# Patient Record
Sex: FEMALE | Race: WHITE | NOT HISPANIC OR LATINO | Employment: OTHER | URBAN - METROPOLITAN AREA
[De-identification: names, ages, dates, MRNs, and addresses within clinical notes are randomized per-mention and may not be internally consistent; named-entity substitution may affect disease eponyms.]

---

## 2017-01-24 ENCOUNTER — ALLIED HEALTH/NURSE VISIT (OUTPATIENT)
Dept: CARDIOLOGY | Facility: CLINIC | Age: 82
End: 2017-01-24
Payer: COMMERCIAL

## 2017-01-24 DIAGNOSIS — Z95.0 CARDIAC PACEMAKER IN SITU: Primary | ICD-10-CM

## 2017-01-24 PROCEDURE — 93293 PM PHONE R-STRIP DEVICE EVAL: CPT | Performed by: INTERNAL MEDICINE

## 2017-01-24 NOTE — PROGRESS NOTES
~ 90 day office teletrace ~  Appropriate AS/ at time of check. Magnet response WNL. Six month threshold scheduled in April. Anatoliy HEAD

## 2017-02-17 ENCOUNTER — OFFICE VISIT (OUTPATIENT)
Dept: CARDIOLOGY | Facility: CLINIC | Age: 82
End: 2017-02-17
Attending: INTERNAL MEDICINE
Payer: COMMERCIAL

## 2017-02-17 VITALS
HEIGHT: 64 IN | DIASTOLIC BLOOD PRESSURE: 68 MMHG | WEIGHT: 148 LBS | BODY MASS INDEX: 25.27 KG/M2 | SYSTOLIC BLOOD PRESSURE: 130 MMHG | HEART RATE: 64 BPM

## 2017-02-17 DIAGNOSIS — I34.0 MITRAL VALVE INSUFFICIENCY, UNSPECIFIED ETIOLOGY: ICD-10-CM

## 2017-02-17 DIAGNOSIS — I42.9 CARDIOMYOPATHY (H): Primary | ICD-10-CM

## 2017-02-17 DIAGNOSIS — I48.20 CHRONIC ATRIAL FIBRILLATION (H): ICD-10-CM

## 2017-02-17 PROCEDURE — 99213 OFFICE O/P EST LOW 20 MIN: CPT | Performed by: NURSE PRACTITIONER

## 2017-02-17 NOTE — LETTER
2/17/2017    Kirit Michel MD  [address] [address]    RE: Haritha Trujillo       Dear Colleague,    I had the pleasure of seeing Haritha Trujillo in the [facility].    Ms. Trujillo is a delightful 89-year-old woman well known to me here at the clinic.  She is an established patient of Dr. Tatum.      Briefly, she has a history of cardiomyopathy, permanent atrial fibrillation with previous AV richard ablation.  She underwent biventricular pacemaker 01/2015.  Unfortunately, her LV threshold is high, which has led to a rapid battery depletion.  She has approximately 2-1/2 years battery longevity on her device.      Last echo was in 03/2015, showed an EF of 27% with global hypokinesis.  Followup echo has not been done since that time.      Her energy level is not the best; however, she continues to live independently at 89 and is still very active.  She denies chest pain, orthopnea, PND, syncope.  She does get exertional shortness of breath.      Last device interrogation on 10/14/2016 showed 98% BiV paced, 0.4 A paced.  Again, battery longevity is 2-1/2 years.  She did have 3 nonsustained VT episodes, the longest being 8 beats at 167 beats per minute.  The patient's underlying rhythm was complete heart block which was achieved by AV richard ablation.      Lab work was completed at Dr. Michel' office in October.  A CMP, CBC all were unremarkable.  She is here today for followup.  All other review of systems and past medical history are noted below.     Outpatient Encounter Prescriptions as of 2/17/2017   Medication Sig Dispense Refill     lisinopril (PRINIVIL,ZESTRIL) 5 MG tablet Take 2 tablets (10 mg) by mouth daily 180 tablet 3     metoprolol (TOPROL-XL) 50 MG 24 hr tablet Take 1 tablet (50 mg) by mouth 2 times daily 180 tablet 3     acetaminophen (TYLENOL) 500 MG tablet Take 500-1,000 mg by mouth every 6 hours as needed for mild pain        Probiotic Product (PROBIOTIC DAILY PO) Take by mouth daily       senna-docusate (SENOKOT-S;PERICOLACE) 8.6-50 MG per tablet Take 1 tablet by mouth daily       furosemide (LASIX) 20 MG tablet Take 0.5 tablets (10 mg) by mouth daily 30 tablet 0     WARFARIN SODIUM PO Take 4.5 mg by mouth daily 1 & 1/2 tablet of 3mg       NITROGLYCERIN SL Place 0.4 mg under the tongue every 5 minutes as needed for chest pain       calcium carbonate-vitamin D (CALCIUM + D) 600-200 MG-UNIT TABS Take 1 tablet by mouth daily.       atorvastatin (LIPITOR) 40 MG tablet Take 40 mg by mouth At Bedtime        Multiple Vitamins-Minerals (CENTRUM SILVER) per tablet Take 1 tablet by mouth daily.         cycloSPORINE (RESTASIS) 0.05 % ophthalmic emulsion Place 1 drop into both eyes every 12 hours.       No facility-administered encounter medications on file as of 2/17/2017.       ASSESSMENT AND PLAN:  Ms. Trujillo is a delightful 89-year-old woman here today for followup.  She is doing well from a cardiac standpoint.   1.  Nonischemic cardiomyopathy, EF of 27%.  I have asked that we do a followup echo.  She is curious to see how her ejection fraction is doing.  She does have some exertional shortness of breath.  We could be more aggressive with her medications and change metoprolol to carvedilol to see if there is any benefit.  I will not do that until I review the echo.  From a functional standpoint, she is stable and is between a class II and III at this time.  She appears much younger than her stated age and continues to be very active.  She is euvolemic on exam.   2.  Atrial fibrillation.  She is on warfarin, which is managed by Dr. Michel.  She does get tired of having INRs completed.  We will again make sure that she has no significant mitral issues, where she could perhaps be on a NOAC.  She will continue warfarin for now.   3.  Biventricular pacemaker.  Her LV threshold is high.  She is still BiV paced 98% of the time.  Battery longevity  is 2-1/2 years.      She will follow up with Dr. Tatum in 6-9 months for her annual cardiology followup.  She is dependent on her device, therefore, will be continued to monitor closely for early depletion.      Thank you for including us in her care.  I will call her on the phone to review her echo.     Sincerely,    Kat Yo NP, APRN [facility]

## 2017-02-17 NOTE — PROGRESS NOTES
HPI and Plan: #[id number]  See dictation    Orders Placed This Encounter   Procedures     Follow-Up with Electrophysiologist     Echocardiogram       No orders of the defined types were placed in this encounter.      There are no discontinued medications.      Encounter Diagnosis   Name Primary?     Cardiomyopathy (H) Yes       CURRENT MEDICATIONS:  Current Outpatient Prescriptions   Medication Sig Dispense Refill     lisinopril (PRINIVIL,ZESTRIL) 5 MG tablet Take 2 tablets (10 mg) by mouth daily 180 tablet 3     metoprolol (TOPROL-XL) 50 MG 24 hr tablet Take 1 tablet (50 mg) by mouth 2 times daily 180 tablet 3     acetaminophen (TYLENOL) 500 MG tablet Take 500-1,000 mg by mouth every 6 hours as needed for mild pain       Probiotic Product (PROBIOTIC DAILY PO) Take by mouth daily       senna-docusate (SENOKOT-S;PERICOLACE) 8.6-50 MG per tablet Take 1 tablet by mouth daily       furosemide (LASIX) 20 MG tablet Take 0.5 tablets (10 mg) by mouth daily 30 tablet 0     WARFARIN SODIUM PO Take 4.5 mg by mouth daily 1 & 1/2 tablet of 3mg       NITROGLYCERIN SL Place 0.4 mg under the tongue every 5 minutes as needed for chest pain       calcium carbonate-vitamin D (CALCIUM + D) 600-200 MG-UNIT TABS Take 1 tablet by mouth daily.       atorvastatin (LIPITOR) 40 MG tablet Take 40 mg by mouth At Bedtime        Multiple Vitamins-Minerals (CENTRUM SILVER) per tablet Take 1 tablet by mouth daily.         cycloSPORINE (RESTASIS) 0.05 % ophthalmic emulsion Place 1 drop into both eyes every 12 hours.         ALLERGIES     Allergies   Allergen Reactions     Hctz      Lansoprazole      diarrhea   Prevacid       PAST MEDICAL HISTORY:  Past Medical History   Diagnosis Date     Aortic regurgitation 12/14/2014     mild (1+) per echo     Atrial fibrillation (H)      Cardiomyopathy (H)      CHF (congestive heart failure) (H)      Chronic kidney disease, stage 3      Coronary atherosclerosis of unspecified type of vessel, native or graft  2000     normal left coronary arteries and tight obstruction in distal RCA, too small for revascularization, medical management     Degeneration of cervical intervertebral disc      cervical spine     Essential hypertension, benign      Mitral regurgitation 2014     mod-mod/sev (2-3+) per echo     Other and unspecified hyperlipidemia      Pacemaker      Palpitations      Pulmonary hypertension (H) 3/16/2013     mild per echo with RVSP 31mmHg +RAP      Pulmonary valve regurgitation 2014     mod (2+) per echo     Tricuspid regurgitation 2014     mild (1+) per echo     Unspecified tinnitus      chronic       PAST SURGICAL HISTORY:  Past Surgical History   Procedure Laterality Date     C nonspecific procedure       right rotator cuff tear OR     C nonspecific procedure  1983     microdiscectomy     C nonspecific procedure       C-sections x 3      C nonspecific procedure       appendectomy     C nonspecific procedure       bilateral benign breast biopsy      C nonspecific procedure       right knee arthroscopic OR     C nonspecific procedure  1974     enteritis     Coronary angiography adult order  2000     Hrw pacemaker permanent  2015     BI- ventricular     Implant pacemaker  2010     dual chamber Medtronic       FAMILY HISTORY:  Family History   Problem Relation Age of Onset     C.A.D. Father       53     C.A.D. Brother      open heart surgery age 53     DIABETES Maternal Grandmother      Hypertension Mother      CANCER Mother      pancreatic     CANCER Daughter      ovavian     Eye Disorder Mother      glacoma     Lipids Father      Neurologic Disorder Son      MS       SOCIAL HISTORY:  Social History     Social History     Marital status:      Spouse name: N/A     Number of children: N/A     Years of education: N/A     Social History Main Topics     Smoking status: Former Smoker     Smokeless tobacco: None      Comment: 35 yrs ago     Alcohol use No     Drug use:  "No     Sexual activity: Not Asked     Other Topics Concern     Parent/Sibling W/ Cabg, Mi Or Angioplasty Before 65f 55m? Yes     Special Diet Yes     low sodium     Exercise Yes     active life style     Seat Belt Yes     Social History Narrative       Review of Systems:  Skin:  Negative       Eyes:  Positive for glasses    ENT:  Negative      Respiratory:  Positive for dyspnea on exertion;cough (Cold sx last 3 days) same   Cardiovascular:  Negative      Gastroenterology: Negative      Genitourinary:  Negative      Musculoskeletal:  Positive for arthritis    Neurologic:  Positive for numbness or tingling of feet numbing in toes at night   Psychiatric:  Negative      Heme/Lymph/Imm:  Positive for allergies    Endocrine:  Negative        Physical Exam:  Vitals: /68  Pulse 64  Ht 1.626 m (5' 4\")  Wt 67.1 kg (148 lb)  BMI 25.4 kg/m2    Constitutional:  cooperative, alert and oriented, well developed, well nourished, in no acute distress        Skin:  warm and dry to the touch, no apparent skin lesions or masses noted        Head:  normocephalic, no masses or lesions        Eyes:  pupils equal and round, conjunctivae and lids unremarkable, sclera white, no xanthalasma, EOMS intact, no nystagmus        ENT:  no pallor or cyanosis, dentition good        Neck:  carotid pulses are full and equal bilaterally, JVP normal, no carotid bruit, no thyromegaly        Chest:  normal breath sounds, clear to auscultation, normal A-P diameter, normal symmetry, normal respiratory excursion, no use of accessory muscles          Cardiac: regular rhythm, normal S1/S2, no S3 or S4, apical impulse not displaced, no murmurs, gallops or rubs                  Abdomen:  abdomen soft        Extremities and Back:  no deformities, clubbing, cyanosis, erythema observed;no edema              Neurological:  affect appropriate, oriented to time, person and place;no gross motor deficits              AGUSTO Tatum MD   [facility]  [address]  [place] [address]

## 2017-02-17 NOTE — PATIENT INSTRUCTIONS
Echo and I will call you to review the results  See Dr. Tatum in 6 months.  Call with questions or concerns

## 2017-02-17 NOTE — MR AVS SNAPSHOT
After Visit Summary   2/17/2017    Haritha Trujillo    MRN: [id number]           Patient Information     Date Of Birth          4/27/1925        Visit Information        Provider Department      2/17/2017 1:10 PM Kat Yo APRN CNP [facility] [place]        Today's Diagnoses     Cardiomyopathy (H)    -  1      Care Instructions    Echo and I will call you to review the results  See Dr. Tatum in 6 months.  Call with questions or concerns        Follow-ups after your visit        Additional Services     Follow-Up with Electrophysiologist       With device threshold                  Your next 10 appointments already scheduled     Apr 25, 2017 10:30 AM CDT   Pacemaker Check with HANG WARREN   [facility] ([facility])    [facility] [id number]  [facility] [address] [phone]              Future tests that were ordered for you today     Open Future Orders        Priority Expected Expires Ordered    Follow-Up with Electrophysiologist Routine 8/16/2017 2/17/2018 2/17/2017    Echocardiogram Routine 2/24/2017 2/17/2018 2/17/2017            Who to contact     If you have questions or need follow up information about today's clinic visit or your schedule please contact [facility] directly at [phone].  Normal or non-critical lab and imaging results will be communicated to you by MyChart, letter or phone within 4 business days after the clinic has received the results. If you do not hear from us within 7 days, please contact the clinic through MyChart or phone. If you have a critical or abnormal lab result, we will notify you by phone as soon as possible.  Submit refill requests through Visualead or call your pharmacy and they will forward the refill request to us. Please allow 3 business days for your refill to be completed.          Additional Information About Your  "Visit        MyChart Information     Signal lets you send messages to your doctor, view your test results, renew your prescriptions, schedule appointments and more. To sign up, go to [email or web address] . Click on \"Log in\" on the left side of the screen, which will take you to the Welcome page. Then click on \"Sign up Now\" on the right side of the page.     You will be asked to enter the access code listed below, as well as some personal information. Please follow the directions to create your username and password.     Your access code is: [email or web address]  Expires: 2017  1:40 PM     Your access code will  in 90 days. If you need help or a new code, please call your [place] clinic or [phone].        Care EveryWhere ID     This is your Care EveryWhere ID. This could be used by other organizations to access your [place] medical records  [id number]        Your Vitals Were     Pulse Height BMI (Body Mass Index)             64 1.626 m (5' 4\") 25.4 kg/m2          Blood Pressure from Last 3 Encounters:   17 130/68   16 120/90   12/09/15 138/62    Weight from Last 3 Encounters:   17 67.1 kg (148 lb)   16 67.1 kg (148 lb)   12/09/15 64.9 kg (143 lb)              We Performed the Following     Follow-Up with Cardiac Advanced Practice Provider        Primary Care Provider Office Phone # Fax #    Kirit Michel [phone]       [address] [address]        Thank you!     Thank you for choosing [facility] [place]  for your care. Our goal is always to provide you with excellent care. Hearing back from our patients is one way we can continue to improve our services. Please take a few minutes to complete the written survey that you may receive in the mail after your visit with us. Thank you!             Your Updated Medication List - Protect others around you: Learn how to safely use, store " and throw away your medicines at www.disposemymeds.org.          This list is accurate as of: 2/17/17  1:40 PM.  Always use your most recent med list.                   Brand Name Dispense Instructions for use    acetaminophen 500 MG tablet    TYLENOL     Take 500-1,000 mg by mouth every 6 hours as needed for mild pain       atorvastatin 40 MG tablet    LIPITOR     Take 40 mg by mouth At Bedtime       calcium + D 600-200 MG-UNIT Tabs   Generic drug:  calcium carbonate-vitamin D      Take 1 tablet by mouth daily.       CENTRUM SILVER per tablet      Take 1 tablet by mouth daily.       furosemide 20 MG tablet    LASIX    30 tablet    Take 0.5 tablets (10 mg) by mouth daily       lisinopril 5 MG tablet    PRINIVIL/ZESTRIL    180 tablet    Take 2 tablets (10 mg) by mouth daily       metoprolol 50 MG 24 hr tablet    TOPROL-XL    180 tablet    Take 1 tablet (50 mg) by mouth 2 times daily       NITROGLYCERIN SL      Place 0.4 mg under the tongue every 5 minutes as needed for chest pain       PROBIOTIC DAILY PO      Take by mouth daily       RESTASIS 0.05 % ophthalmic emulsion   Generic drug:  cycloSPORINE      Place 1 drop into both eyes every 12 hours.       senna-docusate 8.6-50 MG per tablet    SENOKOT-S;PERICOLACE     Take 1 tablet by mouth daily       WARFARIN SODIUM PO      Take 4.5 mg by mouth daily 1 & 1/2 tablet of 3mg

## 2017-02-18 NOTE — PROGRESS NOTES
HISTORY OF PRESENT ILLNESS:  Ms. Trujillo is a delightful 89-year-old woman well known to me here at the clinic.  She is an established patient of Dr. Tatum.      Briefly, she has a history of cardiomyopathy, permanent atrial fibrillation with previous AV richard ablation.  She underwent biventricular pacemaker 01/2015.  Unfortunately, her LV threshold is high, which has led to a rapid battery depletion.  She has approximately 2-1/2 years battery longevity on her device.      Last echo was in 03/2015, showed an EF of 27% with global hypokinesis.  Followup echo has not been done since that time.      Her energy level is not the best; however, she continues to live independently at 89 and is still very active.  She denies chest pain, orthopnea, PND, syncope.  She does get exertional shortness of breath.      Last device interrogation on 10/14/2016 showed 98% BiV paced, 0.4 A paced.  Again, battery longevity is 2-1/2 years.  She did have 3 nonsustained VT episodes, the longest being 8 beats at 167 beats per minute.  The patient's underlying rhythm was complete heart block which was achieved by AV richard ablation.      Lab work was completed at Dr. Michel' office in October.  A CMP, CBC all were unremarkable.  She is here today for followup.  All other review of systems and past medical history are noted below.      ASSESSMENT AND PLAN:  Ms. Trujillo is a delightful 89-year-old woman here today for followup.  She is doing well from a cardiac standpoint.   1.  Nonischemic cardiomyopathy, EF of 27%.  I have asked that we do a followup echo.  She is curious to see how her ejection fraction is doing.  She does have some exertional shortness of breath.  We could be more aggressive with her medications and change metoprolol to carvedilol to see if there is any benefit.  I will not do that until I review the echo.  From a functional standpoint, she is stable and is between a class II and III at this time.  She appears much younger  than her stated age and continues to be very active.  She is euvolemic on exam.   2.  Atrial fibrillation.  She is on warfarin, which is managed by Dr. Michel.  She does get tired of having INRs completed.  We will again make sure that she has no significant mitral issues, where she could perhaps be on a NOAC.  She will continue warfarin for now.   3.  Biventricular pacemaker.  Her LV threshold is high.  She is still BiV paced 98% of the time.  Battery longevity is 2-1/2 years.      She will follow up with Dr. Tatum in 6-9 months for her annual cardiology followup.  She is dependent on her device, therefore, will be continued to monitor closely for early depletion.      Thank you for including us in her care.  I will call her on the phone to review her echo.         NADER MENJIVAR NP             D: 2017 15:02   T: 2017 23:19   MT: KATELYNN      Name:     SEVERIANO VALLEJO   MRN:      [id number]        Account:      [id number]   :      1925           Service Date: 2017      Document: [id number]

## 2017-03-01 ENCOUNTER — HOSPITAL ENCOUNTER (OUTPATIENT)
Dept: CARDIOLOGY | Facility: CLINIC | Age: 82
Discharge: HOME | End: 2017-03-01
Attending: NURSE PRACTITIONER | Admitting: NURSE PRACTITIONER
Payer: MEDICARE

## 2017-03-01 DIAGNOSIS — I42.9 CARDIOMYOPATHY (H): ICD-10-CM

## 2017-03-01 PROCEDURE — 93306 TTE W/DOPPLER COMPLETE: CPT | Mod: 26 | Performed by: INTERNAL MEDICINE

## 2017-03-01 PROCEDURE — 93306 TTE W/DOPPLER COMPLETE: CPT

## 2017-04-25 ENCOUNTER — ALLIED HEALTH/NURSE VISIT (OUTPATIENT)
Dept: CARDIOLOGY | Facility: CLINIC | Age: 82
End: 2017-04-25
Payer: COMMERCIAL

## 2017-04-25 DIAGNOSIS — Z95.0 CARDIAC PACEMAKER IN SITU: Primary | ICD-10-CM

## 2017-04-25 PROCEDURE — 93281 PM DEVICE PROGR EVAL MULTI: CPT | Performed by: INTERNAL MEDICINE

## 2017-04-25 NOTE — MR AVS SNAPSHOT
"              After Visit Summary   4/25/2017    Haritha Trujillo    MRN: [id number]           Patient Information     Date Of Birth          4/27/1925        Visit Information        Provider Department      4/25/2017 10:30 AM HANG WARREN [facility]        Today's Diagnoses     Cardiac pacemaker in situ    -  1       Follow-ups after your visit        Your next 10 appointments already scheduled     Jul 19, 2017  1:30 PM CDT   Teletrace with MAURICIO TECH1   [facility] ([facility])    [address] [place] [address] [address]  [facility] [address] [phone]            Jul 19, 2017  1:50 PM CDT   AFIB Return with NAVYA Allen CNP   [facility] ([facility])    [address] [place] [address] [address]  [facility] [id number] [phone]              Who to contact     If you have questions or need follow up information about today's clinic visit or your schedule please contact [facility] directly at [phone].  Normal or non-critical lab and imaging results will be communicated to you by DigitalGlobehart, letter or phone within 4 business days after the clinic has received the results. If you do not hear from us within 7 days, please contact the clinic through Vicinot or phone. If you have a critical or abnormal lab result, we will notify you by phone as soon as possible.  Submit refill requests through Carma or call your pharmacy and they will forward the refill request to us. Please allow 3 business days for your refill to be completed.          Additional Information About Your Visit        DigitalGlobehart Information     Carma lets you send messages to your doctor, view your test results, renew your prescriptions, schedule appointments and more. To sign up, go to [email or web address] . Click on \"Log in\" on the left side of the screen, which will take you " "to the Welcome page. Then click on \"Sign up Now\" on the right side of the page.     You will be asked to enter the access code listed below, as well as some personal information. Please follow the directions to create your username and password.     Your access code is: [email or web address]  Expires: 2017  2:40 PM     Your access code will  in 90 days. If you need help or a new code, please call your [place] clinic or [phone].        Care EveryWhere ID     This is your Care EveryWhere ID. This could be used by other organizations to access your [place] medical records  [id number]         Blood Pressure from Last 3 Encounters:   17 130/68   16 120/90   12/09/15 138/62    Weight from Last 3 Encounters:   17 67.1 kg (148 lb)   16 67.1 kg (148 lb)   12/09/15 64.9 kg (143 lb)              We Performed the Following     PM DEVICE PROGRAMMING EVAL, MULTI LEAD PACER ([id number])        Primary Care Provider Office Phone # Fax #    Kirit Michel [phone]       [place] [address]        Thank you!     Thank you for choosing [facility] [place]  for your care. Our goal is always to provide you with excellent care. Hearing back from our patients is one way we can continue to improve our services. Please take a few minutes to complete the written survey that you may receive in the mail after your visit with us. Thank you!             Your Updated Medication List - Protect others around you: Learn how to safely use, store and throw away your medicines at www.disposemymeds.org.          This list is accurate as of: 17 11:01 AM.  Always use your most recent med list.                   Brand Name Dispense Instructions for use    acetaminophen 500 MG tablet    TYLENOL     Take 500-1,000 mg by mouth every 6 hours as needed for mild pain       atorvastatin 40 MG tablet    LIPITOR     Take 40 mg by " mouth At Bedtime       calcium + D 600-200 MG-UNIT Tabs   Generic drug:  calcium carbonate-vitamin D      Take 1 tablet by mouth daily.       CENTRUM SILVER per tablet      Take 1 tablet by mouth daily.       furosemide 20 MG tablet    LASIX    30 tablet    Take 0.5 tablets (10 mg) by mouth daily       lisinopril 5 MG tablet    PRINIVIL/ZESTRIL    180 tablet    Take 2 tablets (10 mg) by mouth daily       metoprolol 50 MG 24 hr tablet    TOPROL-XL    180 tablet    Take 1 tablet (50 mg) by mouth 2 times daily       NITROGLYCERIN SL      Place 0.4 mg under the tongue every 5 minutes as needed for chest pain       PROBIOTIC DAILY PO      Take by mouth daily       RESTASIS 0.05 % ophthalmic emulsion   Generic drug:  cycloSPORINE      Place 1 drop into both eyes every 12 hours.       senna-docusate 8.6-50 MG per tablet    SENOKOT-S;PERICOLACE     Take 1 tablet by mouth daily       WARFARIN SODIUM PO      Take 4.5 mg by mouth daily 1 & 1/2 tablet of 3mg

## 2017-04-25 NOTE — PROGRESS NOTES
Med[employer] Consult[employer]/ Pacemaker Device Check  : 95 % with 15% VSR pacing.   Mode: VVIR        Underlying Rhythm: afib with vent rate < 3  Heart Rate: Adequate variation per histogram. Optivol fluid status stable  Sensing: stable    Pacing Threshold: stable- she has occassional diaphragmatic stim. Impedance: stable  Battery Status: 2.5 years  Atrial Arrhythmia: afib - on couamdin  Ventricular Arrhythmia: 2 NSVT episodes with rates 150 - 170s.   Setting Change: none    Care Plan: f/u 3 months teletrace and with Kat Escobedo

## 2017-06-24 ENCOUNTER — OFFICE VISIT (OUTPATIENT)
Dept: URGENT CARE | Facility: URGENT CARE | Age: 82
End: 2017-06-24
Payer: COMMERCIAL

## 2017-06-24 VITALS
SYSTOLIC BLOOD PRESSURE: 154 MMHG | HEART RATE: 70 BPM | TEMPERATURE: 98 F | OXYGEN SATURATION: 98 % | DIASTOLIC BLOOD PRESSURE: 75 MMHG

## 2017-06-24 DIAGNOSIS — J06.9 VIRAL UPPER RESPIRATORY TRACT INFECTION: Primary | ICD-10-CM

## 2017-06-24 PROCEDURE — 99212 OFFICE O/P EST SF 10 MIN: CPT | Performed by: INTERNAL MEDICINE

## 2017-06-24 NOTE — MR AVS SNAPSHOT
"              After Visit Summary   6/24/2017    Haritha Trujillo    MRN: [id number]           Patient Information     Date Of Birth          4/27/1925        Visit Information        Provider Department      6/24/2017 11:25 AM Dipak Nettles MD [facility]        Today's Diagnoses     Viral upper respiratory tract infection    -  1       Follow-ups after your visit        Your next 10 appointments already scheduled     Jul 19, 2017  1:30 PM CDT   Teletrace with MAURICIO TECH1   [facility] ([facility])    [address] [place] [address] [address]  [facility] [id number] [phone]            Jul 19, 2017  1:50 PM CDT   AFIB Return with NAVYA Allen CNP   [facility] ([facility])    [address] [place], [place] [id number]  [facility] [address] [phone]              Who to contact     If you have questions or need follow up information about today's clinic visit or your schedule please contact [facility] directly at [phone].  Normal or non-critical lab and imaging results will be communicated to you by MemberPlanethart, letter or phone within 4 business days after the clinic has received the results. If you do not hear from us within 7 days, please contact the clinic through MemberPlanethart or phone. If you have a critical or abnormal lab result, we will notify you by phone as soon as possible.  Submit refill requests through Re[employer] or call your pharmacy and they will forward the refill request to us. Please allow 3 business days for your refill to be completed.          Additional Information About Your Visit        MyChart Information     Re[employer] lets you send messages to your doctor, view your test results, renew your prescriptions, schedule appointments and more. To sign up, go to [email or web address] . Click on \"Log in\" on the left side of the screen, which will take you to the " "Welcome page. Then click on \"Sign up Now\" on the right side of the page.     You will be asked to enter the access code listed below, as well as some personal information. Please follow the directions to create your username and password.     Your access code is: [email or web address]  Expires: 2017 12:05 PM     Your access code will  in 90 days. If you need help or a new code, please call your [place] clinic or [phone].        Care EveryWhere ID     This is your Care EveryWhere ID. This could be used by other organizations to access your [place] medical records  [id number]        Your Vitals Were     Pulse Temperature Pulse Oximetry Breastfeeding?          70 98  F (36.7  C) (Oral) 98% No         Blood Pressure from Last 3 Encounters:   17 154/75   17 130/68   16 120/90    Weight from Last 3 Encounters:   17 148 lb (67.1 kg)   16 148 lb (67.1 kg)   12/09/15 143 lb (64.9 kg)              Today, you had the following     No orders found for display       Primary Care Provider Office Phone # Fax #    Kirit Michel [phone]       [place] [address]        Equal Access to Services     [facility] Center[facility] : Hadii aad ku hadasho Soomaali, waaxda luqadaha, qaybta kaalmada adeegyada, lori baer . So [facility] [phone].    ATENCIÓN: Si habla español, tiene a fofana disposición servicios gratuitos de asistencia lingüística. Llame al [phone].    We comply with applicable federal civil rights laws and [place] laws. We do not discriminate on the basis of race, color, national origin, age, disability sex, sexual orientation or gender identity.            Thank you!     Thank you for choosing [facility]  for your care. Our goal is always to provide you with excellent care. Hearing back from our patients is one way we can continue to improve our services. Please take a few " minutes to complete the written survey that you may receive in the mail after your visit with us. Thank you!             Your Updated Medication List - Protect others around you: Learn how to safely use, store and throw away your medicines at www.disposemymeds.org.          This list is accurate as of: 6/24/17 12:05 PM.  Always use your most recent med list.                   Brand Name Dispense Instructions for use Diagnosis    acetaminophen 500 MG tablet    TYLENOL     Take 500-1,000 mg by mouth every 6 hours as needed for mild pain        atorvastatin 40 MG tablet    LIPITOR     Take 40 mg by mouth At Bedtime        calcium + D 600-200 MG-UNIT Tabs   Generic drug:  calcium carbonate-vitamin D      Take 1 tablet by mouth daily.        CENTRUM SILVER per tablet      Take 1 tablet by mouth daily.        furosemide 20 MG tablet    LASIX    30 tablet    Take 0.5 tablets (10 mg) by mouth daily    Pulmonary edema       lisinopril 5 MG tablet    PRINIVIL/ZESTRIL    180 tablet    Take 2 tablets (10 mg) by mouth daily    CHF (congestive heart failure) (H)       metoprolol 50 MG 24 hr tablet    TOPROL-XL    180 tablet    Take 1 tablet (50 mg) by mouth 2 times daily    CHF (congestive heart failure) (H)       NITROGLYCERIN SL      Place 0.4 mg under the tongue every 5 minutes as needed for chest pain        PROBIOTIC DAILY PO      Take by mouth daily        RESTASIS 0.05 % ophthalmic emulsion   Generic drug:  cycloSPORINE      Place 1 drop into both eyes every 12 hours.        senna-docusate 8.6-50 MG per tablet    SENOKOT-S;PERICOLACE     Take 1 tablet by mouth daily        WARFARIN SODIUM PO      Take 4.5 mg by mouth daily 1 & 1/2 tablet of 3mg

## 2017-06-24 NOTE — PROGRESS NOTES
[facility] Progress Note        Dipak Nettles MD, MPH  06/24/2017        History:      Haritha Trujillo is a pleasant 89 year old year old female with a chief complaint of nasal congestion and occasional cough since 3 days ago.   No fever or chills.   No dyspnea or chest pain.   No smoking history.   No headache or neck pain.  No GI or  symptoms.   No MSK symptoms.         Assessment and Plan:        URI:   I explained to patient that this is a viral syndrome and is often self-limited.  Discussed supportive care with the patient:  Advised to increase fluid intake and rest.  F/u w PCP in 4-5 days, earlier if symptoms worsen.                   Physical Exam:      /75 (BP Location: Right arm, Cuff Size: Adult Large)  Pulse 70  Temp 98  F (36.7  C) (Oral)  SpO2 98%  Breastfeeding? No     Constitutional: Patient is in no distress The patient is pleasant and cooperative.   HEENT: Head:  Head is atraumatic, normocephalic.    Eyes: Pupils are equal, round and reactive to light and accomodation.  Sclera is non-icteric. No conjunctival injection, or exudate noted. Extraocular motion is intact. Visual acuity is intact bilaterally.  Ears:  External acoustic canals are patent and clear.  There is no erythema and bulging( exudate)  of the ( R/L ) tympanic membrane(s ).   Nose:   Nasal congestion w/o drainage or mucosal ulceration is noted.  Throat:  Oral mucosa is moist.  No oral lesions are noted.  No posterior pharyngeal hyperemia nor exudate noted.     Neck Supple.  There is no cervical lymphadenopathy.  No nuchal rigidity noted.  There is no meningismus.     Cardiovascular: Heart is regular to rate and rhythm.  No murmur is noted.     Lungs: Clear in the anterior and posterior pulmonary fields.   Abdomen: Soft and non-tender.    Back No flank tenderness is noted.   Extremeties No edema, no calf tenderness.   Neuro: No focal deficit.   Skin No petechiae or purpura is noted.  There is no rash.    Mood Normal              Data:      All new lab and imaging data was reviewed.   Results for orders placed or performed during the hospital encounter of 17   Echocardiogram    Narrative    [id number]  [facility]  [id number]  [id number]^TIARA^NADER^N        [facility]  [facility]  Echocardiography Laboratory  [address]  [address]  [place] [address]  Phone [phone]  Fax [phone]        Name: SEVERIANO VALLEJO  MRN: [id number]  : 1925  Study Date: 2017 09:24 AM  Age: 89 yrs  Gender: Female  Patient Location: [facility]  Reason For Study: Cardiomyopathy, unspecified  Ordering Physician: NADER MENJIVAR  Referring Physician: Kirit Michel  Performed By: Carola Boothe     BSA: 1.7 m2  Height: 64 in  Weight: 148 lb  _____________________________________________________________________________  __        Procedure  Complete Echo Adult.  _____________________________________________________________________________  __        Interpretation Summary     Left ventricular systolic function is mildly reduced.  The visual ejection fraction is estimated at 45-50%.  The ejection fraction is estimated at 48% by Reeves's biplane method.  There is mild global hypokinesia of the left ventricle.  E by E prime ratio is greater than 15, that likely suggests increased left  ventricular filling pressures.  There is a catheter/pacemaker lead seen in the right ventricle.  The left atrium is mildly dilated.  Pacer wire in right atrium  There is mild (1+) mitral regurgitation.  There is mild to moderate (1-2+) tricuspid regurgitation.  The right ventricular systolic pressure is approximated at 25mmHg plus the  right atrial pressure.  There is moderate (2+) aortic regurgitation.  There is mild (1+) pulmonic valvular regurgitation.  The ascending aorta is moderately dilated (4.5 cm).  Marked improvement in left ventricular function since the study of 3/16/2015.  No other  significant changes.  _____________________________________________________________________________  __        Left Ventricle  The left ventricle is normal in size. There is normal left ventricular wall  thickness. Left ventricular systolic function is mildly reduced. The visual  ejection fraction is estimated at 45-50%. The visual ejection fraction is  estimated at 48%. Diastolic function not assessed due to atrial fibrillation.  E by E prime ratio is greater than 15, that likely suggests increased left  ventricular filling pressures. There is mild global hypokinesia of the left  ventricle.     Right Ventricle  The right ventricle is normal in structure, function and size. There is a  catheter/pacemaker lead seen in the right ventricle.     Atria  The left atrium is mildly dilated. Right atrial size is normal. Pacer wire in  right atrium.     Mitral Valve  There is mild mitral annular calcification. There is mild (1+) mitral  regurgitation. There is no mitral valve stenosis.     Tricuspid Valve  The tricuspid valve is normal in structure and function. There is mild to  moderate (1-2+) tricuspid regurgitation. The right ventricular systolic  pressure is approximated at 25mmHg plus the right atrial pressure.        Aortic Valve  There is mild trileaflet aortic sclerosis. There is moderate (2+) aortic  regurgitation. No aortic stenosis is present.     Pulmonic Valve  The pulmonic valve is normal in structure and function. There is mild (1+)  pulmonic valvular regurgitation. Normal pulmonic valve velocity.     Vessels  Borderline aortic root dilatation. The ascending aorta is Moderately dilated.  The IVC is normal in size and reactivity with respiration, suggesting normal  central venous pressure.     Pericardium  There is no pericardial effusion.     Rhythm  The rhythm was atrial fibrillation. Paced ventricular rhythm.     _____________________________________________________________________________  __  MMode/2D  Measurements & Calculations  IVSd: 1.1 cm  LVIDd: 4.8 cm  LVIDs: 3.6 cm  LVPWd: 1.0 cm  FS: 24.4 %  EDV(Teich): 105.8 ml  ESV(Teich): 54.6 ml     LV mass(C)d: 181.1 grams  Ao root diam: 3.6 cm  LA dimension: 3.8 cm  asc Aorta Diam: 4.5 cm  LA/Ao: 1.1  LA Volume (BP): 64.7 ml  LA Volume Index (BP): 37.6 ml/m2  TAPSE: 1.3 cm        Doppler Measurements & Calculations  MV E max santy: 90.2 cm/sec  MV A max santy: 42.2 cm/sec  MV E/A: 2.1     MV dec time: 0.19 sec  AI P1/2t: 999.0 msec  TR max santy: 250.2 cm/sec  TR max P.0 mmHg  Lateral E/e': 14.5  Medial E/e': 16.3           _____________________________________________________________________________  __           Report approved by: Vasquez Camargo 2017 10:54 AM

## 2017-06-24 NOTE — NURSING NOTE
"Chief Complaint   Patient presents with     Urgent Care     URI     Cold, cough and sore throat that started about 3 days ago.        Initial /75 (BP Location: Right arm, Cuff Size: Adult Large)  Pulse 70  Temp 98  F (36.7  C) (Oral)  SpO2 98%  Breastfeeding? No Estimated body mass index is 25.4 kg/(m^2) as calculated from the following:    Height as of 2/17/17: 5' 4\" (1.626 m).    Weight as of 2/17/17: 148 lb (67.1 kg).  Medication Reconciliation: complete.  MONIK Greer      "

## 2017-07-12 ENCOUNTER — PRE VISIT (OUTPATIENT)
Dept: CARDIOLOGY | Facility: CLINIC | Age: 82
End: 2017-07-12

## 2017-07-12 ASSESSMENT — CHADS2 SCORE
SEX: FEMALE
VASCULAR DISEASE: NO
CHF: YES
HTN: YES
STROK/TIA/THROM: NO
DIABETES: NO
AGE: >74

## 2017-07-19 ENCOUNTER — OFFICE VISIT (OUTPATIENT)
Dept: CARDIOLOGY | Facility: CLINIC | Age: 82
End: 2017-07-19
Payer: COMMERCIAL

## 2017-07-19 ENCOUNTER — ALLIED HEALTH/NURSE VISIT (OUTPATIENT)
Dept: CARDIOLOGY | Facility: CLINIC | Age: 82
End: 2017-07-19
Payer: COMMERCIAL

## 2017-07-19 VITALS
WEIGHT: 150.5 LBS | SYSTOLIC BLOOD PRESSURE: 146 MMHG | DIASTOLIC BLOOD PRESSURE: 70 MMHG | HEART RATE: 66 BPM | HEIGHT: 64 IN | BODY MASS INDEX: 25.7 KG/M2

## 2017-07-19 DIAGNOSIS — Z95.0 CARDIAC PACEMAKER IN SITU: Primary | ICD-10-CM

## 2017-07-19 DIAGNOSIS — I48.20 CHRONIC ATRIAL FIBRILLATION (H): ICD-10-CM

## 2017-07-19 DIAGNOSIS — I42.8 OTHER CARDIOMYOPATHY (H): Primary | ICD-10-CM

## 2017-07-19 PROCEDURE — 93293 PM PHONE R-STRIP DEVICE EVAL: CPT | Performed by: INTERNAL MEDICINE

## 2017-07-19 PROCEDURE — 99214 OFFICE O/P EST MOD 30 MIN: CPT | Mod: 25 | Performed by: NURSE PRACTITIONER

## 2017-07-19 RX ORDER — NITROGLYCERIN 0.4 MG/1
TABLET SUBLINGUAL
Qty: 25 TABLET | Refills: 1 | Status: ON HOLD | OUTPATIENT
Start: 2017-07-19 | End: 2021-10-07

## 2017-07-19 RX ORDER — LISINOPRIL 5 MG/1
10 TABLET ORAL DAILY
Qty: 180 TABLET | Refills: 3 | Status: SHIPPED | OUTPATIENT
Start: 2017-07-19 | End: 2017-09-12

## 2017-07-19 NOTE — PROGRESS NOTES
HPI and Plan:   Ms. Trujillo is a delightful 89-year-old woman well known to me here at the clinic.  She is an established patient of Dr. Tatum.       Briefly, she has a history of cardiomyopathy, permanent atrial fibrillation with previous AV richard ablation.  She underwent biventricular pacemaker 01/2015.  Unfortunately, her LV threshold is high, which has led to a rapid battery depletion.  She has approximately 2-1/2 years battery longevity on her device.       Last echo was in 03/2017, showed her EF improved from 27% to 45-50% since her Bi-V implant. She had 2+ AR, and 1+ MR, and TR.      Her energy level good, however, she's having increased diaphragmatic stimulation lately. She notices this while she's sitting in her chair reading.  She denies chest pain, orthopnea, PND, syncope.  She does get exertional shortness of breath at times.       Device interrogation today showed 98% BiV paced, 0.4 A paced. Battery longevity is 2-1/2 years.  Seen in device to check for diapragmatic stimulation. LV thresholds elevated at 2.5 V @ 0.8 ms and output has been set at 3 V @ 0.8 ms. She has occassional stim at this setting but it is tolerable. Please refer to device RN's note for complete details.    Lab work was completed at Dr. Michel' office in May which was WNL. She is here today for followup.  All other review of systems and past medical history are noted below.     Assessment and Plan:  Ms. Trujillo is a delightful 89-year-old woman here today for followup.  She is doing well from a cardiac standpoint.   1.  Nonischemic cardiomyopathy, EF improved 45-50%  Euvolemic on exam and on good medical therapy: metoprolol ER, lisinopril, and lasix. No changes at this time.    2.  Atrial fibrillation.    She is on warfarin, which is managed by Dr. Michel.  She does get tired of having INRs completed.  Perhaps she would be a goof candidate for a NOAC.  She will continue warfarin for now.     3. Diaphragmatic stimulation/Biventricular  pacemaker.    Her LV threshold is high, and device RN unable to reprogram device.   I will have her meet with  to discuss options. She has been a good responder to bi-v pacing, but the stimulation appears to be getting worse this week.  She is still BiV paced 95% of the time.  Battery longevity is 2-1/2 years.     Kat Yo NP    Orders Placed This Encounter   Procedures     Follow-Up with Electrophysiologist       Orders Placed This Encounter   Medications     lisinopril (PRINIVIL/ZESTRIL) 5 MG tablet     Sig: Take 2 tablets (10 mg) by mouth daily     Dispense:  180 tablet     Refill:  3     nitroGLYcerin (NITROSTAT) 0.4 MG sublingual tablet     Sig: For chest pain place 1 tablet under the tongue every 5 minutes for 3 doses. If symptoms persist 5 minutes after 1st dose call 911.     Dispense:  25 tablet     Refill:  1       Medications Discontinued During This Encounter   Medication Reason     NITROGLYCERIN SL      lisinopril (PRINIVIL,ZESTRIL) 5 MG tablet Reorder         Encounter Diagnosis   Name Primary?     Chronic combined systolic and diastolic congestive heart failure (H)        CURRENT MEDICATIONS:  Current Outpatient Prescriptions   Medication Sig Dispense Refill     lisinopril (PRINIVIL/ZESTRIL) 5 MG tablet Take 2 tablets (10 mg) by mouth daily 180 tablet 3     nitroGLYcerin (NITROSTAT) 0.4 MG sublingual tablet For chest pain place 1 tablet under the tongue every 5 minutes for 3 doses. If symptoms persist 5 minutes after 1st dose call 911. 25 tablet 1     metoprolol (TOPROL-XL) 50 MG 24 hr tablet Take 1 tablet (50 mg) by mouth 2 times daily 180 tablet 3     acetaminophen (TYLENOL) 500 MG tablet Take 500-1,000 mg by mouth every 6 hours as needed for mild pain       Probiotic Product (PROBIOTIC DAILY PO) Take by mouth daily       senna-docusate (SENOKOT-S;PERICOLACE) 8.6-50 MG per tablet Take 1 tablet by mouth daily       furosemide (LASIX) 20 MG tablet Take 0.5 tablets (10 mg) by mouth daily  30 tablet 0     WARFARIN SODIUM PO Take 4.5 mg by mouth daily 1 & 1/2 tablet of 3mg       calcium carbonate-vitamin D (CALCIUM + D) 600-200 MG-UNIT TABS Take 1 tablet by mouth daily.       atorvastatin (LIPITOR) 40 MG tablet Take 40 mg by mouth At Bedtime        Multiple Vitamins-Minerals (CENTRUM SILVER) per tablet Take 1 tablet by mouth daily.         cycloSPORINE (RESTASIS) 0.05 % ophthalmic emulsion Place 1 drop into both eyes every 12 hours.       [DISCONTINUED] lisinopril (PRINIVIL,ZESTRIL) 5 MG tablet Take 2 tablets (10 mg) by mouth daily 180 tablet 3     [DISCONTINUED] NITROGLYCERIN SL Place 0.4 mg under the tongue every 5 minutes as needed for chest pain         ALLERGIES     Allergies   Allergen Reactions     Hctz      Lansoprazole      diarrhea   Prevacid       PAST MEDICAL HISTORY:  Past Medical History:   Diagnosis Date     Aortic regurgitation 12/14/2014    mild (1+) per echo     Atrial fibrillation (H)      Cardiomyopathy (H)      CHF (congestive heart failure) (H)      Chronic kidney disease, stage 3      Coronary atherosclerosis of unspecified type of vessel, native or graft 5/16/2000    normal left coronary arteries and tight obstruction in distal RCA, too small for revascularization, medical management     Degeneration of cervical intervertebral disc     cervical spine     Essential hypertension, benign      Mitral regurgitation 12/14/2014    mod-mod/sev (2-3+) per echo     Other and unspecified hyperlipidemia      Pacemaker      Pulmonary hypertension (H) 3/16/2013    mild per echo with RVSP 31mmHg +RAP      Pulmonary valve regurgitation 12/14/2014    mod (2+) per echo     Tricuspid regurgitation 12/14/2014    mild (1+) per echo     Unspecified tinnitus     chronic       PAST SURGICAL HISTORY:  Past Surgical History:   Procedure Laterality Date     C NONSPECIFIC PROCEDURE  1999    right rotator cuff tear OR     C NONSPECIFIC PROCEDURE  1983    microdiscectomy     C NONSPECIFIC PROCEDURE       "C-sections x 3      C NONSPECIFIC PROCEDURE      appendectomy     C NONSPECIFIC PROCEDURE      bilateral benign breast biopsy      C NONSPECIFIC PROCEDURE      right knee arthroscopic OR     C NONSPECIFIC PROCEDURE  1974    enteritis     CORONARY ANGIOGRAPHY ADULT ORDER  2000     HRW PACEMAKER PERMANENT  2015    BI- ventricular     IMPLANT PACEMAKER  2010    dual chamber Medtronic       FAMILY HISTORY:  Family History   Problem Relation Age of Onset     Hypertension Mother      CANCER Mother      pancreatic     Eye Disorder Mother      cristian     FAZALABEATRIZ Father       53     Lipids Father      DIABETES Maternal Grandmother      LARON.A.GIDEON Brother      open heart surgery age 53     CANCER Daughter      ovavian     Neurologic Disorder Son      MS       SOCIAL HISTORY:  Social History     Social History     Marital status:      Spouse name: N/A     Number of children: N/A     Years of education: N/A     Social History Main Topics     Smoking status: Former Smoker     Smokeless tobacco: Never Used      Comment: 35 yrs ago     Alcohol use No     Drug use: No     Sexual activity: Not Asked     Other Topics Concern     Parent/Sibling W/ Cabg, Mi Or Angioplasty Before 65f 55m? Yes     Special Diet Yes     low sodium     Exercise Yes     active life style     Seat Belt Yes     Social History Narrative       Review of Systems:  Skin:  Negative       Eyes:  Positive for glasses    ENT:  Negative      Respiratory:  Positive for dyspnea on exertion (Cold sx last 3 days) same   Cardiovascular:  Negative      Gastroenterology: Negative      Genitourinary:  Negative      Musculoskeletal:  Positive for arthritis    Neurologic:  Positive for numbness or tingling of feet numbing in toes at night   Psychiatric:  Negative      Heme/Lymph/Imm:  Positive for allergies    Endocrine:  Negative        Physical Exam:  Vitals: /70  Pulse 66  Ht 1.626 m (5' 4.02\")  Wt 68.3 kg (150 lb 8 oz)  BMI 25.82 " kg/m2    Constitutional:  cooperative, alert and oriented, well developed, well nourished, in no acute distress        Skin:  warm and dry to the touch, no apparent skin lesions or masses noted        Head:  normocephalic, no masses or lesions        Eyes:  pupils equal and round, conjunctivae and lids unremarkable, sclera white, no xanthalasma, EOMS intact, no nystagmus        ENT:  no pallor or cyanosis, dentition good        Neck:  No JVD      Chest:  clear to auscultation   pacemaker incision in the left infraclavicular area was well-healed      Cardiac: regular rhythm;normal S1 and S2;no S3 or S4;no murmurs, gallops or rubs detected                  Abdomen:  abdomen soft        Extremities and Back:  no deformities, clubbing, cyanosis, erythema observed;no edema              Neurological:  affect appropriate, oriented to time, person and place;no gross motor deficits              CC  No referring provider defined for this encounter.

## 2017-07-19 NOTE — LETTER
7/19/2017    Kirit Michel MD  [address] [address]    RE: Haritha GALLOWAY Ronnie       Dear Colleague,    I had the pleasure of seeing Haritha LAVERNE Trujillo in the [facility].    HPI and Plan:   See dictation    Orders Placed This Encounter   Procedures     Follow-Up with Electrophysiologist       Orders Placed This Encounter   Medications     lisinopril (PRINIVIL/ZESTRIL) 5 MG tablet     Sig: Take 2 tablets (10 mg) by mouth daily     Dispense:  180 tablet     Refill:  3     nitroGLYcerin (NITROSTAT) 0.4 MG sublingual tablet     Sig: For chest pain place 1 tablet under the tongue every 5 minutes for 3 doses. If symptoms persist 5 minutes after 1st dose call 911.     Dispense:  25 tablet     Refill:  1       Medications Discontinued During This Encounter   Medication Reason     NITROGLYCERIN SL      lisinopril (PRINIVIL,ZESTRIL) 5 MG tablet Reorder         Encounter Diagnosis   Name Primary?     Chronic combined systolic and diastolic congestive heart failure (H)        CURRENT MEDICATIONS:  Current Outpatient Prescriptions   Medication Sig Dispense Refill     lisinopril (PRINIVIL/ZESTRIL) 5 MG tablet Take 2 tablets (10 mg) by mouth daily 180 tablet 3     nitroGLYcerin (NITROSTAT) 0.4 MG sublingual tablet For chest pain place 1 tablet under the tongue every 5 minutes for 3 doses. If symptoms persist 5 minutes after 1st dose call 911. 25 tablet 1     metoprolol (TOPROL-XL) 50 MG 24 hr tablet Take 1 tablet (50 mg) by mouth 2 times daily 180 tablet 3     acetaminophen (TYLENOL) 500 MG tablet Take 500-1,000 mg by mouth every 6 hours as needed for mild pain       Probiotic Product (PROBIOTIC DAILY PO) Take by mouth daily       senna-docusate (SENOKOT-S;PERICOLACE) 8.6-50 MG per tablet Take 1 tablet by mouth daily       furosemide (LASIX) 20 MG tablet Take 0.5 tablets (10 mg) by mouth daily 30 tablet 0     WARFARIN SODIUM PO Take 4.5 mg by mouth  daily 1 & 1/2 tablet of 3mg       calcium carbonate-vitamin D (CALCIUM + D) 600-200 MG-UNIT TABS Take 1 tablet by mouth daily.       atorvastatin (LIPITOR) 40 MG tablet Take 40 mg by mouth At Bedtime        Multiple Vitamins-Minerals (CENTRUM SILVER) per tablet Take 1 tablet by mouth daily.         cycloSPORINE (RESTASIS) 0.05 % ophthalmic emulsion Place 1 drop into both eyes every 12 hours.       [DISCONTINUED] lisinopril (PRINIVIL,ZESTRIL) 5 MG tablet Take 2 tablets (10 mg) by mouth daily 180 tablet 3     [DISCONTINUED] NITROGLYCERIN SL Place 0.4 mg under the tongue every 5 minutes as needed for chest pain         ALLERGIES     Allergies   Allergen Reactions     Hctz      Lansoprazole      diarrhea   Prevacid       PAST MEDICAL HISTORY:  Past Medical History:   Diagnosis Date     Aortic regurgitation 12/14/2014    mild (1+) per echo     Atrial fibrillation (H)      Cardiomyopathy (H)      CHF (congestive heart failure) (H)      Chronic kidney disease, stage 3      Coronary atherosclerosis of unspecified type of vessel, native or graft 5/16/2000    normal left coronary arteries and tight obstruction in distal RCA, too small for revascularization, medical management     Degeneration of cervical intervertebral disc     cervical spine     Essential hypertension, benign      Mitral regurgitation 12/14/2014    mod-mod/sev (2-3+) per echo     Other and unspecified hyperlipidemia      Pacemaker      Pulmonary hypertension (H) 3/16/2013    mild per echo with RVSP 31mmHg +RAP      Pulmonary valve regurgitation 12/14/2014    mod (2+) per echo     Tricuspid regurgitation 12/14/2014    mild (1+) per echo     Unspecified tinnitus     chronic       PAST SURGICAL HISTORY:  Past Surgical History:   Procedure Laterality Date     C NONSPECIFIC PROCEDURE  1999    right rotator cuff tear OR     C NONSPECIFIC PROCEDURE  1983    microdiscectomy     C NONSPECIFIC PROCEDURE      C-sections x 3      C NONSPECIFIC PROCEDURE       "appendectomy     C NONSPECIFIC PROCEDURE      bilateral benign breast biopsy      C NONSPECIFIC PROCEDURE      right knee arthroscopic OR     C NONSPECIFIC PROCEDURE  1974    enteritis     CORONARY ANGIOGRAPHY ADULT ORDER  2000     HRW PACEMAKER PERMANENT  2015    BI- ventricular     IMPLANT PACEMAKER  2010    dual chamber Medtronic       FAMILY HISTORY:  Family History   Problem Relation Age of Onset     Hypertension Mother      CANCER Mother      pancreatic     Eye Disorder Mother      cristian     LARON.A.D. Father       53     Lipids Father      DIABETES Maternal Grandmother      C.A.D. Brother      open heart surgery age 53     CANCER Daughter      ovavian     Neurologic Disorder Son      MS       SOCIAL HISTORY:  Social History     Social History     Marital status:      Spouse name: N/A     Number of children: N/A     Years of education: N/A     Social History Main Topics     Smoking status: Former Smoker     Smokeless tobacco: Never Used      Comment: 35 yrs ago     Alcohol use No     Drug use: No     Sexual activity: Not Asked     Other Topics Concern     Parent/Sibling W/ Cabg, Mi Or Angioplasty Before 65f 55m? Yes     Special Diet Yes     low sodium     Exercise Yes     active life style     Seat Belt Yes     Social History Narrative       Review of Systems:  Skin:  Negative       Eyes:  Positive for glasses    ENT:  Negative      Respiratory:  Positive for dyspnea on exertion (Cold sx last 3 days) same   Cardiovascular:  Negative      Gastroenterology: Negative      Genitourinary:  Negative      Musculoskeletal:  Positive for arthritis    Neurologic:  Positive for numbness or tingling of feet numbing in toes at night   Psychiatric:  Negative      Heme/Lymph/Imm:  Positive for allergies    Endocrine:  Negative        Physical Exam:  Vitals: /70  Pulse 66  Ht 1.626 m (5' 4.02\")  Wt 68.3 kg (150 lb 8 oz)  BMI 25.82 kg/m2    Constitutional:  cooperative, alert and oriented, well " developed, well nourished, in no acute distress        Skin:  warm and dry to the touch, no apparent skin lesions or masses noted        Head:  normocephalic, no masses or lesions        Eyes:  pupils equal and round, conjunctivae and lids unremarkable, sclera white, no xanthalasma, EOMS intact, no nystagmus        ENT:  no pallor or cyanosis, dentition good        Neck:  carotid pulses are full and equal bilaterally, JVP normal, no carotid bruit, no thyromegaly        Chest:  clear to auscultation   pacemaker incision in the left infraclavicular area was well-healed      Cardiac: regular rhythm;normal S1 and S2;no S3 or S4;no murmurs, gallops or rubs detected                  Abdomen:  abdomen soft        Vascular: not assessed this visit                                        Extremities and Back:  no deformities, clubbing, cyanosis, erythema observed;no edema              Neurological:  affect appropriate, oriented to time, person and place;no gross motor deficits              CC  No referring provider defined for this encounter.                HPI and Plan:   Ms. Trujillo is a delightful 89-year-old woman well known to me here at the clinic.  She is an established patient of Dr. Tatum.       Briefly, she has a history of cardiomyopathy, permanent atrial fibrillation with previous AV richard ablation.  She underwent biventricular pacemaker 01/2015.  Unfortunately, her LV threshold is high, which has led to a rapid battery depletion.  She has approximately 2-1/2 years battery longevity on her device.       Last echo was in 03/2017, showed her EF improved from 27% to 45-50% since her Bi-V implant. She had 2+ AR, and 1+ MR, and TR.      Her energy level good, however, she's having increased diaphragmatic stimulation lately. She notices this while she's sitting in her chair reading.  She denies chest pain, orthopnea, PND, syncope.  She does get exertional shortness of breath at times.       Device interrogation today  showed 98% BiV paced, 0.4 A paced. Battery longevity is 2-1/2 years.  Seen in device to check for diapragmatic stimulation. LV thresholds elevated at 2.5 V @ 0.8 ms and output has been set at 3 V @ 0.8 ms. She has occassional stim at this setting but it is tolerable. Please refer to device RN's note for complete details.    Lab work was completed at Dr. Michel' office in May which was WNL. She is here today for followup.  All other review of systems and past medical history are noted below.     Assessment and Plan:  Ms. Trujillo is a delightful 89-year-old woman here today for followup.  She is doing well from a cardiac standpoint.   1.  Nonischemic cardiomyopathy, EF improved 45-50%  Euvolemic on exam and on good medical therapy: metoprolol ER, lisinopril, and lasix. No changes at this time.    2.  Atrial fibrillation.    She is on warfarin, which is managed by Dr. Michel.  She does get tired of having INRs completed.  Perhaps she would be a goof candidate for a NOAC.  She will continue warfarin for now.     3. Diaphragmatic stimulation/Biventricular pacemaker.    Her LV threshold is high, and device RN unable to reprogram device.   I will have her meet with  to discuss options. She has been a good responder to bi-v pacing, but the stimulation appears to be getting worse this week.  She is still BiV paced 95% of the time.  Battery longevity is 2-1/2 years.     Kat Yo NP    Orders Placed This Encounter   Procedures     Follow-Up with Electrophysiologist       Orders Placed This Encounter   Medications     lisinopril (PRINIVIL/ZESTRIL) 5 MG tablet     Sig: Take 2 tablets (10 mg) by mouth daily     Dispense:  180 tablet     Refill:  3     nitroGLYcerin (NITROSTAT) 0.4 MG sublingual tablet     Sig: For chest pain place 1 tablet under the tongue every 5 minutes for 3 doses. If symptoms persist 5 minutes after 1st dose call 911.     Dispense:  25 tablet     Refill:  1       Medications Discontinued During  This Encounter   Medication Reason     NITROGLYCERIN SL      lisinopril (PRINIVIL,ZESTRIL) 5 MG tablet Reorder         Encounter Diagnosis   Name Primary?     Chronic combined systolic and diastolic congestive heart failure (H)        CURRENT MEDICATIONS:  Current Outpatient Prescriptions   Medication Sig Dispense Refill     lisinopril (PRINIVIL/ZESTRIL) 5 MG tablet Take 2 tablets (10 mg) by mouth daily 180 tablet 3     nitroGLYcerin (NITROSTAT) 0.4 MG sublingual tablet For chest pain place 1 tablet under the tongue every 5 minutes for 3 doses. If symptoms persist 5 minutes after 1st dose call 911. 25 tablet 1     metoprolol (TOPROL-XL) 50 MG 24 hr tablet Take 1 tablet (50 mg) by mouth 2 times daily 180 tablet 3     acetaminophen (TYLENOL) 500 MG tablet Take 500-1,000 mg by mouth every 6 hours as needed for mild pain       Probiotic Product (PROBIOTIC DAILY PO) Take by mouth daily       senna-docusate (SENOKOT-S;PERICOLACE) 8.6-50 MG per tablet Take 1 tablet by mouth daily       furosemide (LASIX) 20 MG tablet Take 0.5 tablets (10 mg) by mouth daily 30 tablet 0     WARFARIN SODIUM PO Take 4.5 mg by mouth daily 1 & 1/2 tablet of 3mg       calcium carbonate-vitamin D (CALCIUM + D) 600-200 MG-UNIT TABS Take 1 tablet by mouth daily.       atorvastatin (LIPITOR) 40 MG tablet Take 40 mg by mouth At Bedtime        Multiple Vitamins-Minerals (CENTRUM SILVER) per tablet Take 1 tablet by mouth daily.         cycloSPORINE (RESTASIS) 0.05 % ophthalmic emulsion Place 1 drop into both eyes every 12 hours.       [DISCONTINUED] lisinopril (PRINIVIL,ZESTRIL) 5 MG tablet Take 2 tablets (10 mg) by mouth daily 180 tablet 3     [DISCONTINUED] NITROGLYCERIN SL Place 0.4 mg under the tongue every 5 minutes as needed for chest pain         ALLERGIES     Allergies   Allergen Reactions     Hctz      Lansoprazole      diarrhea   Prevacid       PAST MEDICAL HISTORY:  Past Medical History:   Diagnosis Date     Aortic regurgitation 12/14/2014     mild (1+) per echo     Atrial fibrillation (H)      Cardiomyopathy (H)      CHF (congestive heart failure) (H)      Chronic kidney disease, stage 3      Coronary atherosclerosis of unspecified type of vessel, native or graft 2000    normal left coronary arteries and tight obstruction in distal RCA, too small for revascularization, medical management     Degeneration of cervical intervertebral disc     cervical spine     Essential hypertension, benign      Mitral regurgitation 2014    mod-mod/sev (2-3+) per echo     Other and unspecified hyperlipidemia      Pacemaker      Pulmonary hypertension (H) 3/16/2013    mild per echo with RVSP 31mmHg +RAP      Pulmonary valve regurgitation 2014    mod (2+) per echo     Tricuspid regurgitation 2014    mild (1+) per echo     Unspecified tinnitus     chronic       PAST SURGICAL HISTORY:  Past Surgical History:   Procedure Laterality Date     C NONSPECIFIC PROCEDURE      right rotator cuff tear OR     C NONSPECIFIC PROCEDURE      microdiscectomy     C NONSPECIFIC PROCEDURE      C-sections x 3      C NONSPECIFIC PROCEDURE      appendectomy     C NONSPECIFIC PROCEDURE      bilateral benign breast biopsy      C NONSPECIFIC PROCEDURE      right knee arthroscopic OR     C NONSPECIFIC PROCEDURE  1974    enteritis     CORONARY ANGIOGRAPHY ADULT ORDER  2000     HRW PACEMAKER PERMANENT  2015    BI- ventricular     IMPLANT PACEMAKER  2010    dual chamber Medtronic       FAMILY HISTORY:  Family History   Problem Relation Age of Onset     Hypertension Mother      CANCER Mother      pancreatic     Eye Disorder Mother      glacoma     LARON.ABEATRIZ Father       53     Lipids Father      DIABETES Maternal Grandmother      C.A.D. Brother      open heart surgery age 53     CANCER Daughter      ovavian     Neurologic Disorder Son      MS       SOCIAL HISTORY:  Social History     Social History     Marital status:      Spouse name: N/A     Number of  "children: N/A     Years of education: N/A     Social History Main Topics     Smoking status: Former Smoker     Smokeless tobacco: Never Used      Comment: 35 yrs ago     Alcohol use No     Drug use: No     Sexual activity: Not Asked     Other Topics Concern     Parent/Sibling W/ Cabg, Mi Or Angioplasty Before 65f 55m? Yes     Special Diet Yes     low sodium     Exercise Yes     active life style     Seat Belt Yes     Social History Narrative       Review of Systems:  Skin:  Negative       Eyes:  Positive for glasses    ENT:  Negative      Respiratory:  Positive for dyspnea on exertion (Cold sx last 3 days) same   Cardiovascular:  Negative      Gastroenterology: Negative      Genitourinary:  Negative      Musculoskeletal:  Positive for arthritis    Neurologic:  Positive for numbness or tingling of feet numbing in toes at night   Psychiatric:  Negative      Heme/Lymph/Imm:  Positive for allergies    Endocrine:  Negative        Physical Exam:  Vitals: /70  Pulse 66  Ht 1.626 m (5' 4.02\")  Wt 68.3 kg (150 lb 8 oz)  BMI 25.82 kg/m2    Constitutional:  cooperative, alert and oriented, well developed, well nourished, in no acute distress        Skin:  warm and dry to the touch, no apparent skin lesions or masses noted        Head:  normocephalic, no masses or lesions        Eyes:  pupils equal and round, conjunctivae and lids unremarkable, sclera white, no xanthalasma, EOMS intact, no nystagmus        ENT:  no pallor or cyanosis, dentition good        Neck:  No JVD      Chest:  clear to auscultation   pacemaker incision in the left infraclavicular area was well-healed      Cardiac: regular rhythm;normal S1 and S2;no S3 or S4;no murmurs, gallops or rubs detected                  Abdomen:  abdomen soft        Extremities and Back:  no deformities, clubbing, cyanosis, erythema observed;no edema              Neurological:  affect appropriate, oriented to time, person and place;no gross motor deficits        Thank you " for allowing me to participate in the care of your patient.    Sincerely,     Kat Yo NP, APRN [facility]

## 2017-07-19 NOTE — MR AVS SNAPSHOT
"              After Visit Summary   7/19/2017    Haritha Trujillo    MRN: [id number]           Patient Information     Date Of Birth          4/27/1925        Visit Information        Provider Department      7/19/2017 1:30 PM HANG TECH1 [facility]        Today's Diagnoses     Cardiac pacemaker in situ    -  1       Follow-ups after your visit        Your next 10 appointments already scheduled     Jul 19, 2017  1:50 PM CDT   AFIB Return with NAVYA Allen CNP   [facility] ([facility])    [address] [place] [address] [address]  [facility] [id number] [phone]            Oct 25, 2017 11:15 AM CDT   Pacemaker Check with HANG WARREN   [facility] ([facility])    [address] [place] [address] [address]  [facility] [id number] [phone]              Who to contact     If you have questions or need follow up information about today's clinic visit or your schedule please contact [facility] directly at [phone].  Normal or non-critical lab and imaging results will be communicated to you by WebinarHerohart, letter or phone within 4 business days after the clinic has received the results. If you do not hear from us within 7 days, please contact the clinic through Dexetrat or phone. If you have a critical or abnormal lab result, we will notify you by phone as soon as possible.  Submit refill requests through [email or web address] or call your pharmacy and they will forward the refill request to us. Please allow 3 business days for your refill to be completed.          Additional Information About Your Visit        WebinarHerohart Information     [email or web address] lets you send messages to your doctor, view your test results, renew your prescriptions, schedule appointments and more. To sign up, go to [email or web address] . Click on \"Log in\" on the left side of the screen, which will take " "you to the Welcome page. Then click on \"Sign up Now\" on the right side of the page.     You will be asked to enter the access code listed below, as well as some personal information. Please follow the directions to create your username and password.     Your access code is: [email or web address]  Expires: 2017 12:05 PM     Your access code will  in 90 days. If you need help or a new code, please call your [place] clinic or [phone].        Care EveryWhere ID     This is your Care EveryWhere ID. This could be used by other organizations to access your [place] medical records  [id number]         Blood Pressure from Last 3 Encounters:   17 154/75   17 130/68   16 120/90    Weight from Last 3 Encounters:   17 67.1 kg (148 lb)   16 67.1 kg (148 lb)   12/09/15 64.9 kg (143 lb)              We Performed the Following     PM PHONE R STRIP EVAL UP TO 90 DAYS ([phone])        Primary Care Provider Office Phone # Fax #    Kirit Michel [phone]       [place] [address]        Equal Access to Services     JAY SINGH : Hadii francisco ku hadasho Soomaali, waaxda luqadaha, qaybta kaalmada adeegyada, waxay idiin haycalvinn seth evans. So [facility] [phone].    ATENCIÓN: Si habla español, tiene a fofana disposición servicios gratuitos de asistencia lingüística. Llame al [phone].    We comply with applicable federal civil rights laws and [place] laws. We do not discriminate on the basis of race, color, national origin, age, disability sex, sexual orientation or gender identity.            Thank you!     Thank you for choosing [facility] [place]  for your care. Our goal is always to provide you with excellent care. Hearing back from our patients is one way we can continue to improve our services. Please take a few minutes to complete the written survey that you may receive in the mail after " your visit with us. Thank you!             Your Updated Medication List - Protect others around you: Learn how to safely use, store and throw away your medicines at www.disposemymeds.org.          This list is accurate as of: 7/19/17  1:38 PM.  Always use your most recent med list.                   Brand Name Dispense Instructions for use Diagnosis    acetaminophen 500 MG tablet    TYLENOL     Take 500-1,000 mg by mouth every 6 hours as needed for mild pain        atorvastatin 40 MG tablet    LIPITOR     Take 40 mg by mouth At Bedtime        calcium + D 600-200 MG-UNIT Tabs   Generic drug:  calcium carbonate-vitamin D      Take 1 tablet by mouth daily.        CENTRUM SILVER per tablet      Take 1 tablet by mouth daily.        furosemide 20 MG tablet    LASIX    30 tablet    Take 0.5 tablets (10 mg) by mouth daily    Pulmonary edema       lisinopril 5 MG tablet    PRINIVIL/ZESTRIL    180 tablet    Take 2 tablets (10 mg) by mouth daily    CHF (congestive heart failure) (H)       metoprolol 50 MG 24 hr tablet    TOPROL-XL    180 tablet    Take 1 tablet (50 mg) by mouth 2 times daily    CHF (congestive heart failure) (H)       NITROGLYCERIN SL      Place 0.4 mg under the tongue every 5 minutes as needed for chest pain        PROBIOTIC DAILY PO      Take by mouth daily        RESTASIS 0.05 % ophthalmic emulsion   Generic drug:  cycloSPORINE      Place 1 drop into both eyes every 12 hours.        senna-docusate 8.6-50 MG per tablet    SENOKOT-S;PERICOLACE     Take 1 tablet by mouth daily        WARFARIN SODIUM PO      Take 4.5 mg by mouth daily 1 & 1/2 tablet of 3mg

## 2017-07-19 NOTE — MR AVS SNAPSHOT
"              After Visit Summary   7/19/2017    Haritha Trujillo    MRN: [id number]           Patient Information     Date Of Birth          4/27/1925        Visit Information        Provider Department      7/19/2017 1:50 PM Kat Yo APRN CNP [facility] HEART AT [place]        Today's Diagnoses     Chronic combined systolic and diastolic congestive heart failure (H)          Care Instructions    See  in 4-6 weeks          Follow-ups after your visit        Your next 10 appointments already scheduled     Oct 25, 2017 11:15 AM CDT   Pacemaker Check with HANG WARREN   [facility] ([facility])    [address] [place], [place] [id number]  [facility] [address] [phone]              Who to contact     If you have questions or need follow up information about today's clinic visit or your schedule please contact [facility] directly at [phone].  Normal or non-critical lab and imaging results will be communicated to you by IT MOVES IThart, letter or phone within 4 business days after the clinic has received the results. If you do not hear from us within 7 days, please contact the clinic through IT MOVES IThart or phone. If you have a critical or abnormal lab result, we will notify you by phone as soon as possible.  Submit refill requests through [employer] Bird or call your pharmacy and they will forward the refill request to us. Please allow 3 business days for your refill to be completed.          Additional Information About Your Visit        IT MOVES [email or web address] ([email or web address]) Information     Cherry Bird lets you send messages to your doctor, view your test results, renew your prescriptions, schedule appointments and more. To sign up, go to [email or web address] . Click on \"Log in\" on the left side of the screen, which will take you to the Welcome page. Then click on \"Sign up Now\" on the right side of the page.     You will be asked to enter " "the access code listed below, as well as some personal information. Please follow the directions to create your username and password.     Your access code is: [email or web address]  Expires: 2017 12:05 PM     Your access code will  in 90 days. If you need help or a new code, please call your [facility] clinic or [phone].        Care EveryWhere ID     This is your Care EveryWhere ID. This could be used by other organizations to access your [facility] medical records  [id number]        Your Vitals Were     Pulse Height BMI (Body Mass Index)             66 1.626 m (5' 4.02\") 25.82 kg/m2          Blood Pressure from Last 3 Encounters:   17 146/70   17 154/75   17 130/68    Weight from Last 3 Encounters:   17 68.3 kg (150 lb 8 oz)   17 67.1 kg (148 lb)   16 67.1 kg (148 lb)              Today, you had the following     No orders found for display         Today's Medication Changes          These changes are accurate as of: 17  2:20 PM.  If you have any questions, ask your nurse or doctor.               These medicines have changed or have updated prescriptions.        Dose/Directions    nitroGLYcerin 0.4 MG sublingual tablet   Commonly known as:  NITROSTAT   This may have changed:    - medication strength  - how much to take  - how to take this  - when to take this  - reasons to take this  - additional instructions   Used for:  Chronic combined systolic and diastolic congestive heart failure (H)   Changed by:  Kat Yo, NAVYA CNP        For chest pain place 1 tablet under the tongue every 5 minutes for 3 doses. If symptoms persist 5 minutes after 1st dose call 911.   Quantity:  25 tablet   Refills:  1            Where to get your medicines      [id number] [place] - [address] [place] AT [facility] OF  & [address]  [address] [facility] [place], [place] [address]     Phone:  [phone]     lisinopril 5 " MG tablet    nitroGLYcerin 0.4 MG sublingual tablet                Primary Care Provider Office Phone # Fax #    Kirit Michel [phone]       [address] [address]        Equal Access to Services     JACOBLIO FRANCISCO : Hadii francisco ku hadstaciao Soomaali, waaxda luqadaha, qaybta kaalmada adeegyada, waxsummer chinain hayaan adefazal whitley lakevinjenni evans. So [facility] [phone].    ATENCIÓN: Si habla español, tiene a fofana disposición servicios gratuitos de asistencia lingüística. Llame al [phone].    We comply with applicable federal civil rights laws and [place] laws. We do not discriminate on the basis of race, color, national origin, age, disability sex, sexual orientation or gender identity.            Thank you!     Thank you for choosing [facility] HEART AT [place]  for your care. Our goal is always to provide you with excellent care. Hearing back from our patients is one way we can continue to improve our services. Please take a few minutes to complete the written survey that you may receive in the mail after your visit with us. Thank you!             Your Updated Medication List - Protect others around you: Learn how to safely use, store and throw away your medicines at www.disposemymeds.org.          This list is accurate as of: 7/19/17  2:20 PM.  Always use your most recent med list.                   Brand Name Dispense Instructions for use Diagnosis    acetaminophen 500 MG tablet    TYLENOL     Take 500-1,000 mg by mouth every 6 hours as needed for mild pain        atorvastatin 40 MG tablet    LIPITOR     Take 40 mg by mouth At Bedtime        calcium + D 600-200 MG-UNIT Tabs   Generic drug:  calcium carbonate-vitamin D      Take 1 tablet by mouth daily.        CENTRUM SILVER per tablet      Take 1 tablet by mouth daily.        furosemide 20 MG tablet    LASIX    30 tablet    Take 0.5 tablets (10 mg) by mouth daily    Pulmonary edema        lisinopril 5 MG tablet    PRINIVIL/ZESTRIL    180 tablet    Take 2 tablets (10 mg) by mouth daily    Chronic combined systolic and diastolic congestive heart failure (H)       metoprolol 50 MG 24 hr tablet    TOPROL-XL    180 tablet    Take 1 tablet (50 mg) by mouth 2 times daily    CHF (congestive heart failure) (H)       nitroGLYcerin 0.4 MG sublingual tablet    NITROSTAT    25 tablet    For chest pain place 1 tablet under the tongue every 5 minutes for 3 doses. If symptoms persist 5 minutes after 1st dose call 911.    Chronic combined systolic and diastolic congestive heart failure (H)       PROBIOTIC DAILY PO      Take by mouth daily        RESTASIS 0.05 % ophthalmic emulsion   Generic drug:  cycloSPORINE      Place 1 drop into both eyes every 12 hours.        senna-docusate 8.6-50 MG per tablet    SENOKOT-S;PERICOLACE     Take 1 tablet by mouth daily        WARFARIN SODIUM PO      Take 4.5 mg by mouth daily 1 & 1/2 tablet of 3mg

## 2017-07-19 NOTE — PROGRESS NOTES
"~90 day office teletrace ~  Appropriate  with PVCs at time of check. Chronic Afib, taking Warfarin. Magnet response WNL. Pt c/o occasional \"thumping\" feeling. She will discuss this with Kat and EP RN may adjust setting. Six month threshold scheduled in October.     Seen in device to check for diapragmatic stim. LV thresholds elevated at 2.5 V @ 0.8 ms and output has been set at 3 V @ 0.8 ms. She has occassional stim at this setting but it is tolerable. Attempted to change vector configuration to LV tip to can and LV ring to LV tip but she had constant diaphragmatic with those 2 configurations. She was left at LV ring to can. She will f/u with Dr Tatum in one month to see what he recommends about LV lead. Gregg    "

## 2017-07-19 NOTE — PROGRESS NOTES
HPI and Plan:   See dictation    Orders Placed This Encounter   Procedures     Follow-Up with Electrophysiologist       Orders Placed This Encounter   Medications     lisinopril (PRINIVIL/ZESTRIL) 5 MG tablet     Sig: Take 2 tablets (10 mg) by mouth daily     Dispense:  180 tablet     Refill:  3     nitroGLYcerin (NITROSTAT) 0.4 MG sublingual tablet     Sig: For chest pain place 1 tablet under the tongue every 5 minutes for 3 doses. If symptoms persist 5 minutes after 1st dose call 911.     Dispense:  25 tablet     Refill:  1       Medications Discontinued During This Encounter   Medication Reason     NITROGLYCERIN SL      lisinopril (PRINIVIL,ZESTRIL) 5 MG tablet Reorder         Encounter Diagnosis   Name Primary?     Chronic combined systolic and diastolic congestive heart failure (H)        CURRENT MEDICATIONS:  Current Outpatient Prescriptions   Medication Sig Dispense Refill     lisinopril (PRINIVIL/ZESTRIL) 5 MG tablet Take 2 tablets (10 mg) by mouth daily 180 tablet 3     nitroGLYcerin (NITROSTAT) 0.4 MG sublingual tablet For chest pain place 1 tablet under the tongue every 5 minutes for 3 doses. If symptoms persist 5 minutes after 1st dose call 911. 25 tablet 1     metoprolol (TOPROL-XL) 50 MG 24 hr tablet Take 1 tablet (50 mg) by mouth 2 times daily 180 tablet 3     acetaminophen (TYLENOL) 500 MG tablet Take 500-1,000 mg by mouth every 6 hours as needed for mild pain       Probiotic Product (PROBIOTIC DAILY PO) Take by mouth daily       senna-docusate (SENOKOT-S;PERICOLACE) 8.6-50 MG per tablet Take 1 tablet by mouth daily       furosemide (LASIX) 20 MG tablet Take 0.5 tablets (10 mg) by mouth daily 30 tablet 0     WARFARIN SODIUM PO Take 4.5 mg by mouth daily 1 & 1/2 tablet of 3mg       calcium carbonate-vitamin D (CALCIUM + D) 600-200 MG-UNIT TABS Take 1 tablet by mouth daily.       atorvastatin (LIPITOR) 40 MG tablet Take 40 mg by mouth At Bedtime        Multiple Vitamins-Minerals (CENTRUM SILVER) per  tablet Take 1 tablet by mouth daily.         cycloSPORINE (RESTASIS) 0.05 % ophthalmic emulsion Place 1 drop into both eyes every 12 hours.       [DISCONTINUED] lisinopril (PRINIVIL,ZESTRIL) 5 MG tablet Take 2 tablets (10 mg) by mouth daily 180 tablet 3     [DISCONTINUED] NITROGLYCERIN SL Place 0.4 mg under the tongue every 5 minutes as needed for chest pain         ALLERGIES     Allergies   Allergen Reactions     Hctz      Lansoprazole      diarrhea   Prevacid       PAST MEDICAL HISTORY:  Past Medical History:   Diagnosis Date     Aortic regurgitation 12/14/2014    mild (1+) per echo     Atrial fibrillation (H)      Cardiomyopathy (H)      CHF (congestive heart failure) (H)      Chronic kidney disease, stage 3      Coronary atherosclerosis of unspecified type of vessel, native or graft 5/16/2000    normal left coronary arteries and tight obstruction in distal RCA, too small for revascularization, medical management     Degeneration of cervical intervertebral disc     cervical spine     Essential hypertension, benign      Mitral regurgitation 12/14/2014    mod-mod/sev (2-3+) per echo     Other and unspecified hyperlipidemia      Pacemaker      Pulmonary hypertension (H) 3/16/2013    mild per echo with RVSP 31mmHg +RAP      Pulmonary valve regurgitation 12/14/2014    mod (2+) per echo     Tricuspid regurgitation 12/14/2014    mild (1+) per echo     Unspecified tinnitus     chronic       PAST SURGICAL HISTORY:  Past Surgical History:   Procedure Laterality Date     C NONSPECIFIC PROCEDURE  1999    right rotator cuff tear OR     C NONSPECIFIC PROCEDURE  1983    microdiscectomy     C NONSPECIFIC PROCEDURE      C-sections x 3      C NONSPECIFIC PROCEDURE      appendectomy     C NONSPECIFIC PROCEDURE      bilateral benign breast biopsy      C NONSPECIFIC PROCEDURE      right knee arthroscopic OR     C NONSPECIFIC PROCEDURE  1974    enteritis     CORONARY ANGIOGRAPHY ADULT ORDER  5/16/2000     HRW PACEMAKER PERMANENT   "2015    BI- ventricular     IMPLANT PACEMAKER  2010    dual chamber Medtronic       FAMILY HISTORY:  Family History   Problem Relation Age of Onset     Hypertension Mother      CANCER Mother      pancreatic     Eye Disorder Mother      cristian     ANIA Father       53     Lipids Father      DIABETES Maternal Grandmother      ANIA Brother      open heart surgery age 53     CANCER Daughter      ovavian     Neurologic Disorder Son      MS       SOCIAL HISTORY:  Social History     Social History     Marital status:      Spouse name: N/A     Number of children: N/A     Years of education: N/A     Social History Main Topics     Smoking status: Former Smoker     Smokeless tobacco: Never Used      Comment: 35 yrs ago     Alcohol use No     Drug use: No     Sexual activity: Not Asked     Other Topics Concern     Parent/Sibling W/ Cabg, Mi Or Angioplasty Before 65f 55m? Yes     Special Diet Yes     low sodium     Exercise Yes     active life style     Seat Belt Yes     Social History Narrative       Review of Systems:  Skin:  Negative       Eyes:  Positive for glasses    ENT:  Negative      Respiratory:  Positive for dyspnea on exertion (Cold sx last 3 days) same   Cardiovascular:  Negative      Gastroenterology: Negative      Genitourinary:  Negative      Musculoskeletal:  Positive for arthritis    Neurologic:  Positive for numbness or tingling of feet numbing in toes at night   Psychiatric:  Negative      Heme/Lymph/Imm:  Positive for allergies    Endocrine:  Negative        Physical Exam:  Vitals: /70  Pulse 66  Ht 1.626 m (5' 4.02\")  Wt 68.3 kg (150 lb 8 oz)  BMI 25.82 kg/m2    Constitutional:  cooperative, alert and oriented, well developed, well nourished, in no acute distress        Skin:  warm and dry to the touch, no apparent skin lesions or masses noted        Head:  normocephalic, no masses or lesions        Eyes:  pupils equal and round, conjunctivae and lids unremarkable, sclera " white, no xanthalasma, EOMS intact, no nystagmus        ENT:  no pallor or cyanosis, dentition good        Neck:  carotid pulses are full and equal bilaterally, JVP normal, no carotid bruit, no thyromegaly        Chest:  clear to auscultation   pacemaker incision in the left infraclavicular area was well-healed      Cardiac: regular rhythm;normal S1 and S2;no S3 or S4;no murmurs, gallops or rubs detected                  Abdomen:  abdomen soft        Vascular: not assessed this visit                                        Extremities and Back:  no deformities, clubbing, cyanosis, erythema observed;no edema              Neurological:  affect appropriate, oriented to time, person and place;no gross motor deficits              CC  No referring provider defined for this encounter.

## 2017-08-03 DIAGNOSIS — I50.9 CHF (CONGESTIVE HEART FAILURE) (H): ICD-10-CM

## 2017-08-03 RX ORDER — METOPROLOL SUCCINATE 50 MG/1
50 TABLET, EXTENDED RELEASE ORAL 2 TIMES DAILY
Qty: 180 TABLET | Refills: 3 | Status: SHIPPED | OUTPATIENT
Start: 2017-08-03 | End: 2018-08-13

## 2017-09-12 ENCOUNTER — OFFICE VISIT (OUTPATIENT)
Dept: URGENT CARE | Facility: URGENT CARE | Age: 82
End: 2017-09-12
Payer: COMMERCIAL

## 2017-09-12 VITALS
SYSTOLIC BLOOD PRESSURE: 170 MMHG | TEMPERATURE: 97.7 F | OXYGEN SATURATION: 99 % | DIASTOLIC BLOOD PRESSURE: 82 MMHG | HEART RATE: 68 BPM

## 2017-09-12 DIAGNOSIS — I10 ESSENTIAL HYPERTENSION: Primary | ICD-10-CM

## 2017-09-12 PROCEDURE — 99214 OFFICE O/P EST MOD 30 MIN: CPT | Performed by: PHYSICIAN ASSISTANT

## 2017-09-12 RX ORDER — LISINOPRIL 20 MG/1
20 TABLET ORAL DAILY
Qty: 30 TABLET | Refills: 0 | Status: SHIPPED | OUTPATIENT
Start: 2017-09-12 | End: 2017-10-16

## 2017-09-12 ASSESSMENT — ENCOUNTER SYMPTOMS
CHILLS: 0
HEADACHES: 0
FEVER: 0
NAUSEA: 0
FOCAL WEAKNESS: 0
SHORTNESS OF BREATH: 0
VOMITING: 0
DIARRHEA: 0
ABDOMINAL PAIN: 0

## 2017-09-12 NOTE — PATIENT INSTRUCTIONS
You were taking 2 tabs of 5 mg lisinopril. Stop this, and instead take 1 tab of 20 mg lisinopril.  Keep track of your BP and if it is remaining > 150/90 follow up with your PCP, cardiology, or return to urgent care.  Follow up with cardiology in 2 weeks.

## 2017-09-12 NOTE — NURSING NOTE
"Chief Complaint   Patient presents with     Urgent Care     Hypertension     High blood pressure that was noticed this morning at her INR appointment--       Initial /87 (BP Location: Right arm, Cuff Size: Adult Regular)  Pulse 68  Temp 97.7  F (36.5  C) (Oral)  SpO2 99%  Breastfeeding? No Estimated body mass index is 25.82 kg/(m^2) as calculated from the following:    Height as of 7/19/17: 5' 4.02\" (1.626 m).    Weight as of 7/19/17: 150 lb 8 oz (68.3 kg).  Medication Reconciliation: complete.  MONIK Greer      "

## 2017-09-12 NOTE — PROGRESS NOTES
HPI  September 12, 2017    HPI: Haritha Trujillo is a 89 year old female with hx HTN, cardiomyopathy, CHF, permanent Afib w/ pacemaker and on warfarin who complains of elevated BP. BP was 190/80 initially, then 148/76 when rechecked at INR appt at 1200 today. [facility] and a cat ran in front of her on the street which upset her. Currently takes metoprolol XL 50 mg daily and lisinopril 10 mg daily for BP. Denies fever/chills, HA, CP, SOB, leg swelling, vision changes, or any other symptoms.     Past Medical History:   Diagnosis Date     Aortic regurgitation 12/14/2014    mild (1+) per echo     Atrial fibrillation (H)      Cardiomyopathy (H)      CHF (congestive heart failure) (H)      Chronic kidney disease, stage 3      Coronary atherosclerosis of unspecified type of vessel, native or graft 5/16/2000    normal left coronary arteries and tight obstruction in distal RCA, too small for revascularization, medical management     Degeneration of cervical intervertebral disc     cervical spine     Essential hypertension, benign      Mitral regurgitation 12/14/2014    mod-mod/sev (2-3+) per echo     Other and unspecified hyperlipidemia      Pacemaker      Pulmonary hypertension (H) 3/16/2013    mild per echo with RVSP 31mmHg +RAP      Pulmonary valve regurgitation 12/14/2014    mod (2+) per echo     Tricuspid regurgitation 12/14/2014    mild (1+) per echo     Unspecified tinnitus     chronic     Past Surgical History:   Procedure Laterality Date     C NONSPECIFIC PROCEDURE  1999    right rotator cuff tear OR     C NONSPECIFIC PROCEDURE  1983    microdiscectomy     C NONSPECIFIC PROCEDURE      C-sections x 3      C NONSPECIFIC PROCEDURE      appendectomy     C NONSPECIFIC PROCEDURE      bilateral benign breast biopsy      C NONSPECIFIC PROCEDURE      right knee arthroscopic OR     C NONSPECIFIC PROCEDURE  1974    enteritis     CORONARY ANGIOGRAPHY ADULT ORDER  5/16/2000     HRW PACEMAKER  PERMANENT  2015    BI- ventricular     IMPLANT PACEMAKER  2010    dual chamber Medtronic     Social History   Substance Use Topics     Smoking status: Former Smoker     Smokeless tobacco: Never Used      Comment: 35 yrs ago     Alcohol use No     Patient Active Problem List   Diagnosis     Hyperlipidemia     Essential hypertension     Cardiovascular disease     Palpitations     Esophageal reflux     Tinnitus     Acute pancreatitis     Diverticulitis of colon     Atrial fibrillation (H)     Cardiac pacemaker in situ     CHF (congestive heart failure) (H)     Chronic kidney disease     Pulmonary hypertension (H)     Mitral regurgitation     Aortic regurgitation     Tricuspid regurgitation     Pulmonary valve regurgitation     Hyponatremia     Cardiomyopathy (H)     Family History   Problem Relation Age of Onset     Hypertension Mother      CANCER Mother      pancreatic     Eye Disorder Mother      cristian     LARON.A.D. Father       53     Lipids Father      DIABETES Maternal Grandmother      C.A.D. Brother      open heart surgery age 53     CANCER Daughter      ovavian     Neurologic Disorder Son      MS        Problem list, Medication list, Allergies, and Medical/Social/Surgical histories reviewed in EPIC and updated as appropriate.      Review of Systems   Constitutional: Negative for chills and fever.        Elevated BP   Respiratory: Negative for shortness of breath.    Cardiovascular: Negative for chest pain.   Gastrointestinal: Negative for abdominal pain, diarrhea, nausea and vomiting.   Skin: Negative for rash.   Neurological: Negative for focal weakness and headaches.   All other systems reviewed and are negative.        Physical Exam   Constitutional: She is oriented to person, place, and time and well-developed, well-nourished, and in no distress.   HENT:   Head: Normocephalic and atraumatic.   Cardiovascular: Normal rate, regular rhythm and normal heart sounds.    Pulmonary/Chest: Effort normal  and breath sounds normal.   Musculoskeletal: Normal range of motion.   Neurological: She is alert and oriented to person, place, and time. Gait normal.   Skin: Skin is warm and dry.   Nursing note and vitals reviewed.    Vital Signs  /82  Pulse 68  Temp 97.7  F (36.5  C) (Oral)  SpO2 99%  Breastfeeding? No     Diagnostic Test Results:  none     ASSESSMENT/PLAN      ICD-10-CM    1. Essential hypertension I10 lisinopril (PRINIVIL/ZESTRIL) 20 MG tablet      BP remains elevated after pt rested for 20 mins. 171/87 initially, 170/82 on recheck. Feel patient needs adjustment to HTN medications. Will increase lisinopril from 10 mg to 20 mg daily. New Rx given so patient doesn't have to take four 5 mg tabs of lisinopril daily. Pt has BP cuff at home- measure a few times/week and keep BP log. Has appt with Dr. Tatum (cardiology) on 9/26/17- bring BP log to this appointment. Contact PCP, cardiologist, or return to urgent care sooner than this if BP remains > 150/90.      I have discussed any lab or imaging results, the patient's diagnosis, and my plan of treatment with the patient and/or family. Patient is aware to come back in if with worsening symptoms or if no relief despite treatment plan.  Patient voiced understanding and had no further questions.       Follow Up: Data Unavailable    RENETTA Cortez, PAMichelleC  [facility]

## 2017-09-12 NOTE — MR AVS SNAPSHOT
After Visit Summary   9/12/2017    Haritha Trujillo    MRN: [id number]           Patient Information     Date Of Birth          4/27/1925        Visit Information        Provider Department      9/12/2017 5:15 PM Milagros Henry PA-C [facility]        Today's Diagnoses     Essential hypertension    -  1      Care Instructions    You were taking 2 tabs of 5 mg lisinopril. Stop this, and instead take 1 tab of 20 mg lisinopril.  Keep track of your BP and if it is remaining > 150/90 follow up with your PCP, cardiology, or return to urgent care.  Follow up with cardiology in 2 weeks.          Follow-ups after your visit        Your next 10 appointments already scheduled     Sep 26, 2017  2:15 PM CDT   [facility] EP RETURN with Fay Tatum MD   [facility] AT [place] ([facility])    [address] [place], [place] [address]  [facility] [id number] [phone]            Oct 25, 2017 11:15 AM CDT   Pacemaker Check with HANG WARREN   [facility] AT [place] ([facility])    [address] [place], [place] [address]  [facility] [address]   [phone]              Who to contact     If you have questions or need follow up information about today's clinic visit or your schedule please contact [facility] directly at [phone].  Normal or non-critical lab and imaging results will be communicated to you by MyChart, letter or phone within 4 business days after the clinic has received the results. If you do not hear from us within 7 days, please contact the clinic through MyChart or phone. If you have a critical or abnormal lab result, we will notify you by phone as soon as possible.  Submit refill requests through [employer] or call your pharmacy and they will forward the refill request to us. Please allow 3 business days for your refill to be completed.          Additional Information About Your  "Visit        [email or web address] Information     [email or web address] lets you send messages to your doctor, view your test results, renew your prescriptions, schedule appointments and more. To sign up, go to [email or web address] . Click on \"Log in\" on the left side of the screen, which will take you to the Welcome page. Then click on \"Sign up Now\" on the right side of the page.     You will be asked to enter the access code listed below, as well as some personal information. Please follow the directions to create your username and password.     Your access code is: [email or web address]  Expires: 2017 12:05 PM     Your access code will  in 90 days. If you need help or a new code, please call your [place] clinic or [phone].        Care EveryWhere ID     This is your Care EveryWhere ID. This could be used by other organizations to access your [place] medical records  [id number]        Your Vitals Were     Pulse Temperature Pulse Oximetry Breastfeeding?          68 97.7  F (36.5  C) (Oral) 99% No         Blood Pressure from Last 3 Encounters:   17 171/87   17 146/70   17 154/75    Weight from Last 3 Encounters:   17 150 lb 8 oz (68.3 kg)   17 148 lb (67.1 kg)   16 148 lb (67.1 kg)              Today, you had the following     No orders found for display         Today's Medication Changes          These changes are accurate as of: 17  5:54 PM.  If you have any questions, ask your nurse or doctor.               These medicines have changed or have updated prescriptions.        Dose/Directions    lisinopril 20 MG tablet   Commonly known as:  PRINIVIL/ZESTRIL   This may have changed:    - medication strength  - how much to take   Used for:  Essential hypertension   Changed by:  Milagros Henry PA-C        Dose:  20 mg   Take 1 tablet (20 mg) by mouth daily   Quantity:  30 tablet   Refills:  0            Where to get your medicines      These medications were sent to [id number] - [place], [place] - [address] AT [facility] OF  & [address]  [address], [place] [address]     Phone:  [phone]     lisinopril 20 MG tablet                Primary Care Provider Office Phone # Fax #    Kirit Michel [phone]       [address] [address]        Equal Access to Services     JAY SINGH AH: Hadii aad ku hadasho Soomaali, waaxda luqadaha, qaybta kaalmada adeegyada, waxay idiin hayaan adeeg kharash lasri . So [facility] [phone].    ATENCIÓN: Si habla español, tiene a fofana disposición servicios gratuitos de asistencia lingüística. [facility] [phone].    We comply with applicable federal civil rights laws and [place] laws. We do not discriminate on the basis of race, color, national origin, age, disability sex, sexual orientation or gender identity.            Thank you!     Thank you for choosing [facility]RGENT [facility]  for your care. Our goal is always to provide you with excellent care. Hearing back from our patients is one way we can continue to improve our services. Please take a few minutes to complete the written survey that you may receive in the mail after your visit with us. Thank you!             Your Updated Medication List - Protect others around you: Learn how to safely use, store and throw away your medicines at www.disposemymeds.org.          This list is accurate as of: 9/12/17  5:54 PM.  Always use your most recent med list.                   Brand Name Dispense Instructions for use Diagnosis    acetaminophen 500 MG tablet    TYLENOL     Take 500-1,000 mg by mouth every 6 hours as needed for mild pain        atorvastatin 40 MG tablet    LIPITOR     Take 40 mg by mouth At Bedtime        calcium + D 600-200 MG-UNIT Tabs   Generic drug:  calcium carbonate-vitamin D      Take 1 tablet by mouth daily.        CELEBREX PO      Take 200 mg by mouth daily         CENTRUM SILVER per tablet      Take 1 tablet by mouth daily.        furosemide 20 MG tablet    LASIX    30 tablet    Take 0.5 tablets (10 mg) by mouth daily    Pulmonary edema       lisinopril 20 MG tablet    PRINIVIL/ZESTRIL    30 tablet    Take 1 tablet (20 mg) by mouth daily    Essential hypertension       metoprolol 50 MG 24 hr tablet    TOPROL-XL    180 tablet    Take 1 tablet (50 mg) by mouth 2 times daily    CHF (congestive heart failure) (H)       nitroGLYcerin 0.4 MG sublingual tablet    NITROSTAT    25 tablet    For chest pain place 1 tablet under the tongue every 5 minutes for 3 doses. If symptoms persist 5 minutes after 1st dose call 911.        PROBIOTIC DAILY PO      Take by mouth daily        RESTASIS 0.05 % ophthalmic emulsion   Generic drug:  cycloSPORINE      Place 1 drop into both eyes every 12 hours.        senna-docusate 8.6-50 MG per tablet    SENOKOT-S;PERICOLACE     Take 1 tablet by mouth daily        WARFARIN SODIUM PO      Take 4.5 mg by mouth daily 1 & 1/2 tablet of 3mg

## 2017-09-17 ENCOUNTER — HOSPITAL ENCOUNTER (EMERGENCY)
Facility: CLINIC | Age: 82
Discharge: HOME | End: 2017-09-17
Attending: EMERGENCY MEDICINE | Admitting: EMERGENCY MEDICINE
Payer: MEDICARE

## 2017-09-17 VITALS
RESPIRATION RATE: 16 BRPM | TEMPERATURE: 98.1 F | WEIGHT: 150 LBS | BODY MASS INDEX: 25.61 KG/M2 | DIASTOLIC BLOOD PRESSURE: 81 MMHG | HEIGHT: 64 IN | OXYGEN SATURATION: 98 % | HEART RATE: 60 BPM | SYSTOLIC BLOOD PRESSURE: 181 MMHG

## 2017-09-17 DIAGNOSIS — I15.9 SECONDARY HYPERTENSION: ICD-10-CM

## 2017-09-17 LAB
ANION GAP SERPL CALCULATED.3IONS-SCNC: 8 MMOL/L (ref 3–14)
BASOPHILS # BLD AUTO: 0 10E9/L (ref 0–0.2)
BASOPHILS NFR BLD AUTO: 0.4 %
BUN SERPL-MCNC: 18 MG/DL (ref 7–30)
CALCIUM SERPL-MCNC: 8.5 MG/DL (ref 8.5–10.1)
CHLORIDE SERPL-SCNC: 98 MMOL/L (ref 94–109)
CO2 SERPL-SCNC: 28 MMOL/L (ref 20–32)
CREAT SERPL-MCNC: 0.99 MG/DL (ref 0.52–1.04)
DIFFERENTIAL METHOD BLD: NORMAL
EOSINOPHIL # BLD AUTO: 0.1 10E9/L (ref 0–0.7)
EOSINOPHIL NFR BLD AUTO: 1.8 %
ERYTHROCYTE [DISTWIDTH] IN BLOOD BY AUTOMATED COUNT: 14 % (ref 10–15)
GFR SERPL CREATININE-BSD FRML MDRD: 53 ML/MIN/1.7M2
GLUCOSE SERPL-MCNC: 112 MG/DL (ref 70–99)
HCT VFR BLD AUTO: 40.5 % (ref 35–47)
HGB BLD-MCNC: 14 G/DL (ref 11.7–15.7)
IMM GRANULOCYTES # BLD: 0 10E9/L (ref 0–0.4)
IMM GRANULOCYTES NFR BLD: 0.2 %
INR PPP: 2.85 (ref 0.86–1.14)
INTERPRETATION ECG - MUSE: NORMAL
LYMPHOCYTES # BLD AUTO: 0.8 10E9/L (ref 0.8–5.3)
LYMPHOCYTES NFR BLD AUTO: 14.2 %
MCH RBC QN AUTO: 31.7 PG (ref 26.5–33)
MCHC RBC AUTO-ENTMCNC: 34.6 G/DL (ref 31.5–36.5)
MCV RBC AUTO: 92 FL (ref 78–100)
MONOCYTES # BLD AUTO: 0.6 10E9/L (ref 0–1.3)
MONOCYTES NFR BLD AUTO: 10 %
NEUTROPHILS # BLD AUTO: 4.2 10E9/L (ref 1.6–8.3)
NEUTROPHILS NFR BLD AUTO: 73.4 %
NRBC # BLD AUTO: 0 10*3/UL
NRBC BLD AUTO-RTO: 0 /100
PLATELET # BLD AUTO: 177 10E9/L (ref 150–450)
POTASSIUM SERPL-SCNC: 4 MMOL/L (ref 3.4–5.3)
RBC # BLD AUTO: 4.41 10E12/L (ref 3.8–5.2)
SODIUM SERPL-SCNC: 134 MMOL/L (ref 133–144)
TROPONIN I SERPL-MCNC: <0.015 UG/L (ref 0–0.04)
WBC # BLD AUTO: 5.7 10E9/L (ref 4–11)

## 2017-09-17 PROCEDURE — 93005 ELECTROCARDIOGRAM TRACING: CPT

## 2017-09-17 PROCEDURE — 85610 PROTHROMBIN TIME: CPT | Performed by: EMERGENCY MEDICINE

## 2017-09-17 PROCEDURE — 99284 EMERGENCY DEPT VISIT MOD MDM: CPT

## 2017-09-17 PROCEDURE — A9270 NON-COVERED ITEM OR SERVICE: HCPCS | Mod: GY | Performed by: EMERGENCY MEDICINE

## 2017-09-17 PROCEDURE — 25000132 ZZH RX MED GY IP 250 OP 250 PS 637: Mod: GY | Performed by: EMERGENCY MEDICINE

## 2017-09-17 PROCEDURE — 80048 BASIC METABOLIC PNL TOTAL CA: CPT | Performed by: EMERGENCY MEDICINE

## 2017-09-17 PROCEDURE — 85025 COMPLETE CBC W/AUTO DIFF WBC: CPT | Performed by: EMERGENCY MEDICINE

## 2017-09-17 PROCEDURE — 84484 ASSAY OF TROPONIN QUANT: CPT | Performed by: EMERGENCY MEDICINE

## 2017-09-17 RX ORDER — LISINOPRIL 20 MG/1
20 TABLET ORAL DAILY
Status: DISCONTINUED | OUTPATIENT
Start: 2017-09-17 | End: 2017-09-17 | Stop reason: HOSPADM

## 2017-09-17 RX ORDER — METOPROLOL SUCCINATE 50 MG/1
50 TABLET, EXTENDED RELEASE ORAL ONCE
Status: COMPLETED | OUTPATIENT
Start: 2017-09-17 | End: 2017-09-17

## 2017-09-17 RX ADMIN — LISINOPRIL 20 MG: 20 TABLET ORAL at 19:04

## 2017-09-17 RX ADMIN — METOPROLOL SUCCINATE 50 MG: 50 TABLET, EXTENDED RELEASE ORAL at 17:46

## 2017-09-17 ASSESSMENT — ENCOUNTER SYMPTOMS
CHILLS: 0
LIGHT-HEADEDNESS: 0
NUMBNESS: 0
FEVER: 0
NAUSEA: 0
SHORTNESS OF BREATH: 0
ABDOMINAL PAIN: 0
WEAKNESS: 0
RHINORRHEA: 1
VOMITING: 0

## 2017-09-17 NOTE — ED AVS SNAPSHOT
Emergency Department    [address] [facility] [address]    Phone:  [phone]    Fax:  [phone]                                       Haritha Trujillo   MRN: [id number]    Department:   Emergency Department   Date of Visit:  9/17/2017           Patient Information     Date Of Birth          4/27/1925        Your diagnoses for this visit were:     Secondary hypertension        You were seen by Baljeet Sanchez DO.      Follow-up Information     Follow up with Kirit Michel MD In 1 day.    Specialty:  Internal Medicine    Why:  as scheduled    Contact information:    [facility] [address] [address]  [phone]          Follow up with  Emergency Department.    Specialty:  EMERGENCY MEDICINE    Why:  If symptoms worsen    Contact information:    [address] [facility] [address] [phone]        Discharge Instructions         Discharge Instructions for High Blood Pressure (Hypertension)  You have been diagnosed with high blood pressure (also called hypertension). This means the force of blood against your artery walls is too strong. It also means your heart is working hard to move blood. High blood pressure usually has no symptoms, but over time, it can damage your heart, blood vessels, eyes, kidneys, and other organs. With help from your doctor, you can manage your blood pressure and protect your health.  Taking medicine    Learn to take your own blood pressure. Keep a record of your results. Ask your doctor which readings mean that you need medical attention.    Take your blood pressure medicine exactly as directed. Don t skip doses. Missing doses can cause your blood pressure to get out of control.    If you do miss a dose (or doses) check with your healthcare provider about what to do.    Avoid medicine that contain heart stimulants, including over-the-counter drugs. Check for warnings about high blood pressure on  "the label. Ask the pharmacist before purchasing something you haven't used before    Check with your doctor or pharmacist before taking a decongestant. Some decongestants can worsen high blood pressure.  Lifestyle changes    Maintain a healthy weight. Get help to lose any extra pounds.    Cut back on salt.    Limit canned, dried, packaged, and fast foods.    Don t add salt to your food at the table.    Season foods with herbs instead of salt when you cook.    Request no added salt when you go to a restaurant.    The American Heart Association s (AHA) \"ideal\" sodium intake recommendation is 1,500 milligrams per day.  However, since American's eat so much salt, the AHA says a positive change can occur by cutting back to even 2,400 milligrams of sodium a day.     Follow the DASH (Dietary Approaches to Stop Hypertension) eating plan. This plan recommends vegetables, fruits, whole gains, and other heart healthy foods.    Begin an exercise program. Ask your doctor how to get started. The American Heart Association recommends aerobic exercise 3 to 4 times a week for an average of 40 minutes at a time, with your doctor's approval. Simple activities like walking or gardening can help.    Break the smoking habit. Enroll in a stop-smoking program to improve your chances of success. Ask your healthcare provider about programs and medicines to help you stop smoking.    Limit drinks that contain caffeine (coffee, black or green tea, cola) to 2 per day.    Never take stimulants such as amphetamines or cocaine; these drugs can be deadly for someone with high blood pressure.    Control your stress. Learn stress-management techniques.    Limit alcohol to no more than 1 drink a day for women and 2 drinks a day for men.  Follow-up care  Make a follow-up appointment as directed by our staff.     When to seek medical care  Call your doctor immediately or seek emergency care if you have any of the following:    Chest pain or shortness of " breath (call 911)    Moderate to severe headache    Weakness in the muscles of your face, arms, or legs    Trouble speaking    Extreme drowsiness    Confusion    Fainting or dizziness    Pulsating or rushing sound in your ears    Unexplained nosebleed    Weakness, tingling, or numbness of your face, arms, or legs    Change in vision    Blood pressure measured at home that is greater than 180/110   Date Last Reviewed: 4/27/2016 2000-2017 [employer]. [address] [place] [address]. All rights reserved. This information is not intended as a substitute for professional medical care. Always follow your healthcare professional's instructions.          Future Appointments        Provider Department Dept Phone Center    9/26/2017 2:15 PM Fay Tatum MD [facility]YS[facility] AT [place] [phone] [facility] CL[facility]    10/25/2017 11:15 AM HANG [facility] Heart Device RN [facility] AT [place] [phone] [facility] CL[facility]      24 Hour Appointment Hotline       To make an appointment at any [place] clinic, call [phone] ([phone]). If you don't have a family doctor or clinic, we will help you find one. [place] clinics are conveniently located to serve the needs of you and your family.             Review of your medicines      Our records show that you are taking the medicines listed below. If these are incorrect, please call your family doctor or clinic.        Dose / Directions Last dose taken    acetaminophen 500 MG tablet   Commonly known as:  TYLENOL   Dose:  500-1000 mg        Take 500-1,000 mg by mouth every 6 hours as needed for mild pain   Refills:  0        atorvastatin 40 MG tablet   Commonly known as:  LIPITOR   Dose:  40 mg        Take 40 mg by mouth At Bedtime   Refills:  0        calcium + D 600-200 MG-UNIT Tabs   Dose:  1 tablet   Generic drug:  calcium carbonate-vitamin D        Take 1 tablet by mouth daily.   Refills:  0         CELEBREX PO   Dose:  200 mg        Take 200 mg by mouth daily   Refills:  0        CENTRUM SILVER per tablet   Dose:  1 tablet        Take 1 tablet by mouth daily.   Refills:  0        furosemide 20 MG tablet   Commonly known as:  LASIX   Dose:  10 mg   Quantity:  30 tablet        Take 0.5 tablets (10 mg) by mouth daily   Refills:  0        lisinopril 20 MG tablet   Commonly known as:  PRINIVIL/ZESTRIL   Dose:  20 mg   Quantity:  30 tablet        Take 1 tablet (20 mg) by mouth daily   Refills:  0        metoprolol 50 MG 24 hr tablet   Commonly known as:  TOPROL-XL   Dose:  50 mg   Quantity:  180 tablet        Take 1 tablet (50 mg) by mouth 2 times daily   Refills:  3        nitroGLYcerin 0.4 MG sublingual tablet   Commonly known as:  NITROSTAT   Quantity:  25 tablet        For chest pain place 1 tablet under the tongue every 5 minutes for 3 doses. If symptoms persist 5 minutes after 1st dose call 911.   Refills:  1        PROBIOTIC DAILY PO        Take by mouth daily   Refills:  0        RESTASIS 0.05 % ophthalmic emulsion   Dose:  1 drop   Generic drug:  cycloSPORINE        Place 1 drop into both eyes every 12 hours.   Refills:  0        senna-docusate 8.6-50 MG per tablet   Commonly known as:  SENOKOT-S;PERICOLACE   Dose:  1 tablet        Take 1 tablet by mouth daily   Refills:  0        WARFARIN SODIUM PO   Dose:  4.5 mg        Take 4.5 mg by mouth daily 1 & 1/2 tablet of 3mg   Refills:  0                Procedures and tests performed during your visit     Basic metabolic panel    CBC with platelets differential    EKG 12-lead, tracing only    INR    Troponin I      Orders Needing Specimen Collection     None      Pending Results     No orders found from 9/15/2017 to 9/18/2017.            Pending Culture Results     No orders found from 9/15/2017 to 9/18/2017.            Pending Results Instructions     If you had any lab results that were not finalized at the time of your Discharge, you can call the ED Lab  Result RN at [phone]. You will be contacted by this team for any positive Lab results or changes in treatment. The nurses are available 7 days a week from 10A to 6:30P.  You can leave a message 24 hours per day and they will return your call.        Test Results From Your Hospital Stay        9/17/2017  5:11 PM      Component Results     Component Value Ref Range & Units Status    WBC 5.7 4.0 - 11.0 10e9/L Final    RBC Count 4.41 3.8 - 5.2 10e12/L Final    Hemoglobin 14.0 11.7 - 15.7 g/dL Final    Hematocrit 40.5 35.0 - 47.0 % Final    MCV 92 78 - 100 fl Final    MCH 31.7 26.5 - 33.0 pg Final    MCHC 34.6 31.5 - 36.5 g/dL Final    RDW 14.0 10.0 - 15.0 % Final    Platelet Count 177 150 - 450 10e9/L Final    Diff Method Automated Method  Final    % Neutrophils 73.4 % Final    % Lymphocytes 14.2 % Final    % Monocytes 10.0 % Final    % Eosinophils 1.8 % Final    % Basophils 0.4 % Final    % Immature Granulocytes 0.2 % Final    Nucleated RBCs 0 0 /100 Final    Absolute Neutrophil 4.2 1.6 - 8.3 10e9/L Final    Absolute Lymphocytes 0.8 0.8 - 5.3 10e9/L Final    Absolute Monocytes 0.6 0.0 - 1.3 10e9/L Final    Absolute Eosinophils 0.1 0.0 - 0.7 10e9/L Final    Absolute Basophils 0.0 0.0 - 0.2 10e9/L Final    Abs Immature Granulocytes 0.0 0 - 0.4 10e9/L Final    Absolute Nucleated RBC 0.0  Final         9/17/2017  5:35 PM      Component Results     Component Value Ref Range & Units Status    INR 2.85 (H) 0.86 - 1.14 Final         9/17/2017  5:30 PM      Component Results     Component Value Ref Range & Units Status    Sodium 134 133 - 144 mmol/L Final    Potassium 4.0 3.4 - 5.3 mmol/L Final    Chloride 98 94 - 109 mmol/L Final    Carbon Dioxide 28 20 - 32 mmol/L Final    Anion Gap 8 3 - 14 mmol/L Final    Glucose 112 (H) 70 - 99 mg/dL Final    Urea Nitrogen 18 7 - 30 mg/dL Final    Creatinine 0.99 0.52 - 1.04 mg/dL Final    GFR Estimate 53 (L) >60 mL/min/1.7m2 Final    Non  GFR Calc    GFR Estimate If  Black 64 >60 mL/min/1.7m2 Final    African American GFR Calc    Calcium 8.5 8.5 - 10.1 mg/dL Final         9/17/2017  5:34 PM      Component Results     Component Value Ref Range & Units Status    Troponin I ES <0.015 0.000 - 0.045 ug/L Final    The 99th percentile for upper reference range is 0.045 ug/L.  Troponin values   in the range of 0.045 - 0.120 ug/L may be associated with risks of adverse   clinical events.                  Clinical Quality Measure: Blood Pressure Screening     Your blood pressure was checked while you were in the emergency department today. The last reading we obtained was  BP: 181/81 . Please read the guidelines below about what these numbers mean and what you should do about them.  If your systolic blood pressure (the top number) is less than 120 and your diastolic blood pressure (the bottom number) is less than 80, then your blood pressure is normal. There is nothing more that you need to do about it.  If your systolic blood pressure (the top number) is 120-139 or your diastolic blood pressure (the bottom number) is 80-89, your blood pressure may be higher than it should be. You should have your blood pressure rechecked within a year by a primary care provider.  If your systolic blood pressure (the top number) is 140 or greater or your diastolic blood pressure (the bottom number) is 90 or greater, you may have high blood pressure. High blood pressure is treatable, but if left untreated over time it can put you at risk for heart attack, stroke, or kidney failure. You should have your blood pressure rechecked by a primary care provider within the next 4 weeks.  If your provider in the emergency department today gave you specific instructions to follow-up with your doctor or provider even sooner than that, you should follow that instruction and not wait for up to 4 weeks for your follow-up visit.        Thank you for choosing [facility]       Thank you for choosing [facility] for your care.  "Our goal is always to provide you with excellent care. Hearing back from our patients is one way we can continue to improve our services. Please take a few minutes to complete the written survey that you may receive in the mail after you visit with us. Thank you!        [employer] Information     [employer] lets you send messages to your doctor, view your test results, renew your prescriptions, schedule appointments and more. To sign up, go to [email or web address] . Click on \"Log in\" on the left side of the screen, which will take you to the Welcome page. Then click on \"Sign up Now\" on the right side of the page.     You will be asked to enter the access code listed below, as well as some personal information. Please follow the directions to create your username and password.     Your access code is: [email or web address]  Expires: 2017 12:05 PM     Your access code will  in 90 days. If you need help or a new code, please call your [place] clinic or [phone].        Care EveryWhere ID     This is your Care EveryWhere ID. This could be used by other organizations to access your [place] medical records  [id number]        Equal Access to Services     JAY SINGH : Hadmeera Donald, wasasha noyola, maribeth walden, lori evans. So [facility] [place] [phone].    ATENCIÓN: Si habla español, tiene a fofana disposición servicios gratuitos de asistencia lingüística. Llame al [phone].    We comply with applicable federal civil rights laws and [place] laws. We do not discriminate on the basis of race, color, national origin, age, disability sex, sexual orientation or gender identity.            After Visit Summary       This is your record. Keep this with you and show to your community pharmacist(s) and doctor(s) at your next visit.                  "

## 2017-09-17 NOTE — ED PROVIDER NOTES
History     Chief Complaint:  Hypertension    HPI   Haritha Trujillo is a 89 year old female with a history of atrial fibrillation, hypertension, congestive heart failure, and chronic kidney disease on Coumadin who presents with hypertension. The patient saw a PA at Urgent Care on Thursday 9/14/17 who increased her Lisinopril dose from 10 mg to 20 mg. On Friday, she took her blood pressure and noted it to be higher than normal. She called Dr. Michel, her primary, who noted Celecoxib can increase her blood pressure since she has atrial fibrillation. She states she took herself off of the Celecoxib on Friday 9/15/17. Today, she took her blood pressure again, recording at 200/150, and went to double check with the RN who lives down the [address] from her, who also noted the blood pressure was high. She had a very mild headache earlier today while reading the paper, but denies any headache currently. No head trauma. The patient denies any chest pain, abdominal pain, or other symptoms. She reports she has had a runny nose lately.    Allergies:  Hctz  Lansoprazole     Medications:    Celecoxib (CELEBREX PO)  lisinopril (PRINIVIL/ZESTRIL) 20 MG tablet  metoprolol (TOPROL-XL) 50 MG 24 hr tablet  nitroGLYcerin (NITROSTAT) 0.4 MG sublingual tablet  acetaminophen (TYLENOL) 500 MG tablet  Probiotic Product (PROBIOTIC DAILY PO)  senna-docusate (SENOKOT-S;PERICOLACE) 8.6-50 MG per tablet  furosemide (LASIX) 20 MG tablet  WARFARIN SODIUM PO  calcium carbonate-vitamin D (CALCIUM + D) 600-200 MG-UNIT TABS  atorvastatin (LIPITOR) 40 MG tablet  Multiple Vitamins-Minerals (CENTRUM SILVER) per tablet  cycloSPORINE (RESTASIS) 0.05 % ophthalmic emulsion    Past Medical History:    Aortic regurgitation  Atrial fibrillation  Cardiomyopathy  Congestive heart failure  Chronic kidney disease, stage 3  Coronary atherosclerosis  Degeneration of cervical intervertebral disc  Hypertension  Mitral regurgitation  Hyperlipidemia  Pacemaker  Pulmonary  "hypertension  Pulmonary valve regurgitation  Tricuspid regurgitation  Unspecified tinnitus    Past Surgical History:    Right rotator cuff tear repair  Microdiscectomy   section x3  Appendectomy  Pacemaker placement, dual chamber Medtronic    Family History:    Hypertension  Pancreatic cancer  Glaucoma  CAD  Lipids  Ovarian cancer  MS    Social History:  Smoking status: Former smoker  Alcohol use: No  Marital Status:   [5]     Review of Systems   Constitutional: Negative for chills and fever.   HENT: Positive for rhinorrhea.    Respiratory: Negative for shortness of breath.    Cardiovascular: Negative for chest pain.   Gastrointestinal: Negative for abdominal pain, nausea and vomiting.   Neurological: Negative for weakness, light-headedness and numbness.   All other systems reviewed and are negative.    Physical Exam     Patient Vitals for the past 24 hrs:   BP Temp Temp src Pulse Resp SpO2 Height Weight   17 1945 181/81 - - 60 16 98 % - -   17 1930 185/83 - - 58 - 97 % - -   17 1915 182/89 - - - - - - -   17 1900 180/86 - - - - - - -   17 1845 178/87 - - - - 96 % - -   17 1830 181/84 - - 60 - 99 % - -   17 1815 (!) 175/92 - - 70 - 98 % - -   17 1800 172/84 - - 60 16 99 % - -   17 1745 (!) 165/106 - - - - - - -   17 1730 180/89 - - 60 - 97 % - -   17 1715 180/83 - - 60 - 97 % - -   17 1706 134/83 - - 60 - 98 % - -   17 1650 (!) 194/91 - - - - - - -   17 1557 185/82 - - - - - - -   17 1555 - 98.1  F (36.7  C) Temporal 63 16 100 % 1.626 m (5' 4\") 68 kg (150 lb)     Physical Exam  Physical Exam   General:  Sitting on bed with friend at bedside.   HENT:  No obvious trauma to head  Right Ear:  External ear normal.   Left Ear:  External ear normal.   Nose:  Nose normal.   Eyes:  Conjunctivae and EOM are normal. Pupils are equal, round, and reactive.   Neck: Normal range of motion. Neck supple. No tracheal deviation " present.   CV:  Normal heart sounds. No murmur heard.  Pulm/Chest: Effort normal and breath sounds normal.   Abd: Soft. No distension. There is no tenderness. There is no rigidity, no rebound and no guarding.   M/S: Normal range of motion.   Neuro: Alert. GCS 15. CN II-XII Grossly intact, no pronator drift, normal finger-nose-finger, visual fields intact by confrontation. Muscle strength is +5 proximal and distal in the bilateral upper and lower extremities. No dysarthria. Normal palm up, palm down.  Skin: Skin is warm and dry. No rash noted. Not diaphoretic.   Psych: Normal mood and affect. Behavior is normal.     Emergency Department Course   ECG (16:52:51):  Rate 60 bpm. CT interval *. QRS duration 140. QT/QTc 472/472. P-R-T axes * -47 94. Ventricular-paced rhythm. Abnormal ECG. No significant change compared to ECG dated 1/5/15. Interpreted at 1653 by Baljeet Sanchez DO.    Laboratory:  CBC: WNL (WBC 5.7, HGB 14.0, )   INR: 2.85 (H)  BMP: Glucose 112 (H), GFR 53 (L), o/w WNL (Creatinine 0.99)  Troponin: <0.015    Interventions:  1746: 50 mg Metoprolol PO  1904: 20 mg Lisinopril PO    Emergency Department Course:  Past medical records, nursing notes, and vitals reviewed.  1700: I performed an exam of the patient and obtained history, as documented above.  IV inserted and blood drawn.    1940: I rechecked the patient. Explained findings to the patient.    I rechecked the patient. Findings and plan explained to the Patient. Patient discharged home with instructions regarding supportive care, medications, and reasons to return. The importance of close follow-up was reviewed.     Impression & Plan      Medical Decision Making:  Haritha Trujillo is a very pleasant 89 year old female who presents for evaluation of elevated blood pressure.  There is a history of hypertension in the past. The workup here is negative and the patient does not have any clinical, laboratory, ecg or historical signs of  end-organ dysfunction. There are no signs of hypertensive emergency or urgency. Supportive outpatient management is therefore indicated with close follow-up of primary care physician. Given data obtained here in ED, will initiate no new for therapy at this time and I encouraged continued serial blood pressure monitoring at home to aid primary in decision making regarding hypertension.      The patient reports that she went to an urgent care and Thursday where they told her to double her lisinopril from 10mg to 20mg. She has been doing that since Thursday. She also discontinued her Celebrex on Friday, as she thought that may be contributing to the high blood pressure. I reviewed the risk and benefit of increasing the metoprolol but due to so many medication changes, she is in agreement against changing any additional medications at this time. She incidentally already has a follow-up appointment with her primary tomorrow morning, 12 hours from now.    The treatment plan was discussed with the patient and they expressed understanding of this plan and consented to the plan.  In addition, the patient will return to the emergency department if their symptoms persist, worsen, if new symptoms arise or if there is any concern as other pathology may be present that is not evident at this time. They also understand the importance of close follow up in the clinic and if unable to do so will return to the emergency department for a reevaluation. All questions were answered.      Diagnosis:   ICD-10-CM   Secondary hypertension I15.9     Disposition: Discharged to home    Malena Bhakta  9/17/2017    EMERGENCY DEPARTMENT    Malena WELCH, joselo serving as a scribe at 5:00 PM on 9/17/2017 to document services personally performed by Baljeet Sanchez DO based on my observations and the provider's statements to me.        Baljeet Sanchez DO  09/17/17 1955

## 2017-09-17 NOTE — ED AVS SNAPSHOT
Emergency Department    [address] [place] [address]    Phone:  [phone]    Fax:  [phone]                                       Haritha Trujillo   MRN: [id number]    Department:   Emergency Department   Date of Visit:  9/17/2017           After Visit Summary Signature Page     I have received my discharge instructions, and my questions have been answered. I have discussed any challenges I see with this plan with the nurse or doctor.    ..........................................................................................................................................  Patient/Patient Representative Signature      ..........................................................................................................................................  Patient Representative Print Name and Relationship to Patient    ..................................................               ................................................  Date                                            Time    ..........................................................................................................................................  Reviewed by Signature/Title    ...................................................              ..............................................  Date                                                            Time

## 2017-09-17 NOTE — ED NOTES
Pt states her BP has been running high for about a week. Went to urgent care and had Lisinopril dose increased to 20mg but has seen no change in BP checks at home. Is scheduled to see her doctor in the morning. Denies any associated symptoms at all.

## 2017-09-18 NOTE — DISCHARGE INSTRUCTIONS
"  Discharge Instructions for High Blood Pressure (Hypertension)  You have been diagnosed with high blood pressure (also called hypertension). This means the force of blood against your artery walls is too strong. It also means your heart is working hard to move blood. High blood pressure usually has no symptoms, but over time, it can damage your heart, blood vessels, eyes, kidneys, and other organs. With help from your doctor, you can manage your blood pressure and protect your health.  Taking medicine    Learn to take your own blood pressure. Keep a record of your results. Ask your doctor which readings mean that you need medical attention.    Take your blood pressure medicine exactly as directed. Don t skip doses. Missing doses can cause your blood pressure to get out of control.    If you do miss a dose (or doses) check with your healthcare provider about what to do.    Avoid medicine that contain heart stimulants, including over-the-counter drugs. Check for warnings about high blood pressure on the label. Ask the pharmacist before purchasing something you haven't used before    Check with your doctor or pharmacist before taking a decongestant. Some decongestants can worsen high blood pressure.  Lifestyle changes    Maintain a healthy weight. Get help to lose any extra pounds.    Cut back on salt.    Limit canned, dried, packaged, and fast foods.    Don t add salt to your food at the table.    Season foods with herbs instead of salt when you cook.    Request no added salt when you go to a restaurant.    The American Heart Association s (AHA) \"ideal\" sodium intake recommendation is 1,500 milligrams per day.  However, since American's eat so much salt, the AHA says a positive change can occur by cutting back to even 2,400 milligrams of sodium a day.     Follow the DASH (Dietary Approaches to Stop Hypertension) eating plan. This plan recommends vegetables, fruits, whole gains, and other heart healthy foods.    Begin " an exercise program. Ask your doctor how to get started. The American Heart Association recommends aerobic exercise 3 to 4 times a week for an average of 40 minutes at a time, with your doctor's approval. Simple activities like walking or gardening can help.    Break the smoking habit. Enroll in a stop-smoking program to improve your chances of success. Ask your healthcare provider about programs and medicines to help you stop smoking.    Limit drinks that contain caffeine (coffee, black or green tea, cola) to 2 per day.    Never take stimulants such as amphetamines or cocaine; these drugs can be deadly for someone with high blood pressure.    Control your stress. Learn stress-management techniques.    Limit alcohol to no more than 1 drink a day for women and 2 drinks a day for men.  Follow-up care  Make a follow-up appointment as directed by our staff.     When to seek medical care  Call your doctor immediately or seek emergency care if you have any of the following:    Chest pain or shortness of breath (call 911)    Moderate to severe headache    Weakness in the muscles of your face, arms, or legs    Trouble speaking    Extreme drowsiness    Confusion    Fainting or dizziness    Pulsating or rushing sound in your ears    Unexplained nosebleed    Weakness, tingling, or numbness of your face, arms, or legs    Change in vision    Blood pressure measured at home that is greater than 180/110   Date Last Reviewed: 4/27/2016 2000-2017 [employer]. [address] [place] [address], [place] [address]. All rights reserved. This information is not intended as a substitute for professional medical care. Always follow your healthcare professional's instructions.

## 2017-09-26 ENCOUNTER — OFFICE VISIT (OUTPATIENT)
Dept: CARDIOLOGY | Facility: CLINIC | Age: 82
End: 2017-09-26
Attending: NURSE PRACTITIONER
Payer: COMMERCIAL

## 2017-09-26 VITALS
HEIGHT: 64 IN | WEIGHT: 151 LBS | HEART RATE: 63 BPM | BODY MASS INDEX: 25.78 KG/M2 | SYSTOLIC BLOOD PRESSURE: 167 MMHG | DIASTOLIC BLOOD PRESSURE: 77 MMHG

## 2017-09-26 DIAGNOSIS — T82.897D DIAPHRAGMATIC STIMULATION BY PACEMAKER, SUBSEQUENT ENCOUNTER: ICD-10-CM

## 2017-09-26 DIAGNOSIS — I25.10 CARDIOVASCULAR DISEASE: ICD-10-CM

## 2017-09-26 DIAGNOSIS — I10 ESSENTIAL HYPERTENSION: Primary | ICD-10-CM

## 2017-09-26 DIAGNOSIS — R00.2 PALPITATIONS: ICD-10-CM

## 2017-09-26 PROCEDURE — 99214 OFFICE O/P EST MOD 30 MIN: CPT | Performed by: INTERNAL MEDICINE

## 2017-09-26 RX ORDER — AMLODIPINE BESYLATE 5 MG/1
5 TABLET ORAL DAILY
Qty: 30 TABLET | Refills: 11 | Status: SHIPPED | OUTPATIENT
Start: 2017-09-26 | End: 2018-08-31

## 2017-09-26 NOTE — PROGRESS NOTES
HPI and Plan:   See dictation    Orders Placed This Encounter   Procedures     Follow-Up with Cardiac Advanced Practice Provider       Orders Placed This Encounter   Medications     amLODIPine (NORVASC) 5 MG tablet     Sig: Take 1 tablet (5 mg) by mouth daily     Dispense:  30 tablet     Refill:  11     Take every am       There are no discontinued medications.      Encounter Diagnoses   Name Primary?     Essential hypertension Yes     Cardiovascular disease      Palpitations        CURRENT MEDICATIONS:  Current Outpatient Prescriptions   Medication Sig Dispense Refill     amLODIPine (NORVASC) 5 MG tablet Take 1 tablet (5 mg) by mouth daily 30 tablet 11     Celecoxib (CELEBREX PO) Take 200 mg by mouth daily       lisinopril (PRINIVIL/ZESTRIL) 20 MG tablet Take 1 tablet (20 mg) by mouth daily 30 tablet 0     metoprolol (TOPROL-XL) 50 MG 24 hr tablet Take 1 tablet (50 mg) by mouth 2 times daily 180 tablet 3     nitroGLYcerin (NITROSTAT) 0.4 MG sublingual tablet For chest pain place 1 tablet under the tongue every 5 minutes for 3 doses. If symptoms persist 5 minutes after 1st dose call 911. 25 tablet 1     acetaminophen (TYLENOL) 500 MG tablet Take 500-1,000 mg by mouth every 6 hours as needed for mild pain       Probiotic Product (PROBIOTIC DAILY PO) Take by mouth daily       senna-docusate (SENOKOT-S;PERICOLACE) 8.6-50 MG per tablet Take 1 tablet by mouth daily       furosemide (LASIX) 20 MG tablet Take 0.5 tablets (10 mg) by mouth daily 30 tablet 0     WARFARIN SODIUM PO Take 4.5 mg by mouth daily 1 & 1/2 tablet of 3mg       calcium carbonate-vitamin D (CALCIUM + D) 600-200 MG-UNIT TABS Take 1 tablet by mouth daily.       atorvastatin (LIPITOR) 40 MG tablet Take 40 mg by mouth At Bedtime        Multiple Vitamins-Minerals (CENTRUM SILVER) per tablet Take 1 tablet by mouth daily.         cycloSPORINE (RESTASIS) 0.05 % ophthalmic emulsion Place 1 drop into both eyes every 12 hours.         ALLERGIES     Allergies    Allergen Reactions     Hctz      Lansoprazole      diarrhea   Prevacid       PAST MEDICAL HISTORY:  Past Medical History:   Diagnosis Date     Aortic regurgitation 2014    mild (1+) per echo     Atrial fibrillation (H)      Cardiomyopathy (H)      CHF (congestive heart failure) (H)      Chronic kidney disease, stage 3      Coronary atherosclerosis of unspecified type of vessel, native or graft 2000    normal left coronary arteries and tight obstruction in distal RCA, too small for revascularization, medical management     Degeneration of cervical intervertebral disc     cervical spine     Essential hypertension, benign      Mitral regurgitation 2014    mod-mod/sev (2-3+) per echo     Other and unspecified hyperlipidemia      Pacemaker      Pulmonary hypertension (H) 3/16/2013    mild per echo with RVSP 31mmHg +RAP      Pulmonary valve regurgitation 2014    mod (2+) per echo     Tricuspid regurgitation 2014    mild (1+) per echo     Unspecified tinnitus     chronic       PAST SURGICAL HISTORY:  Past Surgical History:   Procedure Laterality Date     C NONSPECIFIC PROCEDURE      right rotator cuff tear OR     C NONSPECIFIC PROCEDURE      microdiscectomy     C NONSPECIFIC PROCEDURE      C-sections x 3      C NONSPECIFIC PROCEDURE      appendectomy     C NONSPECIFIC PROCEDURE      bilateral benign breast biopsy      C NONSPECIFIC PROCEDURE      right knee arthroscopic OR     C NONSPECIFIC PROCEDURE  1974    enteritis     CORONARY ANGIOGRAPHY ADULT ORDER  2000     HRW PACEMAKER PERMANENT  2015    BI- ventricular     IMPLANT PACEMAKER  2010    dual chamber Medtronic       FAMILY HISTORY:  Family History   Problem Relation Age of Onset     Hypertension Mother      CANCER Mother      pancreatic     Eye Disorder Mother      cristian JORGE Father       53     Lipids Father      DIABETES Maternal Grandmother      ANIA Brother      open heart surgery age 53     CANCER  Daughter      ovavian     Neurologic Disorder Son      MS       SOCIAL HISTORY:  Social History     Social History     Marital status:      Spouse name: N/A     Number of children: N/A     Years of education: N/A     Social History Main Topics     Smoking status: Former Smoker     Smokeless tobacco: Never Used      Comment: 35 yrs ago     Alcohol use No     Drug use: No     Sexual activity: Not Asked     Other Topics Concern     Parent/Sibling W/ Cabg, Mi Or Angioplasty Before 65f 55m? Yes     Special Diet Yes     low sodium     Exercise Yes     active life style     Seat Belt Yes     Social History Narrative       Review of Systems:  Skin:  Negative       Eyes:  Positive for glasses    ENT:  Negative      Respiratory:  Positive for dyspnea on exertion (when walking fast)     Cardiovascular:    Positive for (chest tightness when walking fast)    Gastroenterology: Negative      Genitourinary:  Negative      Musculoskeletal:  Positive for arthritis    Neurologic:  Positive for  (cramping in toes at night)    Psychiatric:  Negative      Heme/Lymph/Imm:  Positive for allergies    Endocrine:  Negative        [id number]

## 2017-09-26 NOTE — MR AVS SNAPSHOT
After Visit Summary   9/26/2017    Haritha Trujillo    MRN: [id number]           Patient Information     Date Of Birth          4/27/1925        Visit Information        Provider Department      9/26/2017 2:15 PM Fay Tatum MD [facility] HEART AT [place]        Today's Diagnoses     Essential hypertension    -  1    Diaphragmatic stimulation by pacemaker, subsequent encounter        Cardiovascular disease        Palpitations           Follow-ups after your visit        Additional Services     Follow-Up with Cardiac Advanced Practice Provider                 Your next 10 appointments already scheduled     Oct 25, 2017 11:15 AM CDT   Pacemaker Check with HANG WARREN   [facility] ([facility])    [address] [place], [place] [id number]  [facility] [id number] [phone]              Future tests that were ordered for you today     Open Future Orders        Priority Expected Expires Ordered    Follow-Up with Cardiac Advanced Practice Provider Routine 3/25/2018 9/26/2018 9/26/2017            Who to contact     If you have questions or need follow up information about today's clinic visit or your schedule please contact [facility] directly at [phone].  Normal or non-critical lab and imaging results will be communicated to you by MyChart, letter or phone within 4 business days after the clinic has received the results. If you do not hear from us within 7 days, please contact the clinic through [employer] or phone. If you have a critical or abnormal lab result, we will notify you by phone as soon as possible.  Submit refill requests through [employer] or call your pharmacy and they will forward the refill request to us. Please allow 3 business days for your refill to be completed.          Additional Information About Your Visit        [employer] Information     [employer] lets you send  "messages to your doctor, view your test results, renew your prescriptions, schedule appointments and more. To sign up, go to [email or web address] . Click on \"Log in\" on the left side of the screen, which will take you to the Welcome page. Then click on \"Sign up Now\" on the right side of the page.     You will be asked to enter the access code listed below, as well as some personal information. Please follow the directions to create your username and password.     Your access code is: [email or web address]  Expires: 2017  3:18 PM     Your access code will  in 90 days. If you need help or a new code, please call your [place] clinic or [phone].        Care EveryWhere ID     This is your Care EveryWhere ID. This could be used by other organizations to access your [place] medical records  [id number]        Your Vitals Were     Pulse Height BMI (Body Mass Index)             63 1.626 m (5' 4\") 25.92 kg/m2          Blood Pressure from Last 3 Encounters:   17 167/77   17 181/81   17 170/82    Weight from Last 3 Encounters:   17 68.5 kg (151 lb)   17 68 kg (150 lb)   17 68.3 kg (150 lb 8 oz)              We Performed the Following     Follow-Up with Electrophysiologist          Today's Medication Changes          These changes are accurate as of: 17  3:18 PM.  If you have any questions, ask your nurse or doctor.               Start taking these medicines.        Dose/Directions    amLODIPine 5 MG tablet   Commonly known as:  NORVASC   Used for:  Essential hypertension   Started by:  Fay Tatum MD        Dose:  5 mg   Take 1 tablet (5 mg) by mouth daily   Quantity:  30 tablet   Refills:  11            Where to get your medicines      These medications were sent to [id number] - [place], [place] - [address] AT Health [facility] OF  [address] [facility] [address] [address]  [address] [facility] [place], [place] [address]     Phone:  [phone]     " amLODIPine 5 MG tablet                Primary Care Provider Office Phone # Fax #    Kirit Michel [phone]       [place] [address]        Equal Access to Services     JAY SINGH : Collins francisco harrington saeedo Soomaali, waaxda luqadaha, qaybta kaalmada adeafzalyada, lori celis rellfazal whitley laJay Jayrishabh evans. So [facility] [phone].    ATENCIÓN: Si habla español, tiene a fofana disposición servicios gratuitos de asistencia lingüística. Llame al [phone].    We comply with applicable federal civil rights laws and [place] laws. We do not discriminate on the basis of race, color, national origin, age, disability sex, sexual orientation or gender identity.            Thank you!     Thank you for choosing [facility] AT [place]  for your care. Our goal is always to provide you with excellent care. Hearing back from our patients is one way we can continue to improve our services. Please take a few minutes to complete the written survey that you may receive in the mail after your visit with us. Thank you!             Your Updated Medication List - Protect others around you: Learn how to safely use, store and throw away your medicines at www.disposemymeds.org.          This list is accurate as of: 9/26/17  3:18 PM.  Always use your most recent med list.                   Brand Name Dispense Instructions for use Diagnosis    acetaminophen 500 MG tablet    TYLENOL     Take 500-1,000 mg by mouth every 6 hours as needed for mild pain        amLODIPine 5 MG tablet    NORVASC    30 tablet    Take 1 tablet (5 mg) by mouth daily    Essential hypertension       atorvastatin 40 MG tablet    LIPITOR     Take 40 mg by mouth At Bedtime        calcium + D 600-200 MG-UNIT Tabs   Generic drug:  calcium carbonate-vitamin D      Take 1 tablet by mouth daily.        CELEBREX PO      Take 200 mg by mouth daily        CENTRUM SILVER per tablet      Take 1  tablet by mouth daily.        furosemide 20 MG tablet    LASIX    30 tablet    Take 0.5 tablets (10 mg) by mouth daily    Pulmonary edema       lisinopril 20 MG tablet    PRINIVIL/ZESTRIL    30 tablet    Take 1 tablet (20 mg) by mouth daily    Essential hypertension       metoprolol 50 MG 24 hr tablet    TOPROL-XL    180 tablet    Take 1 tablet (50 mg) by mouth 2 times daily    CHF (congestive heart failure) (H)       nitroGLYcerin 0.4 MG sublingual tablet    NITROSTAT    25 tablet    For chest pain place 1 tablet under the tongue every 5 minutes for 3 doses. If symptoms persist 5 minutes after 1st dose call 911.        PROBIOTIC DAILY PO      Take by mouth daily        RESTASIS 0.05 % ophthalmic emulsion   Generic drug:  cycloSPORINE      Place 1 drop into both eyes every 12 hours.        senna-docusate 8.6-50 MG per tablet    SENOKOT-S;PERICOLACE     Take 1 tablet by mouth daily        WARFARIN SODIUM PO      Take 4.5 mg by mouth daily 1 & 1/2 tablet of 3mg

## 2017-09-26 NOTE — LETTER
"9/26/2017    Kirit Michel MD  [address] [address]    RE: Haritha Trujillo       Dear Colleague,    I had the pleasure of seeing Haritha Trujillo in the [facility].    It was my pleasure seeing Ms. Haritha Trujillo in follow-up of cardiomyopathy, CHF and permanent atrial fibrillation with previous AV node ablation.  She had a biventricular pacemaker implanted in 01/2015.  The LV threshold has been on the high side.  In addition, she has had intermittent diaphragmatic stimulation.  She was a late responder to CRT, and her EF has improved to around 45% after being <30% for the first 6 months after the EP procedure.      Ever since the pacemaker was implanted, Lori has complained of intermittent phrenic nerve stimulation.  When she was seen in the clinic in 07/2017, she was having a \"really bad day\" with intense diaphragmatic stimulation.  Reprogramming of her pacemaker was attempted, but there is only a narrow margin for LV capture without phrenic nerve stimulation.      After the most recent device programming 2 months ago, the patient has done fairly well.  She does state that she has occasional chest thumping, but it is better than in July and it is something she can live with.      She is concerned about her blood pressure that has been very high.  In the senior building that she lives there is a nurse living on the same floor, and she comes and checks her blood pressure a few times per week.  Lately it has been quite high with systolic blood pressure sometimes the 180-200 range.  About 10 days ago, her lisinopril was increased from 10 mg daily to 20 mg daily.      The patient has not had chest pain, syncope or near-syncope.      PHYSICAL EXAMINATION:   VITAL SIGNS:  Blood pressure 167/77, pulse 63 and regular, weight 68.5 kg, height 162 cm.   GENERAL:  Lori remains absolutely alert despite her 89 years.  She is delightful and in " no distress.   NECK:  Supple without bruits.   LUNGS:  Clear.   CARDIOVASCULAR:  Normal JVP, regular rhythm, no apparent gallop or murmur.   EXTREMITIES:  No edema.      DIAGNOSTIC STUDIES:    Her 12-lead ECG from 09/17/2017 showed atrial fibrillation with biventricular pacing and excellent paced QRS complex that is measured at 140 milliseconds.      Her echocardiogram in 03/2017 showed an ejection fraction of 45%-50%, much better than previous studies.     Outpatient Encounter Prescriptions as of 9/26/2017   Medication Sig Dispense Refill     amLODIPine (NORVASC) 5 MG tablet Take 1 tablet (5 mg) by mouth daily 30 tablet 11     Celecoxib (CELEBREX PO) Take 200 mg by mouth daily       [DISCONTINUED] lisinopril (PRINIVIL/ZESTRIL) 20 MG tablet Take 1 tablet (20 mg) by mouth daily 30 tablet 0     metoprolol (TOPROL-XL) 50 MG 24 hr tablet Take 1 tablet (50 mg) by mouth 2 times daily 180 tablet 3     nitroGLYcerin (NITROSTAT) 0.4 MG sublingual tablet For chest pain place 1 tablet under the tongue every 5 minutes for 3 doses. If symptoms persist 5 minutes after 1st dose call 911. 25 tablet 1     acetaminophen (TYLENOL) 500 MG tablet Take 500-1,000 mg by mouth every 6 hours as needed for mild pain       Probiotic Product (PROBIOTIC DAILY PO) Take by mouth daily       senna-docusate (SENOKOT-S;PERICOLACE) 8.6-50 MG per tablet Take 1 tablet by mouth daily       furosemide (LASIX) 20 MG tablet Take 0.5 tablets (10 mg) by mouth daily 30 tablet 0     WARFARIN SODIUM PO Take 4.5 mg by mouth daily 1 & 1/2 tablet of 3mg       calcium carbonate-vitamin D (CALCIUM + D) 600-200 MG-UNIT TABS Take 1 tablet by mouth daily.       atorvastatin (LIPITOR) 40 MG tablet Take 40 mg by mouth At Bedtime        Multiple Vitamins-Minerals (CENTRUM SILVER) per tablet Take 1 tablet by mouth daily.         cycloSPORINE (RESTASIS) 0.05 % ophthalmic emulsion Place 1 drop into both eyes every 12 hours.       No facility-administered encounter medications  on file as of 9/26/2017.       IMPRESSION:   1.  Biventricular PM with diaphragmatic stimulation from the left ventricular lead.  This is only mildly bothersome for now.  We discussed 3 options:   A.  Leave things alone.   B.  Revise the LV lead.   C. Turn off the LV lead.   Of the 3 options, I believe A is the most reasonable for now given that she is not miserable from diaphragmatic pacing.  She understands the alternatives and agrees with this recommendation.      2.  Hypertension.  Her blood pressure has been very high lately.  She takes metoprolol twice daily and lisinopril 20 mg in the evening.  I will add amlodipine. If this does not work, consider switching metoprolol to carvedilol.      3.  Cardiomyopathy.  Improved.  Compensated, as above.     4.  Permanent atrial fibrillation with previous AV node ablation.  On warfarin.       RECOMMENDATIONS:   A.  Add amlodipine 5 mg every morning.  Potential side effects of amlodipine were discussed with the patient.   B.  Monitor blood pressure.  Call Dr. Michel or us if systolic blood pressure continues to be elevated after 2-3 weeks on the amlodipine.    C. If all is well, see Kat in 7-8 months.      This is a truly delightful woman.  It is always a pleasure to see her.  Please feel free to contact me with any questions.     Again, thank you for allowing me to participate in the care of your patient.      Sincerely,    Fay Tatum MD     [facility]

## 2017-09-27 NOTE — PROGRESS NOTES
"HISTORY OF PRESENT ILLNESS:    It was my pleasure seeing Ms. Haritha Trujillo in follow-up of cardiomyopathy, CHF and permanent atrial fibrillation with previous AV node ablation.  She had a biventricular pacemaker implanted in 01/2015.  The LV threshold has been on the high side.  In addition, she has had intermittent diaphragmatic stimulation.  She was a late responder to CRT, and her EF has improved to around 45% after being <30% for the first 6 months after the EP procedure.      Ever since the pacemaker was implanted, Lori has complained of intermittent phrenic nerve stimulation.  When she was seen in the clinic in 07/2017, she was having a \"really bad day\" with intense diaphragmatic stimulation.  Reprogramming of her pacemaker was attempted, but there is only a narrow margin for LV capture without phrenic nerve stimulation.      After the most recent device programming 2 months ago, the patient has done fairly well.  She does state that she has occasional chest thumping, but it is better than in July and it is something she can live with.      She is concerned about her blood pressure that has been very high.  In the senior building that she lives there is a nurse living on the same floor, and she comes and checks her blood pressure a few times per week.  Lately it has been quite high with systolic blood pressure sometimes the 180-200 range.  About 10 days ago, her lisinopril was increased from 10 mg daily to 20 mg daily.      The patient has not had chest pain, syncope or near-syncope.      PHYSICAL EXAMINATION:   VITAL SIGNS:  Blood pressure 167/77, pulse 63 and regular, weight 68.5 kg, height 162 cm.   GENERAL:  Lori remains absolutely alert despite her 89 years.  She is delightful and in no distress.   NECK:  Supple without bruits.   LUNGS:  Clear.   CARDIOVASCULAR:  Normal JVP, regular rhythm, no apparent gallop or murmur.   EXTREMITIES:  No edema.      DIAGNOSTIC STUDIES:    Her 12-lead ECG from 09/17/2017 " showed atrial fibrillation with biventricular pacing and excellent paced QRS complex that is measured at 140 milliseconds.      Her echocardiogram in 2017 showed an ejection fraction of 45%-50%, much better than previous studies.      IMPRESSION:   1.  Biventricular PM with diaphragmatic stimulation from the left ventricular lead.  This is only mildly bothersome for now.  We discussed 3 options:   A.  Leave things alone.   B.  Revise the LV lead.   C. Turn off the LV lead.   Of the 3 options, I believe A is the most reasonable for now given that she is not miserable from diaphragmatic pacing.  She understands the alternatives and agrees with this recommendation.      2.  Hypertension.  Her blood pressure has been very high lately.  She takes metoprolol twice daily and lisinopril 20 mg in the evening.  I will add amlodipine. If this does not work, consider switching metoprolol to carvedilol.      3.  Cardiomyopathy.  Improved.  Compensated, as above.     4.  Permanent atrial fibrillation with previous AV node ablation.  On warfarin.       RECOMMENDATIONS:   A.  Add amlodipine 5 mg every morning.  Potential side effects of amlodipine were discussed with the patient.   B.  Monitor blood pressure.  Call Dr. Michel or us if systolic blood pressure continues to be elevated after 2-3 weeks on the amlodipine.    C. If all is well, see Kat in 7-8 months.      This is a truly delightful woman.  It is always a pleasure to see her.  Please feel free to contact me with any questions.         DOUGLAS FOWLER MD, [facility]             D: 2017 15:17   T: 2017 22:01   MT: KATELYNN      Name:     SEVERIANO VALLEJO   MRN:      [id number]        Account:      [id number]   :      1925           Service Date: 2017      Document: [id number]

## 2017-10-16 DIAGNOSIS — I10 ESSENTIAL HYPERTENSION: ICD-10-CM

## 2017-10-16 RX ORDER — LISINOPRIL 20 MG/1
20 TABLET ORAL DAILY
Qty: 180 TABLET | Refills: 3 | Status: SHIPPED | OUTPATIENT
Start: 2017-10-16 | End: 2019-04-26

## 2017-10-20 NOTE — PROGRESS NOTES
Chart reviewed.  Encounter was not reviewed with provider.  Patient was not examined by me.  Florencia Rodriguez MD

## 2017-10-25 ENCOUNTER — ALLIED HEALTH/NURSE VISIT (OUTPATIENT)
Dept: CARDIOLOGY | Facility: CLINIC | Age: 82
End: 2017-10-25
Payer: COMMERCIAL

## 2017-10-25 DIAGNOSIS — I49.5 SSS (SICK SINUS SYNDROME) (H): ICD-10-CM

## 2017-10-25 DIAGNOSIS — Z95.0 CARDIAC PACEMAKER IN SITU: Primary | ICD-10-CM

## 2017-10-25 PROCEDURE — 93281 PM DEVICE PROGR EVAL MULTI: CPT | Performed by: INTERNAL MEDICINE

## 2017-10-25 NOTE — MR AVS SNAPSHOT
"              After Visit Summary   10/25/2017    Haritha Trujillo    MRN: [id number]           Patient Information     Date Of Birth          4/27/1925        Visit Information        Provider Department      10/25/2017 11:15 AM [facility]        Today's Diagnoses     Cardiac pacemaker in situ    -  1    SSS (sick sinus syndrome) (H)           Follow-ups after your visit        Your next 10 appointments already scheduled     Jan 26, 2018 10:30 AM CST   Teletrace with MAURICIO TECH1   [facility] ([facility])    [address] [place] [id number] [phone]              Who to contact     If you have questions or need follow up information about today's clinic visit or your schedule please contact [facility] directly at [phone].  Normal or non-critical lab and imaging results will be communicated to you by MyChart, letter or phone within 4 business days after the clinic has received the results. If you do not hear from us within 7 days, please contact the clinic through [employer] or phone. If you have a critical or abnormal lab result, we will notify you by phone as soon as possible.  Submit refill requests through [employer] or call your pharmacy and they will forward the refill request to us. Please allow 3 business days for your refill to be completed.          Additional Information About Your Visit        MyChart Information     [employer] lets you send messages to your doctor, view your test results, renew your prescriptions, schedule appointments and more. To sign up, go to [email or web address] . Click on \"Log in\" on the left side of the screen, which will take you to the Welcome page. Then click on \"Sign up Now\" on the right side of the page.     You will be asked to enter the access code listed below, as well as some personal information. Please " follow the directions to create your username and password.     Your access code is: [email or web address]  Expires: 2017  3:18 PM     Your access code will  in 90 days. If you need help or a new code, please call your [place] clinic or [phone].        Care EveryWhere ID     This is your Care EveryWhere ID. This could be used by other organizations to access your [place] medical records  [id number]         Blood Pressure from Last 3 Encounters:   17 167/77   17 181/81   17 170/82    Weight from Last 3 Encounters:   17 68.5 kg (151 lb)   17 68 kg (150 lb)   17 68.3 kg (150 lb 8 oz)              We Performed the Following     PM DEVICE PROGRAMMING EVAL, MULTI LEAD PACER ([id number])        Primary Care Provider Office Phone # Fax #    Kirit Michel [phone]       [place] [address]        Equal Access to Services     [facility]: Hadii aad ku hadasho Soomaali, waaxda luqadaha, qaybta kaalmada adeegyada, lori baer . So [facility] [phone].    ATENCIÓN: Si habla español, tiene a fofana disposición servicios gratuitos de asistencia lingüística. Amado al [phone].    We comply with applicable federal civil rights laws and [place] laws. We do not discriminate on the basis of race, color, national origin, age, disability, sex, sexual orientation, or gender identity.            Thank you!     Thank you for choosing [facility] AT [place]  for your care. Our goal is always to provide you with excellent care. Hearing back from our patients is one way we can continue to improve our services. Please take a few minutes to complete the written survey that you may receive in the mail after your visit with us. Thank you!             Your Updated Medication List - Protect others around you: Learn how to safely use, store and throw away your medicines at  www.disposemymeds.org.          This list is accurate as of: 10/25/17 11:33 AM.  Always use your most recent med list.                   Brand Name Dispense Instructions for use Diagnosis    acetaminophen 500 MG tablet    TYLENOL     Take 500-1,000 mg by mouth every 6 hours as needed for mild pain        amLODIPine 5 MG tablet    NORVASC    30 tablet    Take 1 tablet (5 mg) by mouth daily    Essential hypertension       atorvastatin 40 MG tablet    LIPITOR     Take 40 mg by mouth At Bedtime        calcium + D 600-200 MG-UNIT Tabs   Generic drug:  calcium carbonate-vitamin D      Take 1 tablet by mouth daily.        CELEBREX PO      Take 200 mg by mouth daily        CENTRUM SILVER per tablet      Take 1 tablet by mouth daily.        furosemide 20 MG tablet    LASIX    30 tablet    Take 0.5 tablets (10 mg) by mouth daily    Pulmonary edema       lisinopril 20 MG tablet    PRINIVIL/ZESTRIL    180 tablet    Take 1 tablet (20 mg) by mouth daily    Essential hypertension       metoprolol 50 MG 24 hr tablet    TOPROL-XL    180 tablet    Take 1 tablet (50 mg) by mouth 2 times daily    CHF (congestive heart failure) (H)       nitroGLYcerin 0.4 MG sublingual tablet    NITROSTAT    25 tablet    For chest pain place 1 tablet under the tongue every 5 minutes for 3 doses. If symptoms persist 5 minutes after 1st dose call 911.        PROBIOTIC DAILY PO      Take by mouth daily        RESTASIS 0.05 % ophthalmic emulsion   Generic drug:  cycloSPORINE      Place 1 drop into both eyes every 12 hours.        senna-docusate 8.6-50 MG per tablet    SENOKOT-S;PERICOLACE     Take 1 tablet by mouth daily        WARFARIN SODIUM PO      Take 4.5 mg by mouth daily 1 & 1/2 tablet of 3mg

## 2017-10-25 NOTE — PROGRESS NOTES
[employer] Consulta CRT Pacemaker Device Check  BVP: 95% with 3.9% VSR pacing  Mode: VVIR 60        Underlying Rhythm: Chronic AF with complete heart block achieved by AVNA without junctional escape at VVI 30  Heart Rate: stable, minimal variability, patient denies exercise intolerance  Sensing: Dependent    Pacing Threshold: WNL   Impedance: WNL  Battery Status: estimated 2.5 years longevity remaining  Atrial Arrhythmia: Permanent AF - patient takes warfarin   Ventricular Arrhythmia: none  Setting Change: increased LV amplitude from 3.0V to 3.25V based on today's threshold testing. Patient stated that she occasionally feels diaphragmatic stimulation at the 3.0V, but it is tolerable. She did not complain of any increased stimulation after it was increased. Decreased RV amplitude from 2.50V to 2.0V.     Care Plan: Return to Q3 month office teletraces on 1/26/2018. Mihaela

## 2018-01-26 ENCOUNTER — ALLIED HEALTH/NURSE VISIT (OUTPATIENT)
Dept: CARDIOLOGY | Facility: CLINIC | Age: 83
End: 2018-01-26
Payer: COMMERCIAL

## 2018-01-26 DIAGNOSIS — Z95.0 CARDIAC PACEMAKER IN SITU: Primary | ICD-10-CM

## 2018-01-26 PROCEDURE — 93293 PM PHONE R-STRIP DEVICE EVAL: CPT | Performed by: INTERNAL MEDICINE

## 2018-01-26 NOTE — PROGRESS NOTES
~90 day clinic teletrace  Normal pacemaker function.  at time of check. Thirty second strips for presenting, magnet, and post magnet modes.  Six second strips were saved in each mode.  Normal function post magnet. 6 month threshold scheduled in April along with MARGIE visit. ADILENE Cannon

## 2018-01-26 NOTE — MR AVS SNAPSHOT
"              After Visit Summary   1/26/2018    Haritha Trujillo    MRN: [id number]           Patient Information     Date Of Birth          4/27/1925        Visit Information        Provider Department      1/26/2018 10:30 AM HANG PARIS [facility]        Today's Diagnoses     Cardiac pacemaker in situ    -  1       Follow-ups after your visit        Your next 10 appointments already scheduled     Apr 06, 2018 11:15 AM CDT   Pacemaker Check with HANG WARREN   [facility] ([facility])    [address]  [place] [address] [phone]            Apr 06, 2018 12:00 PM CDT   [facility] EP RETURN with NAVYA Allen CNP   [facility] ([facility])    [address]  [place] [id number] [phone]              Who to contact     If you have questions or need follow up information about today's clinic visit or your schedule please contact [facility] directly at [phone].  Normal or non-critical lab and imaging results will be communicated to you by semanticlabshart, letter or phone within 4 business days after the clinic has received the results. If you do not hear from us within 7 days, please contact the clinic through CromoUpt or phone. If you have a critical or abnormal lab result, we will notify you by phone as soon as possible.  Submit refill requests through Healthonomy or call your pharmacy and they will forward the refill request to us. Please allow 3 business days for your refill to be completed.          Additional Information About Your Visit        semanticlabsharPivotshare Information     Healthonomy lets you send messages to your doctor, view your test results, renew your prescriptions, schedule appointments and more. To sign up, go to [email or web address] . Click on \"Log in\" on the left side of the screen, which will take you to " "the Welcome page. Then click on \"Sign up Now\" on the right side of the page.     You will be asked to enter the access code listed below, as well as some personal information. Please follow the directions to create your username and password.     Your access code is: [email or web address]  Expires: 2018 10:33 AM     Your access code will  in 90 days. If you need help or a new code, please call your [place] clinic or [phone].        Care EveryWhere ID     This is your Care EveryWhere ID. This could be used by other organizations to access your [place] medical records  [id number]         Blood Pressure from Last 3 Encounters:   17 167/77   17 181/81   17 170/82    Weight from Last 3 Encounters:   17 68.5 kg (151 lb)   17 68 kg (150 lb)   17 68.3 kg (150 lb 8 oz)              We Performed the Following     PM PHONE R STRIP EVAL UP TO 90 DAYS ([id number])        Primary Care Provider Office Phone # Fax #    Kirit Michel [phone]       [place] [address]        Equal Access to Services     JAY SINGH : Hadii francisco ku hadasho Soomaali, waaxda luqadaha, qaybta kaalmada adeegyada, waxay idiin haycalvinn seth evans. So [facility] [phone].    ATENCIÓN: Si habla español, tiene a fofana disposición servicios gratuitos de asistencia lingüística. Llame al [phone].    We comply with applicable federal civil rights laws and [place] laws. We do not discriminate on the basis of race, color, national origin, age, disability, sex, sexual orientation, or gender identity.            Thank you!     Thank you for choosing [facility]  for your care. Our goal is always to provide you with excellent care. Hearing back from our patients is one way we can continue to improve our services. Please take a few minutes to complete the written survey that you may receive in the mail after your " visit with us. Thank you!             Your Updated Medication List - Protect others around you: Learn how to safely use, store and throw away your medicines at www.disposemymeds.org.          This list is accurate as of 1/26/18 10:33 AM.  Always use your most recent med list.                   Brand Name Dispense Instructions for use Diagnosis    acetaminophen 500 MG tablet    TYLENOL     Take 500-1,000 mg by mouth every 6 hours as needed for mild pain        amLODIPine 5 MG tablet    NORVASC    30 tablet    Take 1 tablet (5 mg) by mouth daily    Essential hypertension       atorvastatin 40 MG tablet    LIPITOR     Take 40 mg by mouth At Bedtime        calcium + D 600-200 MG-UNIT Tabs   Generic drug:  calcium carbonate-vitamin D      Take 1 tablet by mouth daily.        CELEBREX PO      Take 200 mg by mouth daily        CENTRUM SILVER per tablet      Take 1 tablet by mouth daily.        furosemide 20 MG tablet    LASIX    30 tablet    Take 0.5 tablets (10 mg) by mouth daily    Pulmonary edema       lisinopril 20 MG tablet    PRINIVIL/ZESTRIL    180 tablet    Take 1 tablet (20 mg) by mouth daily    Essential hypertension       metoprolol succinate 50 MG 24 hr tablet    TOPROL-XL    180 tablet    Take 1 tablet (50 mg) by mouth 2 times daily    CHF (congestive heart failure) (H)       nitroGLYcerin 0.4 MG sublingual tablet    NITROSTAT    25 tablet    For chest pain place 1 tablet under the tongue every 5 minutes for 3 doses. If symptoms persist 5 minutes after 1st dose call 911.        PROBIOTIC DAILY PO      Take by mouth daily        RESTASIS 0.05 % ophthalmic emulsion   Generic drug:  cycloSPORINE      Place 1 drop into both eyes every 12 hours.        senna-docusate 8.6-50 MG per tablet    SENOKOT-S;PERICOLACE     Take 1 tablet by mouth daily        WARFARIN SODIUM PO      Take 4.5 mg by mouth daily 1 & 1/2 tablet of 3mg

## 2018-04-06 ENCOUNTER — ALLIED HEALTH/NURSE VISIT (OUTPATIENT)
Dept: CARDIOLOGY | Facility: CLINIC | Age: 83
End: 2018-04-06
Payer: COMMERCIAL

## 2018-04-06 ENCOUNTER — OFFICE VISIT (OUTPATIENT)
Dept: CARDIOLOGY | Facility: CLINIC | Age: 83
End: 2018-04-06
Payer: COMMERCIAL

## 2018-04-06 VITALS
SYSTOLIC BLOOD PRESSURE: 131 MMHG | DIASTOLIC BLOOD PRESSURE: 81 MMHG | HEIGHT: 64 IN | BODY MASS INDEX: 25.78 KG/M2 | HEART RATE: 76 BPM | WEIGHT: 151 LBS

## 2018-04-06 DIAGNOSIS — I42.8 OTHER CARDIOMYOPATHY (H): Primary | ICD-10-CM

## 2018-04-06 DIAGNOSIS — I10 ESSENTIAL HYPERTENSION: ICD-10-CM

## 2018-04-06 DIAGNOSIS — Z95.0 CARDIAC PACEMAKER IN SITU: Primary | ICD-10-CM

## 2018-04-06 DIAGNOSIS — I49.5 SSS (SICK SINUS SYNDROME) (H): ICD-10-CM

## 2018-04-06 PROCEDURE — 93281 PM DEVICE PROGR EVAL MULTI: CPT | Performed by: INTERNAL MEDICINE

## 2018-04-06 PROCEDURE — 99214 OFFICE O/P EST MOD 30 MIN: CPT | Performed by: NURSE PRACTITIONER

## 2018-04-06 NOTE — MR AVS SNAPSHOT
After Visit Summary   4/6/2018    Haritha Trujillo    MRN: [id number]           Patient Information     Date Of Birth          4/27/1925        Visit Information        Provider Department      4/6/2018 12:00 PM Kat Yo APRN CNP [facility]        Today's Diagnoses     Other cardiomyopathy (H)    -  1    Essential hypertension          Care Instructions    Call my nurse to review your medications IF your medications are different:[phone]                  Follow-ups after your visit        Additional Services     Follow-Up with Cardiac Advanced Practice Provider           Follow-Up with Electrophysiologist       Schedule with annual device threshold                  Your next 10 appointments already scheduled     Jul 10, 2018 11:00 AM CDT   Teletrace with MAURICIO TECH1   [facility] ([facility])    [address] [place] [address] [phone] OPT 2              Future tests that were ordered for you today     Open Future Orders        Priority Expected Expires Ordered    Follow-Up with Cardiac Advanced Practice Provider Routine 9/4/2018 4/6/2019 4/6/2018    Echocardiogram Routine 4/4/2019 4/6/2019 4/6/2018    Follow-Up with Electrophysiologist Routine 4/4/2019 4/6/2019 4/6/2018            Who to contact     If you have questions or need follow up information about today's clinic visit or your schedule please contact [facility] directly at [phone].  Normal or non-critical lab and imaging results will be communicated to you by MyChart, letter or phone within 4 business days after the clinic has received the results. If you do not hear from us within 7 days, please contact the clinic through MyChart or phone. If you have a critical or abnormal lab result, we will notify you by phone as soon as possible.  Submit refill requests through ApptheGame or  "call your pharmacy and they will forward the refill request to us. Please allow 3 business days for your refill to be completed.          Additional Information About Your Visit        MyChart Information     [employer] lets you send messages to your doctor, view your test results, renew your prescriptions, schedule appointments and more. To sign up, go to [email or web address] . Click on \"Log in\" on the left side of the screen, which will take you to the Welcome page. Then click on \"Sign up Now\" on the right side of the page.     You will be asked to enter the access code listed below, as well as some personal information. Please follow the directions to create your username and password.     Your access code is: [email or web address]  Expires: 2018 11:33 AM     Your access code will  in 90 days. If you need help or a new code, please call your [place] clinic or [phone].        Care EveryWhere ID     This is your Care EveryWhere ID. This could be used by other organizations to access your [place] medical records  [id number]        Your Vitals Were     Pulse Height BMI (Body Mass Index)             76 1.626 m (5' 4.02\") 25.91 kg/m2          Blood Pressure from Last 3 Encounters:   18 131/81   17 167/77   17 181/81    Weight from Last 3 Encounters:   18 68.5 kg (151 lb)   17 68.5 kg (151 lb)   17 68 kg (150 lb)              We Performed the Following     Follow-Up with Cardiac Advanced Practice Provider        Primary Care Provider Office Phone # Fax #    Kirit Michel [phone]       [address] [address]        Equal Access to Services     [facility] : Collins Donald, ryan noyola, qaybta kaalmanatalie walden, lori evans. So [facility] [phone].    ATENCIÓN: Si habla español, tiene a fofana disposición servicios gratuitos de asistencia lingüística. Llame al " [phone].    We comply with applicable federal civil rights laws and [place] laws. We do not discriminate on the basis of race, color, national origin, age, disability, sex, sexual orientation, or gender identity.            Thank you!     Thank you for choosing [facility]  for your care. Our goal is always to provide you with excellent care. Hearing back from our patients is one way we can continue to improve our services. Please take a few minutes to complete the written survey that you may receive in the mail after your visit with us. Thank you!             Your Updated Medication List - Protect others around you: Learn how to safely use, store and throw away your medicines at www.disposemymeds.org.          This list is accurate as of 4/6/18 12:17 PM.  Always use your most recent med list.                   Brand Name Dispense Instructions for use Diagnosis    acetaminophen 500 MG tablet    TYLENOL     Take 500-1,000 mg by mouth every 6 hours as needed for mild pain        amLODIPine 5 MG tablet    NORVASC    30 tablet    Take 1 tablet (5 mg) by mouth daily    Essential hypertension       atorvastatin 40 MG tablet    LIPITOR     Take 40 mg by mouth At Bedtime        calcium + D 600-200 MG-UNIT Tabs   Generic drug:  calcium carbonate-vitamin D      Take 1 tablet by mouth daily.        CELEBREX PO      Take 200 mg by mouth daily        CENTRUM SILVER per tablet      Take 1 tablet by mouth daily.        FOSAMAX PO      Take 70 mg by mouth once a week        furosemide 20 MG tablet    LASIX    30 tablet    Take 0.5 tablets (10 mg) by mouth daily    Pulmonary edema       lisinopril 20 MG tablet    PRINIVIL/ZESTRIL    180 tablet    Take 1 tablet (20 mg) by mouth daily    Essential hypertension       metoprolol succinate 50 MG 24 hr tablet    TOPROL-XL    180 tablet    Take 1 tablet (50 mg) by mouth 2 times daily    CHF (congestive heart failure) (H)       nitroGLYcerin 0.4  MG sublingual tablet    NITROSTAT    25 tablet    For chest pain place 1 tablet under the tongue every 5 minutes for 3 doses. If symptoms persist 5 minutes after 1st dose call 911.        PROBIOTIC DAILY PO      Take by mouth daily        RESTASIS 0.05 % ophthalmic emulsion   Generic drug:  cycloSPORINE      Place 1 drop into both eyes every 12 hours.        WARFARIN SODIUM PO      Take 4.5 mg by mouth daily 1 & 1/2 tablet of 3mg

## 2018-04-06 NOTE — MR AVS SNAPSHOT
"              After Visit Summary   4/6/2018    Haritha Trujillo    MRN: [id number]           Patient Information     Date Of Birth          4/27/1925        Visit Information        Provider Department      4/6/2018 11:15 AM MAURICIO GURINDERN [facility]        Today's Diagnoses     Cardiac pacemaker in situ    -  1       Follow-ups after your visit        Your next 10 appointments already scheduled     Apr 06, 2018 12:00 PM CDT   [facility] EP RETURN with NAVYA Allen CNP   [facility] ([facility])    [address]  [place] [address] [phone] OPT 2            Jul 10, 2018 11:00 AM CDT   Teletrace with MAURICIO TECH1   [facility] ([facility])    [address] [id number]  [facility] [address] [phone] OPT 2              Who to contact     If you have questions or need follow up information about today's clinic visit or your schedule please contact [facility] directly at [phone].  Normal or non-critical lab and imaging results will be communicated to you by Wambahart, letter or phone within 4 business days after the clinic has received the results. If you do not hear from us within 7 days, please contact the clinic through Mingle360t or phone. If you have a critical or abnormal lab result, we will notify you by phone as soon as possible.  Submit refill requests through [employer] or call your pharmacy and they will forward the refill request to us. Please allow 3 business days for your refill to be completed.          Additional Information About Your Visit        Wambahart Information     [employer] lets you send messages to your doctor, view your test results, renew your prescriptions, schedule appointments and more. To sign up, go to [email or web address] . Click on \"Log in\" on the left side of the screen, which will take you " "to the Welcome page. Then click on \"Sign up Now\" on the right side of the page.     You will be asked to enter the access code listed below, as well as some personal information. Please follow the directions to create your username and password.     Your access code is: [email or web address]  Expires: 2018 11:33 AM     Your access code will  in 90 days. If you need help or a new code, please call your [place] clinic or [phone].        Care EveryWhere ID     This is your Care EveryWhere ID. This could be used by other organizations to access your [place] medical records  [id number]         Blood Pressure from Last 3 Encounters:   17 167/77   17 181/81   17 170/82    Weight from Last 3 Encounters:   17 68.5 kg (151 lb)   17 68 kg (150 lb)   17 68.3 kg (150 lb 8 oz)              We Performed the Following     PM DEVICE PROGRAMMING EVAL, MULTI LEAD PACER ([id number])        Primary Care Provider Office Phone # Fax #    Kirit Michel [phone]       [place] [address]        Equal Access to Services     JAY SINGH : Hadii aad ku hadasho Soomaali, waaxda luqadaha, qaybta kaalmada adeegyada, waxay idiin hayaan seth evans. So [facility] [phone].    ATENCIÓN: Si habla español, tiene a fofana disposición servicios gratuitos de asistencia lingüística. Llame al [phone].    We comply with applicable federal civil rights laws and [place] laws. We do not discriminate on the basis of race, color, national origin, age, disability, sex, sexual orientation, or gender identity.            Thank you!     Thank you for choosing [facility]  for your care. Our goal is always to provide you with excellent care. Hearing back from our patients is one way we can continue to improve our services. Please take a few minutes to complete the written survey that you may receive in the " mail after your visit with us. Thank you!             Your Updated Medication List - Protect others around you: Learn how to safely use, store and throw away your medicines at www.disposemymeds.org.          This list is accurate as of 4/6/18 11:36 AM.  Always use your most recent med list.                   Brand Name Dispense Instructions for use Diagnosis    acetaminophen 500 MG tablet    TYLENOL     Take 500-1,000 mg by mouth every 6 hours as needed for mild pain        amLODIPine 5 MG tablet    NORVASC    30 tablet    Take 1 tablet (5 mg) by mouth daily    Essential hypertension       atorvastatin 40 MG tablet    LIPITOR     Take 40 mg by mouth At Bedtime        calcium + D 600-200 MG-UNIT Tabs   Generic drug:  calcium carbonate-vitamin D      Take 1 tablet by mouth daily.        CELEBREX PO      Take 200 mg by mouth daily        CENTRUM SILVER per tablet      Take 1 tablet by mouth daily.        furosemide 20 MG tablet    LASIX    30 tablet    Take 0.5 tablets (10 mg) by mouth daily    Pulmonary edema       lisinopril 20 MG tablet    PRINIVIL/ZESTRIL    180 tablet    Take 1 tablet (20 mg) by mouth daily    Essential hypertension       metoprolol succinate 50 MG 24 hr tablet    TOPROL-XL    180 tablet    Take 1 tablet (50 mg) by mouth 2 times daily    CHF (congestive heart failure) (H)       nitroGLYcerin 0.4 MG sublingual tablet    NITROSTAT    25 tablet    For chest pain place 1 tablet under the tongue every 5 minutes for 3 doses. If symptoms persist 5 minutes after 1st dose call 911.        PROBIOTIC DAILY PO      Take by mouth daily        RESTASIS 0.05 % ophthalmic emulsion   Generic drug:  cycloSPORINE      Place 1 drop into both eyes every 12 hours.        senna-docusate 8.6-50 MG per tablet    SENOKOT-S;PERICOLACE     Take 1 tablet by mouth daily        WARFARIN SODIUM PO      Take 4.5 mg by mouth daily 1 & 1/2 tablet of 3mg

## 2018-04-06 NOTE — PROGRESS NOTES
Med[employer] Consulta CRT-P Device Check  AP: 0 % BIV Pace: 96.1 %  Mode: VVIR 60        Underlying Rhythm: Chronic A. 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 Rate response made more sensitive from Medium High to Medium Low  Sensing: Dependent    Pacing Threshold: Stable RV, elevated LV at 2.75 V at 1.0 ms, but stable   Impedance: Stable  Battery Status: 2.5 yrs  Device Site: Without signs of infection  Atrial Arrhythmia: Chronic A. Fib-takes Coumadin  Ventricular Arrhythmia: None  Setting Change: As above    Care Plan: Scheduled to see Kat Yo today. Next office teletrace scheduled for July. GIDEON Parham RN.

## 2018-04-06 NOTE — PROGRESS NOTES
HPI and Plan: #[id number]  See dictation    Orders Placed This Encounter   Procedures     Follow-Up with Cardiac Advanced Practice Provider     Follow-Up with Electrophysiologist     Echocardiogram       Orders Placed This Encounter   Medications     Alendronate Sodium (FOSAMAX PO)     Sig: Take 70 mg by mouth once a week       Medications Discontinued During This Encounter   Medication Reason     senna-docusate (SENOKOT-S;PERICOLACE) 8.6-50 MG per tablet Therapy completed         Encounter Diagnoses   Name Primary?     Essential hypertension      Other cardiomyopathy (H) Yes       CURRENT MEDICATIONS:  Current Outpatient Prescriptions   Medication Sig Dispense Refill     Alendronate Sodium (FOSAMAX PO) Take 70 mg by mouth once a week       lisinopril (PRINIVIL/ZESTRIL) 20 MG tablet Take 1 tablet (20 mg) by mouth daily 180 tablet 3     amLODIPine (NORVASC) 5 MG tablet Take 1 tablet (5 mg) by mouth daily 30 tablet 11     metoprolol (TOPROL-XL) 50 MG 24 hr tablet Take 1 tablet (50 mg) by mouth 2 times daily 180 tablet 3     nitroGLYcerin (NITROSTAT) 0.4 MG sublingual tablet For chest pain place 1 tablet under the tongue every 5 minutes for 3 doses. If symptoms persist 5 minutes after 1st dose call 911. 25 tablet 1     acetaminophen (TYLENOL) 500 MG tablet Take 500-1,000 mg by mouth every 6 hours as needed for mild pain       Probiotic Product (PROBIOTIC DAILY PO) Take by mouth daily       furosemide (LASIX) 20 MG tablet Take 0.5 tablets (10 mg) by mouth daily 30 tablet 0     WARFARIN SODIUM PO Take 4.5 mg by mouth daily 1 & 1/2 tablet of 3mg       calcium carbonate-vitamin D (CALCIUM + D) 600-200 MG-UNIT TABS Take 1 tablet by mouth daily.       atorvastatin (LIPITOR) 40 MG tablet Take 40 mg by mouth At Bedtime        Multiple Vitamins-Minerals (CENTRUM SILVER) per tablet Take 1 tablet by mouth daily.         cycloSPORINE (RESTASIS) 0.05 % ophthalmic emulsion Place 1 drop into both eyes every 12 hours.       Celecoxib  (CELEBREX PO) Take 200 mg by mouth daily         ALLERGIES     Allergies   Allergen Reactions     Hctz      Lansoprazole      diarrhea   Prevacid       PAST MEDICAL HISTORY:  Past Medical History:   Diagnosis Date     Aortic regurgitation 2014    mild (1+) per echo     Atrial fibrillation (H)      Cardiomyopathy (H)      CHF (congestive heart failure) (H)      Chronic kidney disease, stage 3      Coronary atherosclerosis of unspecified type of vessel, native or graft 2000    normal left coronary arteries and tight obstruction in distal RCA, too small for revascularization, medical management     Degeneration of cervical intervertebral disc     cervical spine     Essential hypertension, benign      Mitral regurgitation 2014    mod-mod/sev (2-3+) per echo     Other and unspecified hyperlipidemia      Pacemaker      Pulmonary hypertension 3/16/2013    mild per echo with RVSP 31mmHg +RAP      Pulmonary valve regurgitation 2014    mod (2+) per echo     Tricuspid regurgitation 2014    mild (1+) per echo     Unspecified tinnitus     chronic       PAST SURGICAL HISTORY:  Past Surgical History:   Procedure Laterality Date     C NONSPECIFIC PROCEDURE      right rotator cuff tear OR     C NONSPECIFIC PROCEDURE      microdiscectomy     C NONSPECIFIC PROCEDURE      C-sections x 3      C NONSPECIFIC PROCEDURE      appendectomy     C NONSPECIFIC PROCEDURE      bilateral benign breast biopsy      C NONSPECIFIC PROCEDURE      right knee arthroscopic OR     C NONSPECIFIC PROCEDURE  1974    enteritis     CORONARY ANGIOGRAPHY ADULT ORDER  2000     HRW PACEMAKER PERMANENT  2015    BI- ventricular     IMPLANT PACEMAKER  2010    dual chamber Medtronic       FAMILY HISTORY:  Family History   Problem Relation Age of Onset     Hypertension Mother      CANCER Mother      pancreatic     Eye Disorder Mother      cristian JORGE Father       53     Lipids Father      DIABETES Maternal  "Grandmother      ANIA Brother      open heart surgery age 53     CANCER Daughter      ovavian     Neurologic Disorder Son      MS       SOCIAL HISTORY:  Social History     Social History     Marital status:      Spouse name: N/A     Number of children: N/A     Years of education: N/A     Social History Main Topics     Smoking status: Former Smoker     Smokeless tobacco: Never Used      Comment: 35 yrs ago     Alcohol use No     Drug use: No     Sexual activity: Not Asked     Other Topics Concern     Parent/Sibling W/ Cabg, Mi Or Angioplasty Before 65f 55m? Yes     Special Diet Yes     low sodium     Exercise Yes     active life style     Seat Belt Yes     Social History Narrative       Review of Systems:  Skin:  Negative rash;bruising     Eyes:  Positive for glasses    ENT:  Negative epistaxis    Respiratory:  Negative  (when walking fast)     Cardiovascular:  Negative Positive for (chest tightness when walking fast) New onset of exertional C/P that is relieved once she rests, tightness on the chest   Gastroenterology: Negative hematochezia    Genitourinary:  Negative      Musculoskeletal:  Positive for arthritis    Neurologic:  Negative numbness or tingling of feet (cramping in toes at night)    Psychiatric:  Negative      Heme/Lymph/Imm:  Positive for allergies    Endocrine:  Negative        Physical Exam:  Vitals: /81  Pulse 76  Ht 1.626 m (5' 4.02\")  Wt 68.5 kg (151 lb)  BMI 25.91 kg/m2    Constitutional:  cooperative, alert and oriented, well developed, well nourished, in no acute distress        Skin:  warm and dry to the touch, no apparent skin lesions or masses noted   pacemaker incision in the left infraclavicular area was well-healed      Head:  normocephalic, no masses or lesions        Eyes:  pupils equal and round, conjunctivae and lids unremarkable, sclera white, no xanthalasma, EOMS intact, no nystagmus        Lymph:      ENT:  no pallor or cyanosis, dentition good        Neck:  JVP " normal        Respiratory:  clear to auscultation         Cardiac: regular rhythm;normal S1 and S2;no S3 or S4;no murmurs, gallops or rubs detected                not assessed this visit                                        GI:  abdomen soft        Extremities and Muscular Skeletal:  no deformities, clubbing, cyanosis, erythema observed;no edema              Neurological:  no gross motor deficits        Psych:  Alert and Oriented x 3        CC  Kirit Michel MD  [address] [address]

## 2018-04-06 NOTE — LETTER
4/6/2018    Kirit Michel MD  [address] [address]    RE: Haritha Trujillo       Dear Colleague,    I had the pleasure of seeing Haritha LAVERNE Trujillo in the [facility].    HPI and Plan: #[id number]  See dictation    Orders Placed This Encounter   Procedures     Follow-Up with Cardiac Advanced Practice Provider     Follow-Up with Electrophysiologist     Echocardiogram       Orders Placed This Encounter   Medications     Alendronate Sodium (FOSAMAX PO)     Sig: Take 70 mg by mouth once a week       Medications Discontinued During This Encounter   Medication Reason     senna-docusate (SENOKOT-S;PERICOLACE) 8.6-50 MG per tablet Therapy completed         Encounter Diagnoses   Name Primary?     Essential hypertension      Other cardiomyopathy (H) Yes       CURRENT MEDICATIONS:  Current Outpatient Prescriptions   Medication Sig Dispense Refill     Alendronate Sodium (FOSAMAX PO) Take 70 mg by mouth once a week       lisinopril (PRINIVIL/ZESTRIL) 20 MG tablet Take 1 tablet (20 mg) by mouth daily 180 tablet 3     amLODIPine (NORVASC) 5 MG tablet Take 1 tablet (5 mg) by mouth daily 30 tablet 11     metoprolol (TOPROL-XL) 50 MG 24 hr tablet Take 1 tablet (50 mg) by mouth 2 times daily 180 tablet 3     nitroGLYcerin (NITROSTAT) 0.4 MG sublingual tablet For chest pain place 1 tablet under the tongue every 5 minutes for 3 doses. If symptoms persist 5 minutes after 1st dose call 911. 25 tablet 1     acetaminophen (TYLENOL) 500 MG tablet Take 500-1,000 mg by mouth every 6 hours as needed for mild pain       Probiotic Product (PROBIOTIC DAILY PO) Take by mouth daily       furosemide (LASIX) 20 MG tablet Take 0.5 tablets (10 mg) by mouth daily 30 tablet 0     WARFARIN SODIUM PO Take 4.5 mg by mouth daily 1 & 1/2 tablet of 3mg       calcium carbonate-vitamin D (CALCIUM + D) 600-200 MG-UNIT TABS Take 1 tablet by mouth daily.       atorvastatin (LIPITOR) 40 MG  tablet Take 40 mg by mouth At Bedtime        Multiple Vitamins-Minerals (CENTRUM SILVER) per tablet Take 1 tablet by mouth daily.         cycloSPORINE (RESTASIS) 0.05 % ophthalmic emulsion Place 1 drop into both eyes every 12 hours.       Celecoxib (CELEBREX PO) Take 200 mg by mouth daily         ALLERGIES     Allergies   Allergen Reactions     Hctz      Lansoprazole      diarrhea   Prevacid       PAST MEDICAL HISTORY:  Past Medical History:   Diagnosis Date     Aortic regurgitation 12/14/2014    mild (1+) per echo     Atrial fibrillation (H)      Cardiomyopathy (H)      CHF (congestive heart failure) (H)      Chronic kidney disease, stage 3      Coronary atherosclerosis of unspecified type of vessel, native or graft 5/16/2000    normal left coronary arteries and tight obstruction in distal RCA, too small for revascularization, medical management     Degeneration of cervical intervertebral disc     cervical spine     Essential hypertension, benign      Mitral regurgitation 12/14/2014    mod-mod/sev (2-3+) per echo     Other and unspecified hyperlipidemia      Pacemaker      Pulmonary hypertension 3/16/2013    mild per echo with RVSP 31mmHg +RAP      Pulmonary valve regurgitation 12/14/2014    mod (2+) per echo     Tricuspid regurgitation 12/14/2014    mild (1+) per echo     Unspecified tinnitus     chronic       PAST SURGICAL HISTORY:  Past Surgical History:   Procedure Laterality Date     C NONSPECIFIC PROCEDURE  1999    right rotator cuff tear OR     C NONSPECIFIC PROCEDURE  1983    microdiscectomy     C NONSPECIFIC PROCEDURE      C-sections x 3      C NONSPECIFIC PROCEDURE      appendectomy     C NONSPECIFIC PROCEDURE      bilateral benign breast biopsy      C NONSPECIFIC PROCEDURE      right knee arthroscopic OR     C NONSPECIFIC PROCEDURE  1974    enteritis     CORONARY ANGIOGRAPHY ADULT ORDER  5/16/2000     HRW PACEMAKER PERMANENT  1/2015    BI- ventricular     IMPLANT PACEMAKER  11/12/2010    dual chamber  "[employer]tronic       FAMILY HISTORY:  Family History   Problem Relation Age of Onset     Hypertension Mother      CANCER Mother      pancreatic     Eye Disorder Mother      cristian     FAZALABEATRIZ Father       53     Lipids Father      DIABETES Maternal Grandmother      ANIA Brother      open heart surgery age 53     CANCER Daughter      ovavian     Neurologic Disorder Son      MS       SOCIAL HISTORY:  Social History     Social History     Marital status:      Spouse name: N/A     Number of children: N/A     Years of education: N/A     Social History Main Topics     Smoking status: Former Smoker     Smokeless tobacco: Never Used      Comment: 35 yrs ago     Alcohol use No     Drug use: No     Sexual activity: Not Asked     Other Topics Concern     Parent/Sibling W/ Cabg, Mi Or Angioplasty Before 65f 55m? Yes     Special Diet Yes     low sodium     Exercise Yes     active life style     Seat Belt Yes     Social History Narrative       Review of Systems:  Skin:  Negative rash;bruising     Eyes:  Positive for glasses    ENT:  Negative epistaxis    Respiratory:  Negative  (when walking fast)     Cardiovascular:  Negative Positive for (chest tightness when walking fast) New onset of exertional C/P that is relieved once she rests, tightness on the chest   Gastroenterology: Negative hematochezia    Genitourinary:  Negative      Musculoskeletal:  Positive for arthritis    Neurologic:  Negative numbness or tingling of feet (cramping in toes at night)    Psychiatric:  Negative      Heme/Lymph/Imm:  Positive for allergies    Endocrine:  Negative        Physical Exam:  Vitals: /81  Pulse 76  Ht 1.626 m (5' 4.02\")  Wt 68.5 kg (151 lb)  BMI 25.91 kg/m2    Constitutional:  cooperative, alert and oriented, well developed, well nourished, in no acute distress        Skin:  warm and dry to the touch, no apparent skin lesions or masses noted   pacemaker incision in the left infraclavicular area was well-healed      Head: "  normocephalic, no masses or lesions        Eyes:  pupils equal and round, conjunctivae and lids unremarkable, sclera white, no xanthalasma, EOMS intact, no nystagmus        Lymph:      ENT:  no pallor or cyanosis, dentition good        Neck:  JVP normal        Respiratory:  clear to auscultation         Cardiac: regular rhythm;normal S1 and S2;no S3 or S4;no murmurs, gallops or rubs detected                not assessed this visit                                        GI:  abdomen soft        Extremities and Muscular Skeletal:  no deformities, clubbing, cyanosis, erythema observed;no edema              Neurological:  no gross motor deficits        Psych:  Alert and Oriented x 3        CC  [place] [address]                Thank you for allowing me to participate in the care of your patient.      Sincerely,     Kat Yo, NP, APRN CNP     [facility]    cc:   [place] [address]

## 2018-04-06 NOTE — LETTER
4/6/2018      Kirit Michel MD  [facility] [address] [address]      RE: Haritha Trujillo       Dear Colleague,    I had the pleasure of seeing Haritha Trujillo in the [facility].    Service Date: 04/06/2018      HISTORY OF PRESENT ILLNESS:  Ms. Trujillo is a delightful 89-year-old woman who appears much younger than her stated age.  She is an established patient of Dr. Tatum.  She has known cardiomyopathy, heart failure which over the past year has done remarkably well and is euvolemic on exam.  She has permanent atrial fibrillation with previous AV richard ablation.  She had a biventricular pacemaker implanted 01/2015.  LV threshold has been high.  She has also had issues with intermittent diaphragmatic stimulation.  Her EF did improve to 45%.  Previously had been less than 30% for the first 6 months after the procedure.  The patient continues to get intermittent diaphragmatic stimulation, however, says that it is variable.  She states that if she gets stimulation while laying on her left side she will roll to her right side.  She states that it does not bother her.  Her device interrogation today was 0% A paced, 96% BiV paced.  Underlying rhythm is chronic AFib with complete heart block.  Marisabel, our device nurse, did change her rate response to make it more sensitive, from medium high to medium low.  Her RV lead is stable.  LV lead is elevated at 2.75 at 1.0 milliseconds, but stable.  She is on warfarin for her AFib.      At her visit with Dr. Tatum, he did place her on amlodipine 5 mg daily.  She has had no concerns with this medication.  She is normotensive today at 130/81.  She has had 1 or 2 episodes of intermittent chest tightness.  She states it is almost like a band sensation.  It was happening while walking.  She states that she kept walking and it went away.  It is not something that has been recurrent.  She just wanted to mention it to  me.  She continues to be fairly active despite the age of 89.  Last Lexiscan was in 12/2014.  This showed normal myocardial perfusion without demonstrating any significant ischemia.      Echocardiogram 03/01/2017 again showed an improvement of her ejection fraction to 45-50%.  She had mild global hypokinesis noted of the left ventricle.  RV function was normal.  She had 1+ mitral, 1-2+ tricuspid and 2+ aortic insufficiency.  Ascending aorta was moderately dilated.  Except for the improved ejection fraction, no other significant changes were noted when compared to her 03/2015 echocardiogram.  All other review of systems and past medical history are noted below.      ASSESSMENT AND PLAN:  Ms. Trujillo is a delightful 89-year-old woman here today for followup.      1.  Cardiomyopathy.  Her ejection fraction has improved.  She is BiV pacing, although LV threshold is elevated with intermittent diaphragmatic stimulation that is not bothersome to the patient.  Dr. Tatum reviewed options with her.  After their discussion, it was opted to leave the lead alone.  She has 2 years of battery longevity on her device.  Her threshold today was stable.        2.  Hypertension.  She is normotensive on her current regimen.  She is on metoprolol 50 mg b.i.d., 20 mg of lisinopril and 5 mg of amlodipine.  Lab work completed last fall at Dr. Michel' office showed a creatinine of 0.99 and a potassium of 4.0.  She is euvolemic on exam.      3.  Permanent atrial fibrillation with AV richard ablation.  The patient is on warfarin therapy.  Has had no issues with bleeding.      4.  Hypercholesterolemia.  The patient is on atorvastatin.  This is managed by Dr. Michel.  Her lipids last fall were as follows:  Total cholesterol 155, triglycerides 82, HDL 45, LDL 94, risk ratio 3.44.      5.  Two episodes of upper abdominal tightness.  This does not appear to be typical angina.  She had 2 episodes.  The last one was while walking.  She had no other  concerns.  It has not reoccurred.  We could consider to do a followup stress test if this episode happens again.  The last episode was in .  It showed no evidence of ischemia.  This could also be related to perhaps reflux.  She was recently started on Fosamax.  Perhaps this is causing some reflux.  Nonetheless, we will just continue to monitor for now and the patient will contact us if there are concerns.      I would like to see her back in 6 months.  If she has any questions or concerns, she will contact us.  I have ordered an echocardiogram for 1 year and for her to follow up with Dr. Tatum at that time.  I am very pleased with how well Ms. Trujillo is doing.  If she has any concerns or questions in the interim, she will contact us.         NADER MENJIVAR NP             D: 2018   T: 2018   MT: HOLLEY      Name:     SEVERIANO TRUJILLO   MRN:      [id number]        Account:      [id number]   :      1925           Service Date: 2018      Document: [id number]         Outpatient Encounter Prescriptions as of 2018   Medication Sig Dispense Refill     Alendronate Sodium (FOSAMAX PO) Take 70 mg by mouth once a week       lisinopril (PRINIVIL/ZESTRIL) 20 MG tablet Take 1 tablet (20 mg) by mouth daily 180 tablet 3     amLODIPine (NORVASC) 5 MG tablet Take 1 tablet (5 mg) by mouth daily 30 tablet 11     metoprolol (TOPROL-XL) 50 MG 24 hr tablet Take 1 tablet (50 mg) by mouth 2 times daily 180 tablet 3     nitroGLYcerin (NITROSTAT) 0.4 MG sublingual tablet For chest pain place 1 tablet under the tongue every 5 minutes for 3 doses. If symptoms persist 5 minutes after 1st dose call 911. 25 tablet 1     acetaminophen (TYLENOL) 500 MG tablet Take 500-1,000 mg by mouth every 6 hours as needed for mild pain       Probiotic Product (PROBIOTIC DAILY PO) Take by mouth daily       furosemide (LASIX) 20 MG tablet Take 0.5 tablets (10 mg) by mouth daily 30 tablet 0     WARFARIN SODIUM PO Take 4.5 mg by  mouth daily 1 & 1/2 tablet of 3mg       calcium carbonate-vitamin D (CALCIUM + D) 600-200 MG-UNIT TABS Take 1 tablet by mouth daily.       atorvastatin (LIPITOR) 40 MG tablet Take 40 mg by mouth At Bedtime        Multiple Vitamins-Minerals (CENTRUM SILVER) per tablet Take 1 tablet by mouth daily.         cycloSPORINE (RESTASIS) 0.05 % ophthalmic emulsion Place 1 drop into both eyes every 12 hours.       Celecoxib (CELEBREX PO) Take 200 mg by mouth daily       [DISCONTINUED] senna-docusate (SENOKOT-S;PERICOLACE) 8.6-50 MG per tablet Take 1 tablet by mouth daily       No facility-administered encounter medications on file as of 4/6/2018.        Again, thank you for allowing me to participate in the care of your patient.      Sincerely,    Kat Yo NP, APRN [facility]

## 2018-04-10 ENCOUNTER — TELEPHONE (OUTPATIENT)
Dept: CARDIOLOGY | Facility: CLINIC | Age: 83
End: 2018-04-10

## 2018-04-10 NOTE — TELEPHONE ENCOUNTER
Patient left message to inform us that her medication list we have in epic is in accordance to what her home medications reflect. I called her back to tell her we appreciated her efforts in verifying this.She had no questions for me at this time. Perfecto

## 2018-05-05 ENCOUNTER — OFFICE VISIT (OUTPATIENT)
Dept: URGENT CARE | Facility: URGENT CARE | Age: 83
End: 2018-05-05
Payer: COMMERCIAL

## 2018-05-05 VITALS
TEMPERATURE: 97 F | DIASTOLIC BLOOD PRESSURE: 77 MMHG | HEART RATE: 81 BPM | BODY MASS INDEX: 26.25 KG/M2 | WEIGHT: 153 LBS | RESPIRATION RATE: 20 BRPM | SYSTOLIC BLOOD PRESSURE: 126 MMHG | OXYGEN SATURATION: 96 %

## 2018-05-05 DIAGNOSIS — Z79.01 LONG TERM CURRENT USE OF ANTICOAGULANT THERAPY: ICD-10-CM

## 2018-05-05 DIAGNOSIS — N30.01 ACUTE CYSTITIS WITH HEMATURIA: Primary | ICD-10-CM

## 2018-05-05 DIAGNOSIS — R30.0 DYSURIA: ICD-10-CM

## 2018-05-05 LAB
ALBUMIN UR-MCNC: 30 MG/DL
APPEARANCE UR: ABNORMAL
BACTERIA #/AREA URNS HPF: ABNORMAL /HPF
BILIRUB UR QL STRIP: NEGATIVE
COLOR UR AUTO: YELLOW
GLUCOSE UR STRIP-MCNC: NEGATIVE MG/DL
HGB UR QL STRIP: ABNORMAL
KETONES UR STRIP-MCNC: NEGATIVE MG/DL
LEUKOCYTE ESTERASE UR QL STRIP: ABNORMAL
NITRATE UR QL: NEGATIVE
PH UR STRIP: 6.5 PH (ref 5–7)
RBC #/AREA URNS AUTO: ABNORMAL /HPF
SOURCE: ABNORMAL
SP GR UR STRIP: 1.01 (ref 1–1.03)
UROBILINOGEN UR STRIP-ACNC: 0.2 EU/DL (ref 0.2–1)
WBC #/AREA URNS AUTO: >100 /HPF

## 2018-05-05 PROCEDURE — 87186 SC STD MICRODIL/AGAR DIL: CPT | Performed by: PHYSICIAN ASSISTANT

## 2018-05-05 PROCEDURE — 87088 URINE BACTERIA CULTURE: CPT | Performed by: PHYSICIAN ASSISTANT

## 2018-05-05 PROCEDURE — 81001 URINALYSIS AUTO W/SCOPE: CPT | Performed by: PHYSICIAN ASSISTANT

## 2018-05-05 PROCEDURE — 87086 URINE CULTURE/COLONY COUNT: CPT | Performed by: PHYSICIAN ASSISTANT

## 2018-05-05 PROCEDURE — 99214 OFFICE O/P EST MOD 30 MIN: CPT | Performed by: PHYSICIAN ASSISTANT

## 2018-05-05 RX ORDER — NITROFURANTOIN 25; 75 MG/1; MG/1
100 CAPSULE ORAL 2 TIMES DAILY
Qty: 14 CAPSULE | Refills: 0 | Status: SHIPPED | OUTPATIENT
Start: 2018-05-05 | End: 2020-03-11

## 2018-05-05 ASSESSMENT — ENCOUNTER SYMPTOMS
ABDOMINAL PAIN: 0
FEVER: 0
FLANK PAIN: 0
NAUSEA: 0
DYSURIA: 0
VOMITING: 0
CHILLS: 0
FREQUENCY: 0
MYALGIAS: 0
SHORTNESS OF BREATH: 0
HEMATURIA: 0
DIARRHEA: 0

## 2018-05-05 NOTE — MR AVS SNAPSHOT
After Visit Summary   5/5/2018    Haritha Trujillo    MRN: [id number]           Patient Information     Date Of Birth          4/27/1925        Visit Information        Provider Department      5/5/2018 9:55 AM Milagros Henry PA-C [facility]        Today's Diagnoses     Acute cystitis with hematuria    -  1    Dysuria        Long term current use of anticoagulant therapy          Care Instructions    Make an appointment for next week to get your INR checked.      Urinary Tract Infections in Women    Urinary tract infections (UTIs) are most often caused by bacteria. These bacteria enter the urinary tract. The bacteria may come from outside the body. Or they may travel from the skin outside the rectum or vagina into the urethra. Female anatomy makes it easy for bacteria from the bowel to enter a woman s urinary tract, which is the most common source of UTI. This means women develop UTIs more often than men. Pain in or around the urinary tract is a common UTI symptom. But the only way to know for sure if you have a UTI for the healthcare provider to test your urine. The two tests that may be done are the urinalysis and urine culture.  Types of UTIs    Cystitis. A bladder infection (cystitis) is the most common UTI in women. You may have urgent or frequent urination. You may also have pain, burning when you urinate, and bloody urine.    Urethritis. This is an inflamed urethra, which is the tube that carries urine from the bladder to outside the body. You may have lower stomach or back pain. You may also have urgent or frequent urination.    Pyelonephritis. This is a kidney infection. If not treated, it can be serious and damage your kidneys. In severe cases, you may need to stay in the hospital. You may have a fever and lower back pain.  Medicines to treat a UTI  Most UTIs are treated with antibiotics. These kill the bacteria. The length of time you need to take them  depends on the type of infection. It may be as short as 3 days. If you have repeated UTIs, you may need a low-dose antibiotic for several months. Take antibiotics exactly as directed. Don t stop taking them until all of the medicine is gone. If you stop taking the antibiotic too soon, the infection may not go away. You may also develop a resistance to the antibiotic. This can make it much harder to treat.  Lifestyle changes to treat and prevent UTIs  The lifestyle changes below will help get rid of your UTI. They may also help prevent future UTIs.    Drink plenty of fluids. This includes water, juice, or other caffeine-free drinks. Fluids help flush bacteria out of your body.    Empty your bladder. Always empty your bladder when you feel the urge to urinate. And always urinate before going to sleep. Urine that stays in your bladder can lead to infection. Try to urinate before and after sex as well.    Practice good personal hygiene. Wipe yourself from front to back after using the toilet. This helps keep bacteria from getting into the urethra.    Use condoms during sex. These help prevent UTIs caused by sexually transmitted bacteria. Also don't use spermicides during sex. These can increase the risk for UTIs. Choose other forms of birth control instead. For women who tend to get UTIs after sex, a low-dose of a preventive antibiotic may be used. Be sure to discuss this option with your healthcare provider.    Follow up with your healthcare provider as directed. He or she may test to make sure the infection has cleared. If needed, more treatment may be started.  Date Last Reviewed: 1/1/2017 2000-2017 [employer]. [address] [place] [address], [place] [address]. All rights reserved. This information is not intended as a substitute for professional medical care. Always follow your healthcare professional's instructions.                    Follow-ups after your visit        Your next 10 appointments already  "scheduled     Jul 10, 2018 11:00 AM CDT   [place] with MAURICIO TECH1   [facility])    [address]  [facility] [address] [phone] OPT 2              Who to contact     If you have questions or need follow up information about today's clinic visit or your schedule please contact [facility] directly at [phone].  Normal or non-critical lab and imaging results will be communicated to you by [employer], letter or phone within 4 business days after the clinic has received the results. If you do not hear from us within 7 days, please contact the clinic through [employer] or phone. If you have a critical or abnormal lab result, we will notify you by phone as soon as possible.  Submit refill requests through [employer] or call your pharmacy and they will forward the refill request to us. Please allow 3 business days for your refill to be completed.          Additional Information About Your Visit        [employer] Information     [employer] lets you send messages to your doctor, view your test results, renew your prescriptions, schedule appointments and more. To sign up, go to [email or web address] . Click on \"Log in\" on the left side of the screen, which will take you to the Welcome page. Then click on \"Sign up Now\" on the right side of the page.     You will be asked to enter the access code listed below, as well as some personal information. Please follow the directions to create your username and password.     Your access code is: [email or web address]  Expires: 8/3/2018 10:43 AM     Your access code will  in 90 days. If you need help or a new code, please call your [place] clinic or [phone].        Care EveryWhere ID     This is your Care EveryWhere ID. This could be used by other organizations to access your [place] medical records  [id number]        Your Vitals Were     Pulse Temperature Respirations Pulse Oximetry " Breastfeeding? BMI (Body Mass Index)    81 97  F (36.1  C) (Oral) 20 96% No 26.25 kg/m2       Blood Pressure from Last 3 Encounters:   05/05/18 126/77   04/06/18 131/81   09/26/17 167/77    Weight from Last 3 Encounters:   05/05/18 153 lb (69.4 kg)   04/06/18 151 lb (68.5 kg)   09/26/17 151 lb (68.5 kg)              We Performed the Following     *UA reflex to Microscopic and Culture ([facility] (except [place] and [place])     Urine Culture Aerobic Bacterial     Urine Microscopic          Today's Medication Changes          These changes are accurate as of 5/5/18 10:43 AM.  If you have any questions, ask your nurse or doctor.               Start taking these medicines.        Dose/Directions    nitroFURantoin (macrocrystal-monohydrate) 100 MG capsule   Commonly known as:  MACROBID   Used for:  Acute cystitis with hematuria   Started by:  Milagros Henry PA-C        Dose:  100 mg   Take 1 capsule (100 mg) by mouth 2 times daily   Quantity:  14 capsule   Refills:  0            Where to get your medicines      These medications were sent to [facility] - [place], [place] - [address] AT [facility] [address] [facility]  [address], [facility] [address]     Phone:  [phone]     nitroFURantoin (macrocrystal-monohydrate) 100 MG capsule                Primary Care Provider Office Phone # Fax #    Kirit Michel [phone]       [address] [address]        Equal Access to Services     LIO SINGH AH: Hadmeera Donald, ryan noyola, lori juarez. So [facility] [phone].    ATENCIÓN: Si habla español, tiene a fofana disposición servicios gratuitos de asistencia lingüística. Llame al [phone].    We comply with applicable federal civil rights laws and [place] laws. We do not discriminate on the basis of race, color,  national origin, age, disability, sex, sexual orientation, or gender identity.            Thank you!     Thank you for choosing [facility]  for your care. Our goal is always to provide you with excellent care. Hearing back from our patients is one way we can continue to improve our services. Please take a few minutes to complete the written survey that you may receive in the mail after your visit with us. Thank you!             Your Updated Medication List - Protect others around you: Learn how to safely use, store and throw away your medicines at www.disposemymeds.org.          This list is accurate as of 5/5/18 10:43 AM.  Always use your most recent med list.                   Brand Name Dispense Instructions for use Diagnosis    acetaminophen 500 MG tablet    TYLENOL     Take 500-1,000 mg by mouth every 6 hours as needed for mild pain        amLODIPine 5 MG tablet    NORVASC    30 tablet    Take 1 tablet (5 mg) by mouth daily    Essential hypertension       atorvastatin 40 MG tablet    LIPITOR     Take 40 mg by mouth At Bedtime        calcium + D 600-200 MG-UNIT Tabs   Generic drug:  calcium carbonate-vitamin D      Take 1 tablet by mouth daily.        CELEBREX PO      Take 200 mg by mouth daily        CENTRUM SILVER per tablet      Take 1 tablet by mouth daily.        FOSAMAX PO      Take 70 mg by mouth once a week        furosemide 20 MG tablet    LASIX    30 tablet    Take 0.5 tablets (10 mg) by mouth daily    Pulmonary edema       lisinopril 20 MG tablet    PRINIVIL/ZESTRIL    180 tablet    Take 1 tablet (20 mg) by mouth daily    Essential hypertension       metoprolol succinate 50 MG 24 hr tablet    TOPROL-XL    180 tablet    Take 1 tablet (50 mg) by mouth 2 times daily    CHF (congestive heart failure) (H)       nitroFURantoin (macrocrystal-monohydrate) 100 MG capsule    MACROBID    14 capsule    Take 1 capsule (100 mg) by mouth 2 times daily    Acute cystitis with hematuria        nitroGLYcerin 0.4 MG sublingual tablet    NITROSTAT    25 tablet    For chest pain place 1 tablet under the tongue every 5 minutes for 3 doses. If symptoms persist 5 minutes after 1st dose call 911.        PROBIOTIC DAILY PO      Take by mouth daily        RESTASIS 0.05 % ophthalmic emulsion   Generic drug:  cycloSPORINE      Place 1 drop into both eyes every 12 hours.        WARFARIN SODIUM PO      Take 4.5 mg by mouth daily 1 & 1/2 tablet of 3mg

## 2018-05-05 NOTE — PATIENT INSTRUCTIONS
Make an appointment for next week to get your INR checked.      Urinary Tract Infections in Women    Urinary tract infections (UTIs) are most often caused by bacteria. These bacteria enter the urinary tract. The bacteria may come from outside the body. Or they may travel from the skin outside the rectum or vagina into the urethra. Female anatomy makes it easy for bacteria from the bowel to enter a woman s urinary tract, which is the most common source of UTI. This means women develop UTIs more often than men. Pain in or around the urinary tract is a common UTI symptom. But the only way to know for sure if you have a UTI for the healthcare provider to test your urine. The two tests that may be done are the urinalysis and urine culture.  Types of UTIs    Cystitis. A bladder infection (cystitis) is the most common UTI in women. You may have urgent or frequent urination. You may also have pain, burning when you urinate, and bloody urine.    Urethritis. This is an inflamed urethra, which is the tube that carries urine from the bladder to outside the body. You may have lower stomach or back pain. You may also have urgent or frequent urination.    Pyelonephritis. This is a kidney infection. If not treated, it can be serious and damage your kidneys. In severe cases, you may need to stay in the hospital. You may have a fever and lower back pain.  Medicines to treat a UTI  Most UTIs are treated with antibiotics. These kill the bacteria. The length of time you need to take them depends on the type of infection. It may be as short as 3 days. If you have repeated UTIs, you may need a low-dose antibiotic for several months. Take antibiotics exactly as directed. Don t stop taking them until all of the medicine is gone. If you stop taking the antibiotic too soon, the infection may not go away. You may also develop a resistance to the antibiotic. This can make it much harder to treat.  Lifestyle changes to treat and prevent  UTIs  The lifestyle changes below will help get rid of your UTI. They may also help prevent future UTIs.    Drink plenty of fluids. This includes water, juice, or other caffeine-free drinks. Fluids help flush bacteria out of your body.    Empty your bladder. Always empty your bladder when you feel the urge to urinate. And always urinate before going to sleep. Urine that stays in your bladder can lead to infection. Try to urinate before and after sex as well.    Practice good personal hygiene. Wipe yourself from front to back after using the toilet. This helps keep bacteria from getting into the urethra.    Use condoms during sex. These help prevent UTIs caused by sexually transmitted bacteria. Also don't use spermicides during sex. These can increase the risk for UTIs. Choose other forms of birth control instead. For women who tend to get UTIs after sex, a low-dose of a preventive antibiotic may be used. Be sure to discuss this option with your healthcare provider.    Follow up with your healthcare provider as directed. He or she may test to make sure the infection has cleared. If needed, more treatment may be started.  Date Last Reviewed: 1/1/2017 2000-2017 [employer]. [address] [place] [address], [place] [address]. All rights reserved. This information is not intended as a substitute for professional medical care. Always follow your healthcare professional's instructions.

## 2018-05-05 NOTE — PROGRESS NOTES
HPI  May 5, 2018    HPI: Haritha Trujillo is a 89 year old female who complains of urinary urgency and suprapubic pressure onset 2 days ago. No treatments tried. No known alleviating or aggravating factors. Symptoms are moderate in severity & constant in duration. Denies hematuria, dysuria, genital sores, abd pain, flank pain, N/V/D, and any other symptoms.    Pt takes warfarin for Afib. Last INR check was 4/24/18 and it was therapeutic.    Past Medical History:   Diagnosis Date     Aortic regurgitation 12/14/2014    mild (1+) per echo     Atrial fibrillation (H)      Cardiomyopathy (H)      CHF (congestive heart failure) (H)      Chronic kidney disease, stage 3      Coronary atherosclerosis of unspecified type of vessel, native or graft 5/16/2000    normal left coronary arteries and tight obstruction in distal RCA, too small for revascularization, medical management     Degeneration of cervical intervertebral disc     cervical spine     Essential hypertension, benign      Mitral regurgitation 12/14/2014    mod-mod/sev (2-3+) per echo     Other and unspecified hyperlipidemia      Pacemaker      Pulmonary hypertension 3/16/2013    mild per echo with RVSP 31mmHg +RAP      Pulmonary valve regurgitation 12/14/2014    mod (2+) per echo     Tricuspid regurgitation 12/14/2014    mild (1+) per echo     Unspecified tinnitus     chronic     Past Surgical History:   Procedure Laterality Date     C NONSPECIFIC PROCEDURE  1999    right rotator cuff tear OR     C NONSPECIFIC PROCEDURE  1983    microdiscectomy     C NONSPECIFIC PROCEDURE      C-sections x 3      C NONSPECIFIC PROCEDURE      appendectomy     C NONSPECIFIC PROCEDURE      bilateral benign breast biopsy      C NONSPECIFIC PROCEDURE      right knee arthroscopic OR     C NONSPECIFIC PROCEDURE  1974    enteritis     CORONARY ANGIOGRAPHY ADULT ORDER  5/16/2000     HRW PACEMAKER PERMANENT  1/2015    BI- ventricular     IMPLANT PACEMAKER  11/12/2010    dual chamber  [employer]     Social History   Substance Use Topics     Smoking status: Former Smoker     Smokeless tobacco: Never Used      Comment: 35 yrs ago     Alcohol use No       Problem list, Medication list, Allergies, and Medical/Social/Surgical histories reviewed in [facility] and updated as appropriate.      Review of Systems   Constitutional: Negative for chills and fever.   Respiratory: Negative for shortness of breath.    Cardiovascular: Negative for chest pain.   Gastrointestinal: Negative for abdominal pain, diarrhea, nausea and vomiting.   Genitourinary: Positive for urgency. Negative for dysuria, flank pain, frequency and hematuria.   Musculoskeletal: Negative for myalgias.   All other systems reviewed and are negative.        Physical Exam   Constitutional: She is oriented to person, place, and time and well-developed, well-nourished, and in no distress.   Cardiovascular: Normal rate and regular rhythm.    Pulmonary/Chest: Effort normal and breath sounds normal.   Abdominal: Soft. Normal appearance. There is no tenderness. There is no CVA tenderness.   Neurological: She is oriented to person, place, and time.   Skin: Skin is warm, dry and intact.   Nursing note and vitals reviewed.    Vital Signs  /77 (BP Location: Left arm, Patient Position: Sitting, Cuff Size: Adult Regular)  Pulse 81  Temp 97  F (36.1  C) (Oral)  Resp 20  Wt 153 lb (69.4 kg)  SpO2 96%  Breastfeeding? No  BMI 26.25 kg/m2     Diagnostic Test Results:  Results for orders placed or performed in visit on 05/05/18 (from the past 24 hour(s))   *UA reflex to Microscopic and Culture ([place] and [facility] (except [place] and [place])   Result Value Ref Range    Color Urine Yellow     Appearance Urine Slightly Cloudy     Glucose Urine Negative NEG^Negative mg/dL    Bilirubin Urine Negative NEG^Negative    Ketones Urine Negative NEG^Negative mg/dL    Specific Gravity Urine 1.010 1.003 - 1.035    Blood Urine Moderate (A) NEG^Negative     pH Urine 6.5 5.0 - 7.0 pH    Protein Albumin Urine 30 (A) NEG^Negative mg/dL    Urobilinogen Urine 0.2 0.2 - 1.0 EU/dL    Nitrite Urine Negative NEG^Negative    Leukocyte Esterase Urine Large (A) NEG^Negative    Source Midstream Urine    Urine Microscopic   Result Value Ref Range    WBC Urine >100 (A) OTO5^0 - 5 /HPF    RBC Urine O - 2 OTO2^O - 2 /HPF    Bacteria Urine Few (A) NEG^Negative /HPF       ASSESSMENT/PLAN      ICD-10-CM    1. Acute cystitis with hematuria N30.01 nitroFURantoin, macrocrystal-monohydrate, (MACROBID) 100 MG capsule   2. Dysuria R30.0 *UA reflex to Microscopic and Culture ([place] and [facility] (except [place] and [place])     Urine Microscopic     Urine Culture Aerobic Bacterial   3. Long term current use of anticoagulant therapy Z79.01       No s/sx pyelonephritis, UA indicates infection. Rx Macrobid.   F/u with primary care office for INR recheck next week.      I have discussed any lab or imaging results, the patient's diagnosis, and my plan of treatment with the patient and/or family. Patient is aware to come back in if with worsening symptoms or if no relief despite treatment plan.  Patient voiced understanding and had no further questions.       Follow Up: Data Unavailable    RENETTA Cortez, PA-C  [facility]

## 2018-05-07 LAB
BACTERIA SPEC CULT: ABNORMAL
SPECIMEN SOURCE: ABNORMAL

## 2018-05-23 ENCOUNTER — APPOINTMENT (OUTPATIENT)
Dept: GENERAL RADIOLOGY | Facility: CLINIC | Age: 83
End: 2018-05-23
Attending: EMERGENCY MEDICINE
Payer: MEDICARE

## 2018-05-23 ENCOUNTER — HOSPITAL ENCOUNTER (EMERGENCY)
Facility: CLINIC | Age: 83
Discharge: HOME | End: 2018-05-23
Attending: EMERGENCY MEDICINE | Admitting: EMERGENCY MEDICINE
Payer: MEDICARE

## 2018-05-23 VITALS
WEIGHT: 143 LBS | BODY MASS INDEX: 24.41 KG/M2 | DIASTOLIC BLOOD PRESSURE: 75 MMHG | TEMPERATURE: 97.7 F | HEIGHT: 64 IN | OXYGEN SATURATION: 97 % | SYSTOLIC BLOOD PRESSURE: 141 MMHG | RESPIRATION RATE: 20 BRPM | HEART RATE: 76 BPM

## 2018-05-23 DIAGNOSIS — R42 LIGHTHEADEDNESS: ICD-10-CM

## 2018-05-23 LAB
ALBUMIN UR-MCNC: NEGATIVE MG/DL
ANION GAP SERPL CALCULATED.3IONS-SCNC: 7 MMOL/L (ref 3–14)
APPEARANCE UR: CLEAR
BACTERIA #/AREA URNS HPF: ABNORMAL /HPF
BASOPHILS # BLD AUTO: 0 10E9/L (ref 0–0.2)
BASOPHILS NFR BLD AUTO: 0.3 %
BILIRUB UR QL STRIP: NEGATIVE
BUN SERPL-MCNC: 17 MG/DL (ref 7–30)
CALCIUM SERPL-MCNC: 8.8 MG/DL (ref 8.5–10.1)
CHLORIDE SERPL-SCNC: 102 MMOL/L (ref 94–109)
CO2 SERPL-SCNC: 28 MMOL/L (ref 20–32)
COLOR UR AUTO: ABNORMAL
CREAT SERPL-MCNC: 0.93 MG/DL (ref 0.52–1.04)
DIFFERENTIAL METHOD BLD: ABNORMAL
EOSINOPHIL # BLD AUTO: 0 10E9/L (ref 0–0.7)
EOSINOPHIL NFR BLD AUTO: 0.6 %
ERYTHROCYTE [DISTWIDTH] IN BLOOD BY AUTOMATED COUNT: 14.2 % (ref 10–15)
GFR SERPL CREATININE-BSD FRML MDRD: 56 ML/MIN/1.7M2
GLUCOSE SERPL-MCNC: 113 MG/DL (ref 70–99)
GLUCOSE UR STRIP-MCNC: NEGATIVE MG/DL
HCT VFR BLD AUTO: 44.4 % (ref 35–47)
HGB BLD-MCNC: 15.2 G/DL (ref 11.7–15.7)
HGB UR QL STRIP: NEGATIVE
IMM GRANULOCYTES # BLD: 0 10E9/L (ref 0–0.4)
IMM GRANULOCYTES NFR BLD: 0 %
INR PPP: 2.33 (ref 0.86–1.14)
INTERPRETATION ECG - MUSE: NORMAL
KETONES UR STRIP-MCNC: NEGATIVE MG/DL
LEUKOCYTE ESTERASE UR QL STRIP: ABNORMAL
LYMPHOCYTES # BLD AUTO: 0.7 10E9/L (ref 0.8–5.3)
LYMPHOCYTES NFR BLD AUTO: 11.7 %
MCH RBC QN AUTO: 31.3 PG (ref 26.5–33)
MCHC RBC AUTO-ENTMCNC: 34.2 G/DL (ref 31.5–36.5)
MCV RBC AUTO: 92 FL (ref 78–100)
MONOCYTES # BLD AUTO: 0.5 10E9/L (ref 0–1.3)
MONOCYTES NFR BLD AUTO: 8.4 %
NEUTROPHILS # BLD AUTO: 4.9 10E9/L (ref 1.6–8.3)
NEUTROPHILS NFR BLD AUTO: 79 %
NITRATE UR QL: NEGATIVE
NRBC # BLD AUTO: 0 10*3/UL
NRBC BLD AUTO-RTO: 0 /100
PH UR STRIP: 6 PH (ref 5–7)
PLATELET # BLD AUTO: 199 10E9/L (ref 150–450)
POTASSIUM SERPL-SCNC: 4.4 MMOL/L (ref 3.4–5.3)
RBC # BLD AUTO: 4.85 10E12/L (ref 3.8–5.2)
RBC #/AREA URNS AUTO: 1 /HPF (ref 0–2)
SODIUM SERPL-SCNC: 137 MMOL/L (ref 133–144)
SOURCE: ABNORMAL
SP GR UR STRIP: 1.01 (ref 1–1.03)
SQUAMOUS #/AREA URNS AUTO: <1 /HPF (ref 0–1)
TROPONIN I SERPL-MCNC: <0.015 UG/L (ref 0–0.04)
UROBILINOGEN UR STRIP-MCNC: NORMAL MG/DL (ref 0–2)
WBC # BLD AUTO: 6.2 10E9/L (ref 4–11)
WBC #/AREA URNS AUTO: 1 /HPF (ref 0–5)

## 2018-05-23 PROCEDURE — 71046 X-RAY EXAM CHEST 2 VIEWS: CPT

## 2018-05-23 PROCEDURE — 80048 BASIC METABOLIC PNL TOTAL CA: CPT | Performed by: EMERGENCY MEDICINE

## 2018-05-23 PROCEDURE — 85610 PROTHROMBIN TIME: CPT | Performed by: EMERGENCY MEDICINE

## 2018-05-23 PROCEDURE — 81001 URINALYSIS AUTO W/SCOPE: CPT | Performed by: EMERGENCY MEDICINE

## 2018-05-23 PROCEDURE — 85025 COMPLETE CBC W/AUTO DIFF WBC: CPT | Performed by: EMERGENCY MEDICINE

## 2018-05-23 PROCEDURE — 84484 ASSAY OF TROPONIN QUANT: CPT | Performed by: EMERGENCY MEDICINE

## 2018-05-23 PROCEDURE — 99285 EMERGENCY DEPT VISIT HI MDM: CPT | Mod: 25

## 2018-05-23 PROCEDURE — 93005 ELECTROCARDIOGRAM TRACING: CPT

## 2018-05-23 ASSESSMENT — ENCOUNTER SYMPTOMS
SHORTNESS OF BREATH: 0
NAUSEA: 0
VOMITING: 0
DIARRHEA: 0
LIGHT-HEADEDNESS: 1
WEAKNESS: 0
FEVER: 0
NUMBNESS: 0

## 2018-05-23 NOTE — ED AVS SNAPSHOT
Emergency Department    [address] [place] [address]    Phone:  [phone]    Fax:  [phone]                                       Haritha Trujillo   MRN: [id number]    Department:   Emergency Department   Date of Visit:  5/23/2018           After Visit Summary Signature Page     I have received my discharge instructions, and my questions have been answered. I have discussed any challenges I see with this plan with the nurse or doctor.    ..........................................................................................................................................  Patient/Patient Representative Signature      ..........................................................................................................................................  Patient Representative Print Name and Relationship to Patient    ..................................................               ................................................  Date                                            Time    ..........................................................................................................................................  Reviewed by Signature/Title    ...................................................              ..............................................  Date                                                            Time

## 2018-05-23 NOTE — ED PROVIDER NOTES
History     Chief Complaint:  Lightheadedness     HPI   Haritha Trujillo is a 89 year old female with history of hypertension, in situ pacemaker, and atrial fibrillation on Coumadin who presents with her neighbor for evaluation lightheadedness. The patient states she went to bed last evening feeling well; however, she woke up this morning and experienced lightheadedness. She contacted her neighbor who came to assist her. When her neighbor presented to her home, the patient felt better but wanted to come to the ED for evaluation. She just finished a 7 day course of Macrobid a few weeks ago for UTI. Here, her lightheadedness has resolved. She denies weakness, numbness, shortness of breath, headache, abdominal pain, chest pain, nausea, vomiting, diarrhea, or any additional symptoms.     Allergies:  Amiodarone  Hctz  Lansoprazole     Medications:    acetaminophen (TYLENOL) 500 MG tablet  Alendronate Sodium (FOSAMAX PO)  amLODIPine (NORVASC) 5 MG tablet  atorvastatin (LIPITOR) 40 MG tablet  calcium carbonate-vitamin D (CALCIUM + D) 600-200 MG-UNIT TABS  Celecoxib (CELEBREX PO)  cycloSPORINE (RESTASIS) 0.05 % ophthalmic emulsion  furosemide (LASIX) 20 MG tablet  lisinopril (PRINIVIL/ZESTRIL) 20 MG tablet  metoprolol (TOPROL-XL) 50 MG 24 hr tablet  Multiple Vitamins-Minerals (CENTRUM SILVER) per tablet  nitroFURantoin, macrocrystal-monohydrate, (MACROBID) 100 MG capsule  nitroGLYcerin (NITROSTAT) 0.4 MG sublingual tablet  Probiotic Product (PROBIOTIC DAILY PO)  WARFARIN SODIUM PO    Past Medical History:    Cardiomyopathy   CKD  CHF  Atrial fibrillation   Cardiac pacemaker in situ  Diverticulitis   Pancreatitis  Tinnitus  Esophageal reflux  Hyperlipidemia  Hypertension   Aortic regurgitation  Coronary atherosclerosis   Degeneration of cervical intervertebral disc   Pulmonary valve regurgitation   Tricuspid regurgitation     Past Surgical History:    Shoulder surgery - OR   Microdiskectomy     "x3  Appendectomy  Breast biopsy     Knee arthroscopy   Pacemaker placement ventricular     Family History:    Hypertension - mother  Cancer - mother, daughter  Glaucoma - mother  CAD - father, brother  Lipids - father  MS - son    Social History:  Smoking status: Former smoker  Alcohol use: No   Marital Status:        Review of Systems   Constitutional: Negative for fever.   Respiratory: Negative for shortness of breath.    Cardiovascular: Negative for chest pain.   Gastrointestinal: Negative for diarrhea, nausea and vomiting.   Neurological: Positive for light-headedness (resolved). Negative for weakness and numbness.   All other systems reviewed and are negative.    Physical Exam   First Vitals:   BP Temp Temp src Pulse Resp SpO2 Height Weight   05/23/18 1052 156/78 97.7  F (36.5  C) Oral 76 20 97 % 1.626 m (5' 4\") 64.9 kg (143 lb)      Orthostatics:   Lying: blood pressure 148/76, heart rate 61  Standing: blood pressure 139/74, heart rate 63    Physical Exam  Constitutional:  Patient is oriented to person, place, and time. They appear well-developed and well-nourished. Not in apparent distress.   HENT:   Mouth/Throat:   Oropharynx is clear and moist.   Eyes:    Conjunctivae normal and EOM are normal. Pupils are equal, round, and reactive to light.   Neck:    Normal range of motion.   Cardiovascular: Normal rate, regular rhythm and normal heart sounds.  Exam reveals no gallop and no friction rub.  No murmur heard.  Pulmonary/Chest:  Effort normal and breath sounds normal. Patient has no wheezes. Patient has no rales.   Abdominal:   Soft. Bowel sounds are normal. Patient exhibits no mass. There is no tenderness. There is no rebound and no guarding.   Musculoskeletal:  Normal range of motion. Patient exhibits no edema.   Neurological:   Patient is alert and oriented to person, place, and time. Patient has normal strength. No cranial nerve deficit or sensory deficit. GCS 15.  Skin:   Skin is warm and dry. No " rash noted. No erythema.   Psychiatric:   Patient has a normal mood and affect. Patient's behavior is normal. Judgment and thought content normal.     Emergency Department Course   ECG (10:52:56):  Rate 78 bpm. WA interval *. QRS duration 140. QT/QTc 410/467. P-R-T axes * -44 98. Poor data quality, interpretation may be adversely affected. Ventricular-paced rhythm. Abnormal ECG. Interpreted at 1053 by Ne Abbasi MD.     Imaging:  Radiographic findings were communicated with the patient who voiced understanding of the findings.    X-ray chest, 2 views:  No acute abnormality.   Result per radiology.      Laboratory:  CBC: WNL (WBC 6.2, HGB 15.2, )   BMP: Glucose 113(H),  FGR 56(L) o/w WNL (Creatinine 0.93)  1146 Troponin: <0.015  1146 INR: 2.33(H)   UA: leukocyte esterase small (A), bacteria few (A), o/w Negative     Emergency Department Course:  Past medical records, nursing notes, and vitals reviewed.  1133: I performed an exam of the patient and obtained history, as documented above.   IV started and labs were obtained as above.   The patient was sent for an X-ray while in the emergency department, findings above.   1258: I rechecked the patient. Findings and plan explained to the Patient. Patient discharged home with instructions regarding supportive care, medications, and reasons to return. The importance of close follow-up was reviewed.        Impression & Plan    Medical Decision Making:  Haritha Galan is a 89-year-old female presenting with feeling lightheaded. She denied any focal neurologic symptoms.  At the time of presentation, on my evaluation, her symptoms have resolved.  She did not have any chest pain and no ataxia.  EKG was performed which does show a paced rhythm. Vital signs are unremarkable and she is not orthostatic. Diagnostic blood work was obtained. She has no metabolic derangement, no renal failure, and she is not acutely anemic. Chest X-ray does not show any acute  findings such as mass, abnormal mediastinum, CHF, pneumonia, or pneumothorax.    Her symptoms were not concerning for stroke. I doubt dissection given no chest discomfort or back pain symptoms and her blood pressure is not acutely elevated.  At this time, her INR is therapeutic, making PE unlikely. She has no abdominal pain or UTI.  I feel she is safe for discharge given her normal workup here and asymptomatic. Patient states she does feel comfortable with this plan. She will follow-up closely with her primary care doctor. Return to the ED if her symptoms recur and persists.    Diagnosis:    ICD-10-CM    1. Lightheadedness R42      Disposition:  discharged to home    [facility] EMERGENCY DEPARTMENT    IChemo, am serving as a scribe at 11:33 AM on 5/23/2018 to document services personally performed by Ne Abbasi MD based on my observations and the provider's statements to me.       Ne Abbasi MD  05/23/18 [id number]

## 2018-05-23 NOTE — ED AVS SNAPSHOT
Emergency Department    [address] [facility] [address]    Phone:  [phone]    Fax:  [phone]                                       Haritha Trujillo   MRN: [id number]    Department:   Emergency Department   Date of Visit:  5/23/2018           Patient Information     Date Of Birth          4/27/1925        Your diagnoses for this visit were:     Lightheadedness        You were seen by Ne Abbasi MD.      Follow-up Information     Follow up with Kirit Michel MD In 2 days.    Specialty:  Internal Medicine    Contact information:    [address] [address] [phone]        Discharge References/Attachments     DIZZINESS, UNCERTAIN CAUSE (ENGLISH)      Your next 10 appointments already scheduled     Jul 10, 2018 11:00 AM CDT   [facility] with MAURICIO TECH1   [facility] ([facility])    [address] [address] [phone] OPT 2              24 Hour Appointment Hotline       To make an appointment at any [facility], call [phone] ([phone]). If you don't have a family doctor or clinic, we will help you find one. [facility] are conveniently located to serve the needs of you and your family.             Review of your medicines      Our records show that you are taking the medicines listed below. If these are incorrect, please call your family doctor or clinic.        Dose / Directions Last dose taken    acetaminophen 500 MG tablet   Commonly known as:  TYLENOL   Dose:  500-1000 mg        Take 500-1,000 mg by mouth every 6 hours as needed for mild pain   Refills:  0        amLODIPine 5 MG tablet   Commonly known as:  NORVASC   Dose:  5 mg   Quantity:  30 tablet        Take 1 tablet (5 mg) by mouth daily   Refills:  11        atorvastatin 40 MG tablet   Commonly known as:  LIPITOR   Dose:  40 mg        Take 40 mg by mouth At Bedtime    Refills:  0        calcium + D 600-200 MG-UNIT Tabs   Dose:  1 tablet   Generic drug:  calcium carbonate-vitamin D        Take 1 tablet by mouth daily.   Refills:  0        CELEBREX PO   Dose:  200 mg        Take 200 mg by mouth daily   Refills:  0        CENTRUM SILVER per tablet   Dose:  1 tablet        Take 1 tablet by mouth daily.   Refills:  0        FOSAMAX PO   Dose:  70 mg   Indication:  on Thursday        Take 70 mg by mouth once a week   Refills:  0        furosemide 20 MG tablet   Commonly known as:  LASIX   Dose:  10 mg   Quantity:  30 tablet        Take 0.5 tablets (10 mg) by mouth daily   Refills:  0        lisinopril 20 MG tablet   Commonly known as:  PRINIVIL/ZESTRIL   Dose:  20 mg   Quantity:  180 tablet        Take 1 tablet (20 mg) by mouth daily   Refills:  3        metoprolol succinate 50 MG 24 hr tablet   Commonly known as:  TOPROL-XL   Dose:  50 mg   Quantity:  180 tablet        Take 1 tablet (50 mg) by mouth 2 times daily   Refills:  3        nitroFURantoin (macrocrystal-monohydrate) 100 MG capsule   Commonly known as:  MACROBID   Dose:  100 mg   Quantity:  14 capsule        Take 1 capsule (100 mg) by mouth 2 times daily   Refills:  0        nitroGLYcerin 0.4 MG sublingual tablet   Commonly known as:  NITROSTAT   Quantity:  25 tablet        For chest pain place 1 tablet under the tongue every 5 minutes for 3 doses. If symptoms persist 5 minutes after 1st dose call 911.   Refills:  1        PROBIOTIC DAILY PO        Take by mouth daily   Refills:  0        RESTASIS 0.05 % ophthalmic emulsion   Dose:  1 drop   Generic drug:  cycloSPORINE        Place 1 drop into both eyes every 12 hours.   Refills:  0        WARFARIN SODIUM PO   Dose:  4.5 mg        Take 4.5 mg by mouth daily 1 & 1/2 tablet of 3mg   Refills:  0                Procedures and tests performed during your visit     Basic metabolic panel    CBC with platelets differential    EKG 12 lead    EKG 12-lead, tracing only    INR     Troponin I    UA with Microscopic    XR Chest 2 Views      Orders Needing Specimen Collection     None      Pending Results     No orders found from 5/21/2018 to 5/24/2018.            Pending Culture Results     No orders found from 5/21/2018 to 5/24/2018.            Pending Results Instructions     If you had any lab results that were not finalized at the time of your Discharge, you can call the ED Lab Result RN at [phone]. You will be contacted by this team for any positive Lab results or changes in treatment. The nurses are available 7 days a week from 10A to 6:30P.  You can leave a message 24 hours per day and they will return your call.        Test Results From Your Hospital Stay        5/23/2018 11:59 AM      Component Results     Component Value Ref Range & Units Status    WBC 6.2 4.0 - 11.0 10e9/L Final    RBC Count 4.85 3.8 - 5.2 10e12/L Final    Hemoglobin 15.2 11.7 - 15.7 g/dL Final    Hematocrit 44.4 35.0 - 47.0 % Final    MCV 92 78 - 100 fl Final    MCH 31.3 26.5 - 33.0 pg Final    MCHC 34.2 31.5 - 36.5 g/dL Final    RDW 14.2 10.0 - 15.0 % Final    Platelet Count 199 150 - 450 10e9/L Final    Diff Method Automated Method  Final    % Neutrophils 79.0 % Final    % Lymphocytes 11.7 % Final    % Monocytes 8.4 % Final    % Eosinophils 0.6 % Final    % Basophils 0.3 % Final    % Immature Granulocytes 0.0 % Final    Nucleated RBCs 0 0 /100 Final    Absolute Neutrophil 4.9 1.6 - 8.3 10e9/L Final    Absolute Lymphocytes 0.7 (L) 0.8 - 5.3 10e9/L Final    Absolute Monocytes 0.5 0.0 - 1.3 10e9/L Final    Absolute Eosinophils 0.0 0.0 - 0.7 10e9/L Final    Absolute Basophils 0.0 0.0 - 0.2 10e9/L Final    Abs Immature Granulocytes 0.0 0 - 0.4 10e9/L Final    Absolute Nucleated RBC 0.0  Final         5/23/2018 12:20 PM      Component Results     Component Value Ref Range & Units Status    Troponin I ES <0.015 0.000 - 0.045 ug/L Final    The 99th percentile for upper reference range is 0.045 ug/L.  Troponin values    in the range of 0.045 - 0.120 ug/L may be associated with risks of adverse   clinical events.           5/23/2018 12:15 PM      Component Results     Component Value Ref Range & Units Status    Sodium 137 133 - 144 mmol/L Final    Potassium 4.4 3.4 - 5.3 mmol/L Final    Chloride 102 94 - 109 mmol/L Final    Carbon Dioxide 28 20 - 32 mmol/L Final    Anion Gap 7 3 - 14 mmol/L Final    Glucose 113 (H) 70 - 99 mg/dL Final    Urea Nitrogen 17 7 - 30 mg/dL Final    Creatinine 0.93 0.52 - 1.04 mg/dL Final    GFR Estimate 56 (L) >60 mL/min/1.7m2 Final    Non  GFR Calc    GFR Estimate If Black 68 >60 mL/min/1.7m2 Final    African American GFR Calc    Calcium 8.8 8.5 - 10.1 mg/dL Final         5/23/2018 12:10 PM      Component Results     Component Value Ref Range & Units Status    INR 2.33 (H) 0.86 - 1.14 Final         5/23/2018 12:01 PM      Component Results     Component Value Ref Range & Units Status    Color Urine Light Yellow  Final    Appearance Urine Clear  Final    Glucose Urine Negative NEG^Negative mg/dL Final    Bilirubin Urine Negative NEG^Negative Final    Ketones Urine Negative NEG^Negative mg/dL Final    Specific Gravity Urine 1.007 1.003 - 1.035 Final    Blood Urine Negative NEG^Negative Final    pH Urine 6.0 5.0 - 7.0 pH Final    Protein Albumin Urine Negative NEG^Negative mg/dL Final    Urobilinogen mg/dL Normal 0.0 - 2.0 mg/dL Final    Nitrite Urine Negative NEG^Negative Final    Leukocyte Esterase Urine Small (A) NEG^Negative Final    Source Midstream Urine  Final    WBC Urine 1 0 - 5 /HPF Final    RBC Urine 1 0 - 2 /HPF Final    Bacteria Urine Few (A) NEG^Negative /HPF Final    Squamous Epithelial /HPF Urine <1 0 - 1 /HPF Final         5/23/2018 12:27 PM      Narrative     XR CHEST 2 VW 5/23/2018 12:06 PM    HISTORY: Lightheaded.    COMPARISON: 1/6/2015    FINDINGS: No airspace consolidation, pleural effusion or pneumothorax.  Stable moderate cardiomegaly. Cardiac device leads appear  stable.  Moderate degenerative change at both shoulders. Moderate thoracolumbar  scoliosis. Vascular calcification.        Impression     IMPRESSION: No acute abnormality.    KATE FREED MD                Clinical Quality Measure: Blood Pressure Screening     Your blood pressure was checked while you were in the emergency department today. The last reading we obtained was  BP: 156/78 . Please read the guidelines below about what these numbers mean and what you should do about them.  If your systolic blood pressure (the top number) is less than 120 and your diastolic blood pressure (the bottom number) is less than 80, then your blood pressure is normal. There is nothing more that you need to do about it.  If your systolic blood pressure (the top number) is 120-139 or your diastolic blood pressure (the bottom number) is 80-89, your blood pressure may be higher than it should be. You should have your blood pressure rechecked within a year by a primary care provider.  If your systolic blood pressure (the top number) is 140 or greater or your diastolic blood pressure (the bottom number) is 90 or greater, you may have high blood pressure. High blood pressure is treatable, but if left untreated over time it can put you at risk for heart attack, stroke, or kidney failure. You should have your blood pressure rechecked by a primary care provider within the next 4 weeks.  If your provider in the emergency department today gave you specific instructions to follow-up with your doctor or provider even sooner than that, you should follow that instruction and not wait for up to 4 weeks for your follow-up visit.        Thank you for choosing [facility]       Thank you for choosing [facility] for your care. Our goal is always to provide you with excellent care. Hearing back from our patients is one way we can continue to improve our services. Please take a few minutes to complete the written survey that you may receive in the mail  "after you visit with us. Thank you!        [employer] Information     [facility] lets you send messages to your doctor, view your test results, renew your prescriptions, schedule appointments and more. To sign up, go to [email or web address] . Click on \"Log in\" on the left side of the screen, which will take you to the Welcome page. Then click on \"Sign up Now\" on the right side of the page.     You will be asked to enter the access code listed below, as well as some personal information. Please follow the directions to create your username and password.     Your access code is: [email or web address]  Expires: 8/3/2018 10:43 AM     Your access code will  in 90 days. If you need help or a new code, please call your [place] clinic or [phone].        Care EveryWhere ID     This is your Care EveryWhere ID. This could be used by other organizations to access your [place] medical records  [id number]        Equal Access to Services     LIO SINGH : Hadii francisco tiptono Somic, waaxda luqadaha, qaybta kaalmada adeklaus, lori baer . So [facility] [phone].    ATENCIÓN: Si habla español, tiene a fofana disposición servicios gratuitos de asistencia lingüística. Llame al [phone].    We comply with applicable federal civil rights laws and [place] laws. We do not discriminate on the basis of race, color, national origin, age, disability, sex, sexual orientation, or gender identity.            After Visit Summary       This is your record. Keep this with you and show to your community pharmacist(s) and doctor(s) at your next visit.                  "

## 2018-07-10 ENCOUNTER — ALLIED HEALTH/NURSE VISIT (OUTPATIENT)
Dept: CARDIOLOGY | Facility: CLINIC | Age: 83
End: 2018-07-10
Payer: COMMERCIAL

## 2018-07-10 DIAGNOSIS — Z95.0 CARDIAC PACEMAKER IN SITU: Primary | ICD-10-CM

## 2018-07-10 PROCEDURE — 93293 PM PHONE R-STRIP DEVICE EVAL: CPT | Performed by: INTERNAL MEDICINE

## 2018-07-10 NOTE — MR AVS SNAPSHOT
After Visit Summary   7/10/2018    Haritha Trujillo    MRN: [id number]           Patient Information     Date Of Birth          4/27/1925        Visit Information        Provider Department      7/10/2018 11:00 AM MAURICIO TECH1 [facility]        Today's Diagnoses     Cardiac pacemaker in situ    -  1       Follow-ups after your visit        Your next 10 appointments already scheduled     Sep 05, 2018  1:10 PM CDT   [facility] EP RETURN with NAVYA Allen CNP   [facility] ([facility])    [address]  [facility] [address] [phone] OPT 2            Oct 09, 2018 10:30 AM CDT   Pacemaker Check with HANG WARREN   [facility] ([facility])    [id number]  [facility] [address] [phone] OPT 2              Who to contact     If you have questions or need follow up information about today's clinic visit or your schedule please contact [facility] directly at [phone].  Normal or non-critical lab and imaging results will be communicated to you by MyChart, letter or phone within 4 business days after the clinic has received the results. If you do not hear from us within 7 days, please contact the clinic through MyChart or phone. If you have a critical or abnormal lab result, we will notify you by phone as soon as possible.  Submit refill requests through [employer] or call your pharmacy and they will forward the refill request to us. Please allow 3 business days for your refill to be completed.          Additional Information About Your Visit        Care EveryWhere ID     This is your Care EveryWhere ID. This could be used by other organizations to access your [place] medical records  [id number]         Blood Pressure from Last 3 Encounters:   05/23/18 141/75   05/05/18 126/77   04/06/18 131/81     Weight from Last 3 Encounters:   05/23/18 64.9 kg (143 lb)   05/05/18 69.4 kg (153 lb)   04/06/18 68.5 kg (151 lb)              We Performed the Following     PM PHONE R STRIP EVAL UP TO 90 DAYS ([phone])        Primary Care Provider Office Phone # Fax #    Kirit Michel [phone]       [place] [address]        Equal Access to Services     [facility] FRANCISCO : Hadii aad ku hadasho Soomaali, waaxda luqadaha, qaybta kaalmada adeegyada, waxay idiin hayaan adeeg bettyarachapito lasri . So [facility] [phone].    ATENCIÓN: Si habla español, tiene a fofana disposición servicios gratuitos de asistencia lingüística. [place] [phone].    We comply with applicable federal civil rights laws and [place] laws. We do not discriminate on the basis of race, color, national origin, age, disability, sex, sexual orientation, or gender identity.            Thank you!     Thank you for choosing [facility]  for your care. Our goal is always to provide you with excellent care. Hearing back from our patients is one way we can continue to improve our services. Please take a few minutes to complete the written survey that you may receive in the mail after your visit with us. Thank you!             Your Updated Medication List - Protect others around you: Learn how to safely use, store and throw away your medicines at www.disposemymeds.org.          This list is accurate as of 7/10/18 11:00 AM.  Always use your most recent med list.                   Brand Name Dispense Instructions for use Diagnosis    acetaminophen 500 MG tablet    TYLENOL     Take 500-1,000 mg by mouth every 6 hours as needed for mild pain        amLODIPine 5 MG tablet    NORVASC    30 tablet    Take 1 tablet (5 mg) by mouth daily    Essential hypertension       atorvastatin 40 MG tablet    LIPITOR     Take 40 mg by mouth At Bedtime        calcium + D 600-200 MG-UNIT Tabs    Generic drug:  calcium carbonate-vitamin D      Take 1 tablet by mouth daily.        CELEBREX PO      Take 200 mg by mouth daily        CENTRUM SILVER per tablet      Take 1 tablet by mouth daily.        FOSAMAX PO      Take 70 mg by mouth once a week        furosemide 20 MG tablet    LASIX    30 tablet    Take 0.5 tablets (10 mg) by mouth daily    Pulmonary edema       lisinopril 20 MG tablet    PRINIVIL/ZESTRIL    180 tablet    Take 1 tablet (20 mg) by mouth daily    Essential hypertension       metoprolol succinate 50 MG 24 hr tablet    TOPROL-XL    180 tablet    Take 1 tablet (50 mg) by mouth 2 times daily    CHF (congestive heart failure) (H)       nitroFURantoin (macrocrystal-monohydrate) 100 MG capsule    MACROBID    14 capsule    Take 1 capsule (100 mg) by mouth 2 times daily    Acute cystitis with hematuria       nitroGLYcerin 0.4 MG sublingual tablet    NITROSTAT    25 tablet    For chest pain place 1 tablet under the tongue every 5 minutes for 3 doses. If symptoms persist 5 minutes after 1st dose call 911.        PROBIOTIC DAILY PO      Take by mouth daily        RESTASIS 0.05 % ophthalmic emulsion   Generic drug:  cycloSPORINE      Place 1 drop into both eyes every 12 hours.        WARFARIN SODIUM PO      Take 4.5 mg by mouth daily 1 & 1/2 tablet of 3mg

## 2018-07-10 NOTE — PROGRESS NOTES
~ 90 day office teletrace~  Appropriate  at time of check.  Chronic Afib, taking Warfarin. Magnet response WNL. Six month threshold scheduled in October. Pt is scheduled to see ERIK Salinas in September. Anatoliy HEAD

## 2018-07-17 ENCOUNTER — HOSPITAL ENCOUNTER (EMERGENCY)
Facility: CLINIC | Age: 83
Discharge: HOME | End: 2018-07-18
Attending: EMERGENCY MEDICINE | Admitting: EMERGENCY MEDICINE
Payer: MEDICARE

## 2018-07-17 DIAGNOSIS — R07.9 CHEST PAIN, UNSPECIFIED TYPE: ICD-10-CM

## 2018-07-17 LAB
BASOPHILS # BLD AUTO: 0 10E9/L (ref 0–0.2)
BASOPHILS NFR BLD AUTO: 0.3 %
DIFFERENTIAL METHOD BLD: NORMAL
EOSINOPHIL # BLD AUTO: 0.1 10E9/L (ref 0–0.7)
EOSINOPHIL NFR BLD AUTO: 2 %
ERYTHROCYTE [DISTWIDTH] IN BLOOD BY AUTOMATED COUNT: 14.1 % (ref 10–15)
HCT VFR BLD AUTO: 39 % (ref 35–47)
HGB BLD-MCNC: 13.2 G/DL (ref 11.7–15.7)
IMM GRANULOCYTES # BLD: 0 10E9/L (ref 0–0.4)
IMM GRANULOCYTES NFR BLD: 0.2 %
LYMPHOCYTES # BLD AUTO: 1.3 10E9/L (ref 0.8–5.3)
LYMPHOCYTES NFR BLD AUTO: 21.8 %
MCH RBC QN AUTO: 31.5 PG (ref 26.5–33)
MCHC RBC AUTO-ENTMCNC: 33.8 G/DL (ref 31.5–36.5)
MCV RBC AUTO: 93 FL (ref 78–100)
MONOCYTES # BLD AUTO: 0.5 10E9/L (ref 0–1.3)
MONOCYTES NFR BLD AUTO: 7.6 %
NEUTROPHILS # BLD AUTO: 4 10E9/L (ref 1.6–8.3)
NEUTROPHILS NFR BLD AUTO: 68.1 %
NRBC # BLD AUTO: 0 10*3/UL
NRBC BLD AUTO-RTO: 0 /100
PLATELET # BLD AUTO: 168 10E9/L (ref 150–450)
RBC # BLD AUTO: 4.19 10E12/L (ref 3.8–5.2)
WBC # BLD AUTO: 5.9 10E9/L (ref 4–11)

## 2018-07-17 PROCEDURE — 85610 PROTHROMBIN TIME: CPT | Performed by: EMERGENCY MEDICINE

## 2018-07-17 PROCEDURE — 99285 EMERGENCY DEPT VISIT HI MDM: CPT | Mod: 25

## 2018-07-17 PROCEDURE — 84484 ASSAY OF TROPONIN QUANT: CPT | Performed by: EMERGENCY MEDICINE

## 2018-07-17 PROCEDURE — 80053 COMPREHEN METABOLIC PANEL: CPT | Performed by: EMERGENCY MEDICINE

## 2018-07-17 PROCEDURE — 85025 COMPLETE CBC W/AUTO DIFF WBC: CPT | Performed by: EMERGENCY MEDICINE

## 2018-07-17 PROCEDURE — 93005 ELECTROCARDIOGRAM TRACING: CPT

## 2018-07-17 ASSESSMENT — ENCOUNTER SYMPTOMS
TREMORS: 1
SHORTNESS OF BREATH: 0
NECK PAIN: 1
NUMBNESS: 1

## 2018-07-18 ENCOUNTER — DOCUMENTATION ONLY (OUTPATIENT)
Dept: CARDIOLOGY | Facility: CLINIC | Age: 83
End: 2018-07-18

## 2018-07-18 ENCOUNTER — APPOINTMENT (OUTPATIENT)
Dept: GENERAL RADIOLOGY | Facility: CLINIC | Age: 83
End: 2018-07-18
Attending: PHYSICIAN ASSISTANT
Payer: MEDICARE

## 2018-07-18 VITALS
WEIGHT: 153 LBS | DIASTOLIC BLOOD PRESSURE: 77 MMHG | BODY MASS INDEX: 30.04 KG/M2 | SYSTOLIC BLOOD PRESSURE: 151 MMHG | RESPIRATION RATE: 15 BRPM | OXYGEN SATURATION: 96 % | HEIGHT: 60 IN | HEART RATE: 61 BPM | TEMPERATURE: 98.3 F

## 2018-07-18 LAB
ALBUMIN SERPL-MCNC: 3.5 G/DL (ref 3.4–5)
ALP SERPL-CCNC: 73 U/L (ref 40–150)
ALT SERPL W P-5'-P-CCNC: 48 U/L (ref 0–50)
ANION GAP SERPL CALCULATED.3IONS-SCNC: 7 MMOL/L (ref 3–14)
AST SERPL W P-5'-P-CCNC: 60 U/L (ref 0–45)
BILIRUB SERPL-MCNC: 0.7 MG/DL (ref 0.2–1.3)
BUN SERPL-MCNC: 25 MG/DL (ref 7–30)
CALCIUM SERPL-MCNC: 8.2 MG/DL (ref 8.5–10.1)
CHLORIDE SERPL-SCNC: 100 MMOL/L (ref 94–109)
CO2 SERPL-SCNC: 27 MMOL/L (ref 20–32)
CREAT SERPL-MCNC: 1.06 MG/DL (ref 0.52–1.04)
GFR SERPL CREATININE-BSD FRML MDRD: 48 ML/MIN/1.7M2
GLUCOSE SERPL-MCNC: 125 MG/DL (ref 70–99)
INR PPP: 2.62 (ref 0.86–1.14)
INTERPRETATION ECG - MUSE: NORMAL
POTASSIUM SERPL-SCNC: 4 MMOL/L (ref 3.4–5.3)
PROT SERPL-MCNC: 6.7 G/DL (ref 6.8–8.8)
SODIUM SERPL-SCNC: 134 MMOL/L (ref 133–144)
TROPONIN I SERPL-MCNC: <0.015 UG/L (ref 0–0.04)
TROPONIN I SERPL-MCNC: <0.015 UG/L (ref 0–0.04)

## 2018-07-18 PROCEDURE — 71046 X-RAY EXAM CHEST 2 VIEWS: CPT

## 2018-07-18 PROCEDURE — 84484 ASSAY OF TROPONIN QUANT: CPT | Performed by: EMERGENCY MEDICINE

## 2018-07-18 NOTE — DISCHARGE INSTRUCTIONS
*CHEST PAIN, UNCERTAIN CAUSE    Based on your exam today, the exact cause of your chest pain is not certain. Your condition does not seem serious at this time, and your pain does not appear to be coming from your heart. However, sometimes the signs of a serious problem take more time to appear. Therefore, watch for the warning signs listed below.  HOME CARE:    1. Rest today and avoid strenuous activity.  2. Take any prescribed medicine as directed.  FOLLOW UP with your doctor in 1-3 days.   GET PROMPT MEDICAL ATTENTION if any of the following occur:    A change in the type of pain: if it feels different, becomes more severe, lasts longer, or begins to spread into your shoulder, arm, neck, jaw or back    Shortness of breath or increased pain with breathing    Weakness, dizziness, or fainting    Cough with blood or dark colored sputum (phlegm)    Fever over 101  F (38.3  C)    Swelling, pain or redness in one leg    9535-2750 [employer]. [address] [place] [address]. All rights reserved. This information is not intended as a substitute for professional medical care. Always follow your healthcare professional's instructions.  This information has been modified by your health care provider with permission from the publisher.

## 2018-07-18 NOTE — ED PROVIDER NOTES
Emergency Department Attending Supervision Note  7/17/2018  11:58 PM    I evaluated this patient in conjunction with DAKSHA Rodriguez.    Briefly, the patient presented with acute onset chest pain, described as pressure, that radiated into her arms bilaterally and her neck. She denies shortness of breath. She was asymptomatic upon arrival to the Emergency Department.     On my exam, ***    Results:    ED course:    My impression is ***    Diagnosis  No diagnosis found.    Emerson Fuentes MD

## 2018-07-18 NOTE — PROGRESS NOTES
[employer] [facility] CRTP    ----[facility] - in ED for chest pain---------      BiVP: 97 %  Mode: VVIR 60        Presenting Rhythm: BiVP  Sensing: WNL    Pacing Threshold: RV-not obtained, LV elevated     Impedance: WNL  Battery Status: 2.93 V  Ventricular Arrhythmia: none     Care Plan: LV threshold 4.0 V at 1.0 ms - last two weeks have ranged from 3.2 V to 4.2 V with a programmed amplitude of 3.5 V. EF is up to 45-50% (2017). Discussed with Susy - patient is coming to the clinic for device check in October - will reassess LV threshold at that time. SK

## 2018-07-18 NOTE — ED PROVIDER NOTES
History     Chief Complaint:  Chest Pain     HPI   Haritha Trujillo is an anticoagulated 89 year old female with a pacemaker and a history of CHF and hypertension who presents to the emergency department today via EMS for evaluation of chest pain. Earlier while watching TV the patient developed chest pain that radiated into her neck, shoulders, and arms. She called her neighbor who sat with her till EMS arrived, but while they were waiting her pain, described as a pressure, disappeared. She is currently pain free and had no shortness of breath or leg swelling. She does note that her blood pressure was high and that she had tremors throughout her arms and hands, but thinks it was secondary to her nerves as she was nervous about having a heart attack. She received 324 aspirin en route. She does have some occasional numbness in her toes, this is not new.    Allergies:  Amiodarone  Hctz  Lansoprazole    Medications:    acetaminophen (TYLENOL) 500 MG tablet  Alendronate Sodium (FOSAMAX PO)  amLODIPine (NORVASC) 5 MG tablet  atorvastatin (LIPITOR) 40 MG tablet  calcium carbonate-vitamin D (CALCIUM + D) 600-200 MG-UNIT TABS  Celecoxib (CELEBREX PO)  cycloSPORINE (RESTASIS) 0.05 % ophthalmic emulsion  furosemide (LASIX) 20 MG tablet  lisinopril (PRINIVIL/ZESTRIL) 20 MG tablet  metoprolol (TOPROL-XL) 50 MG 24 hr tablet  Multiple Vitamins-Minerals (CENTRUM SILVER) per tablet  nitroFURantoin, macrocrystal-monohydrate, (MACROBID) 100 MG capsule  nitroGLYcerin (NITROSTAT) 0.4 MG sublingual tablet  Probiotic Product (PROBIOTIC DAILY PO)  WARFARIN SODIUM PO    Past Medical History:    Aortic regurgitation   Atrial fibrillation   Cardiomyopathy    CHF   Chronic kidney disease, stage 3   Coronary atherosclerosis of unspecified type of vessel, native or graft   Degeneration of cervical intervertebral disc   Essential hypertension   Mitral regurgitation   hyperlipidemia   Pacemaker   Pulmonary hypertension   Pulmonary valve  regurgitation   Tricuspid regurgitation     Past Surgical History:    Pacemaker    Family History:    CAD Father  CAD Brother    Social History:  The patient was accompanied to the ED by EMS and her grandson, Jan.  Smoking Status: Former Smoker  Smokeless Tobacco: Never Used  Alcohol Use: Negative   Marital Status:       Review of Systems   Respiratory: Negative for shortness of breath.    Cardiovascular: Positive for chest pain. Negative for leg swelling.   Musculoskeletal: Positive for neck pain.        Arm pain   Neurological: Positive for tremors and numbness.   All other systems reviewed and are negative.    Physical Exam     Patient Vitals for the past 24 hrs:   BP Temp Temp src Pulse Heart Rate Resp SpO2 Height Weight   07/18/18 0236 151/77 - - 61 - 15 96 % - -   07/18/18 0045 - - - - 60 14 97 % - -   07/18/18 0035 134/77 - - - - - 97 % - -   07/18/18 0000 161/75 - - - 62 13 - - -   07/17/18 2327 156/85 98.3  F (36.8  C) Oral - 77 18 98 % 1.524 m (5') 69.4 kg (153 lb)      Physical Exam    General: elderly female, Well-nourished, no acute distress  Eyes: PERRL, conjunctivae pink no scleral icterus or conjunctival injection  ENT:  Moist mucus membranes  Respiratory:  No respiratory distress, no wheezes, crackles or rales  CV: Normal rate, no murmurs, rubs or gallops  GI: Nontender, nondistended, normal bowel sounds  : No CVA tenderness  Skin: Warm, dry.  No rashes or petechiae  Musculoskeletal: No peripheral edema or calf tenderness  Neuro: Alert and oriented to person/place/time, cranial nerves II through XII intact, strength 5 out of 5 throughout  Psychiatric: Normal affect      Emergency Department Course     ECG:  ECG taken at 2336, ECG read at 2341  Ventricular-paced rhythm  Abnormal ECG  Rate 70 bpm. CO interval * ms. QRS duration 146 ms. QT/QTc 438/473 ms. P-R-T axes - 130 96.    Imaging:  Radiology findings were communicated with the patient who voiced understanding of the findings.    XR  Chest 2 Views   Final Result   IMPRESSION: No new focal air-space disease or significant change.   Stable mild cardiomegaly without pulmonary edema. Cardiac device   remains in place, unchanged. Calcified tortuous aorta. Degenerative   changes in the shoulders and thoracic spine.      ADY LOERA MD        Laboratory:  Laboratory findings were communicated with the patient who voiced understanding of the findings.      CBC: WBC 5.9, HGB 13.2,   BMP:  o/w WNL (Creatinine 1.06)  Troponin and delta troponin <0.015    Procedures:      Interventions:    Medications - No data to display      Impression & Plan      Medical Decision Making:    Haritha Trujillo presents with chest pain.  The work up in the Emergency Department is negative.  The differential diagnosis of chest pain is broad and includes life threatening etiologies such as Acute coronary syndrome, Myocardial infarction, Pulmonary Embolism, and Acute Aortic Dissection.  Other causes may include pneumonia, pneumothorax, pericarditis, pleurisy, and esophageal spasm.  Workup in the emergency department tonight included unremarkable CBC CMP, therapeutic INR negative troponin and delta troponin.  Chest x-ray showing no focal airspace disease or significant change with stable cardiomegaly without pulmonary edema.  Her pacemaker was interrogated did not show any signs of any arrhythmias in the last 24 hours.  No serious etiology for the chest pain was detected today during this visit.  Due to the patient's age and cardiac risk factors I did recommend staying for a cardiac observation and continued troponin trending.  After a shared decision-making conversation the patient declined observation and would prefer to follow-up with her cardiologist as an outpatient.  She is aware of the risks of going home versus staying for continued observation.  Return precautions were given recommend patient come back if she has any new chest pain or additional episodes  of dizziness.    Close follow up with primary care is indicated should the pain continue, as further work up may be performed; this was made clear to Haritha, who understands.      Diagnosis:      ICD-10-CM    1. Chest pain, unspecified type R07.9      Disposition:   Discharged to home    Critical Care time was 0 minutes for this patient excluding procedures.     CMS Diagnoses:             Discharge Medications:    New Prescriptions    No medications on file     Scribe Disclosure:  I, Kirit Isaacs, am serving as a scribe at 11:42 PM on 7/17/2018 to document services personally performed by Emerson Fuentes MD based on my observations and the provider's statements to me.       EMERGENCY DEPARTMENT       Frank Edwards PA-C  07/18/18 0422

## 2018-07-24 ENCOUNTER — OFFICE VISIT (OUTPATIENT)
Dept: CARDIOLOGY | Facility: CLINIC | Age: 83
End: 2018-07-24
Payer: COMMERCIAL

## 2018-07-24 VITALS
SYSTOLIC BLOOD PRESSURE: 138 MMHG | DIASTOLIC BLOOD PRESSURE: 76 MMHG | HEIGHT: 64 IN | HEART RATE: 78 BPM | WEIGHT: 152 LBS | BODY MASS INDEX: 25.95 KG/M2

## 2018-07-24 DIAGNOSIS — I42.8 OTHER CARDIOMYOPATHY (H): ICD-10-CM

## 2018-07-24 DIAGNOSIS — I48.20 CHRONIC ATRIAL FIBRILLATION (H): ICD-10-CM

## 2018-07-24 DIAGNOSIS — R07.2 PRECORDIAL PAIN: Primary | ICD-10-CM

## 2018-07-24 PROCEDURE — 99214 OFFICE O/P EST MOD 30 MIN: CPT | Performed by: INTERNAL MEDICINE

## 2018-07-24 NOTE — LETTER
7/24/2018      Kirit Michel MD  [address] [address]      RE: Haritha Trujillo       Dear Colleague,    I had the pleasure of seeing Haritha Trujillo in the [facility].    Service Date: 07/24/2018      HISTORY OF PRESENT ILLNESS:  It was my pleasure seeing Ms. Haritha Trujillo, an absolutely delightful 89-year-old woman, with the following cardiac issues:   A.  Nonischemic cardiomyopathy with CHF.  EF improved with CRT and medical therapy.  Most recent EF 45%-50%.   B.  Permanent atrial fibrillation with previous AV node ablation.  Biventricular pacemaker implanted in 01/2015.  High LV threshold with intermittent diaphragmatic stimulation.   C.  Dyslipidemia.   D.  Hypertension.      Ms. Trujillo was brought to the ER by ambulance on 07/17.  She was at home reading a book where she had the onset of rather severe substernal chest pain that felt like a heavy pressure radiating to her shoulders and left arm.  This persisted, and she became alarmed and 911 was called.  She had a complete evaluation in the emergency room.  She tells me that by the time she arrived in the emergency room, her discomfort was gone.  She had 2 troponins that were normal.  There were no obvious culprits identified.  The patient was sent home a few hours later.        She has not had similar discomfort before or after this ER visit.  She remains very alert and active despite her 89 years of age.  She provides excellent history.  She has not had syncope, near syncope or other issues.  She continues to have intermittent diaphragmatic stimulation from the LV lead of her CRT-P, but she can tolerate that.  Fortunately, this does not interfere with her sleep.      PHYSICAL EXAMINATION:   VITAL SIGNS:  Blood pressure 138/76, pulse 78 and regular, weight 69 kg, height 163 cm.   GENERAL:  This is a very pleasant woman who appears younger than her stated age.   HEENT:   Head normocephalic.  She wears glasses.   NECK:  Supple, without carotid bruits.   LUNGS:  Clear.   CARDIOVASCULAR:  Normal JVP, regular rhythm, nicely healed pacemaker incision.   ABDOMEN:  Soft, nontender.   EXTREMITIES:  No edema.      DIAGNOSTIC STUDIES:   1.  Substernal chest discomfort with radiation to the left arm on 2018.  Description somewhat worrisome for angina.  The patient ruled out for non-STEMI in the emergency room.  She had a previous normal nuclear stress test in .  I would repeat a nuclear stress test at this point.   2.  Permanent atrial fibrillation with previous AV node ablation and CRT-P.  She has intermittent diaphragmatic stimulation from the LV lead, but this is mild and this is something she can live with.   3.  Hypertension.  She says she is doing well on her current regimen, which includes amlodipine, lisinopril and metoprolol.      RECOMMENDATIONS:   A.  Lexiscan nuclear stress test.   B.  No changes in her medications.   C.  We will see her again in 1 year unless she has problems in the interim.      I was my pleasure being involved in her care.      cc:   [place] [address]         DOUGLAS FOWLER MD             D: 2018   T: 2018   MT: KATELYNN      Name:     SEVERIANO VALLEJO   MRN:      [id number]        Account:      [id number]   :      1925           Service Date: 2018      Document: [id number]         Outpatient Encounter Prescriptions as of 2018   Medication Sig Dispense Refill     acetaminophen (TYLENOL) 500 MG tablet Take 500-1,000 mg by mouth every 6 hours as needed for mild pain       Alendronate Sodium (FOSAMAX PO) Take 70 mg by mouth once a week       amLODIPine (NORVASC) 5 MG tablet Take 1 tablet (5 mg) by mouth daily 30 tablet 11     atorvastatin (LIPITOR) 40 MG tablet Take 40 mg by mouth At Bedtime        calcium carbonate-vitamin D (CALCIUM + D)  600-200 MG-UNIT TABS Take 1 tablet by mouth daily.       Celecoxib (CELEBREX PO) Take 200 mg by mouth daily       cycloSPORINE (RESTASIS) 0.05 % ophthalmic emulsion Place 1 drop into both eyes every 12 hours.       furosemide (LASIX) 20 MG tablet Take 0.5 tablets (10 mg) by mouth daily 30 tablet 0     lisinopril (PRINIVIL/ZESTRIL) 20 MG tablet Take 1 tablet (20 mg) by mouth daily 180 tablet 3     metoprolol (TOPROL-XL) 50 MG 24 hr tablet Take 1 tablet (50 mg) by mouth 2 times daily 180 tablet 3     Multiple Vitamins-Minerals (CENTRUM SILVER) per tablet Take 1 tablet by mouth daily.         nitroFURantoin, macrocrystal-monohydrate, (MACROBID) 100 MG capsule Take 1 capsule (100 mg) by mouth 2 times daily 14 capsule 0     nitroGLYcerin (NITROSTAT) 0.4 MG sublingual tablet For chest pain place 1 tablet under the tongue every 5 minutes for 3 doses. If symptoms persist 5 minutes after 1st dose call 911. 25 tablet 1     Probiotic Product (PROBIOTIC DAILY PO) Take by mouth daily       WARFARIN SODIUM PO Take 4.5 mg by mouth daily 1 & 1/2 tablet of 3mg       No facility-administered encounter medications on file as of 7/24/2018.        Again, thank you for allowing me to participate in the care of your patient.      Sincerely,    Fay Tatum MD     [facility]

## 2018-07-24 NOTE — LETTER
7/24/2018    Kirit Michel MD  [address] [address]    RE: Haritha LAVERNE Trujillo       Dear Colleague,    I had the pleasure of seeing Haritha LAVERNE Trujillo in the [facility].    HPI and Plan:   See dictation    Orders Placed This Encounter   Procedures     NM Lexiscan stress test (nuc card)     Follow-Up with Cardiac Advanced Practice Provider       No orders of the defined types were placed in this encounter.      There are no discontinued medications.      Encounter Diagnoses   Name Primary?     Precordial pain Yes     Other cardiomyopathy (H)      Chronic atrial fibrillation (H)        CURRENT MEDICATIONS:  Current Outpatient Prescriptions   Medication Sig Dispense Refill     acetaminophen (TYLENOL) 500 MG tablet Take 500-1,000 mg by mouth every 6 hours as needed for mild pain       Alendronate Sodium (FOSAMAX PO) Take 70 mg by mouth once a week       amLODIPine (NORVASC) 5 MG tablet Take 1 tablet (5 mg) by mouth daily 30 tablet 11     atorvastatin (LIPITOR) 40 MG tablet Take 40 mg by mouth At Bedtime        calcium carbonate-vitamin D (CALCIUM + D) 600-200 MG-UNIT TABS Take 1 tablet by mouth daily.       Celecoxib (CELEBREX PO) Take 200 mg by mouth daily       cycloSPORINE (RESTASIS) 0.05 % ophthalmic emulsion Place 1 drop into both eyes every 12 hours.       furosemide (LASIX) 20 MG tablet Take 0.5 tablets (10 mg) by mouth daily 30 tablet 0     lisinopril (PRINIVIL/ZESTRIL) 20 MG tablet Take 1 tablet (20 mg) by mouth daily 180 tablet 3     metoprolol (TOPROL-XL) 50 MG 24 hr tablet Take 1 tablet (50 mg) by mouth 2 times daily 180 tablet 3     Multiple Vitamins-Minerals (CENTRUM SILVER) per tablet Take 1 tablet by mouth daily.         nitroFURantoin, macrocrystal-monohydrate, (MACROBID) 100 MG capsule Take 1 capsule (100 mg) by mouth 2 times daily 14 capsule 0     nitroGLYcerin (NITROSTAT) 0.4 MG sublingual tablet For chest pain place 1  tablet under the tongue every 5 minutes for 3 doses. If symptoms persist 5 minutes after 1st dose call 911. 25 tablet 1     Probiotic Product (PROBIOTIC DAILY PO) Take by mouth daily       WARFARIN SODIUM PO Take 4.5 mg by mouth daily 1 & 1/2 tablet of 3mg         ALLERGIES     Allergies   Allergen Reactions     Amiodarone Nausea     Hctz      Lansoprazole      diarrhea   Prevacid       PAST MEDICAL HISTORY:  Past Medical History:   Diagnosis Date     Aortic regurgitation 12/14/2014    mild (1+) per echo     Atrial fibrillation (H)      Cardiomyopathy (H)      CHF (congestive heart failure) (H)      Chronic kidney disease, stage 3      Coronary atherosclerosis of unspecified type of vessel, native or graft 5/16/2000    normal left coronary arteries and tight obstruction in distal RCA, too small for revascularization, medical management     Degeneration of cervical intervertebral disc     cervical spine     Essential hypertension, benign      Mitral regurgitation 12/14/2014    mod-mod/sev (2-3+) per echo     Other and unspecified hyperlipidemia      Pacemaker      Pulmonary hypertension 3/16/2013    mild per echo with RVSP 31mmHg +RAP      Pulmonary valve regurgitation 12/14/2014    mod (2+) per echo     Tricuspid regurgitation 12/14/2014    mild (1+) per echo     Unspecified tinnitus     chronic       PAST SURGICAL HISTORY:  Past Surgical History:   Procedure Laterality Date     C NONSPECIFIC PROCEDURE  1999    right rotator cuff tear OR     C NONSPECIFIC PROCEDURE  1983    microdiscectomy     C NONSPECIFIC PROCEDURE      C-sections x 3      C NONSPECIFIC PROCEDURE      appendectomy     C NONSPECIFIC PROCEDURE      bilateral benign breast biopsy      C NONSPECIFIC PROCEDURE      right knee arthroscopic OR     C NONSPECIFIC PROCEDURE  1974    enteritis     CORONARY ANGIOGRAPHY ADULT ORDER  5/16/2000     HRW PACEMAKER PERMANENT  1/2015    BI- ventricular     IMPLANT PACEMAKER  11/12/2010    dual chamber [employer]        FAMILY HISTORY:  Family History   Problem Relation Age of Onset     Hypertension Mother      Cancer Mother      pancreatic     Eye Disorder Mother      cristian     FAZALAMISAEL. Father       53     Lipids Father      Diabetes Maternal Grandmother      LARON.A.WARREN. Brother      open heart surgery age 53     Cancer Daughter      ovavian     Neurologic Disorder Son      MS       SOCIAL HISTORY:  Social History     Social History     Marital status:      Spouse name: N/A     Number of children: N/A     Years of education: N/A     Social History Main Topics     Smoking status: Former Smoker     Smokeless tobacco: Never Used      Comment: 35 yrs ago     Alcohol use No     Drug use: No     Sexual activity: Not Asked     Other Topics Concern     Parent/Sibling W/ Cabg, Mi Or Angioplasty Before 65f 55m? Yes     Special Diet Yes     low sodium     Exercise Yes     active life style     Seat Belt Yes     Social History Narrative       Review of Systems:  Skin:  Negative rash;bruising     Eyes:  Positive for glasses    ENT:  Negative epistaxis    Respiratory:  Negative  (when walking fast)     Cardiovascular:  Negative Positive for (chest tightness when walking fast) New onset of exertional C/P that is relieved once she rests, tightness on the chest   Gastroenterology: Negative hematochezia    Genitourinary:  Negative      Musculoskeletal:  Positive for arthritis    Neurologic:  Negative numbness or tingling of feet (cramping in toes at night)    Psychiatric:  Negative      Heme/Lymph/Imm:  Positive for allergies    Endocrine:  Negative        [id number]          Thank you for allowing me to participate in the care of your patient.      Sincerely,     Fay Tatum MD     [facility]    cc:   No referring provider defined for this encounter.

## 2018-07-24 NOTE — PROGRESS NOTES
HPI and Plan:   See dictation    Orders Placed This Encounter   Procedures     NM Lexiscan stress test (nuc card)     Follow-Up with Cardiac Advanced Practice Provider       No orders of the defined types were placed in this encounter.      There are no discontinued medications.      Encounter Diagnoses   Name Primary?     Precordial pain Yes     Other cardiomyopathy (H)      Chronic atrial fibrillation (H)        CURRENT MEDICATIONS:  Current Outpatient Prescriptions   Medication Sig Dispense Refill     acetaminophen (TYLENOL) 500 MG tablet Take 500-1,000 mg by mouth every 6 hours as needed for mild pain       Alendronate Sodium (FOSAMAX PO) Take 70 mg by mouth once a week       amLODIPine (NORVASC) 5 MG tablet Take 1 tablet (5 mg) by mouth daily 30 tablet 11     atorvastatin (LIPITOR) 40 MG tablet Take 40 mg by mouth At Bedtime        calcium carbonate-vitamin D (CALCIUM + D) 600-200 MG-UNIT TABS Take 1 tablet by mouth daily.       Celecoxib (CELEBREX PO) Take 200 mg by mouth daily       cycloSPORINE (RESTASIS) 0.05 % ophthalmic emulsion Place 1 drop into both eyes every 12 hours.       furosemide (LASIX) 20 MG tablet Take 0.5 tablets (10 mg) by mouth daily 30 tablet 0     lisinopril (PRINIVIL/ZESTRIL) 20 MG tablet Take 1 tablet (20 mg) by mouth daily 180 tablet 3     metoprolol (TOPROL-XL) 50 MG 24 hr tablet Take 1 tablet (50 mg) by mouth 2 times daily 180 tablet 3     Multiple Vitamins-Minerals (CENTRUM SILVER) per tablet Take 1 tablet by mouth daily.         nitroFURantoin, macrocrystal-monohydrate, (MACROBID) 100 MG capsule Take 1 capsule (100 mg) by mouth 2 times daily 14 capsule 0     nitroGLYcerin (NITROSTAT) 0.4 MG sublingual tablet For chest pain place 1 tablet under the tongue every 5 minutes for 3 doses. If symptoms persist 5 minutes after 1st dose call 911. 25 tablet 1     Probiotic Product (PROBIOTIC DAILY PO) Take by mouth daily       WARFARIN SODIUM PO Take 4.5 mg by mouth daily 1 & 1/2 tablet of  3mg         ALLERGIES     Allergies   Allergen Reactions     Amiodarone Nausea     Hctz      Lansoprazole      diarrhea   Prevacid       PAST MEDICAL HISTORY:  Past Medical History:   Diagnosis Date     Aortic regurgitation 2014    mild (1+) per echo     Atrial fibrillation (H)      Cardiomyopathy (H)      CHF (congestive heart failure) (H)      Chronic kidney disease, stage 3      Coronary atherosclerosis of unspecified type of vessel, native or graft 2000    normal left coronary arteries and tight obstruction in distal RCA, too small for revascularization, medical management     Degeneration of cervical intervertebral disc     cervical spine     Essential hypertension, benign      Mitral regurgitation 2014    mod-mod/sev (2-3+) per echo     Other and unspecified hyperlipidemia      Pacemaker      Pulmonary hypertension 3/16/2013    mild per echo with RVSP 31mmHg +RAP      Pulmonary valve regurgitation 2014    mod (2+) per echo     Tricuspid regurgitation 2014    mild (1+) per echo     Unspecified tinnitus     chronic       PAST SURGICAL HISTORY:  Past Surgical History:   Procedure Laterality Date     C NONSPECIFIC PROCEDURE      right rotator cuff tear OR     C NONSPECIFIC PROCEDURE      microdiscectomy     C NONSPECIFIC PROCEDURE      C-sections x 3      C NONSPECIFIC PROCEDURE      appendectomy     C NONSPECIFIC PROCEDURE      bilateral benign breast biopsy      C NONSPECIFIC PROCEDURE      right knee arthroscopic OR     C NONSPECIFIC PROCEDURE  1974    enteritis     CORONARY ANGIOGRAPHY ADULT ORDER  2000     HRW PACEMAKER PERMANENT  2015    BI- ventricular     IMPLANT PACEMAKER  2010    dual chamber Medtronic       FAMILY HISTORY:  Family History   Problem Relation Age of Onset     Hypertension Mother      Cancer Mother      pancreatic     Eye Disorder Mother      cristian DIEHLABEATRIZ Father       53     Lipids Father      Diabetes Maternal Grandmother       ANIA Brother      open heart surgery age 53     Cancer Daughter      ovavian     Neurologic Disorder Son      MS       SOCIAL HISTORY:  Social History     Social History     Marital status:      Spouse name: N/A     Number of children: N/A     Years of education: N/A     Social History Main Topics     Smoking status: Former Smoker     Smokeless tobacco: Never Used      Comment: 35 yrs ago     Alcohol use No     Drug use: No     Sexual activity: Not Asked     Other Topics Concern     Parent/Sibling W/ Cabg, Mi Or Angioplasty Before 65f 55m? Yes     Special Diet Yes     low sodium     Exercise Yes     active life style     Seat Belt Yes     Social History Narrative       Review of Systems:  Skin:  Negative rash;bruising     Eyes:  Positive for glasses    ENT:  Negative epistaxis    Respiratory:  Negative  (when walking fast)     Cardiovascular:  Negative Positive for (chest tightness when walking fast) New onset of exertional C/P that is relieved once she rests, tightness on the chest   Gastroenterology: Negative hematochezia    Genitourinary:  Negative      Musculoskeletal:  Positive for arthritis    Neurologic:  Negative numbness or tingling of feet (cramping in toes at night)    Psychiatric:  Negative      Heme/Lymph/Imm:  Positive for allergies    Endocrine:  Negative        [id number]

## 2018-07-24 NOTE — MR AVS SNAPSHOT
After Visit Summary   7/24/2018    Haritha Trujillo    MRN: [id number]           Patient Information     Date Of Birth          4/27/1925        Visit Information        Provider Department      7/24/2018 3:15 PM Fay Tatum MD [facility]   [place]        Today's Diagnoses     Precordial pain    -  1    Other cardiomyopathy (H)        Chronic atrial fibrillation (H)           Follow-ups after your visit        Additional Services     Follow-Up with Cardiac Advanced Practice Provider                 Your next 10 appointments already scheduled     Jul 27, 2018  1:00 PM CDT   NM SH CV MPI WITH SOFY 1 DAY with SCINM1   [facility] CV Nuclear Medicine (Cardiovascular Imaging at [facility])    [address]  [address]  [place] [address] [phone]           For a ONE day exam: Allow 3-4 hours for test. For a TWO day exam: Allow 2 hours PER day for test.  You may need to stop some medicines before the test. Follow your doctor s orders. - If you take a beta blocker: Follow your doctor s specific instructions on taking it prior to and on the day of your exam. - If you take Aggrenox or dipyridamole (Persantine, Permole), stop taking it 48 hours before your test. - If you take Viagra, Cialis or Levitra, stop taking it 48 hours before your test. - If you take theophylline or aminophylline, stop taking it 12 hours before your test.  For patients with diabetes: - If you take insulin, call your diabetes care team. Ask if you should take a 1/2 dose the morning of your test. - If you take diabetes medicine by mouth, don t take it on the morning of your test. Bring it with you to take after the test. (If you have questions, call your diabetes care team.)  Do not take nitrates on the day of your test. Do not wear your Nitro-Patch.  Stop all caffeine 12 hours before the test. This includes coffee, tea, soda pop, chocolate and certain  medicines (such as Anacin, Excedrin and NoDoz). Also avoid decaf coffee and tea, as these contain small amounts of caffeine.  No alcohol, smoking or other tobacco for 12 hours before the test.  Stop eating 3 hours before the test. You may drink water.  Please wear a loose two-piece outfit. If you will have an exercise test, bring rubber-soled walking shoes.  When you arrive, please tell us if you: - Have diabetes - Are breastfeeding - May be pregnant - Have a pacemaker of ICD (implantable defibrillator).  Please call your Imaging Department at your exam site with any questions.            Sep 05, 2018  1:10 PM CDT   [facility] EP RETURN with NAVYA Allen CNP   [facility] ([facility])    [address]  [facility] [address] [phone] OPT 2            Oct 09, 2018 10:30 AM CDT   Pacemaker Check with HANG WARREN   [facility] ([facility])    [address]  [place] [id number] [phone] OPT 2              Future tests that were ordered for you today     Open Future Orders        Priority Expected Expires Ordered    Follow-Up with Cardiac Advanced Practice Provider Routine 7/24/2019 7/25/2019 7/24/2018    NM Lexiscan stress test (nuc card) Routine 7/31/2018 7/24/2019 7/24/2018            Who to contact     If you have questions or need follow up information about today's clinic visit or your schedule please contact [facility] directly at [phone].  Normal or non-critical lab and imaging results will be communicated to you by MyChart, letter or phone within 4 business days after the clinic has received the results. If you do not hear from us within 7 days, please contact the clinic through MyChart or phone. If you have a critical or abnormal lab result, we will notify you by phone as soon as possible.  Submit refill requests through [employer] or  "call your pharmacy and they will forward the refill request to us. Please allow 3 business days for your refill to be completed.          Additional Information About Your Visit        Care EveryWhere ID     This is your Care EveryWhere ID. This could be used by other organizations to access your [facility] medical records  [id number]        Your Vitals Were     Pulse Height BMI (Body Mass Index)             78 1.626 m (5' 4\") 26.09 kg/m2          Blood Pressure from Last 3 Encounters:   07/24/18 138/76   07/18/18 151/77   05/23/18 141/75    Weight from Last 3 Encounters:   07/24/18 68.9 kg (152 lb)   07/17/18 69.4 kg (153 lb)   05/23/18 64.9 kg (143 lb)               Primary Care Provider Office Phone # Fax #    Kirit Michel [phone]       [place] [address]        Equal Access to Services     [facility] HospitalYOBANY : Hadii aad ku hadasho Soomaali, waaxda luqadaha, qaybta kaalmada adeegyada, waxay idiin hayaan seth baer . So [facility] [phone].    ATENCIÓN: Si habla español, tiene a fofana disposición servicios gratuitos de asistencia lingüística. Ll[facility] [phone].    We comply with applicable federal civil rights laws and [place] laws. We do not discriminate on the basis of race, color, national origin, age, disability, sex, sexual orientation, or gender identity.            Thank you!     Thank you for choosing [facility]  for your care. Our goal is always to provide you with excellent care. Hearing back from our patients is one way we can continue to improve our services. Please take a few minutes to complete the written survey that you may receive in the mail after your visit with us. Thank you!             Your Updated Medication List - Protect others around you: Learn how to safely use, store and throw away your medicines at www.disposemymeds.org.          This list is accurate as of 7/24/18  4:19 " PM.  Always use your most recent med list.                   Brand Name Dispense Instructions for use Diagnosis    acetaminophen 500 MG tablet    TYLENOL     Take 500-1,000 mg by mouth every 6 hours as needed for mild pain        amLODIPine 5 MG tablet    NORVASC    30 tablet    Take 1 tablet (5 mg) by mouth daily    Essential hypertension       atorvastatin 40 MG tablet    LIPITOR     Take 40 mg by mouth At Bedtime        calcium + D 600-200 MG-UNIT Tabs   Generic drug:  calcium carbonate-vitamin D      Take 1 tablet by mouth daily.        CELEBREX PO      Take 200 mg by mouth daily        CENTRUM SILVER per tablet      Take 1 tablet by mouth daily.        FOSAMAX PO      Take 70 mg by mouth once a week        furosemide 20 MG tablet    LASIX    30 tablet    Take 0.5 tablets (10 mg) by mouth daily    Pulmonary edema       lisinopril 20 MG tablet    PRINIVIL/ZESTRIL    180 tablet    Take 1 tablet (20 mg) by mouth daily    Essential hypertension       metoprolol succinate 50 MG 24 hr tablet    TOPROL-XL    180 tablet    Take 1 tablet (50 mg) by mouth 2 times daily    CHF (congestive heart failure) (H)       nitroFURantoin (macrocrystal-monohydrate) 100 MG capsule    MACROBID    14 capsule    Take 1 capsule (100 mg) by mouth 2 times daily    Acute cystitis with hematuria       nitroGLYcerin 0.4 MG sublingual tablet    NITROSTAT    25 tablet    For chest pain place 1 tablet under the tongue every 5 minutes for 3 doses. If symptoms persist 5 minutes after 1st dose call 911.        PROBIOTIC DAILY PO      Take by mouth daily        RESTASIS 0.05 % ophthalmic emulsion   Generic drug:  cycloSPORINE      Place 1 drop into both eyes every 12 hours.        WARFARIN SODIUM PO      Take 4.5 mg by mouth daily 1 & 1/2 tablet of 3mg

## 2018-07-25 NOTE — PROGRESS NOTES
Service Date: 07/24/2018      HISTORY OF PRESENT ILLNESS:    It was my pleasure seeing Ms. Haritha Trujillo, an absolutely delightful 89-year-old woman with the following cardiac issues:   A.  Nonischemic cardiomyopathy with CHF.  EF improved with CRT and medical therapy.  Most recent EF 45%-50%.   B.  Permanent atrial fibrillation with previous AV node ablation.  Biventricular pacemaker implanted in 01/2015.  High LV threshold with intermittent diaphragmatic stimulation.   C.  Dyslipidemia.   D.  Hypertension.      Ms. Trujillo was brought to the ER by ambulance on 07/17/18.  She was at home reading a book when she had the onset of severe substernal chest pain that felt like heavy pressure radiating to her shoulders and left arm.  This persisted.  She became alarmed and 911 was called.  She had a thorough evaluation in the emergency room.  She tells me that by the time she arrived in the emergency room, her discomfort was gone.  She had 2 troponins that were normal.  No obvious culprits were identified.  The patient was sent home a few hours later.        She has not had similar discomfort before or after this ER visit.  She remains very alert and active despite her 89 years of age.  She provides excellent history.  She has not had syncope, near syncope or other issues.  She continues to have intermittent diaphragmatic stimulation from the LV lead of her CRT-P, but she can tolerate it.  Fortunately, this does not interfere with her sleep.      PHYSICAL EXAMINATION:   VITAL SIGNS:  Blood pressure 138/76, pulse 78 and regular, weight 69 kg, height 163 cm.   GENERAL:  This is a very pleasant woman who is alert and oriented and appears younger than her stated age.   HEENT:  Head normocephalic.  She wears glasses.   NECK:  Supple, without carotid bruits.   LUNGS:  Clear.   CARDIOVASCULAR:  Normal JVP, regular rhythm, nicely healed pacemaker incision.   ABDOMEN:  Soft, nontender.   EXTREMITIES:  No edema.      DIAGNOSTIC  STUDIES:   1.  Substernal chest discomfort with radiation to the left arm on 2018.  Description somewhat worrisome for angina.  The patient ruled out for non-STEMI in the emergency room.  She had a previous normal nuclear stress test in .  Repeat a nuclear stress test.   2.  Permanent atrial fibrillation with previous AV node ablation and CRT-P.  She has intermittent diaphragmatic stimulation from the LV lead, but this is mild and she tells me it is something she can live with.   3.  Hypertension.  She is doing well on her current regimen, which includes amlodipine, lisinopril and metoprolol.      RECOMMENDATIONS:   A.  Lexiscan nuclear stress test.   B.  No changes in her medications.   C.  We will see her again in one year unless she has issues in the interim.      I has been my pleasure being involved in her care.   Time spent was 25 minutes.  More than 50% of time was discussion and counseling.        DOUGLAS FOWLER MD, FACC            cc:   [place] [address]             D: 2018   T: 2018   MT: KATELYNN      Name:     SEVERIANO VALLEJO   MRN:      [id number]        Account:      [id number]   :      1925           Service Date: 2018      Document: [id number]

## 2018-07-27 ENCOUNTER — HOSPITAL ENCOUNTER (OUTPATIENT)
Dept: CARDIOLOGY | Facility: CLINIC | Age: 83
Discharge: HOME | End: 2018-07-27
Attending: INTERNAL MEDICINE | Admitting: INTERNAL MEDICINE
Payer: MEDICARE

## 2018-07-27 VITALS — HEART RATE: 68 BPM | DIASTOLIC BLOOD PRESSURE: 64 MMHG | SYSTOLIC BLOOD PRESSURE: 152 MMHG

## 2018-07-27 DIAGNOSIS — R07.2 PRECORDIAL PAIN: ICD-10-CM

## 2018-07-27 PROCEDURE — 25000128 H RX IP 250 OP 636: Performed by: INTERNAL MEDICINE

## 2018-07-27 PROCEDURE — A9502 TC99M TETROFOSMIN: HCPCS | Performed by: INTERNAL MEDICINE

## 2018-07-27 PROCEDURE — 78452 HT MUSCLE IMAGE SPECT MULT: CPT | Mod: 26 | Performed by: INTERNAL MEDICINE

## 2018-07-27 PROCEDURE — 34300033 ZZH RX 343: Performed by: INTERNAL MEDICINE

## 2018-07-27 PROCEDURE — 93018 CV STRESS TEST I&R ONLY: CPT | Performed by: INTERNAL MEDICINE

## 2018-07-27 PROCEDURE — 78452 HT MUSCLE IMAGE SPECT MULT: CPT

## 2018-07-27 PROCEDURE — 93016 CV STRESS TEST SUPVJ ONLY: CPT | Performed by: INTERNAL MEDICINE

## 2018-07-27 RX ORDER — ALBUTEROL SULFATE 90 UG/1
2 AEROSOL, METERED RESPIRATORY (INHALATION) EVERY 5 MIN PRN
Status: DISCONTINUED | OUTPATIENT
Start: 2018-07-27 | End: 2018-07-28 | Stop reason: HOSPADM

## 2018-07-27 RX ORDER — AMINOPHYLLINE 25 MG/ML
50-100 INJECTION, SOLUTION INTRAVENOUS
Status: DISCONTINUED | OUTPATIENT
Start: 2018-07-27 | End: 2018-07-28 | Stop reason: HOSPADM

## 2018-07-27 RX ORDER — REGADENOSON 0.08 MG/ML
0.4 INJECTION, SOLUTION INTRAVENOUS ONCE
Status: COMPLETED | OUTPATIENT
Start: 2018-07-27 | End: 2018-07-27

## 2018-07-27 RX ORDER — ACYCLOVIR 200 MG/1
0-1 CAPSULE ORAL
Status: DISCONTINUED | OUTPATIENT
Start: 2018-07-27 | End: 2018-07-28 | Stop reason: HOSPADM

## 2018-07-27 RX ADMIN — REGADENOSON 0.4 MG: 0.08 INJECTION, SOLUTION INTRAVENOUS at 15:16

## 2018-07-27 RX ADMIN — TETROFOSMIN 3.57 MCI.: 1.38 INJECTION, POWDER, LYOPHILIZED, FOR SOLUTION INTRAVENOUS at 13:18

## 2018-07-27 RX ADMIN — TETROFOSMIN 8.96 MCI.: 1.38 INJECTION, POWDER, LYOPHILIZED, FOR SOLUTION INTRAVENOUS at 15:20

## 2018-07-30 ENCOUNTER — TELEPHONE (OUTPATIENT)
Dept: CARDIOLOGY | Facility: CLINIC | Age: 83
End: 2018-07-30

## 2018-07-30 NOTE — TELEPHONE ENCOUNTER
LM on home phone regarding normal Nuc results per Dr Tatum. Instructed pt to call us back at number given if she did have any questions. BAKARI Miller                   Normal nuc stress.  Good news.   Please call.   EVELYN Smith

## 2018-08-13 DIAGNOSIS — I50.9 CHF (CONGESTIVE HEART FAILURE) (H): ICD-10-CM

## 2018-08-13 RX ORDER — METOPROLOL SUCCINATE 50 MG/1
50 TABLET, EXTENDED RELEASE ORAL 2 TIMES DAILY
Qty: 180 TABLET | Refills: 3 | Status: SHIPPED | OUTPATIENT
Start: 2018-08-13 | End: 2019-05-14

## 2018-08-31 DIAGNOSIS — I10 ESSENTIAL HYPERTENSION: ICD-10-CM

## 2018-08-31 RX ORDER — AMLODIPINE BESYLATE 5 MG/1
5 TABLET ORAL DAILY
Qty: 90 TABLET | Refills: 3 | Status: SHIPPED | OUTPATIENT
Start: 2018-08-31 | End: 2021-10-27

## 2018-10-09 ENCOUNTER — ALLIED HEALTH/NURSE VISIT (OUTPATIENT)
Dept: CARDIOLOGY | Facility: CLINIC | Age: 83
End: 2018-10-09
Payer: COMMERCIAL

## 2018-10-09 DIAGNOSIS — Z95.0 CARDIAC PACEMAKER IN SITU: Primary | ICD-10-CM

## 2018-10-09 PROCEDURE — 93281 PM DEVICE PROGR EVAL MULTI: CPT | Performed by: INTERNAL MEDICINE

## 2018-10-09 NOTE — PROGRESS NOTES
Medtronic Consulta CRT-P C4TR01 Pacemaker Device Check  AP: NA % BVP: 93.5 % Plus a 6.4% VSR Pace  Mode: VVIR  bpm        Underlying Rhythm: AF with CHB achieved by an AVNA, no junctional escape  Heart Rate: Stable with good variability.  Sensing: Dependent    Pacing Threshold: RV WVL, LV elevated but stable   Impedance: WNL  Battery Status: Approximately 1.5 years longevity.  Device Site: Intact  Atrial Arrhythmia: Permanent AF, Patient is on warfarin.  Ventricular Arrhythmia: 0  Setting Change: LV output changed from 3.5V to 4.0V for safety margin x1    Care Plan: Follow up with Q 3 month office teletrace. Orders in Epic for annual OV with Dr Tatum/Prakash Ferraro RN

## 2018-10-09 NOTE — MR AVS SNAPSHOT
After Visit Summary   10/9/2018    Haritha Trujillo    MRN: [id number]           Patient Information     Date Of Birth          4/27/1925        Visit Information        Provider Department      10/9/2018 10:30 AM HANG WARREN [facility]        Today's Diagnoses     Cardiac pacemaker in situ    -  1       Follow-ups after your visit        Your next 10 appointments already scheduled     Jan 21, 2019 11:30 AM CST   Teletrace with MAURICIO TECH1   [facility] ([facility])    [address] [place], [place] [address]  [facility] [address] [phone] OPT 2              Who to contact     If you have questions or need follow up information about today's clinic visit or your schedule please contact [facility] directly at [phone].  Normal or non-critical lab and imaging results will be communicated to you by MyChart, letter or phone within 4 business days after the clinic has received the results. If you do not hear from us within 7 days, please contact the clinic through MyChart or phone. If you have a critical or abnormal lab result, we will notify you by phone as soon as possible.  Submit refill requests through [employer] or call your pharmacy and they will forward the refill request to us. Please allow 3 business days for your refill to be completed.          Additional Information About Your Visit        Care EveryWhere ID     This is your Care EveryWhere ID. This could be used by other organizations to access your [place] medical records  [id number]         Blood Pressure from Last 3 Encounters:   07/27/18 152/64   07/24/18 138/76   07/18/18 151/77    Weight from Last 3 Encounters:   07/24/18 68.9 kg (152 lb)   07/17/18 69.4 kg (153 lb)   05/23/18 64.9 kg (143 lb)              We Performed the Following     PM DEVICE PROGRAMMING EVAL, MULTI LEAD PACER (82025)        Primary Care Provider  Office Phone # Fax #    Kirit Michel [phone]       [address] [address]        Equal Access to Services     JACOBLIO FRANCISCO : Hadmeera francisco harrington saeedo Soangelinaali, waaxda luqadaha, qaybta kaalmada adeklaus, lori whitley laJay Jayrishabh evans. So [facility] [phone].    ATENCIÓN: Si habla español, tiene a fofana disposición servicios gratuitos de asistencia lingüística. Llame al [phone].    We comply with applicable federal civil rights laws and [place] laws. We do not discriminate on the basis of race, color, national origin, age, disability, sex, sexual orientation, or gender identity.            Thank you!     Thank you for choosing [facility]  for your care. Our goal is always to provide you with excellent care. Hearing back from our patients is one way we can continue to improve our services. Please take a few minutes to complete the written survey that you may receive in the mail after your visit with us. Thank you!             Your Updated Medication List - Protect others around you: Learn how to safely use, store and throw away your medicines at www.disposemymeds.org.          This list is accurate as of 10/9/18 10:58 AM.  Always use your most recent med list.                   Brand Name Dispense Instructions for use Diagnosis    acetaminophen 500 MG tablet    TYLENOL     Take 500-1,000 mg by mouth every 6 hours as needed for mild pain        amLODIPine 5 MG tablet    NORVASC    90 tablet    Take 1 tablet (5 mg) by mouth daily    Essential hypertension       atorvastatin 40 MG tablet    LIPITOR     Take 40 mg by mouth At Bedtime        calcium + D 600-200 MG-UNIT Tabs   Generic drug:  calcium carbonate-vitamin D      Take 1 tablet by mouth daily.        CELEBREX PO      Take 200 mg by mouth daily        CENTRUM SILVER per tablet      Take 1 tablet by mouth daily.        FOSAMAX PO      Take 70 mg by  mouth once a week        furosemide 20 MG tablet    LASIX    30 tablet    Take 0.5 tablets (10 mg) by mouth daily    Pulmonary edema       lisinopril 20 MG tablet    PRINIVIL/ZESTRIL    180 tablet    Take 1 tablet (20 mg) by mouth daily    Essential hypertension       metoprolol succinate 50 MG 24 hr tablet    TOPROL-XL    180 tablet    Take 1 tablet (50 mg) by mouth 2 times daily    CHF (congestive heart failure) (H)       nitroFURantoin (macrocrystal-monohydrate) 100 MG capsule    MACROBID    14 capsule    Take 1 capsule (100 mg) by mouth 2 times daily    Acute cystitis with hematuria       nitroGLYcerin 0.4 MG sublingual tablet    NITROSTAT    25 tablet    For chest pain place 1 tablet under the tongue every 5 minutes for 3 doses. If symptoms persist 5 minutes after 1st dose call 911.        PROBIOTIC DAILY PO      Take by mouth daily        RESTASIS 0.05 % ophthalmic emulsion   Generic drug:  cycloSPORINE      Place 1 drop into both eyes every 12 hours.        WARFARIN SODIUM PO      Take 4.5 mg by mouth daily 1 & 1/2 tablet of 3mg

## 2019-01-21 ENCOUNTER — ANCILLARY PROCEDURE (OUTPATIENT)
Dept: CARDIOLOGY | Facility: CLINIC | Age: 84
End: 2019-01-21
Attending: INTERNAL MEDICINE
Payer: MEDICARE

## 2019-01-21 DIAGNOSIS — I49.5 BRADY-TACHY SYNDROME (H): ICD-10-CM

## 2019-01-21 DIAGNOSIS — I49.5 SICK SINUS SYNDROME (H): ICD-10-CM

## 2019-01-21 PROCEDURE — 93293 PM PHONE R-STRIP DEVICE EVAL: CPT | Performed by: INTERNAL MEDICINE

## 2019-02-15 LAB
MDC_IDC_LEAD_IMPLANT_DT: NORMAL
MDC_IDC_LEAD_LOCATION: NORMAL
MDC_IDC_LEAD_LOCATION_DETAIL_1: NORMAL
MDC_IDC_LEAD_MFG: NORMAL
MDC_IDC_LEAD_MODEL: NORMAL
MDC_IDC_LEAD_POLARITY_TYPE: NORMAL
MDC_IDC_LEAD_SERIAL: NORMAL
MDC_IDC_PG_IMPLANT_DTM: NORMAL
MDC_IDC_PG_MFG: NORMAL
MDC_IDC_PG_MODEL: NORMAL
MDC_IDC_PG_SERIAL: NORMAL
MDC_IDC_PG_TYPE: NORMAL
MDC_IDC_SESS_CLINIC_NAME: NORMAL
MDC_IDC_SESS_DTM: NORMAL
MDC_IDC_SESS_TYPE: NORMAL

## 2019-03-04 ENCOUNTER — HOSPITAL ENCOUNTER (OUTPATIENT)
Dept: CARDIOLOGY | Facility: CLINIC | Age: 84
Discharge: HOME | End: 2019-03-04
Attending: NURSE PRACTITIONER | Admitting: NURSE PRACTITIONER
Payer: MEDICARE

## 2019-03-04 DIAGNOSIS — I42.8 OTHER CARDIOMYOPATHY (H): ICD-10-CM

## 2019-03-04 PROCEDURE — 93306 TTE W/DOPPLER COMPLETE: CPT | Mod: 26 | Performed by: INTERNAL MEDICINE

## 2019-03-04 PROCEDURE — 93306 TTE W/DOPPLER COMPLETE: CPT

## 2019-03-11 ENCOUNTER — OFFICE VISIT (OUTPATIENT)
Dept: CARDIOLOGY | Facility: CLINIC | Age: 84
End: 2019-03-11
Attending: NURSE PRACTITIONER
Payer: MEDICARE

## 2019-03-11 VITALS
DIASTOLIC BLOOD PRESSURE: 80 MMHG | HEART RATE: 74 BPM | BODY MASS INDEX: 25.95 KG/M2 | HEIGHT: 64 IN | WEIGHT: 152 LBS | SYSTOLIC BLOOD PRESSURE: 136 MMHG

## 2019-03-11 DIAGNOSIS — I10 ESSENTIAL HYPERTENSION: ICD-10-CM

## 2019-03-11 DIAGNOSIS — I48.20 CHRONIC ATRIAL FIBRILLATION (H): Primary | ICD-10-CM

## 2019-03-11 DIAGNOSIS — I42.8 OTHER CARDIOMYOPATHY (H): ICD-10-CM

## 2019-03-11 PROCEDURE — 99214 OFFICE O/P EST MOD 30 MIN: CPT | Performed by: INTERNAL MEDICINE

## 2019-03-11 RX ORDER — SODIUM CHLORIDE 1 G/1
1 TABLET ORAL DAILY
COMMUNITY

## 2019-03-11 ASSESSMENT — MIFFLIN-ST. JEOR: SCORE: 1079.47

## 2019-03-11 NOTE — LETTER
3/11/2019    Kirit Michel MD  [address] [address]    RE: Haritha LAVERNE Trujillo       Dear Colleague,    I had the pleasure of seeing Haritha LAVERNE Trujillo in the [facility].    HPI and Plan:   See dictation    Orders Placed This Encounter   Procedures     Follow-Up with Cardiac Advanced Practice Provider       Orders Placed This Encounter   Medications     sodium chloride 1 GM tablet     Sig: Take 1 g by mouth daily       There are no discontinued medications.      Encounter Diagnoses   Name Primary?     Other cardiomyopathy (H)      Essential hypertension Yes     Chronic atrial fibrillation (H)        CURRENT MEDICATIONS:  Current Outpatient Medications   Medication Sig Dispense Refill     acetaminophen (TYLENOL) 500 MG tablet Take 500-1,000 mg by mouth every 6 hours as needed for mild pain       Alendronate Sodium (FOSAMAX PO) Take 70 mg by mouth once a week       amLODIPine (NORVASC) 5 MG tablet Take 1 tablet (5 mg) by mouth daily 90 tablet 3     atorvastatin (LIPITOR) 40 MG tablet Take 40 mg by mouth At Bedtime        calcium carbonate-vitamin D (CALCIUM + D) 600-200 MG-UNIT TABS Take 1 tablet by mouth daily.       cycloSPORINE (RESTASIS) 0.05 % ophthalmic emulsion Place 1 drop into both eyes every 12 hours.       furosemide (LASIX) 20 MG tablet Take 0.5 tablets (10 mg) by mouth daily 30 tablet 0     metoprolol succinate (TOPROL-XL) 50 MG 24 hr tablet Take 1 tablet (50 mg) by mouth 2 times daily 180 tablet 3     Multiple Vitamins-Minerals (CENTRUM SILVER) per tablet Take 1 tablet by mouth daily.         nitroFURantoin, macrocrystal-monohydrate, (MACROBID) 100 MG capsule Take 1 capsule (100 mg) by mouth 2 times daily 14 capsule 0     nitroGLYcerin (NITROSTAT) 0.4 MG sublingual tablet For chest pain place 1 tablet under the tongue every 5 minutes for 3 doses. If symptoms persist 5 minutes after 1st dose call 911. 25 tablet 1     Probiotic  Product (PROBIOTIC DAILY PO) Take by mouth daily       sodium chloride 1 GM tablet Take 1 g by mouth daily       Celecoxib (CELEBREX PO) Take 200 mg by mouth daily       lisinopril (PRINIVIL/ZESTRIL) 20 MG tablet Take 1 tablet (20 mg) by mouth daily (Patient not taking: Reported on 3/11/2019) 180 tablet 3     WARFARIN SODIUM PO Take 4.5 mg by mouth daily 1 & 1/2 tablet of 3mg         ALLERGIES     Allergies   Allergen Reactions     Amiodarone Nausea     Hctz      Lansoprazole      diarrhea   Prevacid       PAST MEDICAL HISTORY:  Past Medical History:   Diagnosis Date     Aortic regurgitation 12/14/2014    mild (1+) per echo     Atrial fibrillation (H)      Cardiomyopathy (H)      CHF (congestive heart failure) (H)      Chronic kidney disease, stage 3 (H)      Coronary atherosclerosis of unspecified type of vessel, native or graft 5/16/2000    normal left coronary arteries and tight obstruction in distal RCA, too small for revascularization, medical management     Degeneration of cervical intervertebral disc     cervical spine     Essential hypertension, benign      Mitral regurgitation 12/14/2014    mod-mod/sev (2-3+) per echo     Other and unspecified hyperlipidemia      Pacemaker      Pulmonary hypertension (H) 3/16/2013    mild per echo with RVSP 31mmHg +RAP      Pulmonary valve regurgitation 12/14/2014    mod (2+) per echo     Tricuspid regurgitation 12/14/2014    mild (1+) per echo     Unspecified tinnitus     chronic       PAST SURGICAL HISTORY:  Past Surgical History:   Procedure Laterality Date     C NONSPECIFIC PROCEDURE  1999    right rotator cuff tear OR     C NONSPECIFIC PROCEDURE  1983    microdiscectomy     C NONSPECIFIC PROCEDURE      C-sections x 3      C NONSPECIFIC PROCEDURE      appendectomy     C NONSPECIFIC PROCEDURE      bilateral benign breast biopsy      C NONSPECIFIC PROCEDURE      right knee arthroscopic OR     C NONSPECIFIC PROCEDURE  1974    enteritis     CORONARY ANGIOGRAPHY ADULT ORDER   2000     HRW PACEMAKER PERMANENT  2015    BI- ventricular     IMPLANT PACEMAKER  2010    dual chamber Medtronic       FAMILY HISTORY:  Family History   Problem Relation Age of Onset     Hypertension Mother      Cancer Mother         pancreatic     Eye Disorder Mother         cristian     ANIA Father          53     Lipids Father      Diabetes Maternal Grandmother      ANIA Brother         open heart surgery age 53     Cancer Daughter         ovavian     Neurologic Disorder Son         MS       SOCIAL HISTORY:  Social History     Socioeconomic History     Marital status:      Spouse name: None     Number of children: None     Years of education: None     Highest education level: None   Occupational History     None   Social Needs     Financial resource strain: None     Food insecurity:     Worry: None     Inability: None     Transportation needs:     Medical: None     Non-medical: None   Tobacco Use     Smoking status: Former Smoker     Smokeless tobacco: Never Used     Tobacco comment: 35 yrs ago   Substance and Sexual Activity     Alcohol use: No     Drug use: No     Sexual activity: None   Lifestyle     Physical activity:     Days per week: None     Minutes per session: None     Stress: None   Relationships     Social connections:     Talks on phone: None     Gets together: None     Attends Mosque service: None     Active member of club or organization: None     Attends meetings of clubs or organizations: None     Relationship status: None     Intimate partner violence:     Fear of current or ex partner: None     Emotionally abused: None     Physically abused: None     Forced sexual activity: None   Other Topics Concern     Parent/sibling w/ CABG, MI or angioplasty before 65F 55M? Yes      Service Not Asked     Blood Transfusions Not Asked     Caffeine Concern Not Asked     Occupational Exposure Not Asked     Hobby Hazards Not Asked     Sleep Concern Not Asked     Stress  Concern Not Asked     Weight Concern Not Asked     Special Diet Yes     Comment: low sodium     Back Care Not Asked     Exercise Yes     Comment: active life style     Bike Helmet Not Asked     Seat Belt Yes     Self-Exams Not Asked   Social History Narrative     None       Review of Systems:  Skin:  Negative rash;bruising     Eyes:  Positive for glasses    ENT:  Negative epistaxis    Respiratory:  Negative (when walking fast) same   Cardiovascular:  Negative;palpitations;chest pain;fatigue Positive for;edema;lightheadedness;dizziness on occ on sweeling and stand up too fast  Gastroenterology: Negative hematochezia    Genitourinary:  Negative hematuria    Musculoskeletal:  Positive for arthritis Negative for Statin Related Myalgias  Neurologic:  Negative numbness or tingling of feet    Psychiatric:  Negative      Heme/Lymph/Imm:  Positive for allergies    Endocrine:  Negative        [id number]              Thank you for allowing me to participate in the care of your patient.      Sincerely,     Fay Tatum MD     [facility]    cc:   Kat Yo, APRN CNP  [address]  [place] [address]

## 2019-03-11 NOTE — PROGRESS NOTES
Service Date: 03/11/2019      HISTORY OF PRESENT ILLNESS:    It is my pleasure seeing Ms. Haritha Trujillo, an absolutely delightful 89-year-old female with a past medical history cardiac issues:     1.  Nonischemic cardiomyopathy with CHF.  EF improved with CRT and medical therapy.  Most recent EF 45%-50%.   2.  Permanent atrial fibrillation with previous AV node ablation.  Biventricular pacemaker upgrade in 01/2015.  High LV threshold with intermittent diaphragmatic stimulation.   3.  Dyslipidemia.   4.  Hypertension.      Ms. Trujillo has been feeling well.  She has not had chest pain, dizziness, dyspnea or other cardiac symptoms.  Her blood pressure has been under control.      We have been monitoring her in the [facility].  Her device battery is projected to last another 1.5 years.  She asked several questions about device replacement.      She continues to have intermittent diaphragmatic stimulation from the LV lead.  This never wakes her up at night.  If she experiences diaphragmatic stimulation during the day, she simply changes position and the sensation goes away.  She tells me she can live with it.      PHYSICAL EXAMINATION:     VITAL SIGNS:  Blood pressure 136/80, pulse 74 and regular.  Weight 69 kg, height 163 cm.  She is an extremely pleasant elderly woman who appears younger than her stated age.     GENERAL EXAM: She is remarkably alert.  She is accompanied by her grandson.     HEENT:  Head normocephalic.  She wears glasses.   NECK:  Supple with a possible soft left bruit.   LUNGS:  Clear.   CARDIOVASCULAR:  Normal JVD.  Regular rhythm.  Nicely healed pacemaker incision.   EXTREMITIES:  No edema.      DIAGNOSTIC STUDIES:    Her recent echocardiogram was reviewed.  It showed stable LVEF of 45%-50% with mild LVH.  There was 1 to 2+ TR, 2+ AI, 2+ TI.      IMPRESSION:   1.  Nonischemic cardiomyopathy.  Stable LVEF by recent echocardiography.  Continue current cardiac medications which include metoprolol  XL and lisinopril.  She is on low-dose furosemide 10 mg daily and appears euvolemic.   2.  Permanent atrial fibrillation.  She underwent AV node ablation in .  She is stable on warfarin.  She asked about DOACs.  I do not see a compelling reason for switching anticoagulation given the stability of her INRs and the fact that she is quite comfortable taking this medication.   3.  CRT pacemaker.  The device will likely need to be replaced within approximately 1 year.  She does describe intermittent diaphragmatic stimulation which is mild and positional.  Because her LV function definitely improved with CRT, I do not want to turn the LV lead off.      RECOMMENDATIONS:    A good report for this delightful lady today.  Continue current medications.  Follow up with Kat in 1 year.  We will be happy to see her sooner should she have problems in the interim.        DOUGLAS FOWLER MD, [facility]           cc:      MD Anna Marie Woods [place]   [address] [place] [id number]             D: 2019   T: 2019   MT: ILAN      Name:     SEVERIANO VALLEJO   MRN:      [id number]        Account:      [id number]   :      1925           Service Date: 2019      Document: [id number]

## 2019-03-11 NOTE — LETTER
3/11/2019      Kirit Michel MD  [address] [address]      RE: Haritha Trujillo       Dear Colleague,    I had the pleasure of seeing Haritha Trujillo in the [facility].    Service Date: 03/11/2019      HISTORY OF PRESENT ILLNESS:    It is my pleasure seeing Ms. Haritha Trujillo, an absolutely delightful 89-year-old female with a past medical history cardiac issues:     1.  Nonischemic cardiomyopathy with CHF.  EF improved with CRT and medical therapy.  Most recent EF 45%-50%.   2.  Permanent atrial fibrillation with previous AV node ablation.  Biventricular pacemaker upgrade in 01/2015.  High LV threshold with intermittent diaphragmatic stimulation.   3.  Dyslipidemia.   4.  Hypertension.      Ms. Trujillo has been feeling well.  She has not had chest pain, dizziness, dyspnea or other cardiac symptoms.  Her blood pressure has been under control.      We have been monitoring her in the Device Clinic.  Her device battery is projected to last another 1.5 years.  She asked several questions about device replacement.      She continues to have intermittent diaphragmatic stimulation from the LV lead.  This never wakes her up at night.  If she experiences diaphragmatic stimulation during the day, she simply changes position and the sensation goes away.  She tells me she can live with it.      PHYSICAL EXAMINATION:     VITAL SIGNS:  Blood pressure 136/80, pulse 74 and regular.  Weight 69 kg, height 163 cm.  She is an extremely pleasant elderly woman who appears younger than her stated age.     GENERAL EXAM: She is remarkably alert.  She is accompanied by her grandson.     HEENT:  Head normocephalic.  She wears glasses.   NECK:  Supple with a possible soft left bruit.   LUNGS:  Clear.   CARDIOVASCULAR:  Normal JVD.  Regular rhythm.  Nicely healed pacemaker incision.   EXTREMITIES:  No edema.      DIAGNOSTIC STUDIES:    Her recent  echocardiogram was reviewed.  It showed stable LVEF of 45%-50% with mild LVH.  There was 1 to 2+ TR, 2+ AI, 2+ TI.      IMPRESSION:   1.  Nonischemic cardiomyopathy.  Stable LVEF by recent echocardiography.  Continue current cardiac medications which include metoprolol XL and lisinopril.  She is on low-dose furosemide 10 mg daily and appears euvolemic.   2.  Permanent atrial fibrillation.  She underwent AV node ablation in 2015.  She is stable on warfarin.  She asked about DOACs.  I do not see a compelling reason for switching anticoagulation given the stability of her INRs and the fact that she is quite comfortable taking this medication.   3.  CRT pacemaker.  The device will likely need to be replaced within approximately 1 year.  She does describe intermittent diaphragmatic stimulation which is mild and positional.  Because her LV function definitely improved with CRT, I do not want to turn the LV lead off.      RECOMMENDATIONS:    A good report for this delightful lady today.  Continue current medications.  Follow up with Kat in 1 year.  We will be happy to see her sooner should she have problems in the interim.        DOUGLAS FOWLER MD, FACC           cc:      MD [facility]   [address] [place] [id number]             D: 2019   T: 2019   MT: ILAN      Name:     SEVERIANO VALLEJO   MRN:      [id number]        Account:      [id number]   :      1925           Service Date: 2019      Document: [id number]      Outpatient Encounter Medications as of 3/11/2019   Medication Sig Dispense Refill     acetaminophen (TYLENOL) 500 MG tablet Take 500-1,000 mg by mouth every 6 hours as needed for mild pain       Alendronate Sodium (FOSAMAX PO) Take 70 mg by mouth once a week       amLODIPine (NORVASC) 5 MG tablet Take 1 tablet (5 mg) by mouth daily 90 tablet 3     atorvastatin (LIPITOR) 40 MG tablet Take 40 mg by mouth At Bedtime        calcium carbonate-vitamin D  (CALCIUM + D) 600-200 MG-UNIT TABS Take 1 tablet by mouth daily.       cycloSPORINE (RESTASIS) 0.05 % ophthalmic emulsion Place 1 drop into both eyes every 12 hours.       furosemide (LASIX) 20 MG tablet Take 0.5 tablets (10 mg) by mouth daily 30 tablet 0     metoprolol succinate (TOPROL-XL) 50 MG 24 hr tablet Take 1 tablet (50 mg) by mouth 2 times daily 180 tablet 3     Multiple Vitamins-Minerals (CENTRUM SILVER) per tablet Take 1 tablet by mouth daily.         nitroFURantoin, macrocrystal-monohydrate, (MACROBID) 100 MG capsule Take 1 capsule (100 mg) by mouth 2 times daily 14 capsule 0     nitroGLYcerin (NITROSTAT) 0.4 MG sublingual tablet For chest pain place 1 tablet under the tongue every 5 minutes for 3 doses. If symptoms persist 5 minutes after 1st dose call 911. 25 tablet 1     Probiotic Product (PROBIOTIC DAILY PO) Take by mouth daily       sodium chloride 1 GM tablet Take 1 g by mouth daily       Celecoxib (CELEBREX PO) Take 200 mg by mouth daily       lisinopril (PRINIVIL/ZESTRIL) 20 MG tablet Take 1 tablet (20 mg) by mouth daily (Patient not taking: Reported on 3/11/2019) 180 tablet 3     WARFARIN SODIUM PO Take 4.5 mg by mouth daily 1 & 1/2 tablet of 3mg       No facility-administered encounter medications on file as of 3/11/2019.      Again, thank you for allowing me to participate in the care of your patient.      Sincerely,    Fay Tatum MD     [facility]

## 2019-03-11 NOTE — PROGRESS NOTES
HPI and Plan:   See dictation    Orders Placed This Encounter   Procedures     Follow-Up with Cardiac Advanced Practice Provider       Orders Placed This Encounter   Medications     sodium chloride 1 GM tablet     Sig: Take 1 g by mouth daily       There are no discontinued medications.      Encounter Diagnoses   Name Primary?     Other cardiomyopathy (H)      Essential hypertension Yes     Chronic atrial fibrillation (H)        CURRENT MEDICATIONS:  Current Outpatient Medications   Medication Sig Dispense Refill     acetaminophen (TYLENOL) 500 MG tablet Take 500-1,000 mg by mouth every 6 hours as needed for mild pain       Alendronate Sodium (FOSAMAX PO) Take 70 mg by mouth once a week       amLODIPine (NORVASC) 5 MG tablet Take 1 tablet (5 mg) by mouth daily 90 tablet 3     atorvastatin (LIPITOR) 40 MG tablet Take 40 mg by mouth At Bedtime        calcium carbonate-vitamin D (CALCIUM + D) 600-200 MG-UNIT TABS Take 1 tablet by mouth daily.       cycloSPORINE (RESTASIS) 0.05 % ophthalmic emulsion Place 1 drop into both eyes every 12 hours.       furosemide (LASIX) 20 MG tablet Take 0.5 tablets (10 mg) by mouth daily 30 tablet 0     metoprolol succinate (TOPROL-XL) 50 MG 24 hr tablet Take 1 tablet (50 mg) by mouth 2 times daily 180 tablet 3     Multiple Vitamins-Minerals (CENTRUM SILVER) per tablet Take 1 tablet by mouth daily.         nitroFURantoin, macrocrystal-monohydrate, (MACROBID) 100 MG capsule Take 1 capsule (100 mg) by mouth 2 times daily 14 capsule 0     nitroGLYcerin (NITROSTAT) 0.4 MG sublingual tablet For chest pain place 1 tablet under the tongue every 5 minutes for 3 doses. If symptoms persist 5 minutes after 1st dose call 911. 25 tablet 1     Probiotic Product (PROBIOTIC DAILY PO) Take by mouth daily       sodium chloride 1 GM tablet Take 1 g by mouth daily       Celecoxib (CELEBREX PO) Take 200 mg by mouth daily       lisinopril (PRINIVIL/ZESTRIL) 20 MG tablet Take 1 tablet (20 mg) by mouth daily  (Patient not taking: Reported on 3/11/2019) 180 tablet 3     WARFARIN SODIUM PO Take 4.5 mg by mouth daily 1 & 1/2 tablet of 3mg         ALLERGIES     Allergies   Allergen Reactions     Amiodarone Nausea     Hctz      Lansoprazole      diarrhea   Prevacid       PAST MEDICAL HISTORY:  Past Medical History:   Diagnosis Date     Aortic regurgitation 12/14/2014    mild (1+) per echo     Atrial fibrillation (H)      Cardiomyopathy (H)      CHF (congestive heart failure) (H)      Chronic kidney disease, stage 3 (H)      Coronary atherosclerosis of unspecified type of vessel, native or graft 5/16/2000    normal left coronary arteries and tight obstruction in distal RCA, too small for revascularization, medical management     Degeneration of cervical intervertebral disc     cervical spine     Essential hypertension, benign      Mitral regurgitation 12/14/2014    mod-mod/sev (2-3+) per echo     Other and unspecified hyperlipidemia      Pacemaker      Pulmonary hypertension (H) 3/16/2013    mild per echo with RVSP 31mmHg +RAP      Pulmonary valve regurgitation 12/14/2014    mod (2+) per echo     Tricuspid regurgitation 12/14/2014    mild (1+) per echo     Unspecified tinnitus     chronic       PAST SURGICAL HISTORY:  Past Surgical History:   Procedure Laterality Date     C NONSPECIFIC PROCEDURE  1999    right rotator cuff tear OR     C NONSPECIFIC PROCEDURE  1983    microdiscectomy     C NONSPECIFIC PROCEDURE      C-sections x 3      C NONSPECIFIC PROCEDURE      appendectomy     C NONSPECIFIC PROCEDURE      bilateral benign breast biopsy      C NONSPECIFIC PROCEDURE      right knee arthroscopic OR     C NONSPECIFIC PROCEDURE  1974    enteritis     CORONARY ANGIOGRAPHY ADULT ORDER  5/16/2000     HRW PACEMAKER PERMANENT  1/2015    BI- ventricular     IMPLANT PACEMAKER  11/12/2010    dual chamber Medtronic       FAMILY HISTORY:  Family History   Problem Relation Age of Onset     Hypertension Mother      Cancer Mother          pancreatic     Eye Disorder Mother         cristian     ANIA Father          53     Lipids Father      Diabetes Maternal Grandmother      ANIA Brother         open heart surgery age 53     Cancer Daughter         ovavian     Neurologic Disorder Son         MS       SOCIAL HISTORY:  Social History     Socioeconomic History     Marital status:      Spouse name: None     Number of children: None     Years of education: None     Highest education level: None   Occupational History     None   Social Needs     Financial resource strain: None     Food insecurity:     Worry: None     Inability: None     Transportation needs:     Medical: None     Non-medical: None   Tobacco Use     Smoking status: Former Smoker     Smokeless tobacco: Never Used     Tobacco comment: 35 yrs ago   Substance and Sexual Activity     Alcohol use: No     Drug use: No     Sexual activity: None   Lifestyle     Physical activity:     Days per week: None     Minutes per session: None     Stress: None   Relationships     Social connections:     Talks on phone: None     Gets together: None     Attends Hoahaoism service: None     Active member of club or organization: None     Attends meetings of clubs or organizations: None     Relationship status: None     Intimate partner violence:     Fear of current or ex partner: None     Emotionally abused: None     Physically abused: None     Forced sexual activity: None   Other Topics Concern     Parent/sibling w/ CABG, MI or angioplasty before 65F 55M? Yes      Service Not Asked     Blood Transfusions Not Asked     Caffeine Concern Not Asked     Occupational Exposure Not Asked     Hobby Hazards Not Asked     Sleep Concern Not Asked     Stress Concern Not Asked     Weight Concern Not Asked     Special Diet Yes     Comment: low sodium     Back Care Not Asked     Exercise Yes     Comment: active life style     Bike Helmet Not Asked     Seat Belt Yes     Self-Exams Not Asked   Social History  Narrative     None       Review of Systems:  Skin:  Negative rash;bruising     Eyes:  Positive for glasses    ENT:  Negative epistaxis    Respiratory:  Negative (when walking fast) same   Cardiovascular:  Negative;palpitations;chest pain;fatigue Positive for;edema;lightheadedness;dizziness on occ on sweeling and stand up too fast  Gastroenterology: Negative hematochezia    Genitourinary:  Negative hematuria    Musculoskeletal:  Positive for arthritis Negative for Statin Related Myalgias  Neurologic:  Negative numbness or tingling of feet    Psychiatric:  Negative      Heme/Lymph/Imm:  Positive for allergies    Endocrine:  Negative        [id number]

## 2019-04-24 ENCOUNTER — TELEPHONE (OUTPATIENT)
Dept: CARDIOLOGY | Facility: CLINIC | Age: 84
End: 2019-04-24

## 2019-04-24 DIAGNOSIS — I10 ESSENTIAL HYPERTENSION: ICD-10-CM

## 2019-04-24 NOTE — TELEPHONE ENCOUNTER
"04/24/19 Pt called nurse line stating she had a pharmacy appt 1 day ago. Pharmacist noted pt had Lisinopril on med list, but pt reported she is not taking anymore. Looking back on past visits in EP clinic record notes reflect that pt is supposed to be taking Lisinopril. Pt reports today she has not taken for \"months\". Denies any c/o's . Will route info to Dr. Tatum for recommendations. Kenzie 11:35am    04/26/19 Recd msg back from Dr. Tatum who would like pt to take Lisinopril 10 mg every day. Pt called and informed. RX sent to pharmacy of pts req. Pt voiced understanding and instructed to call w any questions or concerns once med is started. Kenzie 10:00 am  "

## 2019-04-25 NOTE — TELEPHONE ENCOUNTER
With her h/o CM, it is best to be on ACEI.  She used to be on lisinopril 20 mg daily.  Can she restart at a lower dose, let's say 10 mg daily?  EVELYN Smith

## 2019-04-26 RX ORDER — LISINOPRIL 10 MG/1
10 TABLET ORAL DAILY
Qty: 90 TABLET | Refills: 3 | Status: SHIPPED | OUTPATIENT
Start: 2019-04-26 | End: 2020-04-06

## 2019-04-29 ENCOUNTER — ANCILLARY PROCEDURE (OUTPATIENT)
Dept: CARDIOLOGY | Facility: CLINIC | Age: 84
End: 2019-04-29
Payer: MEDICARE

## 2019-04-29 DIAGNOSIS — I49.5 BRADY-TACHY SYNDROME (H): ICD-10-CM

## 2019-04-29 DIAGNOSIS — Z95.0 CARDIAC PACEMAKER IN SITU: Primary | ICD-10-CM

## 2019-04-29 PROCEDURE — 93281 PM DEVICE PROGR EVAL MULTI: CPT | Performed by: INTERNAL MEDICINE

## 2019-05-01 LAB
MDC_IDC_EPISODE_DTM: NORMAL
MDC_IDC_EPISODE_DTM: NORMAL
MDC_IDC_EPISODE_DURATION: 10 S
MDC_IDC_EPISODE_DURATION: 12 S
MDC_IDC_EPISODE_ID: 19
MDC_IDC_EPISODE_ID: 20
MDC_IDC_EPISODE_TYPE: NORMAL
MDC_IDC_EPISODE_TYPE: NORMAL
MDC_IDC_LEAD_IMPLANT_DT: NORMAL
MDC_IDC_LEAD_LOCATION: NORMAL
MDC_IDC_LEAD_LOCATION_DETAIL_1: NORMAL
MDC_IDC_LEAD_MFG: NORMAL
MDC_IDC_LEAD_MODEL: NORMAL
MDC_IDC_LEAD_POLARITY_TYPE: NORMAL
MDC_IDC_LEAD_SERIAL: NORMAL
MDC_IDC_MSMT_BATTERY_DTM: NORMAL
MDC_IDC_MSMT_BATTERY_REMAINING_LONGEVITY: 10 MO
MDC_IDC_MSMT_BATTERY_RRT_TRIGGER: 2.77
MDC_IDC_MSMT_BATTERY_STATUS: NORMAL
MDC_IDC_MSMT_BATTERY_VOLTAGE: 2.88 V
MDC_IDC_MSMT_LEADCHNL_LV_IMPEDANCE_VALUE: 456 OHM
MDC_IDC_MSMT_LEADCHNL_LV_IMPEDANCE_VALUE: 551 OHM
MDC_IDC_MSMT_LEADCHNL_LV_IMPEDANCE_VALUE: 589 OHM
MDC_IDC_MSMT_LEADCHNL_LV_IMPEDANCE_VALUE: 665 OHM
MDC_IDC_MSMT_LEADCHNL_LV_IMPEDANCE_VALUE: 874 OHM
MDC_IDC_MSMT_LEADCHNL_LV_PACING_THRESHOLD_AMPLITUDE: 3.5 V
MDC_IDC_MSMT_LEADCHNL_LV_PACING_THRESHOLD_PULSEWIDTH: 1 MS
MDC_IDC_MSMT_LEADCHNL_RA_IMPEDANCE_VALUE: 304 OHM
MDC_IDC_MSMT_LEADCHNL_RA_IMPEDANCE_VALUE: 342 OHM
MDC_IDC_MSMT_LEADCHNL_RA_SENSING_INTR_AMPL: 3.62 MV
MDC_IDC_MSMT_LEADCHNL_RA_SENSING_INTR_AMPL: 3.62 MV
MDC_IDC_MSMT_LEADCHNL_RV_IMPEDANCE_VALUE: 361 OHM
MDC_IDC_MSMT_LEADCHNL_RV_IMPEDANCE_VALUE: 437 OHM
MDC_IDC_MSMT_LEADCHNL_RV_PACING_THRESHOLD_AMPLITUDE: 0.75 V
MDC_IDC_MSMT_LEADCHNL_RV_PACING_THRESHOLD_PULSEWIDTH: 0.4 MS
MDC_IDC_MSMT_LEADCHNL_RV_SENSING_INTR_AMPL: 11.62 MV
MDC_IDC_PG_IMPLANT_DTM: NORMAL
MDC_IDC_PG_MFG: NORMAL
MDC_IDC_PG_MODEL: NORMAL
MDC_IDC_PG_SERIAL: NORMAL
MDC_IDC_PG_TYPE: NORMAL
MDC_IDC_SESS_CLINIC_NAME: NORMAL
MDC_IDC_SESS_DTM: NORMAL
MDC_IDC_SESS_TYPE: NORMAL
MDC_IDC_SET_BRADY_LOWRATE: 60 {BEATS}/MIN
MDC_IDC_SET_BRADY_MAX_SENSOR_RATE: 130 {BEATS}/MIN
MDC_IDC_SET_BRADY_MODE: NORMAL
MDC_IDC_SET_CRT_LVRV_DELAY: 0 MS
MDC_IDC_SET_CRT_PACED_CHAMBERS: NORMAL
MDC_IDC_SET_LEADCHNL_LV_PACING_AMPLITUDE: 4 V
MDC_IDC_SET_LEADCHNL_LV_PACING_ANODE_ELECTRODE_1: NORMAL
MDC_IDC_SET_LEADCHNL_LV_PACING_CAPTURE_MODE: NORMAL
MDC_IDC_SET_LEADCHNL_LV_PACING_CATHODE_ELECTRODE_1: NORMAL
MDC_IDC_SET_LEADCHNL_LV_PACING_CATHODE_LOCATION_1: NORMAL
MDC_IDC_SET_LEADCHNL_LV_PACING_POLARITY: NORMAL
MDC_IDC_SET_LEADCHNL_LV_PACING_PULSEWIDTH: 1 MS
MDC_IDC_SET_LEADCHNL_RA_PACING_ANODE_ELECTRODE_1: NORMAL
MDC_IDC_SET_LEADCHNL_RA_PACING_ANODE_LOCATION_1: NORMAL
MDC_IDC_SET_LEADCHNL_RA_PACING_CAPTURE_MODE: NORMAL
MDC_IDC_SET_LEADCHNL_RA_PACING_CATHODE_ELECTRODE_1: NORMAL
MDC_IDC_SET_LEADCHNL_RA_PACING_CATHODE_LOCATION_1: NORMAL
MDC_IDC_SET_LEADCHNL_RA_SENSING_ANODE_ELECTRODE_1: NORMAL
MDC_IDC_SET_LEADCHNL_RA_SENSING_ANODE_LOCATION_1: NORMAL
MDC_IDC_SET_LEADCHNL_RA_SENSING_CATHODE_ELECTRODE_1: NORMAL
MDC_IDC_SET_LEADCHNL_RA_SENSING_CATHODE_LOCATION_1: NORMAL
MDC_IDC_SET_LEADCHNL_RA_SENSING_POLARITY: NORMAL
MDC_IDC_SET_LEADCHNL_RA_SENSING_SENSITIVITY: 4 MV
MDC_IDC_SET_LEADCHNL_RV_PACING_AMPLITUDE: 2 V
MDC_IDC_SET_LEADCHNL_RV_PACING_ANODE_ELECTRODE_1: NORMAL
MDC_IDC_SET_LEADCHNL_RV_PACING_ANODE_LOCATION_1: NORMAL
MDC_IDC_SET_LEADCHNL_RV_PACING_CAPTURE_MODE: NORMAL
MDC_IDC_SET_LEADCHNL_RV_PACING_CATHODE_ELECTRODE_1: NORMAL
MDC_IDC_SET_LEADCHNL_RV_PACING_CATHODE_LOCATION_1: NORMAL
MDC_IDC_SET_LEADCHNL_RV_PACING_POLARITY: NORMAL
MDC_IDC_SET_LEADCHNL_RV_PACING_PULSEWIDTH: 0.4 MS
MDC_IDC_SET_LEADCHNL_RV_SENSING_ANODE_ELECTRODE_1: NORMAL
MDC_IDC_SET_LEADCHNL_RV_SENSING_ANODE_LOCATION_1: NORMAL
MDC_IDC_SET_LEADCHNL_RV_SENSING_CATHODE_ELECTRODE_1: NORMAL
MDC_IDC_SET_LEADCHNL_RV_SENSING_CATHODE_LOCATION_1: NORMAL
MDC_IDC_SET_LEADCHNL_RV_SENSING_POLARITY: NORMAL
MDC_IDC_SET_LEADCHNL_RV_SENSING_SENSITIVITY: 0.9 MV
MDC_IDC_SET_ZONE_DETECTION_INTERVAL: 200 MS
MDC_IDC_SET_ZONE_DETECTION_INTERVAL: 350 MS
MDC_IDC_SET_ZONE_DETECTION_INTERVAL: 400 MS
MDC_IDC_SET_ZONE_TYPE: NORMAL
MDC_IDC_STAT_AT_BURDEN_PERCENT: 0 %
MDC_IDC_STAT_AT_DTM_END: NORMAL
MDC_IDC_STAT_AT_DTM_START: NORMAL
MDC_IDC_STAT_BRADY_AP_VP_PERCENT: 0 %
MDC_IDC_STAT_BRADY_AP_VS_PERCENT: 0 %
MDC_IDC_STAT_BRADY_AS_VP_PERCENT: 98.49 %
MDC_IDC_STAT_BRADY_AS_VS_PERCENT: 1.51 %
MDC_IDC_STAT_BRADY_DTM_END: NORMAL
MDC_IDC_STAT_BRADY_DTM_START: NORMAL
MDC_IDC_STAT_BRADY_RA_PERCENT_PACED: 0 %
MDC_IDC_STAT_BRADY_RV_PERCENT_PACED: 95.48 %
MDC_IDC_STAT_EPISODE_RECENT_COUNT: 0
MDC_IDC_STAT_EPISODE_RECENT_COUNT_DTM_END: NORMAL
MDC_IDC_STAT_EPISODE_RECENT_COUNT_DTM_START: NORMAL
MDC_IDC_STAT_EPISODE_TOTAL_COUNT: 0
MDC_IDC_STAT_EPISODE_TOTAL_COUNT: 0
MDC_IDC_STAT_EPISODE_TOTAL_COUNT: 2
MDC_IDC_STAT_EPISODE_TOTAL_COUNT: 5
MDC_IDC_STAT_EPISODE_TOTAL_COUNT_DTM_END: NORMAL
MDC_IDC_STAT_EPISODE_TOTAL_COUNT_DTM_START: NORMAL
MDC_IDC_STAT_EPISODE_TYPE: NORMAL

## 2019-05-08 ENCOUNTER — TELEPHONE (OUTPATIENT)
Dept: CARDIOLOGY | Facility: CLINIC | Age: 84
End: 2019-05-08

## 2019-05-08 NOTE — TELEPHONE ENCOUNTER
Received call from patient nurse friend Mirna  concerned with her BP readings.   Received verbal consent from patient to discuss health issues with friend as she is not list.  It was noted that her BP is elevated 183/84 today.  Patient recently resumed lisinopril 10mg on 4/25 as patient was non compliant with not taking this for months.  Reviewed medications with friend and patient had stopped taking amlodipine (not sure of date when stopped) which was not supposed to take place.  Instructed patient to resume amlodipine 5mg  as recommended.  Check home BP and HR reading twice daily and call with update on Monday to see if readings come down.  Friend will check BP and HR readings and will update us as directed..  BAKARI Burks

## 2019-05-14 DIAGNOSIS — I50.9 CHF (CONGESTIVE HEART FAILURE) (H): ICD-10-CM

## 2019-05-14 RX ORDER — METOPROLOL SUCCINATE 50 MG/1
50 TABLET, EXTENDED RELEASE ORAL 2 TIMES DAILY
Qty: 180 TABLET | Refills: 3 | Status: SHIPPED | OUTPATIENT
Start: 2019-05-14 | End: 2020-04-30

## 2019-05-16 ENCOUNTER — APPOINTMENT (OUTPATIENT)
Dept: GENERAL RADIOLOGY | Facility: CLINIC | Age: 84
End: 2019-05-16
Attending: EMERGENCY MEDICINE
Payer: MEDICARE

## 2019-05-16 ENCOUNTER — HOSPITAL ENCOUNTER (EMERGENCY)
Facility: CLINIC | Age: 84
Discharge: HOME | End: 2019-05-17
Attending: EMERGENCY MEDICINE | Admitting: EMERGENCY MEDICINE
Payer: MEDICARE

## 2019-05-16 DIAGNOSIS — N39.0 URINARY TRACT INFECTION WITHOUT HEMATURIA, SITE UNSPECIFIED: ICD-10-CM

## 2019-05-16 LAB
ALBUMIN SERPL-MCNC: 3.3 G/DL (ref 3.4–5)
ALBUMIN UR-MCNC: NEGATIVE MG/DL
ALP SERPL-CCNC: 66 U/L (ref 40–150)
ALT SERPL W P-5'-P-CCNC: 36 U/L (ref 0–50)
ANION GAP SERPL CALCULATED.3IONS-SCNC: 7 MMOL/L (ref 3–14)
APPEARANCE UR: CLEAR
AST SERPL W P-5'-P-CCNC: 29 U/L (ref 0–45)
BASOPHILS # BLD AUTO: 0 10E9/L (ref 0–0.2)
BASOPHILS NFR BLD AUTO: 0.2 %
BILIRUB SERPL-MCNC: 0.8 MG/DL (ref 0.2–1.3)
BILIRUB UR QL STRIP: NEGATIVE
BUN SERPL-MCNC: 13 MG/DL (ref 7–30)
CALCIUM SERPL-MCNC: 8.3 MG/DL (ref 8.5–10.1)
CHLORIDE SERPL-SCNC: 101 MMOL/L (ref 94–109)
CO2 SERPL-SCNC: 25 MMOL/L (ref 20–32)
COLOR UR AUTO: ABNORMAL
CREAT SERPL-MCNC: 0.91 MG/DL (ref 0.52–1.04)
DIFFERENTIAL METHOD BLD: ABNORMAL
EOSINOPHIL # BLD AUTO: 0.1 10E9/L (ref 0–0.7)
EOSINOPHIL NFR BLD AUTO: 1.1 %
ERYTHROCYTE [DISTWIDTH] IN BLOOD BY AUTOMATED COUNT: 13.8 % (ref 10–15)
GFR SERPL CREATININE-BSD FRML MDRD: 54 ML/MIN/{1.73_M2}
GLUCOSE SERPL-MCNC: 110 MG/DL (ref 70–99)
GLUCOSE UR STRIP-MCNC: NEGATIVE MG/DL
HCT VFR BLD AUTO: 37.2 % (ref 35–47)
HGB BLD-MCNC: 12.8 G/DL (ref 11.7–15.7)
HGB UR QL STRIP: NEGATIVE
IMM GRANULOCYTES # BLD: 0 10E9/L (ref 0–0.4)
IMM GRANULOCYTES NFR BLD: 0.2 %
INR PPP: 1.63 (ref 0.86–1.14)
KETONES UR STRIP-MCNC: NEGATIVE MG/DL
LEUKOCYTE ESTERASE UR QL STRIP: ABNORMAL
LIPASE SERPL-CCNC: 114 U/L (ref 73–393)
LYMPHOCYTES # BLD AUTO: 0.7 10E9/L (ref 0.8–5.3)
LYMPHOCYTES NFR BLD AUTO: 11.5 %
MCH RBC QN AUTO: 31.4 PG (ref 26.5–33)
MCHC RBC AUTO-ENTMCNC: 34.4 G/DL (ref 31.5–36.5)
MCV RBC AUTO: 91 FL (ref 78–100)
MONOCYTES # BLD AUTO: 0.8 10E9/L (ref 0–1.3)
MONOCYTES NFR BLD AUTO: 12.1 %
MUCOUS THREADS #/AREA URNS LPF: PRESENT /LPF
NEUTROPHILS # BLD AUTO: 4.8 10E9/L (ref 1.6–8.3)
NEUTROPHILS NFR BLD AUTO: 74.9 %
NITRATE UR QL: NEGATIVE
NRBC # BLD AUTO: 0 10*3/UL
NRBC BLD AUTO-RTO: 0 /100
PH UR STRIP: 6 PH (ref 5–7)
PLATELET # BLD AUTO: 171 10E9/L (ref 150–450)
POTASSIUM SERPL-SCNC: 4 MMOL/L (ref 3.4–5.3)
PROT SERPL-MCNC: 6.2 G/DL (ref 6.8–8.8)
RBC # BLD AUTO: 4.08 10E12/L (ref 3.8–5.2)
RBC #/AREA URNS AUTO: <1 /HPF (ref 0–2)
SODIUM SERPL-SCNC: 133 MMOL/L (ref 133–144)
SOURCE: ABNORMAL
SP GR UR STRIP: 1.01 (ref 1–1.03)
TROPONIN I SERPL-MCNC: <0.015 UG/L (ref 0–0.04)
UROBILINOGEN UR STRIP-MCNC: NORMAL MG/DL (ref 0–2)
WBC # BLD AUTO: 6.4 10E9/L (ref 4–11)
WBC #/AREA URNS AUTO: 21 /HPF (ref 0–5)

## 2019-05-16 PROCEDURE — 83690 ASSAY OF LIPASE: CPT | Performed by: EMERGENCY MEDICINE

## 2019-05-16 PROCEDURE — 87086 URINE CULTURE/COLONY COUNT: CPT | Performed by: EMERGENCY MEDICINE

## 2019-05-16 PROCEDURE — 81001 URINALYSIS AUTO W/SCOPE: CPT | Performed by: EMERGENCY MEDICINE

## 2019-05-16 PROCEDURE — 74019 RADEX ABDOMEN 2 VIEWS: CPT

## 2019-05-16 PROCEDURE — 80053 COMPREHEN METABOLIC PANEL: CPT | Performed by: EMERGENCY MEDICINE

## 2019-05-16 PROCEDURE — 84484 ASSAY OF TROPONIN QUANT: CPT | Performed by: EMERGENCY MEDICINE

## 2019-05-16 PROCEDURE — 85610 PROTHROMBIN TIME: CPT | Performed by: EMERGENCY MEDICINE

## 2019-05-16 PROCEDURE — 99284 EMERGENCY DEPT VISIT MOD MDM: CPT

## 2019-05-16 PROCEDURE — 85025 COMPLETE CBC W/AUTO DIFF WBC: CPT | Performed by: EMERGENCY MEDICINE

## 2019-05-16 RX ORDER — CEPHALEXIN 500 MG/1
500 CAPSULE ORAL 2 TIMES DAILY
Qty: 10 CAPSULE | Refills: 0 | Status: SHIPPED | OUTPATIENT
Start: 2019-05-16 | End: 2020-03-11

## 2019-05-16 RX ORDER — ONDANSETRON 4 MG/1
4 TABLET, ORALLY DISINTEGRATING ORAL EVERY 8 HOURS PRN
Qty: 15 TABLET | Refills: 0 | Status: SHIPPED | OUTPATIENT
Start: 2019-05-16 | End: 2020-03-11

## 2019-05-16 RX ORDER — ONDANSETRON 2 MG/ML
4 INJECTION INTRAMUSCULAR; INTRAVENOUS EVERY 30 MIN PRN
Status: DISCONTINUED | OUTPATIENT
Start: 2019-05-16 | End: 2019-05-17 | Stop reason: HOSPADM

## 2019-05-16 RX ORDER — ONDANSETRON 4 MG/1
4 TABLET, ORALLY DISINTEGRATING ORAL EVERY 8 HOURS PRN
Qty: 15 TABLET | Refills: 0 | Status: SHIPPED | OUTPATIENT
Start: 2019-05-16 | End: 2019-05-16

## 2019-05-16 RX ORDER — CEPHALEXIN 500 MG/1
500 CAPSULE ORAL ONCE
Status: COMPLETED | OUTPATIENT
Start: 2019-05-16 | End: 2019-05-17

## 2019-05-16 RX ORDER — CEPHALEXIN 500 MG/1
500 CAPSULE ORAL 2 TIMES DAILY
Qty: 10 CAPSULE | Refills: 0 | Status: SHIPPED | OUTPATIENT
Start: 2019-05-16 | End: 2019-05-16

## 2019-05-16 ASSESSMENT — ENCOUNTER SYMPTOMS
ABDOMINAL DISTENTION: 1
DYSURIA: 0
HEMATURIA: 0
CHEST TIGHTNESS: 0
FEVER: 0
NAUSEA: 1
FREQUENCY: 0
VOMITING: 0
CHILLS: 0
CONSTIPATION: 0
SHORTNESS OF BREATH: 0
COUGH: 0

## 2019-05-16 NOTE — ED AVS SNAPSHOT
Emergency Department  [address] [place] [address]  Phone:  [phone]  Fax:  [phone]                                    Haritha Trujillo   MRN: [id number]    Department:   Emergency Department   Date of Visit:  5/16/2019           After Visit Summary Signature Page    I have received my discharge instructions, and my questions have been answered. I have discussed any challenges I see with this plan with the nurse or doctor.    ..........................................................................................................................................  Patient/Patient Representative Signature      ..........................................................................................................................................  Patient Representative Print Name and Relationship to Patient    ..................................................               ................................................  Date                                   Time    ..........................................................................................................................................  Reviewed by Signature/Title    ...................................................              ..............................................  Date                                               Time          22EPIC Rev 08/18

## 2019-05-17 VITALS
DIASTOLIC BLOOD PRESSURE: 74 MMHG | RESPIRATION RATE: 18 BRPM | TEMPERATURE: 97.9 F | BODY MASS INDEX: 26.61 KG/M2 | OXYGEN SATURATION: 96 % | SYSTOLIC BLOOD PRESSURE: 146 MMHG | WEIGHT: 155 LBS

## 2019-05-17 PROCEDURE — A9270 NON-COVERED ITEM OR SERVICE: HCPCS | Performed by: EMERGENCY MEDICINE

## 2019-05-17 PROCEDURE — 25000132 ZZH RX MED GY IP 250 OP 250 PS 637: Performed by: EMERGENCY MEDICINE

## 2019-05-17 RX ADMIN — CEPHALEXIN 500 MG: 500 CAPSULE ORAL at 00:19

## 2019-05-17 NOTE — ED PROVIDER NOTES
"  History     Chief Complaint:  Abdominal Pain    HPI:   The history is provided by the patient.      Haritha Trujillo is a 89 year old female, dual chamber Medtronic pacemaker in situ, with history of atrial fibrillation anticoagulated on warfarin, CHF, hypertension, hyperlipidemia, and CKD stage 3 who presents with abdominal pain. The patient reports that about 1 week ago she mistakenly went off of her amlodipine and began having nausea and chest heaviness after this. The patient was re-started on her amlodipine by her primary care physician, and she states the chest tightness completely resolved with this, and her nausea improved.  Yesterday, the patient states that her nausea exacerbated again. She denies any recurrence of the chest heaviness, she reports abdominal distention that is uncomfortable, especially when sitting. She states that she had a normal BM this morning. She denies urinary symptoms. She denies any chest pain or shortness of breath. The patient was seen by her PCP Dr. Kirit Michel at [facility] for this nausea. She had lab work done with a CMP sodium level of 130, started on sodium pills, and Bilirubin total of 1.8. She reports that she still \"doesn't feel right\" and therefore presented to the ED. She denies abd pain, vomiting. She notes she had recurrence of the nausea earlier but has none now. She denies any other current symptoms. No significant increase in fatigue. No confusion.    (5/14/2019) CMP: sodium 130, chloride 95, glucose 114, Bilirubin total 1.8, o/w WNL (creatinine 0.91)      Allergies:  Amiodarone  Hydrochlorothiazide   Lansoprazole      Medications:    Fosamax  Amlodipine  Lipitor   Lasix  Lisinopril  Metoprolol succinate  Nitroglycerine   Warfarin      Past Medical History:    Aortic regurgitation  Atrial fibrillation  Cardiomyopathy  CHF  CAD  CKD stage 3  Degeneration of cervical intervertebral disc  Hypertension  Hyperlipidemia   Mitral " regurgitation  Pulmonary valve regurgitation   Tricuspid regurgitation  Esophageal reflux     Past Surgical History:    Right rotator cuff tear repair  Microdiscectomy  C-sections x3  Appendectomy  Bilateral benign breast biopsy  Right knee arthroscopy  Enteritis surgery   Coronary angiography  Pacemaker placement    Family History:    Hypertension: mother  Pancreatic cancer: mother  Glaucoma: mother  CAD: father  at age 53, brother open heart surgery at age 53  Lipids: father  Ovarian cancer: daughter  MS: Son     Social History:  PCP: Kirit Michel MD [facility]  Marital Status:    Smoking status: former smoker, quit  (smoked socially for 10 years)  Alcohol use: Negative       Review of Systems   Constitutional: Negative for chills and fever.   Respiratory: Negative for cough, chest tightness and shortness of breath.    Cardiovascular: Negative for chest pain.   Gastrointestinal: Positive for abdominal distention and nausea. Negative for constipation and vomiting.   Genitourinary: Negative for dysuria, frequency and hematuria.   All other systems reviewed and are negative.    Physical Exam     Patient Vitals for the past 24 hrs:   BP Temp Temp src Heart Rate Resp SpO2 Weight   19 2230 146/74 -- -- -- -- 96 % --   19 2213 -- -- -- -- -- -- 70.3 kg (155 lb)   19 2106 141/66 97.9  F (36.6  C) Oral 62 18 97 % --        Physical Exam  General: Well appearing, nontoxic. Appears younger than stated age. Resting comfortably  Head:  Scalp, face, and head appear normal  Eyes:  Pupils are equal, round, and reactive to light    Conjunctivae non-injected and sclerae white  ENT:    The external nose is normal    Pinnae are normal    The oropharynx is normal, mucous membranes moist    Uvula is in the midline  Neck:  Normal range of motion    There is no rigidity noted    Trachea is in the midline  CV:  Regular rate and rhythm     Normal S1/S2, no S3/S4    No murmur or rub  Resp:  Lungs are  clear and equal bilaterally    There is no tachypnea    No increased work of breathing    No rales, wheezing, or rhonchi  GI:  Abdomen is soft, no rigidity or guarding    No distension, or mass    No tenderness or rebound tenderness. No CVA tenderness    MS:  Normal muscular tone    Symmetric motor strength    No lower extremity edema  Skin:  No rash or acute skin lesions noted  Neuro:  Awake and alert    Speech is normal and fluent    Moves all extremities spontaneously  Psych: Normal affect.  Appropriate interactions.      Emergency Department Course     Imaging:  Radiographic findings were communicated with the patient who voiced understanding of the findings.    XR Abdomen 2 Views  Impression: Supine and upright views. The bowel gas pattern is within  normal limits. No free air or air-fluid levels. Moderate amount of  stool in the colon. Degenerative disease and scoliosis in the spine.  Cardiomegaly.  As read by Radiology.     Laboratory:  CBC: WNL (WBC 6.4, HGB 12.8, )   INR: 1.63  CMP: glucose 110, GFR estimate 54, calcium 8.3, albumin 3.3, protein total 6.2, o/w WNL (Creatinine 0.91, bilirubin total 0.8)  Lipase: 114  Troponin I: <0.015  UA reflex to microscopic: leukocyte esterase large, WBC/HPF 21, mucous urine present, o/w WNL    Interventions:  Ordered: Zofran 4 mg  Ordered : Keflex 500 mg     Emergency Department Course:  Past medical records, nursing notes, and vitals reviewed.  2134: I performed an exam of the patient and obtained history, as documented above.  IV started and blood drawn for laboratory testing. Results are as above.    The patient was sent for X-ray imaging while in the emergency department, findings above.      2333: I rechecked the patient. Explained findings to the patient.     I rechecked the patient. Findings and plan explained to the Patient. Patient discharged home with instructions regarding supportive care, medications, and reasons to return. The importance of close  "follow-up was reviewed.      Impression & Plan      Medical Decision Making:  Haritha Trujillo is a 89 year old female presents with vague nausea and \"not feeling right.\" She is clinically well appearing. Labs done 2 days ago by PCP. These are reviewed by myself. Notable for mild hyponatremia for which she is taking salt tabs. Abdominal exam is benign. Broad ddx considered. ACS considered but symptoms would be very atypical. Troponin undetectable, would not pursue this further given lack of chest pain, shortness of breath or other anginal equivalent. No underlying metabolic or electrolyte disturbance. Bilirubin normal. No RUQ pain to indicate hepatobiliary symptoms. CBC normal. UA with pyuria. Signs and symptoms consistent with a urinary tract infection. There has been no fever, back/flank pain, or significant abdominal pain. At this time, there is no clinical evidence of pyelonephritis, appendicitis, diverticulitis, ovarian torsion, or other intraabdominal emergency.  No evidence of systemic infection.  The patient will be started on antibiotics for the infection. Follow up with primary physician is indicated if not improving in 2-3 days. Return immediately to ER if increasing pain, new flank pain, vomiting, fever, inability to tolerate the oral antibiotic, or for any other worrisome concerns. Patient agreeable with plan of care and discharged in stable condition.    Critical Care time:  none    Diagnosis:    ICD-10-CM    1. Urinary tract infection without hematuria, site unspecified N39.0        Disposition:  discharged to home    Discharge Medications:     Medication List      Started    cephALEXin 500 MG capsule  Commonly known as:  KEFLEX  500 mg, Oral, 2 TIMES DAILY     ondansetron 4 MG ODT tab  Commonly known as:  ZOFRAN ODT  4 mg, Oral, EVERY 8 HOURS PRN          I, Megan Beh, am serving as a scribe at 9:34 PM on 5/16/2019 to document services personally performed by Stan Rajan MD based on my " observations and the provider's statements to me.      Megan Beh  5/16/2019    EMERGENCY DEPARTMENT       Stan Rajan MD  05/18/19 0926

## 2019-05-18 LAB
BACTERIA SPEC CULT: NORMAL
Lab: NORMAL
SPECIMEN SOURCE: NORMAL

## 2019-06-19 ENCOUNTER — ANCILLARY PROCEDURE (OUTPATIENT)
Dept: CARDIOLOGY | Facility: CLINIC | Age: 84
End: 2019-06-19
Attending: INTERNAL MEDICINE
Payer: MEDICARE

## 2019-06-19 DIAGNOSIS — Z95.0 CARDIAC PACEMAKER IN SITU: ICD-10-CM

## 2019-06-19 DIAGNOSIS — I49.5 BRADY-TACHY SYNDROME (H): ICD-10-CM

## 2019-07-17 ENCOUNTER — TELEPHONE (OUTPATIENT)
Dept: CARDIOLOGY | Facility: CLINIC | Age: 84
End: 2019-07-17

## 2019-07-17 NOTE — TELEPHONE ENCOUNTER
"07/17/19 Pt scheduled for appt w Kat Yo MARGIE on 7/19. Per Dr. Tatum last OV note 3/19, follow up recommended in 1 year. Called pt who reports she has no complaints, and is \"feeling great\". Pt scheduled for Pacemaker Check at 1 pm on 7/19 and will still plan on coming for that. Kenzie 11:42 am  "

## 2019-07-19 ENCOUNTER — ANCILLARY PROCEDURE (OUTPATIENT)
Dept: CARDIOLOGY | Facility: CLINIC | Age: 84
End: 2019-07-19
Attending: INTERNAL MEDICINE
Payer: MEDICARE

## 2019-07-19 DIAGNOSIS — I49.5 BRADY-TACHY SYNDROME (H): ICD-10-CM

## 2019-08-23 ENCOUNTER — ANCILLARY PROCEDURE (OUTPATIENT)
Dept: CARDIOLOGY | Facility: CLINIC | Age: 84
End: 2019-08-23
Attending: INTERNAL MEDICINE
Payer: MEDICARE

## 2019-08-23 DIAGNOSIS — I49.5 BRADY-TACHY SYNDROME (H): ICD-10-CM

## 2019-08-23 PROCEDURE — 93293 PM PHONE R-STRIP DEVICE EVAL: CPT | Performed by: INTERNAL MEDICINE

## 2019-09-20 ENCOUNTER — ANCILLARY PROCEDURE (OUTPATIENT)
Dept: CARDIOLOGY | Facility: CLINIC | Age: 84
End: 2019-09-20
Attending: INTERNAL MEDICINE
Payer: MEDICARE

## 2019-09-20 DIAGNOSIS — I49.5 BRADY-TACHY SYNDROME (H): ICD-10-CM

## 2019-10-25 ENCOUNTER — TELEPHONE (OUTPATIENT)
Dept: CARDIOLOGY | Facility: CLINIC | Age: 84
End: 2019-10-25

## 2019-10-25 ENCOUNTER — ANCILLARY PROCEDURE (OUTPATIENT)
Dept: CARDIOLOGY | Facility: CLINIC | Age: 84
End: 2019-10-25
Attending: INTERNAL MEDICINE
Payer: MEDICARE

## 2019-10-25 DIAGNOSIS — Z95.0 CARDIAC PACEMAKER IN SITU: Primary | ICD-10-CM

## 2019-10-25 DIAGNOSIS — I49.5 BRADY-TACHY SYNDROME (H): ICD-10-CM

## 2019-10-25 PROCEDURE — 93281 PM DEVICE PROGR EVAL MULTI: CPT | Performed by: INTERNAL MEDICINE

## 2019-10-25 NOTE — TELEPHONE ENCOUNTER
Pt had routine 6 month threshold today for CRT-P. She was noted to have 2 episodes of NSVT. Pt has Medtronic CRT-P implanted in 2015 for NICM. She also has history of AVNA. Battery longevity is estimated at 4 months until MICHAEL. Pt had a nuclear stress test back in 7/2018 which showed no ischemia or infarct and no RWMA.     Medtronic Consulta CRT Pacemaker Device Check - 6 month threshold   Atrial Arrhythmia: chronic AF, on warfarin    Ventricular Arrhythmia: 2 episodes, both from the same day on 5/25/2019, both were during sleeping hours. No symptoms. EGMs show NSVT (in the setting of AVNA) for 6-9 beats, HR in the 180's bpm. EF on echo from 3/2019 was 45-50%. Pt takes metoprolol succinate 50mg BID.     Care Plan: 1 month office teletrace scheduled. OV with MARGIE due in 3/2020. Discussed plan of care and what will happen when device triggers MICHAEL, pt states understanding. Will update Dr. Tatum about the NSVT episodes since EF is decreased.      EC RN          Will send update to Dr. Tatum.

## 2019-10-28 LAB
MDC_IDC_LEAD_IMPLANT_DT: NORMAL
MDC_IDC_LEAD_LOCATION: NORMAL
MDC_IDC_LEAD_LOCATION_DETAIL_1: NORMAL
MDC_IDC_LEAD_MFG: NORMAL
MDC_IDC_LEAD_MODEL: NORMAL
MDC_IDC_LEAD_POLARITY_TYPE: NORMAL
MDC_IDC_LEAD_SERIAL: NORMAL
MDC_IDC_MSMT_BATTERY_DTM: NORMAL
MDC_IDC_MSMT_BATTERY_REMAINING_LONGEVITY: 4 MO
MDC_IDC_MSMT_BATTERY_RRT_TRIGGER: 2.77
MDC_IDC_MSMT_BATTERY_STATUS: NORMAL
MDC_IDC_MSMT_BATTERY_VOLTAGE: 2.83 V
MDC_IDC_MSMT_LEADCHNL_LV_IMPEDANCE_VALUE: 437 OHM
MDC_IDC_MSMT_LEADCHNL_LV_IMPEDANCE_VALUE: 532 OHM
MDC_IDC_MSMT_LEADCHNL_LV_IMPEDANCE_VALUE: 570 OHM
MDC_IDC_MSMT_LEADCHNL_LV_IMPEDANCE_VALUE: 646 OHM
MDC_IDC_MSMT_LEADCHNL_LV_IMPEDANCE_VALUE: 836 OHM
MDC_IDC_MSMT_LEADCHNL_LV_PACING_THRESHOLD_AMPLITUDE: 4 V
MDC_IDC_MSMT_LEADCHNL_LV_PACING_THRESHOLD_PULSEWIDTH: 1 MS
MDC_IDC_MSMT_LEADCHNL_RA_IMPEDANCE_VALUE: 304 OHM
MDC_IDC_MSMT_LEADCHNL_RA_IMPEDANCE_VALUE: 342 OHM
MDC_IDC_MSMT_LEADCHNL_RA_SENSING_INTR_AMPL: 3.62 MV
MDC_IDC_MSMT_LEADCHNL_RA_SENSING_INTR_AMPL: 3.62 MV
MDC_IDC_MSMT_LEADCHNL_RV_IMPEDANCE_VALUE: 361 OHM
MDC_IDC_MSMT_LEADCHNL_RV_IMPEDANCE_VALUE: 456 OHM
MDC_IDC_MSMT_LEADCHNL_RV_PACING_THRESHOLD_AMPLITUDE: 0.75 V
MDC_IDC_MSMT_LEADCHNL_RV_PACING_THRESHOLD_PULSEWIDTH: 0.4 MS
MDC_IDC_MSMT_LEADCHNL_RV_SENSING_INTR_AMPL: 11.62 MV
MDC_IDC_MSMT_LEADCHNL_RV_SENSING_INTR_AMPL: 11.62 MV
MDC_IDC_PG_IMPLANT_DTM: NORMAL
MDC_IDC_PG_MFG: NORMAL
MDC_IDC_PG_MODEL: NORMAL
MDC_IDC_PG_SERIAL: NORMAL
MDC_IDC_PG_TYPE: NORMAL
MDC_IDC_SESS_CLINIC_NAME: NORMAL
MDC_IDC_SESS_DTM: NORMAL
MDC_IDC_SESS_TYPE: NORMAL
MDC_IDC_SET_BRADY_LOWRATE: 60 {BEATS}/MIN
MDC_IDC_SET_BRADY_MAX_SENSOR_RATE: 130 {BEATS}/MIN
MDC_IDC_SET_BRADY_MODE: NORMAL
MDC_IDC_SET_CRT_LVRV_DELAY: 0 MS
MDC_IDC_SET_CRT_PACED_CHAMBERS: NORMAL
MDC_IDC_SET_LEADCHNL_LV_PACING_AMPLITUDE: 4 V
MDC_IDC_SET_LEADCHNL_LV_PACING_ANODE_ELECTRODE_1: NORMAL
MDC_IDC_SET_LEADCHNL_LV_PACING_CAPTURE_MODE: NORMAL
MDC_IDC_SET_LEADCHNL_LV_PACING_CATHODE_ELECTRODE_1: NORMAL
MDC_IDC_SET_LEADCHNL_LV_PACING_CATHODE_LOCATION_1: NORMAL
MDC_IDC_SET_LEADCHNL_LV_PACING_POLARITY: NORMAL
MDC_IDC_SET_LEADCHNL_LV_PACING_PULSEWIDTH: 1 MS
MDC_IDC_SET_LEADCHNL_RA_PACING_CAPTURE_MODE: NORMAL
MDC_IDC_SET_LEADCHNL_RA_SENSING_ANODE_ELECTRODE_1: NORMAL
MDC_IDC_SET_LEADCHNL_RA_SENSING_ANODE_LOCATION_1: NORMAL
MDC_IDC_SET_LEADCHNL_RA_SENSING_CATHODE_ELECTRODE_1: NORMAL
MDC_IDC_SET_LEADCHNL_RA_SENSING_CATHODE_LOCATION_1: NORMAL
MDC_IDC_SET_LEADCHNL_RA_SENSING_POLARITY: NORMAL
MDC_IDC_SET_LEADCHNL_RA_SENSING_SENSITIVITY: 4 MV
MDC_IDC_SET_LEADCHNL_RV_PACING_AMPLITUDE: 2 V
MDC_IDC_SET_LEADCHNL_RV_PACING_ANODE_ELECTRODE_1: NORMAL
MDC_IDC_SET_LEADCHNL_RV_PACING_ANODE_LOCATION_1: NORMAL
MDC_IDC_SET_LEADCHNL_RV_PACING_CAPTURE_MODE: NORMAL
MDC_IDC_SET_LEADCHNL_RV_PACING_CATHODE_ELECTRODE_1: NORMAL
MDC_IDC_SET_LEADCHNL_RV_PACING_CATHODE_LOCATION_1: NORMAL
MDC_IDC_SET_LEADCHNL_RV_PACING_POLARITY: NORMAL
MDC_IDC_SET_LEADCHNL_RV_PACING_PULSEWIDTH: 0.4 MS
MDC_IDC_SET_LEADCHNL_RV_SENSING_ANODE_ELECTRODE_1: NORMAL
MDC_IDC_SET_LEADCHNL_RV_SENSING_ANODE_LOCATION_1: NORMAL
MDC_IDC_SET_LEADCHNL_RV_SENSING_CATHODE_ELECTRODE_1: NORMAL
MDC_IDC_SET_LEADCHNL_RV_SENSING_CATHODE_LOCATION_1: NORMAL
MDC_IDC_SET_LEADCHNL_RV_SENSING_POLARITY: NORMAL
MDC_IDC_SET_LEADCHNL_RV_SENSING_SENSITIVITY: 0.9 MV
MDC_IDC_SET_ZONE_DETECTION_INTERVAL: 200 MS
MDC_IDC_SET_ZONE_DETECTION_INTERVAL: 350 MS
MDC_IDC_SET_ZONE_DETECTION_INTERVAL: 400 MS
MDC_IDC_SET_ZONE_TYPE: NORMAL
MDC_IDC_STAT_AT_BURDEN_PERCENT: 0 %
MDC_IDC_STAT_AT_DTM_END: NORMAL
MDC_IDC_STAT_AT_DTM_START: NORMAL
MDC_IDC_STAT_BRADY_AP_VP_PERCENT: 0 %
MDC_IDC_STAT_BRADY_AP_VS_PERCENT: 0 %
MDC_IDC_STAT_BRADY_AS_VP_PERCENT: 92 %
MDC_IDC_STAT_BRADY_AS_VS_PERCENT: 8 %
MDC_IDC_STAT_BRADY_DTM_END: NORMAL
MDC_IDC_STAT_BRADY_DTM_START: NORMAL
MDC_IDC_STAT_BRADY_RA_PERCENT_PACED: 0 %
MDC_IDC_STAT_BRADY_RV_PERCENT_PACED: 91.55 %
MDC_IDC_STAT_EPISODE_RECENT_COUNT: 0
MDC_IDC_STAT_EPISODE_RECENT_COUNT: 2
MDC_IDC_STAT_EPISODE_RECENT_COUNT_DTM_END: NORMAL
MDC_IDC_STAT_EPISODE_RECENT_COUNT_DTM_START: NORMAL
MDC_IDC_STAT_EPISODE_TOTAL_COUNT: 0
MDC_IDC_STAT_EPISODE_TOTAL_COUNT: 0
MDC_IDC_STAT_EPISODE_TOTAL_COUNT: 2
MDC_IDC_STAT_EPISODE_TOTAL_COUNT: 7
MDC_IDC_STAT_EPISODE_TOTAL_COUNT_DTM_END: NORMAL
MDC_IDC_STAT_EPISODE_TOTAL_COUNT_DTM_START: NORMAL
MDC_IDC_STAT_EPISODE_TYPE: NORMAL

## 2019-11-26 ENCOUNTER — ANCILLARY PROCEDURE (OUTPATIENT)
Dept: CARDIOLOGY | Facility: CLINIC | Age: 84
End: 2019-11-26
Attending: INTERNAL MEDICINE
Payer: MEDICARE

## 2019-11-26 DIAGNOSIS — Z95.0 CARDIAC PACEMAKER IN SITU: ICD-10-CM

## 2019-12-27 ENCOUNTER — ANCILLARY PROCEDURE (OUTPATIENT)
Dept: CARDIOLOGY | Facility: CLINIC | Age: 84
End: 2019-12-27
Attending: INTERNAL MEDICINE
Payer: MEDICARE

## 2019-12-27 DIAGNOSIS — Z95.0 CARDIAC PACEMAKER IN SITU: ICD-10-CM

## 2020-01-28 ENCOUNTER — ANCILLARY PROCEDURE (OUTPATIENT)
Dept: CARDIOLOGY | Facility: CLINIC | Age: 85
End: 2020-01-28
Attending: INTERNAL MEDICINE
Payer: MEDICARE

## 2020-01-28 DIAGNOSIS — Z95.0 CARDIAC PACEMAKER IN SITU: ICD-10-CM

## 2020-01-28 PROCEDURE — 93293 PM PHONE R-STRIP DEVICE EVAL: CPT | Performed by: INTERNAL MEDICINE

## 2020-03-02 ENCOUNTER — ANCILLARY PROCEDURE (OUTPATIENT)
Dept: CARDIOLOGY | Facility: CLINIC | Age: 85
End: 2020-03-02
Attending: INTERNAL MEDICINE
Payer: COMMERCIAL

## 2020-03-02 DIAGNOSIS — Z95.0 CARDIAC PACEMAKER IN SITU: ICD-10-CM

## 2020-03-11 ENCOUNTER — OFFICE VISIT (OUTPATIENT)
Dept: CARDIOLOGY | Facility: CLINIC | Age: 85
End: 2020-03-11
Payer: MEDICARE

## 2020-03-11 VITALS
HEART RATE: 80 BPM | DIASTOLIC BLOOD PRESSURE: 58 MMHG | BODY MASS INDEX: 25.44 KG/M2 | WEIGHT: 149 LBS | HEIGHT: 64 IN | SYSTOLIC BLOOD PRESSURE: 140 MMHG

## 2020-03-11 DIAGNOSIS — I42.8 OTHER CARDIOMYOPATHY (H): ICD-10-CM

## 2020-03-11 DIAGNOSIS — I48.20 CHRONIC ATRIAL FIBRILLATION (H): Primary | ICD-10-CM

## 2020-03-11 DIAGNOSIS — I10 ESSENTIAL HYPERTENSION: ICD-10-CM

## 2020-03-11 PROCEDURE — 93000 ELECTROCARDIOGRAM COMPLETE: CPT | Performed by: NURSE PRACTITIONER

## 2020-03-11 PROCEDURE — 99214 OFFICE O/P EST MOD 30 MIN: CPT | Performed by: NURSE PRACTITIONER

## 2020-03-11 ASSESSMENT — MIFFLIN-ST. JEOR: SCORE: 1060.86

## 2020-03-11 NOTE — LETTER
3/11/2020      Kirit Michel MD  [address] [address]      RE: Haritha Trujillo       Dear Colleague,    I had the pleasure of seeing Haritha Trujillo in the [facility].    Service Date: 03/11/2020      HISTORY OF PRESENT ILLNESS:  Ms. Trujillo is a delightful 89-year-old woman well known to me here at the clinic.  She is an established patient of Dr. Tatum.      PAST MEDICAL HISTORY:   1.  Nonischemic cardiomyopathy with an ejection fraction stable at 45%-50%.   2.  Permanent atrial fibrillation with previous AV richard ablation and BiV pacer upgrade 01/2015.  High LV threshold with intermittent diaphragmatic stimulation is noted.  The patient is now nearing MICHAEL.   3.  Dyslipidemia.   4.  Hypertension.   5.  Moderate tricuspid, aortic and pulmonic regurgitation noted on echo 03/2019.      At today's visit, Peg is feeling well.  She continues to be very active.  She lives in a senior independent living facility.  Currently, she denies chest pain, shortness of breath, lightheadedness or dizziness.  Her blood pressure is stable at 140/58.      Her 30-day Teletrace on 03/02 showed that she is nearing MICHAEL.  We are now doing monthly Teletrace visits.  Her left pectoral pocket has healed well; however, with weight loss over the years and loss of muscle, the device itself is more prominent.  She has known diaphragmatic stimulation from the LV lead.  This never wakes her up at night and when it happened during the day, she just changes positions.      All other review of systems and past medical history are noted below.          ASSESSMENT AND PLAN:  Ms. Trujillo is a delightful 89-year-old woman here today for her annual followup:   1.  Nonischemic cardiomyopathy.  Her ejection fraction is stable.  She is euvolemic on exam.  She is currently on metoprolol 50 mg b.i.d., lisinopril 10 mg daily and amlodipine 5 mg daily.  She is on furosemide at 10  mg daily.   2.  CRT-P therapy pacemaker.  Her battery is now nearing MICHAEL.  I did over I did review with her the procedure of a generator change.  It will be a same day procedure, barring any complications.  Risks include but are not limited to:  Bruising, bleeding, pocket hematoma, pocket infection.  If it was decided to do an LV lead revision, then it would be an overnight stay.  It does not seem to bother her.  I will leave that up to Dr. Tatum' discretion if that should be considered.  Also, the pocket is quite prominent on exam today.  The skin is intact without signs of erythema.  The next generator may need to be implanted submuscular.      As always, I appreciate being involved in her care.  If there are questions or concerns, I have asked that she please contact us.  We will continue to see her on an annual basis.         NADER MENJIVAR NP             D: 2020   T: 2020   MT:       Name:     SEVERIANO VALLEJO   MRN:      [id number]        Account:      [id number]   :      1925           Service Date: 2020      Document: [id number]         Outpatient Encounter Medications as of 3/11/2020   Medication Sig Dispense Refill     acetaminophen (TYLENOL) 500 MG tablet Take 500-1,000 mg by mouth every 6 hours as needed for mild pain       Alendronate Sodium (FOSAMAX PO) Take 70 mg by mouth once a week       amLODIPine (NORVASC) 5 MG tablet Take 1 tablet (5 mg) by mouth daily 90 tablet 3     atorvastatin (LIPITOR) 40 MG tablet Take 40 mg by mouth At Bedtime        calcium carbonate-vitamin D (CALCIUM + D) 600-200 MG-UNIT TABS Take 1 tablet by mouth daily.       Celecoxib (CELEBREX PO) Take 200 mg by mouth daily       cycloSPORINE (RESTASIS) 0.05 % ophthalmic emulsion Place 1 drop into both eyes every 12 hours.       furosemide (LASIX) 20 MG tablet Take 0.5 tablets (10 mg) by mouth daily 30 tablet 0     lisinopril (PRINIVIL/ZESTRIL) 10 MG tablet Take 1 tablet (10 mg) by mouth daily 90  tablet 3     metoprolol succinate ER (TOPROL-XL) 50 MG 24 hr tablet Take 1 tablet (50 mg) by mouth 2 times daily 180 tablet 3     Multiple Vitamins-Minerals (CENTRUM SILVER) per tablet Take 1 tablet by mouth daily.         nitroGLYcerin (NITROSTAT) 0.4 MG sublingual tablet For chest pain place 1 tablet under the tongue every 5 minutes for 3 doses. If symptoms persist 5 minutes after 1st dose call 911. 25 tablet 1     Probiotic Product (PROBIOTIC DAILY PO) Take by mouth daily       sodium chloride 1 GM tablet Take 1 g by mouth daily       WARFARIN SODIUM PO Take 4.5 mg by mouth daily 1 & 1/2 tablet of 3mg       [DISCONTINUED] cephALEXin (KEFLEX) 500 MG capsule Take 1 capsule (500 mg) by mouth 2 times daily 10 capsule 0     [DISCONTINUED] nitroFURantoin, macrocrystal-monohydrate, (MACROBID) 100 MG capsule Take 1 capsule (100 mg) by mouth 2 times daily 14 capsule 0     [DISCONTINUED] ondansetron (ZOFRAN ODT) 4 MG ODT tab Take 1 tablet (4 mg) by mouth every 8 hours as needed for nausea or vomiting 15 tablet 0     No facility-administered encounter medications on file as of 3/11/2020.        Again, thank you for allowing me to participate in the care of your patient.      Sincerely,    Kat Yo NP, APRN CNP     [facility]

## 2020-03-11 NOTE — PROGRESS NOTES
Service Date: 03/11/2020      HISTORY OF PRESENT ILLNESS:  Ms. Trujillo is a delightful 89-year-old woman well known to me here at the clinic.  She is an established patient of Dr. Tatum.      PAST MEDICAL HISTORY:   1.  Nonischemic cardiomyopathy with an ejection fraction stable at 45%-50%.   2.  Permanent atrial fibrillation with previous AV richard ablation and BiV pacer upgrade 01/2015.  High LV threshold with intermittent diaphragmatic stimulation is noted.  The patient is now nearing MICHAEL.   3.  Dyslipidemia.   4.  Hypertension.   5.  Moderate tricuspid, aortic and pulmonic regurgitation noted on echo 03/2019.      At today's visit, Peg is feeling well.  She continues to be very active.  She lives in a senior independent living facility.  Currently, she denies chest pain, shortness of breath, lightheadedness or dizziness.  Her blood pressure is stable at 140/58.      Her 30-day Teletrace on 03/02 showed that she is nearing MICHAEL.  We are now doing monthly Teletrace visits.  Her left pectoral pocket has healed well; however, with weight loss over the years and loss of muscle, the device itself is more prominent.  She has known diaphragmatic stimulation from the LV lead.  This never wakes her up at night and when it happened during the day, she just changes positions.      All other review of systems and past medical history are noted below.      ASSESSMENT AND PLAN:  Ms. Trujillo is a delightful 89-year-old woman here today for her annual followup:   1.  Nonischemic cardiomyopathy.    Her ejection fraction is stable.  She is euvolemic on exam.  She is currently on metoprolol 50 mg b.i.d., lisinopril 10 mg daily and amlodipine 5 mg daily.  She is on furosemide at 10 mg daily.     2.  CRT-P therapy pacemaker.    Her battery is now nearing MICHAEL.  I did over I did review with her the procedure of a generator change.  It will be a same day procedure, barring any complications.  Risks include but are not limited to:   Bruising, bleeding, pocket hematoma, pocket infection.  If it was decided to do an LV lead revision, then it would be an overnight stay.  It does not seem to bother her.  I will leave that up to Dr. Tatum' discretion if that should be considered.  Also, the pocket is quite prominent on exam today.  The skin is intact without signs of erythema.  The next generator may need to be implanted submuscular.      As always, I appreciate being involved in her care.  If there are questions or concerns, I have asked that she please contact us.  We will continue to see her on an annual basis.         NADER MENJIVAR NP             D: 2020   T: 2020   MT: ILAN      Name:     SEVERIANO VALLEJO   MRN:      [id number]        Account:      [id number]   :      1925           Service Date: 2020      Document: [id number]

## 2020-03-11 NOTE — LETTER
3/11/2020    Kirit Michel MD  [address] [address]    RE: Haritha Trujillo       Dear Colleague,    I had the pleasure of seeing Haritha LAVERNE Trujillo in the [facility].    HPI and Plan: #[id number]  See dictation    Orders Placed This Encounter   Procedures     Follow-Up with Electrophysiologist     EKG 12-lead complete w/read - Clinics (performed today)       No orders of the defined types were placed in this encounter.      Medications Discontinued During This Encounter   Medication Reason     cephALEXin (KEFLEX) 500 MG capsule Therapy completed     nitroFURantoin, macrocrystal-monohydrate, (MACROBID) 100 MG capsule Therapy completed     ondansetron (ZOFRAN ODT) 4 MG ODT tab Therapy completed         Encounter Diagnoses   Name Primary?     Other cardiomyopathy (H)      Essential hypertension      Chronic atrial fibrillation        CURRENT MEDICATIONS:  Current Outpatient Medications   Medication Sig Dispense Refill     acetaminophen (TYLENOL) 500 MG tablet Take 500-1,000 mg by mouth every 6 hours as needed for mild pain       Alendronate Sodium (FOSAMAX PO) Take 70 mg by mouth once a week       amLODIPine (NORVASC) 5 MG tablet Take 1 tablet (5 mg) by mouth daily 90 tablet 3     atorvastatin (LIPITOR) 40 MG tablet Take 40 mg by mouth At Bedtime        calcium carbonate-vitamin D (CALCIUM + D) 600-200 MG-UNIT TABS Take 1 tablet by mouth daily.       Celecoxib (CELEBREX PO) Take 200 mg by mouth daily       cycloSPORINE (RESTASIS) 0.05 % ophthalmic emulsion Place 1 drop into both eyes every 12 hours.       furosemide (LASIX) 20 MG tablet Take 0.5 tablets (10 mg) by mouth daily 30 tablet 0     lisinopril (PRINIVIL/ZESTRIL) 10 MG tablet Take 1 tablet (10 mg) by mouth daily 90 tablet 3     metoprolol succinate ER (TOPROL-XL) 50 MG 24 hr tablet Take 1 tablet (50 mg) by mouth 2 times daily 180 tablet 3     Multiple Vitamins-Minerals (CENTRUM  SILVER) per tablet Take 1 tablet by mouth daily.         nitroGLYcerin (NITROSTAT) 0.4 MG sublingual tablet For chest pain place 1 tablet under the tongue every 5 minutes for 3 doses. If symptoms persist 5 minutes after 1st dose call 911. 25 tablet 1     Probiotic Product (PROBIOTIC DAILY PO) Take by mouth daily       sodium chloride 1 GM tablet Take 1 g by mouth daily       WARFARIN SODIUM PO Take 4.5 mg by mouth daily 1 & 1/2 tablet of 3mg         ALLERGIES     Allergies   Allergen Reactions     Amiodarone Nausea     Hctz      Lansoprazole      diarrhea   Prevacid       PAST MEDICAL HISTORY:  Past Medical History:   Diagnosis Date     Aortic regurgitation 12/14/2014    mild (1+) per echo     Atrial fibrillation (H)      Cardiomyopathy (H)      CHF (congestive heart failure) (H)      Chronic kidney disease, stage 3 (H)      Coronary atherosclerosis of unspecified type of vessel, native or graft 5/16/2000    normal left coronary arteries and tight obstruction in distal RCA, too small for revascularization, medical management     Degeneration of cervical intervertebral disc     cervical spine     Essential hypertension, benign      Mitral regurgitation 12/14/2014    mod-mod/sev (2-3+) per echo     Other and unspecified hyperlipidemia      Pacemaker      Pulmonary hypertension (H) 3/16/2013    mild per echo with RVSP 31mmHg +RAP      Pulmonary valve regurgitation 12/14/2014    mod (2+) per echo     Tricuspid regurgitation 12/14/2014    mild (1+) per echo     Unspecified tinnitus     chronic       PAST SURGICAL HISTORY:  Past Surgical History:   Procedure Laterality Date     C NONSPECIFIC PROCEDURE  1999    right rotator cuff tear OR     C NONSPECIFIC PROCEDURE  1983    microdiscectomy     C NONSPECIFIC PROCEDURE      C-sections x 3      C NONSPECIFIC PROCEDURE      appendectomy     C NONSPECIFIC PROCEDURE      bilateral benign breast biopsy      C NONSPECIFIC PROCEDURE      right knee arthroscopic OR     C NONSPECIFIC  PROCEDURE  1974    enteritis     CORONARY ANGIOGRAPHY ADULT ORDER  2000     HRW PACEMAKER PERMANENT  2015    BI- ventricular     IMPLANT PACEMAKER  2010    dual chamber Medtronic       FAMILY HISTORY:  Family History   Problem Relation Age of Onset     Hypertension Mother      Cancer Mother         pancreatic     Eye Disorder Mother         cristian     LARON.AMISAEL. Father          53     Lipids Father      Diabetes Maternal Grandmother      FAZALA.GIDEON Brother         open heart surgery age 53     Cancer Daughter         ovavian     Neurologic Disorder Son         MS       SOCIAL HISTORY:  Social History     Socioeconomic History     Marital status:      Spouse name: Not on file     Number of children: Not on file     Years of education: Not on file     Highest education level: Not on file   Occupational History     Not on file   Social Needs     Financial resource strain: Not on file     Food insecurity     Worry: Not on file     Inability: Not on file     Transportation needs     Medical: Not on file     Non-medical: Not on file   Tobacco Use     Smoking status: Former Smoker     Packs/day: 0.50     Years: 15.00     Pack years: 7.50     Types: Cigarettes     Start date:      Last attempt to quit:      Years since quittin.2     Smokeless tobacco: Never Used   Substance and Sexual Activity     Alcohol use: No     Drug use: No     Sexual activity: Not on file   Lifestyle     Physical activity     Days per week: Not on file     Minutes per session: Not on file     Stress: Not on file   Relationships     Social connections     Talks on phone: Not on file     Gets together: Not on file     Attends [employer] service: Not on file     Active member of club or organization: Not on file     Attends meetings of clubs or organizations: Not on file     Relationship status: Not on file     Intimate partner violence     Fear of current or ex partner: Not on file     Emotionally abused: Not on file      "Physically abused: Not on file     Forced sexual activity: Not on file   Other Topics Concern     Parent/sibling w/ CABG, MI or angioplasty before 65F 55M? Yes      Service Not Asked     Blood Transfusions Not Asked     Caffeine Concern Not Asked     Occupational Exposure Not Asked     Hobby Hazards Not Asked     Sleep Concern Not Asked     Stress Concern Not Asked     Weight Concern Not Asked     Special Diet Yes     Comment: low sodium     Back Care Not Asked     Exercise Yes     Comment: active life style     Bike Helmet Not Asked     Seat Belt Yes     Self-Exams Not Asked   Social History Narrative     Not on file       Review of Systems:  Skin:  Negative rash;bruising     Eyes:  Positive for glasses    ENT:  Negative epistaxis    Respiratory:  Negative (when walking fast) same   Cardiovascular:  Negative;palpitations;chest pain;lightheadedness Positive for;fatigue on occ on sweeling and stand up too fast  Gastroenterology: Negative hematochezia    Genitourinary:  Negative hematuria    Musculoskeletal:  Positive for arthritis Negative for Statin Related Myalgias  Neurologic:  Negative numbness or tingling of feet    Psychiatric:  Negative      Heme/Lymph/Imm:  Positive for allergies    Endocrine:  Negative        Physical Exam:  Vitals: BP (!) 140/58   Pulse 80   Ht 1.626 m (5' 4\")   Wt 67.6 kg (149 lb)   BMI 25.58 kg/m      Constitutional:  cooperative, alert and oriented, well developed, well nourished, in no acute distress        Skin:  warm and dry to the touch, no apparent skin lesions or masses noted   pacemaker incision in the left infraclavicular area was well-healed      Head:  normocephalic, no masses or lesions        Eyes:  pupils equal and round;conjunctivae and lids unremarkable        Lymph:      ENT:  no pallor or cyanosis, dentition good        Neck:  carotid pulses are full and equal bilaterally, JVP normal, no carotid bruit        Respiratory:  normal breath sounds, clear to " auscultation, normal A-P diameter, normal symmetry, normal respiratory excursion, no use of accessory muscles         Cardiac: regular rhythm;normal S1 and S2;no S3 or S4;no murmurs, gallops or rubs detected                not assessed this visit                                        GI:  abdomen soft        Extremities and Muscular Skeletal:  no deformities, clubbing, cyanosis, erythema observed;no edema              Neurological:  no gross motor deficits        Psych:  Alert and Oriented x 3;affect appropriate, oriented to time, person and place        CC  [place] [address]                Thank you for allowing me to participate in the care of your patient.      Sincerely,     Kat Yo, NP, APRN CNP     [facility]    cc:   Kirit Michel MD  [place] [id number]

## 2020-03-11 NOTE — PATIENT INSTRUCTIONS
Call my nurse with any questions or concerns:  [phone]  *If you have concerns after hours, please call [phone], option 2 to speak with on call Cardiologist.    Check your pill bottles and make a current list for yourself  Call with any concerns

## 2020-03-11 NOTE — PROGRESS NOTES
HPI and Plan: #[id number]  See dictation    Orders Placed This Encounter   Procedures     Follow-Up with Electrophysiologist     EKG 12-lead complete w/read - Clinics (performed today)       No orders of the defined types were placed in this encounter.      Medications Discontinued During This Encounter   Medication Reason     cephALEXin (KEFLEX) 500 MG capsule Therapy completed     nitroFURantoin, macrocrystal-monohydrate, (MACROBID) 100 MG capsule Therapy completed     ondansetron (ZOFRAN ODT) 4 MG ODT tab Therapy completed         Encounter Diagnoses   Name Primary?     Other cardiomyopathy (H)      Essential hypertension      Chronic atrial fibrillation        CURRENT MEDICATIONS:  Current Outpatient Medications   Medication Sig Dispense Refill     acetaminophen (TYLENOL) 500 MG tablet Take 500-1,000 mg by mouth every 6 hours as needed for mild pain       Alendronate Sodium (FOSAMAX PO) Take 70 mg by mouth once a week       amLODIPine (NORVASC) 5 MG tablet Take 1 tablet (5 mg) by mouth daily 90 tablet 3     atorvastatin (LIPITOR) 40 MG tablet Take 40 mg by mouth At Bedtime        calcium carbonate-vitamin D (CALCIUM + D) 600-200 MG-UNIT TABS Take 1 tablet by mouth daily.       Celecoxib (CELEBREX PO) Take 200 mg by mouth daily       cycloSPORINE (RESTASIS) 0.05 % ophthalmic emulsion Place 1 drop into both eyes every 12 hours.       furosemide (LASIX) 20 MG tablet Take 0.5 tablets (10 mg) by mouth daily 30 tablet 0     lisinopril (PRINIVIL/ZESTRIL) 10 MG tablet Take 1 tablet (10 mg) by mouth daily 90 tablet 3     metoprolol succinate ER (TOPROL-XL) 50 MG 24 hr tablet Take 1 tablet (50 mg) by mouth 2 times daily 180 tablet 3     Multiple Vitamins-Minerals (CENTRUM SILVER) per tablet Take 1 tablet by mouth daily.         nitroGLYcerin (NITROSTAT) 0.4 MG sublingual tablet For chest pain place 1 tablet under the tongue every 5 minutes for 3 doses. If symptoms persist 5 minutes after 1st dose call 911. 25 tablet 1      Probiotic Product (PROBIOTIC DAILY PO) Take by mouth daily       sodium chloride 1 GM tablet Take 1 g by mouth daily       WARFARIN SODIUM PO Take 4.5 mg by mouth daily 1 & 1/2 tablet of 3mg         ALLERGIES     Allergies   Allergen Reactions     Amiodarone Nausea     Hctz      Lansoprazole      diarrhea   Prevacid       PAST MEDICAL HISTORY:  Past Medical History:   Diagnosis Date     Aortic regurgitation 12/14/2014    mild (1+) per echo     Atrial fibrillation (H)      Cardiomyopathy (H)      CHF (congestive heart failure) (H)      Chronic kidney disease, stage 3 (H)      Coronary atherosclerosis of unspecified type of vessel, native or graft 5/16/2000    normal left coronary arteries and tight obstruction in distal RCA, too small for revascularization, medical management     Degeneration of cervical intervertebral disc     cervical spine     Essential hypertension, benign      Mitral regurgitation 12/14/2014    mod-mod/sev (2-3+) per echo     Other and unspecified hyperlipidemia      Pacemaker      Pulmonary hypertension (H) 3/16/2013    mild per echo with RVSP 31mmHg +RAP      Pulmonary valve regurgitation 12/14/2014    mod (2+) per echo     Tricuspid regurgitation 12/14/2014    mild (1+) per echo     Unspecified tinnitus     chronic       PAST SURGICAL HISTORY:  Past Surgical History:   Procedure Laterality Date     C NONSPECIFIC PROCEDURE  1999    right rotator cuff tear OR     C NONSPECIFIC PROCEDURE  1983    microdiscectomy     C NONSPECIFIC PROCEDURE      C-sections x 3      C NONSPECIFIC PROCEDURE      appendectomy     C NONSPECIFIC PROCEDURE      bilateral benign breast biopsy      C NONSPECIFIC PROCEDURE      right knee arthroscopic OR     C NONSPECIFIC PROCEDURE  1974    enteritis     CORONARY ANGIOGRAPHY ADULT ORDER  5/16/2000     HRW PACEMAKER PERMANENT  1/2015    BI- ventricular     IMPLANT PACEMAKER  11/12/2010    dual chamber Medtronic       FAMILY HISTORY:  Family History   Problem Relation Age  of Onset     Hypertension Mother      Cancer Mother         pancreatic     Eye Disorder Mother         cristian     ANIA Father          53     Lipids Father      Diabetes Maternal Grandmother      ANIA Brother         open heart surgery age 53     Cancer Daughter         ovavian     Neurologic Disorder Son         MS       SOCIAL HISTORY:  Social History     Socioeconomic History     Marital status:      Spouse name: Not on file     Number of children: Not on file     Years of education: Not on file     Highest education level: Not on file   Occupational History     Not on file   Social Needs     Financial resource strain: Not on file     Food insecurity     Worry: Not on file     Inability: Not on file     Transportation needs     Medical: Not on file     Non-medical: Not on file   Tobacco Use     Smoking status: Former Smoker     Packs/day: 0.50     Years: 15.00     Pack years: 7.50     Types: Cigarettes     Start date:      Last attempt to quit:      Years since quittin.2     Smokeless tobacco: Never Used   Substance and Sexual Activity     Alcohol use: No     Drug use: No     Sexual activity: Not on file   Lifestyle     Physical activity     Days per week: Not on file     Minutes per session: Not on file     Stress: Not on file   Relationships     Social connections     Talks on phone: Not on file     Gets together: Not on file     Attends Bapt[employer] service: Not on file     Active member of club or organization: Not on file     Attends meetings of clubs or organizations: Not on file     Relationship status: Not on file     Intimate partner violence     Fear of current or ex partner: Not on file     Emotionally abused: Not on file     Physically abused: Not on file     Forced sexual activity: Not on file   Other Topics Concern     Parent/sibling w/ CABG, MI or angioplasty before 65F 55M? Yes      Service Not Asked     Blood Transfusions Not Asked     Caffeine Concern Not Asked  "    Occupational Exposure Not Asked     Hobby Hazards Not Asked     Sleep Concern Not Asked     Stress Concern Not Asked     Weight Concern Not Asked     Special Diet Yes     Comment: low sodium     Back Care Not Asked     Exercise Yes     Comment: active life style     Bike Helmet Not Asked     Seat Belt Yes     Self-Exams Not Asked   Social History Narrative     Not on file       Review of Systems:  Skin:  Negative rash;bruising     Eyes:  Positive for glasses    ENT:  Negative epistaxis    Respiratory:  Negative (when walking fast) same   Cardiovascular:  Negative;palpitations;chest pain;lightheadedness Positive for;fatigue on occ on sweeling and stand up too fast  Gastroenterology: Negative hematochezia    Genitourinary:  Negative hematuria    Musculoskeletal:  Positive for arthritis Negative for Statin Related Myalgias  Neurologic:  Negative numbness or tingling of feet    Psychiatric:  Negative      Heme/Lymph/Imm:  Positive for allergies    Endocrine:  Negative        Physical Exam:  Vitals: BP (!) 140/58   Pulse 80   Ht 1.626 m (5' 4\")   Wt 67.6 kg (149 lb)   BMI 25.58 kg/m      Constitutional:  cooperative, alert and oriented, well developed, well nourished, in no acute distress        Skin:  warm and dry to the touch, no apparent skin lesions or masses noted   pacemaker incision in the left infraclavicular area was well-healed      Head:  normocephalic, no masses or lesions        Eyes:  pupils equal and round;conjunctivae and lids unremarkable        Lymph:      ENT:  no pallor or cyanosis, dentition good        Neck:  carotid pulses are full and equal bilaterally, JVP normal, no carotid bruit        Respiratory:  normal breath sounds, clear to auscultation, normal A-P diameter, normal symmetry, normal respiratory excursion, no use of accessory muscles         Cardiac: regular rhythm;normal S1 and S2;no S3 or S4;no murmurs, gallops or rubs detected                not assessed this visit                "                         GI:  abdomen soft        Extremities and Muscular Skeletal:  no deformities, clubbing, cyanosis, erythema observed;no edema              Neurological:  no gross motor deficits        Psych:  Alert and Oriented x 3;affect appropriate, oriented to time, person and place        CC  Kirit Michel MD  [address] [address]

## 2020-03-31 ENCOUNTER — TELEPHONE (OUTPATIENT)
Dept: CARDIOLOGY | Facility: CLINIC | Age: 85
End: 2020-03-31

## 2020-03-31 NOTE — TELEPHONE ENCOUNTER
Called patient 3/31/20 to complete COVID19 wellness screening for 4/6/20 in clinic pacemaker check. Patient confirms no symptoms (fever, cough, SOB). Patient will call in the meantime if things change. eml

## 2020-04-06 ENCOUNTER — ANCILLARY PROCEDURE (OUTPATIENT)
Dept: CARDIOLOGY | Facility: CLINIC | Age: 85
End: 2020-04-06
Attending: INTERNAL MEDICINE
Payer: COMMERCIAL

## 2020-04-06 DIAGNOSIS — I10 ESSENTIAL HYPERTENSION: ICD-10-CM

## 2020-04-06 DIAGNOSIS — Z98.890 HX OF ATRIOVENTRICULAR NODE ABLATION: ICD-10-CM

## 2020-04-06 DIAGNOSIS — Z45.010 ENCOUNTER FOR PACEMAKER AT END OF BATTERY LIFE: Primary | ICD-10-CM

## 2020-04-06 DIAGNOSIS — I50.9 CHF (CONGESTIVE HEART FAILURE) (H): ICD-10-CM

## 2020-04-06 DIAGNOSIS — Z95.0 CARDIAC PACEMAKER IN SITU: ICD-10-CM

## 2020-04-06 RX ORDER — LISINOPRIL 10 MG/1
10 TABLET ORAL DAILY
Qty: 90 TABLET | Refills: 0 | Status: SHIPPED | OUTPATIENT
Start: 2020-04-06 | End: 2020-06-22

## 2020-04-07 LAB
MDC_IDC_LEAD_IMPLANT_DT: NORMAL
MDC_IDC_LEAD_LOCATION: NORMAL
MDC_IDC_LEAD_LOCATION_DETAIL_1: NORMAL
MDC_IDC_LEAD_MFG: NORMAL
MDC_IDC_LEAD_MODEL: NORMAL
MDC_IDC_LEAD_POLARITY_TYPE: NORMAL
MDC_IDC_LEAD_SERIAL: NORMAL
MDC_IDC_MSMT_BATTERY_DTM: NORMAL
MDC_IDC_MSMT_BATTERY_REMAINING_LONGEVITY: 1 MO
MDC_IDC_MSMT_BATTERY_RRT_TRIGGER: 2.77
MDC_IDC_MSMT_BATTERY_STATUS: NORMAL
MDC_IDC_MSMT_BATTERY_VOLTAGE: 2.74 V
MDC_IDC_MSMT_LEADCHNL_LV_IMPEDANCE_VALUE: 494 OHM
MDC_IDC_MSMT_LEADCHNL_LV_IMPEDANCE_VALUE: 570 OHM
MDC_IDC_MSMT_LEADCHNL_LV_IMPEDANCE_VALUE: 608 OHM
MDC_IDC_MSMT_LEADCHNL_LV_IMPEDANCE_VALUE: 665 OHM
MDC_IDC_MSMT_LEADCHNL_LV_IMPEDANCE_VALUE: 912 OHM
MDC_IDC_MSMT_LEADCHNL_LV_PACING_THRESHOLD_AMPLITUDE: 3.75 V
MDC_IDC_MSMT_LEADCHNL_LV_PACING_THRESHOLD_PULSEWIDTH: 1 MS
MDC_IDC_MSMT_LEADCHNL_RA_IMPEDANCE_VALUE: 304 OHM
MDC_IDC_MSMT_LEADCHNL_RA_IMPEDANCE_VALUE: 342 OHM
MDC_IDC_MSMT_LEADCHNL_RA_SENSING_INTR_AMPL: 3.62 MV
MDC_IDC_MSMT_LEADCHNL_RA_SENSING_INTR_AMPL: 3.62 MV
MDC_IDC_MSMT_LEADCHNL_RV_IMPEDANCE_VALUE: 361 OHM
MDC_IDC_MSMT_LEADCHNL_RV_IMPEDANCE_VALUE: 437 OHM
MDC_IDC_MSMT_LEADCHNL_RV_PACING_THRESHOLD_AMPLITUDE: 0.75 V
MDC_IDC_MSMT_LEADCHNL_RV_PACING_THRESHOLD_PULSEWIDTH: 0.4 MS
MDC_IDC_MSMT_LEADCHNL_RV_SENSING_INTR_AMPL: 13.62 MV
MDC_IDC_MSMT_LEADCHNL_RV_SENSING_INTR_AMPL: 13.62 MV
MDC_IDC_PG_IMPLANT_DTM: NORMAL
MDC_IDC_PG_MFG: NORMAL
MDC_IDC_PG_MODEL: NORMAL
MDC_IDC_PG_SERIAL: NORMAL
MDC_IDC_PG_TYPE: NORMAL
MDC_IDC_SESS_CLINIC_NAME: NORMAL
MDC_IDC_SESS_DTM: NORMAL
MDC_IDC_SESS_TYPE: NORMAL
MDC_IDC_SET_BRADY_LOWRATE: 60 {BEATS}/MIN
MDC_IDC_SET_BRADY_MAX_SENSOR_RATE: 130 {BEATS}/MIN
MDC_IDC_SET_BRADY_MODE: NORMAL
MDC_IDC_SET_CRT_LVRV_DELAY: 0 MS
MDC_IDC_SET_CRT_PACED_CHAMBERS: NORMAL
MDC_IDC_SET_LEADCHNL_LV_PACING_AMPLITUDE: 4 V
MDC_IDC_SET_LEADCHNL_LV_PACING_ANODE_ELECTRODE_1: NORMAL
MDC_IDC_SET_LEADCHNL_LV_PACING_CAPTURE_MODE: NORMAL
MDC_IDC_SET_LEADCHNL_LV_PACING_CATHODE_ELECTRODE_1: NORMAL
MDC_IDC_SET_LEADCHNL_LV_PACING_CATHODE_LOCATION_1: NORMAL
MDC_IDC_SET_LEADCHNL_LV_PACING_POLARITY: NORMAL
MDC_IDC_SET_LEADCHNL_LV_PACING_PULSEWIDTH: 1 MS
MDC_IDC_SET_LEADCHNL_RA_PACING_CAPTURE_MODE: NORMAL
MDC_IDC_SET_LEADCHNL_RA_SENSING_ANODE_ELECTRODE_1: NORMAL
MDC_IDC_SET_LEADCHNL_RA_SENSING_ANODE_LOCATION_1: NORMAL
MDC_IDC_SET_LEADCHNL_RA_SENSING_CATHODE_ELECTRODE_1: NORMAL
MDC_IDC_SET_LEADCHNL_RA_SENSING_CATHODE_LOCATION_1: NORMAL
MDC_IDC_SET_LEADCHNL_RA_SENSING_POLARITY: NORMAL
MDC_IDC_SET_LEADCHNL_RA_SENSING_SENSITIVITY: 4 MV
MDC_IDC_SET_LEADCHNL_RV_PACING_AMPLITUDE: 2 V
MDC_IDC_SET_LEADCHNL_RV_PACING_ANODE_ELECTRODE_1: NORMAL
MDC_IDC_SET_LEADCHNL_RV_PACING_ANODE_LOCATION_1: NORMAL
MDC_IDC_SET_LEADCHNL_RV_PACING_CAPTURE_MODE: NORMAL
MDC_IDC_SET_LEADCHNL_RV_PACING_CATHODE_ELECTRODE_1: NORMAL
MDC_IDC_SET_LEADCHNL_RV_PACING_CATHODE_LOCATION_1: NORMAL
MDC_IDC_SET_LEADCHNL_RV_PACING_POLARITY: NORMAL
MDC_IDC_SET_LEADCHNL_RV_PACING_PULSEWIDTH: 0.4 MS
MDC_IDC_SET_LEADCHNL_RV_SENSING_ANODE_ELECTRODE_1: NORMAL
MDC_IDC_SET_LEADCHNL_RV_SENSING_ANODE_LOCATION_1: NORMAL
MDC_IDC_SET_LEADCHNL_RV_SENSING_CATHODE_ELECTRODE_1: NORMAL
MDC_IDC_SET_LEADCHNL_RV_SENSING_CATHODE_LOCATION_1: NORMAL
MDC_IDC_SET_LEADCHNL_RV_SENSING_POLARITY: NORMAL
MDC_IDC_SET_LEADCHNL_RV_SENSING_SENSITIVITY: 0.9 MV
MDC_IDC_SET_ZONE_DETECTION_INTERVAL: 200 MS
MDC_IDC_SET_ZONE_DETECTION_INTERVAL: 350 MS
MDC_IDC_SET_ZONE_DETECTION_INTERVAL: 400 MS
MDC_IDC_SET_ZONE_TYPE: NORMAL
MDC_IDC_STAT_AT_BURDEN_PERCENT: 0 %
MDC_IDC_STAT_AT_DTM_END: NORMAL
MDC_IDC_STAT_AT_DTM_START: NORMAL
MDC_IDC_STAT_BRADY_AP_VP_PERCENT: 0 %
MDC_IDC_STAT_BRADY_AP_VS_PERCENT: 0 %
MDC_IDC_STAT_BRADY_AS_VP_PERCENT: 92.31 %
MDC_IDC_STAT_BRADY_AS_VS_PERCENT: 7.69 %
MDC_IDC_STAT_BRADY_DTM_END: NORMAL
MDC_IDC_STAT_BRADY_DTM_START: NORMAL
MDC_IDC_STAT_BRADY_RA_PERCENT_PACED: 0 %
MDC_IDC_STAT_BRADY_RV_PERCENT_PACED: 94.65 %
MDC_IDC_STAT_EPISODE_RECENT_COUNT: 0
MDC_IDC_STAT_EPISODE_RECENT_COUNT: 2
MDC_IDC_STAT_EPISODE_RECENT_COUNT_DTM_END: NORMAL
MDC_IDC_STAT_EPISODE_RECENT_COUNT_DTM_START: NORMAL
MDC_IDC_STAT_EPISODE_TOTAL_COUNT: 0
MDC_IDC_STAT_EPISODE_TOTAL_COUNT: 0
MDC_IDC_STAT_EPISODE_TOTAL_COUNT: 2
MDC_IDC_STAT_EPISODE_TOTAL_COUNT: 9
MDC_IDC_STAT_EPISODE_TOTAL_COUNT_DTM_END: NORMAL
MDC_IDC_STAT_EPISODE_TOTAL_COUNT_DTM_START: NORMAL
MDC_IDC_STAT_EPISODE_TYPE: NORMAL

## 2020-04-10 DIAGNOSIS — Z45.010 ENCOUNTER FOR PACEMAKER AT END OF BATTERY LIFE: Primary | ICD-10-CM

## 2020-04-10 RX ORDER — LIDOCAINE 40 MG/G
CREAM TOPICAL
Status: CANCELLED | OUTPATIENT
Start: 2020-04-10

## 2020-04-10 RX ORDER — SODIUM CHLORIDE 450 MG/100ML
INJECTION, SOLUTION INTRAVENOUS CONTINUOUS
Status: CANCELLED | OUTPATIENT
Start: 2020-04-10

## 2020-04-10 RX ORDER — CEFAZOLIN SODIUM 2 G/100ML
2 INJECTION, SOLUTION INTRAVENOUS
Status: CANCELLED | OUTPATIENT
Start: 2020-04-10

## 2020-04-10 NOTE — PROGRESS NOTES
Called patient with pre-procedure instructions for device implant:     Anticoagulation: Hold warfarin 2 days prior to procedure (April 12th and 13th) per Dr. Tatum.   Oral diabetes meds: N/A  Insulin: N/A  Diuretic: Hold lasix morning of procedure   Contrast allergy: N/A  Pt informed to be NPO at midnight, may have clear liquid breakfast before 8am.     Instructed pt to shower the morning of the procedure, and then put on a clean shirt in order to help prevent infection.     Pt has post-procedure transportation and 24 hours monitoring set up.   Pt aware of no driving for 24 hours post procedure due to sedation.     Pt aware of arrival time 11AM and at [facility] in [place]. Pt verbalized understanding of instructions.

## 2020-04-14 ENCOUNTER — HOSPITAL ENCOUNTER (OUTPATIENT)
Facility: CLINIC | Age: 85
Discharge: HOME | End: 2020-04-14
Payer: MEDICARE

## 2020-04-14 ENCOUNTER — SURGERY (OUTPATIENT)
Age: 85
End: 2020-04-14
Payer: MEDICARE

## 2020-04-14 VITALS
DIASTOLIC BLOOD PRESSURE: 67 MMHG | WEIGHT: 144.7 LBS | SYSTOLIC BLOOD PRESSURE: 128 MMHG | HEART RATE: 60 BPM | OXYGEN SATURATION: 94 % | RESPIRATION RATE: 16 BRPM | TEMPERATURE: 96.6 F | HEIGHT: 64 IN | BODY MASS INDEX: 24.7 KG/M2

## 2020-04-14 DIAGNOSIS — Z98.890 HX OF ATRIOVENTRICULAR NODE ABLATION: ICD-10-CM

## 2020-04-14 DIAGNOSIS — Z45.010 ENCOUNTER FOR PACEMAKER AT END OF BATTERY LIFE: ICD-10-CM

## 2020-04-14 DIAGNOSIS — I50.9 CHF (CONGESTIVE HEART FAILURE) (H): ICD-10-CM

## 2020-04-14 DIAGNOSIS — Z45.010 ENCOUNTER FOR PACEMAKER AT END OF BATTERY LIFE: Primary | ICD-10-CM

## 2020-04-14 LAB
ANION GAP SERPL CALCULATED.3IONS-SCNC: 7 MMOL/L (ref 3–14)
BUN SERPL-MCNC: 16 MG/DL (ref 7–30)
CALCIUM SERPL-MCNC: 8.8 MG/DL (ref 8.5–10.1)
CHLORIDE SERPL-SCNC: 104 MMOL/L (ref 94–109)
CO2 SERPL-SCNC: 26 MMOL/L (ref 20–32)
CREAT SERPL-MCNC: 1 MG/DL (ref 0.52–1.04)
ERYTHROCYTE [DISTWIDTH] IN BLOOD BY AUTOMATED COUNT: 13.9 % (ref 10–15)
GFR SERPL CREATININE-BSD FRML MDRD: 48 ML/MIN/{1.73_M2}
GLUCOSE SERPL-MCNC: 119 MG/DL (ref 70–99)
HCT VFR BLD AUTO: 41.1 % (ref 35–47)
HGB BLD-MCNC: 14 G/DL (ref 11.7–15.7)
INR BLD: 1.8 (ref 0.86–1.14)
MCH RBC QN AUTO: 31.5 PG (ref 26.5–33)
MCHC RBC AUTO-ENTMCNC: 34.1 G/DL (ref 31.5–36.5)
MCV RBC AUTO: 93 FL (ref 78–100)
PLATELET # BLD AUTO: 185 10E9/L (ref 150–450)
POTASSIUM SERPL-SCNC: 4.2 MMOL/L (ref 3.4–5.3)
RBC # BLD AUTO: 4.44 10E12/L (ref 3.8–5.2)
SODIUM SERPL-SCNC: 137 MMOL/L (ref 133–144)
WBC # BLD AUTO: 7.6 10E9/L (ref 4–11)

## 2020-04-14 PROCEDURE — 36415 COLL VENOUS BLD VENIPUNCTURE: CPT

## 2020-04-14 PROCEDURE — 25800029 ZZH RX IP 258 OP 250: Performed by: INTERNAL MEDICINE

## 2020-04-14 PROCEDURE — 25000125 ZZHC RX 250: Performed by: INTERNAL MEDICINE

## 2020-04-14 PROCEDURE — 93005 ELECTROCARDIOGRAM TRACING: CPT

## 2020-04-14 PROCEDURE — 85610 PROTHROMBIN TIME: CPT | Mod: QW

## 2020-04-14 PROCEDURE — 93010 ELECTROCARDIOGRAM REPORT: CPT | Mod: 59 | Performed by: INTERNAL MEDICINE

## 2020-04-14 PROCEDURE — 40000852 ZZH STATISTIC HEART CATH LAB OR EP LAB

## 2020-04-14 PROCEDURE — 27210794 ZZH OR GENERAL SUPPLY STERILE: Performed by: INTERNAL MEDICINE

## 2020-04-14 PROCEDURE — 40000235 ZZH STATISTIC TELEMETRY

## 2020-04-14 PROCEDURE — 99152 MOD SED SAME PHYS/QHP 5/>YRS: CPT | Performed by: INTERNAL MEDICINE

## 2020-04-14 PROCEDURE — 99153 MOD SED SAME PHYS/QHP EA: CPT | Performed by: INTERNAL MEDICINE

## 2020-04-14 PROCEDURE — 33229 REMV&REPLC PM GEN MULT LEADS: CPT | Performed by: INTERNAL MEDICINE

## 2020-04-14 PROCEDURE — 80048 BASIC METABOLIC PNL TOTAL CA: CPT | Performed by: INTERNAL MEDICINE

## 2020-04-14 PROCEDURE — 85027 COMPLETE CBC AUTOMATED: CPT | Performed by: INTERNAL MEDICINE

## 2020-04-14 PROCEDURE — C2621 PMKR, OTHER THAN SING/DUAL: HCPCS | Performed by: INTERNAL MEDICINE

## 2020-04-14 PROCEDURE — 25000128 H RX IP 250 OP 636: Performed by: INTERNAL MEDICINE

## 2020-04-14 PROCEDURE — 40000065 ZZH STATISTIC EKG NON-CHARGEABLE

## 2020-04-14 PROCEDURE — 99152 MOD SED SAME PHYS/QHP 5/>YRS: CPT | Mod: 59 | Performed by: INTERNAL MEDICINE

## 2020-04-14 DEVICE — PCMKR PERCEPTA BIPOLAR CRT-P M: Type: IMPLANTABLE DEVICE | Status: FUNCTIONAL

## 2020-04-14 RX ORDER — ACETAMINOPHEN 325 MG/1
650 TABLET ORAL EVERY 4 HOURS PRN
Status: DISCONTINUED | OUTPATIENT
Start: 2020-04-14 | End: 2020-04-14 | Stop reason: HOSPADM

## 2020-04-14 RX ORDER — BUPIVACAINE HYDROCHLORIDE 2.5 MG/ML
INJECTION, SOLUTION EPIDURAL; INFILTRATION; INTRACAUDAL
Status: DISCONTINUED | OUTPATIENT
Start: 2020-04-14 | End: 2020-04-14 | Stop reason: HOSPADM

## 2020-04-14 RX ORDER — HEPARIN SODIUM 200 [USP'U]/100ML
100-500 INJECTION, SOLUTION INTRAVENOUS CONTINUOUS PRN
Status: DISCONTINUED | OUTPATIENT
Start: 2020-04-14 | End: 2020-04-14 | Stop reason: HOSPADM

## 2020-04-14 RX ORDER — FENTANYL CITRATE 50 UG/ML
INJECTION, SOLUTION INTRAMUSCULAR; INTRAVENOUS
Status: DISCONTINUED | OUTPATIENT
Start: 2020-04-14 | End: 2020-04-14 | Stop reason: HOSPADM

## 2020-04-14 RX ORDER — NALOXONE HYDROCHLORIDE 0.4 MG/ML
.1-.4 INJECTION, SOLUTION INTRAMUSCULAR; INTRAVENOUS; SUBCUTANEOUS
Status: DISCONTINUED | OUTPATIENT
Start: 2020-04-14 | End: 2020-04-14 | Stop reason: HOSPADM

## 2020-04-14 RX ORDER — HEPARIN SODIUM 200 [USP'U]/100ML
100-600 INJECTION, SOLUTION INTRAVENOUS CONTINUOUS PRN
Status: DISCONTINUED | OUTPATIENT
Start: 2020-04-14 | End: 2020-04-14 | Stop reason: HOSPADM

## 2020-04-14 RX ORDER — CEFAZOLIN SODIUM 1 G/3ML
1 INJECTION, POWDER, FOR SOLUTION INTRAMUSCULAR; INTRAVENOUS
Status: DISCONTINUED | OUTPATIENT
Start: 2020-04-14 | End: 2020-04-14 | Stop reason: HOSPADM

## 2020-04-14 RX ORDER — LIDOCAINE 40 MG/G
CREAM TOPICAL
Status: DISCONTINUED | OUTPATIENT
Start: 2020-04-14 | End: 2020-04-14 | Stop reason: HOSPADM

## 2020-04-14 RX ORDER — CEFAZOLIN SODIUM 2 G/100ML
2 INJECTION, SOLUTION INTRAVENOUS
Status: COMPLETED | OUTPATIENT
Start: 2020-04-14 | End: 2020-04-14

## 2020-04-14 RX ORDER — SODIUM CHLORIDE 450 MG/100ML
INJECTION, SOLUTION INTRAVENOUS CONTINUOUS
Status: DISCONTINUED | OUTPATIENT
Start: 2020-04-14 | End: 2020-04-14 | Stop reason: HOSPADM

## 2020-04-14 RX ADMIN — MIDAZOLAM 0.5 MG: 1 INJECTION INTRAMUSCULAR; INTRAVENOUS at 13:14

## 2020-04-14 RX ADMIN — FENTANYL CITRATE 25 MCG: 50 INJECTION, SOLUTION INTRAMUSCULAR; INTRAVENOUS at 13:24

## 2020-04-14 RX ADMIN — MIDAZOLAM 0.5 MG: 1 INJECTION INTRAMUSCULAR; INTRAVENOUS at 13:24

## 2020-04-14 RX ADMIN — SODIUM CHLORIDE: 4.5 INJECTION, SOLUTION INTRAVENOUS at 11:36

## 2020-04-14 RX ADMIN — FENTANYL CITRATE 25 MCG: 50 INJECTION, SOLUTION INTRAMUSCULAR; INTRAVENOUS at 13:43

## 2020-04-14 RX ADMIN — FENTANYL CITRATE 25 MCG: 50 INJECTION, SOLUTION INTRAMUSCULAR; INTRAVENOUS at 13:35

## 2020-04-14 RX ADMIN — MIDAZOLAM 0.5 MG: 1 INJECTION INTRAMUSCULAR; INTRAVENOUS at 13:35

## 2020-04-14 RX ADMIN — BACITRACIN 20 ML: 5000 INJECTION, POWDER, FOR SOLUTION INTRAMUSCULAR at 13:36

## 2020-04-14 RX ADMIN — FENTANYL CITRATE 25 MCG: 50 INJECTION, SOLUTION INTRAMUSCULAR; INTRAVENOUS at 13:14

## 2020-04-14 RX ADMIN — CEFAZOLIN SODIUM 2 G: 2 INJECTION, SOLUTION INTRAVENOUS at 12:45

## 2020-04-14 RX ADMIN — BUPIVACAINE HYDROCHLORIDE 10 ML: 2.5 INJECTION, SOLUTION EPIDURAL; INFILTRATION; INTRACAUDAL; PERINEURAL at 13:29

## 2020-04-14 RX ADMIN — LIDOCAINE HYDROCHLORIDE 5 ML: 10 INJECTION, SOLUTION EPIDURAL; INFILTRATION; INTRACAUDAL; PERINEURAL at 13:40

## 2020-04-14 RX ADMIN — MIDAZOLAM 0.5 MG: 1 INJECTION INTRAMUSCULAR; INTRAVENOUS at 13:17

## 2020-04-14 RX ADMIN — LIDOCAINE HYDROCHLORIDE 10 ML: 10 INJECTION, SOLUTION EPIDURAL; INFILTRATION; INTRACAUDAL; PERINEURAL at 13:29

## 2020-04-14 ASSESSMENT — MIFFLIN-ST. JEOR: SCORE: 1041.35

## 2020-04-14 NOTE — DISCHARGE INSTRUCTIONS
Pacemaker Generator Change Discharge Instructions    After you go home:      Have an adult stay with you until tomorrow.    You may resume your normal diet.       For 24 hours - due to the sedation you received:    Relax and take it easy.    Do NOT make any important or legal decisions.    Do NOT drive or operate machines at home or at work.    Do NOT drink alcohol.    Care of Chest Incision:      Keep the bandage on at least 3 days. You may remove the dressing on Saturday. Change it only if it gets loose or soaked. If you need to change it, use 4x4-inch gauze and a large clear bandage.     If there is a pressure dressing (gauze & tape) - 24 hours after your procedure you may remove ONLY the top dressing. Leave the bottom dressing on.    Leave the strips of tape on. They will fall off on their own, or we will remove them at your first check-up.    Check your wound daily for signs of infection, such as increased redness, severe swelling or draining. Fever may also be a sign of infection. Call us if you see any of these signs.    If there are no signs of infection, you may shower after the bandage comes off in 3 days. If you take a tub bath, keep the wound dry.    No soaking the incision (swimming pool, bathtub, hot tub) for 2 weeks.    You may have mild to medium pain for 3 to 5 days. Take Acetaminophen (Tylenol) or Ibuprofen (Advil) for the pain. If the pain persists or is severe, call us.    Activity:      You can begin to use your arm as it feels comfortable to you.    No driving for one day & limit to necessary driving for one week.    Bleeding:      If you start bleeding from the incision site, sit down and press firmly on the site for 10 minutes.     Once bleeding stops, call [facility] as soon as you can.       Call 911 right away if you have heavy bleeding or bleeding that does not stop.      Medicines:      Take your medications, including blood thinners, unless your provider tells you not to.    If  you have stopped any other medicines, check with your provider about when to restart them.    Follow Up Appointments:      Follow up with Device Clinic at [facility] of patient preference in 7-10 days.    Call the clinic if:      You have a large or growing hard lump around the site.    The site is red, swollen, hot or tender.    Blood or fluid is draining from the site.    You have chills or a fever greater than 101 F (38 C).    You feel dizzy or light-headed.    Questions or concerns.      Telling others about your device:      Before you leave the hospital, you will receive a temporary ID card. A permanent card will be mailed to you about 6 to 8 weeks later. Always carry the ID card with you. It has important details about your device.    You may also get a Medical Alert bracelet or tag that says you have a pacemaker or a defibrillator (ICD).  Go to [email or web address].     Always tell doctors, dentists and other care providers that you have a device implanted in you.    Let us know before you plan any surgeries. Your care team must take special steps to keep you safe during certain procedures. These steps will depend on the type of device you have. Your provider will need to see your ID card. They may need to call us for instructions.    Device Safety:      Please refer to device  s booklet for further information.        [facility] [place]:    [phone] [facility] (7 days a week)

## 2020-04-14 NOTE — H&P
Admitted:     2020        REASON FOR ADMISSION:  Pacemaker generator replacement.      HISTORY OF PRESENT ILLNESS:    Ms. Haritha Trujillo is a delightful 89-year-old female with history of nonischemic cardiomyopathy and permanent atrial fibrillation.  She underwent AV node ablation with biventricular pacemaker upgrade in 2015.  Her LV function improved after CRT.  The pacemaker generator recently reached MICHAEL.  The patient is pacemaker-dependent and arrangements were made for device replacement today.  Of note, she has had intermittent diaphragmatic stimulation from the Lv lead for years.  This is mild and typically goes away with change in position.  She is otherwise feeling well.      PHYSICAL EXAMINATION:   VITAL SIGNS:  Blood pressure 130/82, pulse 70 and regular, weight 69 kg.   LUNGS:  Clear.   CHEST:  Nicely healed left pectoral incision.  One of the leads is protruding under the skin towards the axilla.   ABDOMEN:  Soft, nontender.   EXTREMITIES:  No edema.      DIAGNOSTIC STUDIES:  INR 1.8, creatinine 1, sodium 137, potassium 4.2, hematocrit 41%.        IMPRESSION & PLAN:    1.  Pacemaker reaching MICHAEL.  Mrs. Trujillo is pacemaker-dependent after AV node ablation in .  Her pacemaker has now reached MICHAEL.  The plan today is to replace her generator with a new CRTP.         The technical aspects, risks and benefits of the procedure were discussed in detail with the patient.  I quoted her an approximately 1% to 2% risk of infection, which can be a serious complication in this setting.  We discussed methods to decrease the likelihood of infection.        She expressed understanding and agreed to proceed.        We plan to resume warfarin later today.         DOUGLAS FOWLER MD, [facility]             D: 2020   T: 2020   MT: GINO      Name:     HARITHA TRUJILLO   MRN:      [id number]        Account:      [id number]   :      1925        Admitted:     2020                    Document: [id number]       cc: Kirit Michel MD

## 2020-04-14 NOTE — PROGRESS NOTES
Care Suites Post Procedure Note    Patient Information  Name: Haritha Trujillo  Age: 89 year old    Post Procedure  Procedure: pacemaker generator change  Time patient returned to Care Suites: 1400  Concerns/abnormal assessment: none  If abnormal assessment, provider notified: N/A  Plan/Other: home after recovered from sedation      Care Suites Discharge Nursing Note    Patient Information  Name: Haritha Trujillo  Age: 89 year old    Discharge Education:  Discharge instructions reviewed: Yes  Additional education/resources provided: yes from [employer] rep  Patient/patient representative verbalizes understanding: Yes  Patient discharging on new medications: No  Medication education completed: Yes    Discharge Plans:   Discharge location: home  Discharge ride contacted: Yes  Approximate discharge time: 1515    Discharge Criteria:  Discharge criteria met and vital signs stable: Yes    Patient Belongs:  Patient belongings returned to patient: Yes    Jodi Bass RN

## 2020-04-14 NOTE — PRE-PROCEDURE
GENERAL PRE-PROCEDURE:   Procedure:  PM replacement  Date/Time:  4/14/2020 1:20 PM    Written consent obtained?: Yes    Risks and benefits: Risks, benefits and alternatives were discussed    Consent given by:  Patient  Patient states understanding of procedure being performed: Yes    Patient's understanding of procedure matches consent: Yes    Procedure consent matches procedure scheduled: Yes    Expected level of sedation:  Moderate  Appropriately NPO:  Yes  ASA Class:  Class 2- mild systemic disease, no acute problems, no functional limitations  Mallampati  :  Grade 2- soft palate, base of uvula, tonsillar pillars, and portion of posterior pharyngeal wall visible  Lungs:  Lungs clear with good breath sounds bilaterally  Heart:  Normal heart sounds and rate  History & Physical reviewed:  History and physical reviewed and no updates needed  Statement of review:  I have reviewed the lab findings, diagnostic data, medications, and the plan for sedation

## 2020-04-14 NOTE — PROGRESS NOTES
Care Suites Admission Nursing Note    Patient Information  Name: Haritha Trujillo  Age: 89 year old  Reason for admission: Pacemaker generator change  Care Suites arrival time: 1100    Patient Admission/Assessment   Pre-procedure assessment complete: Yes  If abnormal assessment/labs, provider notified: N/A  NPO: Yes  Medications held per instructions/orders: Yes  Consent: obtained  If applicable, pregnancy test status: deferred  Patient oriented to room: Yes  Education/questions answered: Yes  Plan/other: To EP lab for generator change    Discharge Planning  Accompanied by: Son Philipp will  when all done  Discharge name/phone number: Philipp [phone]  Overnight post sedation caregiver: same  Discharge location: home      PATIENT WELLNESS SCREENING    Step 1: Answer all screening questions 1-3.    1. In the last month, have you been in contact with someone who was confirmed or suspected to have Coronavirus/COVID-19? No    2. Do you have the following symptoms?   Fever? No   Cough? No   Shortness of breath? No   Skin rash? No    3. Have you traveled internationally in the last month? No   If so, where?     Step 2: Refer to logic grid below for actions    NO SYMPTOM(S)    ACTIONS:  1. Standard rooming process  2. Provider to assess per normal protocol    POSITIVE SYMPTOM(S)  If positive for ANY of the following symptoms: fever, cough, shortness of breath, rash    ACTIONS  1. Mask patient  2. Room patient as soon as possible  3. Don appropriate PPE when entering room  4. Provider evaluation      Jodi Bass RN

## 2020-04-14 NOTE — PROGRESS NOTES
Dictated.    Successful CRT pacemaker gen replacement (Medtronic).      No apparent complication.    EBL = 5-10 cc.      Plan:  - home later today, if all is well   - resume warfarin tonight

## 2020-04-15 ENCOUNTER — TELEPHONE (OUTPATIENT)
Dept: CARDIOLOGY | Facility: CLINIC | Age: 85
End: 2020-04-15

## 2020-04-15 DIAGNOSIS — Z95.0 CARDIAC PACEMAKER IN SITU: Primary | ICD-10-CM

## 2020-04-15 NOTE — TELEPHONE ENCOUNTER
Patient had a CRT-P generator change done yesterday.     Post device implant discharge phone call.    Reviewed the following:    Remove outer dressing 3 days after implant. May shower after outer dressing removed. Leave steri-strips in place, will be removed at 1 week device check  Limit driving for: N/A  Watch for redness, drainage, warmth, or fever. Call device clinic if any signs of infection.     1 week device check scheduled: 4/24/20. This will be done remotely due to COVID-19. Provided patient with the number to [employer] and asked that she call them if she has not received a call by the end of this week. She has been asked to call us back with any questions or concerns in the meantime.  She states that she feels well and has no concerns at this time.     Pt states understanding of all instructions.

## 2020-04-16 ENCOUNTER — TELEPHONE (OUTPATIENT)
Dept: CARDIOLOGY | Facility: CLINIC | Age: 85
End: 2020-04-16

## 2020-04-16 NOTE — TELEPHONE ENCOUNTER
Received call from patient stating that her PPM remote monitor would be arriving in 4-7 days.  Pt is scheduled for a remote device check on Friday 4/24.  Patient instructed to call the clinic once the monitor arrives for assistance in setting it up.      Also reviewed instructions to to keep outer bandage dry and remove this in 2 days.  Patient instructed to leave steri-strips in place and told it would be okay to shower after outer bandage removed.  Instructed patient to avoid lotions/creams/powders and to notify the clinic if she notices any changes or signs of infection at her incision site.      Patient verbalized understanding and will call the clinic with questions.      Post-discharge phone call with post-implant instructions was previously completed on 4/15/20.     BAKARI Rachel

## 2020-04-17 LAB — INTERPRETATION ECG - MUSE: NORMAL

## 2020-04-24 ENCOUNTER — ANCILLARY PROCEDURE (OUTPATIENT)
Dept: CARDIOLOGY | Facility: CLINIC | Age: 85
End: 2020-04-24
Attending: INTERNAL MEDICINE
Payer: MEDICARE

## 2020-04-24 ENCOUNTER — TELEPHONE (OUTPATIENT)
Dept: CARDIOLOGY | Facility: CLINIC | Age: 85
End: 2020-04-24

## 2020-04-24 DIAGNOSIS — I49.5 SICK SINUS SYNDROME (H): ICD-10-CM

## 2020-04-24 DIAGNOSIS — Z95.0 CARDIAC PACEMAKER IN SITU: Primary | ICD-10-CM

## 2020-04-24 DIAGNOSIS — Z95.0 CARDIAC PACEMAKER IN SITU: ICD-10-CM

## 2020-04-24 PROCEDURE — 93296 REM INTERROG EVL PM/IDS: CPT | Performed by: INTERNAL MEDICINE

## 2020-04-24 PROCEDURE — 93294 REM INTERROG EVL PM/LDLS PM: CPT | Performed by: INTERNAL MEDICINE

## 2020-04-24 NOTE — TELEPHONE ENCOUNTER
"Please doublecheck that her LV lead has been programmed EXACTLY as before the recent replacement procedure.  I believe the best setting has historically been  \"LV ring to can\".  It is difficult to know why the LV threshold has increased post recent procedure in this 5 year old lead, unless it has something to do with the new \"can\"...     If settings are indeed the same as before and she continues to be significantly bothered by diaphragmatic stim, we should bring her in and turn the LV lead \"off\".    Thx, DI  "

## 2020-04-24 NOTE — TELEPHONE ENCOUNTER
"Called patient for 10 day follow up post pacemaker generator change. Discussed elevated LV threshold, with that information patient stated the for the past two nights her diaphragmatic stim has been very noticeable. She stated that it woke her from sleep and is a \"nuisance\", however she is able to tolerate it if she needs too. Currently patients LV settings are adaptive +auto, programmed at 6.0V@1.0ms based on today's remote data, and explained that may be why it has been increasingly noticeable this past week. Told patient that she didn't need to tolerate it and we could bring her in for programming changes. Patient stated that she would like to see how the weekend goes. Reviewed plan with patient to send Dr. Deepti DELCID and plan on calling her to set up an in clinic device appointment next week. She was agreeable to the plan. Also encouraged patient to call the clinic if she has any further questions or concerns.         "

## 2020-04-27 LAB
MDC_IDC_LEAD_IMPLANT_DT: NORMAL
MDC_IDC_LEAD_LOCATION: NORMAL
MDC_IDC_LEAD_LOCATION_DETAIL_1: NORMAL
MDC_IDC_LEAD_MFG: NORMAL
MDC_IDC_LEAD_MODEL: NORMAL
MDC_IDC_LEAD_POLARITY_TYPE: NORMAL
MDC_IDC_LEAD_SERIAL: NORMAL
MDC_IDC_MSMT_BATTERY_DTM: NORMAL
MDC_IDC_MSMT_BATTERY_REMAINING_LONGEVITY: 37 MO
MDC_IDC_MSMT_BATTERY_RRT_TRIGGER: 2.6
MDC_IDC_MSMT_BATTERY_STATUS: NORMAL
MDC_IDC_MSMT_BATTERY_VOLTAGE: 3.1 V
MDC_IDC_MSMT_LEADCHNL_LV_IMPEDANCE_VALUE: 437 OHM
MDC_IDC_MSMT_LEADCHNL_LV_IMPEDANCE_VALUE: 532 OHM
MDC_IDC_MSMT_LEADCHNL_LV_IMPEDANCE_VALUE: 532 OHM
MDC_IDC_MSMT_LEADCHNL_LV_IMPEDANCE_VALUE: 627 OHM
MDC_IDC_MSMT_LEADCHNL_LV_IMPEDANCE_VALUE: 836 OHM
MDC_IDC_MSMT_LEADCHNL_LV_PACING_THRESHOLD_AMPLITUDE: 4.25 V
MDC_IDC_MSMT_LEADCHNL_LV_PACING_THRESHOLD_PULSEWIDTH: 1 MS
MDC_IDC_MSMT_LEADCHNL_RA_IMPEDANCE_VALUE: 304 OHM
MDC_IDC_MSMT_LEADCHNL_RA_IMPEDANCE_VALUE: 342 OHM
MDC_IDC_MSMT_LEADCHNL_RV_IMPEDANCE_VALUE: 323 OHM
MDC_IDC_MSMT_LEADCHNL_RV_IMPEDANCE_VALUE: 418 OHM
MDC_IDC_MSMT_LEADCHNL_RV_PACING_THRESHOLD_AMPLITUDE: 0.75 V
MDC_IDC_MSMT_LEADCHNL_RV_PACING_THRESHOLD_PULSEWIDTH: 0.4 MS
MDC_IDC_PG_IMPLANT_DTM: NORMAL
MDC_IDC_PG_MFG: NORMAL
MDC_IDC_PG_MODEL: NORMAL
MDC_IDC_PG_SERIAL: NORMAL
MDC_IDC_PG_TYPE: NORMAL
MDC_IDC_SESS_CLINIC_NAME: NORMAL
MDC_IDC_SESS_DTM: NORMAL
MDC_IDC_SESS_TYPE: NORMAL
MDC_IDC_SET_BRADY_LOWRATE: 60 {BEATS}/MIN
MDC_IDC_SET_BRADY_MAX_SENSOR_RATE: 130 {BEATS}/MIN
MDC_IDC_SET_BRADY_MODE: NORMAL
MDC_IDC_SET_CRT_LVRV_DELAY: 0 MS
MDC_IDC_SET_CRT_PACED_CHAMBERS: NORMAL
MDC_IDC_SET_LEADCHNL_LV_PACING_AMPLITUDE: 6 V
MDC_IDC_SET_LEADCHNL_LV_PACING_ANODE_ELECTRODE_1: NORMAL
MDC_IDC_SET_LEADCHNL_LV_PACING_CAPTURE_MODE: NORMAL
MDC_IDC_SET_LEADCHNL_LV_PACING_CATHODE_ELECTRODE_1: NORMAL
MDC_IDC_SET_LEADCHNL_LV_PACING_CATHODE_LOCATION_1: NORMAL
MDC_IDC_SET_LEADCHNL_LV_PACING_POLARITY: NORMAL
MDC_IDC_SET_LEADCHNL_LV_PACING_PULSEWIDTH: 1 MS
MDC_IDC_SET_LEADCHNL_RA_SENSING_ANODE_ELECTRODE_1: NORMAL
MDC_IDC_SET_LEADCHNL_RA_SENSING_ANODE_LOCATION_1: NORMAL
MDC_IDC_SET_LEADCHNL_RA_SENSING_CATHODE_ELECTRODE_1: NORMAL
MDC_IDC_SET_LEADCHNL_RA_SENSING_CATHODE_LOCATION_1: NORMAL
MDC_IDC_SET_LEADCHNL_RA_SENSING_POLARITY: NORMAL
MDC_IDC_SET_LEADCHNL_RA_SENSING_SENSITIVITY: 4 MV
MDC_IDC_SET_LEADCHNL_RV_PACING_AMPLITUDE: 2 V
MDC_IDC_SET_LEADCHNL_RV_PACING_ANODE_ELECTRODE_1: NORMAL
MDC_IDC_SET_LEADCHNL_RV_PACING_ANODE_LOCATION_1: NORMAL
MDC_IDC_SET_LEADCHNL_RV_PACING_CAPTURE_MODE: NORMAL
MDC_IDC_SET_LEADCHNL_RV_PACING_CATHODE_ELECTRODE_1: NORMAL
MDC_IDC_SET_LEADCHNL_RV_PACING_CATHODE_LOCATION_1: NORMAL
MDC_IDC_SET_LEADCHNL_RV_PACING_POLARITY: NORMAL
MDC_IDC_SET_LEADCHNL_RV_PACING_PULSEWIDTH: 0.4 MS
MDC_IDC_SET_LEADCHNL_RV_SENSING_ANODE_ELECTRODE_1: NORMAL
MDC_IDC_SET_LEADCHNL_RV_SENSING_ANODE_LOCATION_1: NORMAL
MDC_IDC_SET_LEADCHNL_RV_SENSING_CATHODE_ELECTRODE_1: NORMAL
MDC_IDC_SET_LEADCHNL_RV_SENSING_CATHODE_LOCATION_1: NORMAL
MDC_IDC_SET_LEADCHNL_RV_SENSING_POLARITY: NORMAL
MDC_IDC_SET_LEADCHNL_RV_SENSING_SENSITIVITY: 0.9 MV
MDC_IDC_SET_ZONE_DETECTION_INTERVAL: 350 MS
MDC_IDC_SET_ZONE_DETECTION_INTERVAL: 400 MS
MDC_IDC_SET_ZONE_TYPE: NORMAL
MDC_IDC_STAT_BRADY_AP_VP_PERCENT: 0 %
MDC_IDC_STAT_BRADY_AP_VS_PERCENT: 0 %
MDC_IDC_STAT_BRADY_AS_VP_PERCENT: 93.36 %
MDC_IDC_STAT_BRADY_AS_VS_PERCENT: 6.64 %
MDC_IDC_STAT_BRADY_DTM_END: NORMAL
MDC_IDC_STAT_BRADY_DTM_START: NORMAL
MDC_IDC_STAT_BRADY_RA_PERCENT_PACED: 0 %
MDC_IDC_STAT_BRADY_RV_PERCENT_PACED: 93.35 %
MDC_IDC_STAT_CRT_DTM_END: NORMAL
MDC_IDC_STAT_CRT_DTM_START: NORMAL
MDC_IDC_STAT_CRT_LV_PERCENT_PACED: 93.32 %
MDC_IDC_STAT_CRT_PERCENT_PACED: 93.32 %
MDC_IDC_STAT_EPISODE_RECENT_COUNT: 0
MDC_IDC_STAT_EPISODE_RECENT_COUNT_DTM_END: NORMAL
MDC_IDC_STAT_EPISODE_RECENT_COUNT_DTM_START: NORMAL
MDC_IDC_STAT_EPISODE_TOTAL_COUNT: 0
MDC_IDC_STAT_EPISODE_TOTAL_COUNT_DTM_END: NORMAL
MDC_IDC_STAT_EPISODE_TOTAL_COUNT_DTM_START: NORMAL
MDC_IDC_STAT_EPISODE_TYPE: NORMAL

## 2020-04-27 NOTE — TELEPHONE ENCOUNTER
"Called patient regarding coming into the clinic for a device check to make programming adjustments due to diaphragmatic stim. Patient believes she found a solution with repositioning and wearing less restrictive clothing. Patient was able to sleep well this weekend, and hasn't have any noticeable diaphragmatic stim today. Told patient that we would like to see her in clinic to make adjustments to help with diaphragmatic stim and preserve battery longevity. Patient stated she would like to wait at this time or until it \"changes her lifestyle\". Encouraged patient to call the clinic if she notices increased diaphragmatic stim or any other concerns. Will follow up in two weeks to see if patient feels more comfortable coming into the clinic for programming changes.   "

## 2020-04-30 DIAGNOSIS — I50.9 CHF (CONGESTIVE HEART FAILURE) (H): ICD-10-CM

## 2020-04-30 RX ORDER — METOPROLOL SUCCINATE 50 MG/1
50 TABLET, EXTENDED RELEASE ORAL 2 TIMES DAILY
Qty: 180 TABLET | Refills: 3 | Status: SHIPPED | OUTPATIENT
Start: 2020-04-30 | End: 2021-09-10

## 2020-06-22 DIAGNOSIS — I10 ESSENTIAL HYPERTENSION: ICD-10-CM

## 2020-06-22 RX ORDER — LISINOPRIL 10 MG/1
10 TABLET ORAL DAILY
Qty: 90 TABLET | Refills: 2 | Status: SHIPPED | OUTPATIENT
Start: 2020-06-22 | End: 2021-03-15

## 2020-06-23 ENCOUNTER — ANCILLARY PROCEDURE (OUTPATIENT)
Dept: CARDIOLOGY | Facility: CLINIC | Age: 85
End: 2020-06-23
Attending: INTERNAL MEDICINE
Payer: COMMERCIAL

## 2020-06-23 ENCOUNTER — TELEPHONE (OUTPATIENT)
Dept: CARDIOLOGY | Facility: CLINIC | Age: 85
End: 2020-06-23

## 2020-06-23 DIAGNOSIS — I49.5 SICK SINUS SYNDROME (H): ICD-10-CM

## 2020-06-23 DIAGNOSIS — Z95.0 CARDIAC PACEMAKER IN SITU: ICD-10-CM

## 2020-06-23 NOTE — TELEPHONE ENCOUNTER
"Pt left VM. She is having lots of diaphragmatic stim from her CRT-P. She says it's worse when she wears tight clothing, and it's been happening a lot more lately, and driving her crazy.     Chart reviewed. This was discussed with pt in April after her gen change. She was having increased diaphragmatic stim after her 1 week remote check and her LV threshold was elevated as well. Dr. Tatum wrote the following on 4/24/2020:   Please doublecheck that her LV lead has been programmed EXACTLY as before the recent replacement procedure.  I believe the best setting has historically been  \"LV ring to can\".  It is difficult to know why the LV threshold has increased post recent procedure in this 5 year old lead, unless it has something to do with the new \"can\"...    If settings are indeed the same as before and she continues to be significantly bothered by diaphragmatic stim, we should bring her in and turn the LV lead \"off\".   EVELYN Smith    Called pt. She can come in today for setting changes. Scheduled pt for 10:30am.       Wellness Screening Tool    Symptom Screening:  Do you have one of the following NEW symptoms:    Fever (subjective or >100.0)?  No    New cough? No    Shortness of breath? No    Chills? No    New loss of taste or smell? No    Generalized body aches? No    New persistent headache? No    New sore throat? No    Nausea, vomiting or diarrhea? No  Within the past 3 weeks, have you been exposed to someone with a known positive illness below?    COVID - 19 (known or suspected) No    Chicken pox?  No    Measles? No    Pertussis? No  Patient notified of visitor restriction: Yes  Patient informed to wear a mask: Yes  Patient's appointment status: Patient will be seen in clinic as scheduled on: today at 10:30    "

## 2020-06-24 LAB
MDC_IDC_LEAD_IMPLANT_DT: NORMAL
MDC_IDC_LEAD_LOCATION: NORMAL
MDC_IDC_LEAD_LOCATION_DETAIL_1: NORMAL
MDC_IDC_LEAD_MFG: NORMAL
MDC_IDC_LEAD_MODEL: NORMAL
MDC_IDC_LEAD_POLARITY_TYPE: NORMAL
MDC_IDC_LEAD_SERIAL: NORMAL
MDC_IDC_MSMT_BATTERY_DTM: NORMAL
MDC_IDC_MSMT_BATTERY_REMAINING_LONGEVITY: 62 MO
MDC_IDC_MSMT_BATTERY_RRT_TRIGGER: 2.6
MDC_IDC_MSMT_BATTERY_STATUS: NORMAL
MDC_IDC_MSMT_BATTERY_VOLTAGE: 2.98 V
MDC_IDC_MSMT_LEADCHNL_LV_IMPEDANCE_VALUE: 513 OHM
MDC_IDC_MSMT_LEADCHNL_LV_IMPEDANCE_VALUE: 532 OHM
MDC_IDC_MSMT_LEADCHNL_LV_IMPEDANCE_VALUE: 570 OHM
MDC_IDC_MSMT_LEADCHNL_LV_IMPEDANCE_VALUE: 608 OHM
MDC_IDC_MSMT_LEADCHNL_LV_IMPEDANCE_VALUE: 836 OHM
MDC_IDC_MSMT_LEADCHNL_LV_PACING_THRESHOLD_AMPLITUDE: 5 V
MDC_IDC_MSMT_LEADCHNL_LV_PACING_THRESHOLD_PULSEWIDTH: 1 MS
MDC_IDC_MSMT_LEADCHNL_RA_IMPEDANCE_VALUE: 266 OHM
MDC_IDC_MSMT_LEADCHNL_RA_IMPEDANCE_VALUE: 304 OHM
MDC_IDC_MSMT_LEADCHNL_RV_IMPEDANCE_VALUE: 323 OHM
MDC_IDC_MSMT_LEADCHNL_RV_IMPEDANCE_VALUE: 399 OHM
MDC_IDC_MSMT_LEADCHNL_RV_PACING_THRESHOLD_AMPLITUDE: 0.88 V
MDC_IDC_MSMT_LEADCHNL_RV_PACING_THRESHOLD_PULSEWIDTH: 0.4 MS
MDC_IDC_MSMT_LEADCHNL_RV_SENSING_INTR_AMPL: 13.5 MV
MDC_IDC_MSMT_LEADCHNL_RV_SENSING_INTR_AMPL: 13.5 MV
MDC_IDC_PG_IMPLANT_DTM: NORMAL
MDC_IDC_PG_MFG: NORMAL
MDC_IDC_PG_MODEL: NORMAL
MDC_IDC_PG_SERIAL: NORMAL
MDC_IDC_PG_TYPE: NORMAL
MDC_IDC_SESS_CLINIC_NAME: NORMAL
MDC_IDC_SESS_DTM: NORMAL
MDC_IDC_SESS_TYPE: NORMAL
MDC_IDC_SET_BRADY_LOWRATE: 60 {BEATS}/MIN
MDC_IDC_SET_BRADY_MAX_SENSOR_RATE: 130 {BEATS}/MIN
MDC_IDC_SET_BRADY_MODE: NORMAL
MDC_IDC_SET_CRT_LVRV_DELAY: 0 MS
MDC_IDC_SET_CRT_PACED_CHAMBERS: NORMAL
MDC_IDC_SET_LEADCHNL_LV_PACING_AMPLITUDE: 4 V
MDC_IDC_SET_LEADCHNL_LV_PACING_ANODE_ELECTRODE_1: NORMAL
MDC_IDC_SET_LEADCHNL_LV_PACING_CAPTURE_MODE: NORMAL
MDC_IDC_SET_LEADCHNL_LV_PACING_CATHODE_ELECTRODE_1: NORMAL
MDC_IDC_SET_LEADCHNL_LV_PACING_CATHODE_LOCATION_1: NORMAL
MDC_IDC_SET_LEADCHNL_LV_PACING_POLARITY: NORMAL
MDC_IDC_SET_LEADCHNL_LV_PACING_PULSEWIDTH: 1 MS
MDC_IDC_SET_LEADCHNL_RA_SENSING_ANODE_ELECTRODE_1: NORMAL
MDC_IDC_SET_LEADCHNL_RA_SENSING_ANODE_LOCATION_1: NORMAL
MDC_IDC_SET_LEADCHNL_RA_SENSING_CATHODE_ELECTRODE_1: NORMAL
MDC_IDC_SET_LEADCHNL_RA_SENSING_CATHODE_LOCATION_1: NORMAL
MDC_IDC_SET_LEADCHNL_RA_SENSING_POLARITY: NORMAL
MDC_IDC_SET_LEADCHNL_RA_SENSING_SENSITIVITY: 4 MV
MDC_IDC_SET_LEADCHNL_RV_PACING_AMPLITUDE: 2 V
MDC_IDC_SET_LEADCHNL_RV_PACING_ANODE_ELECTRODE_1: NORMAL
MDC_IDC_SET_LEADCHNL_RV_PACING_ANODE_LOCATION_1: NORMAL
MDC_IDC_SET_LEADCHNL_RV_PACING_CAPTURE_MODE: NORMAL
MDC_IDC_SET_LEADCHNL_RV_PACING_CATHODE_ELECTRODE_1: NORMAL
MDC_IDC_SET_LEADCHNL_RV_PACING_CATHODE_LOCATION_1: NORMAL
MDC_IDC_SET_LEADCHNL_RV_PACING_POLARITY: NORMAL
MDC_IDC_SET_LEADCHNL_RV_PACING_PULSEWIDTH: 0.4 MS
MDC_IDC_SET_LEADCHNL_RV_SENSING_ANODE_ELECTRODE_1: NORMAL
MDC_IDC_SET_LEADCHNL_RV_SENSING_ANODE_LOCATION_1: NORMAL
MDC_IDC_SET_LEADCHNL_RV_SENSING_CATHODE_ELECTRODE_1: NORMAL
MDC_IDC_SET_LEADCHNL_RV_SENSING_CATHODE_LOCATION_1: NORMAL
MDC_IDC_SET_LEADCHNL_RV_SENSING_POLARITY: NORMAL
MDC_IDC_SET_LEADCHNL_RV_SENSING_SENSITIVITY: 0.9 MV
MDC_IDC_SET_ZONE_DETECTION_INTERVAL: 350 MS
MDC_IDC_SET_ZONE_DETECTION_INTERVAL: 400 MS
MDC_IDC_SET_ZONE_TYPE: NORMAL
MDC_IDC_STAT_BRADY_AP_VP_PERCENT: 0 %
MDC_IDC_STAT_BRADY_AP_VS_PERCENT: 0 %
MDC_IDC_STAT_BRADY_AS_VP_PERCENT: 95.15 %
MDC_IDC_STAT_BRADY_AS_VS_PERCENT: 4.85 %
MDC_IDC_STAT_BRADY_DTM_END: NORMAL
MDC_IDC_STAT_BRADY_DTM_START: NORMAL
MDC_IDC_STAT_BRADY_RA_PERCENT_PACED: 0 %
MDC_IDC_STAT_BRADY_RV_PERCENT_PACED: 95.15 %
MDC_IDC_STAT_CRT_DTM_END: NORMAL
MDC_IDC_STAT_CRT_DTM_START: NORMAL
MDC_IDC_STAT_CRT_LV_PERCENT_PACED: 95.12 %
MDC_IDC_STAT_CRT_PERCENT_PACED: 95.12 %
MDC_IDC_STAT_EPISODE_RECENT_COUNT: 0
MDC_IDC_STAT_EPISODE_RECENT_COUNT_DTM_END: NORMAL
MDC_IDC_STAT_EPISODE_RECENT_COUNT_DTM_START: NORMAL
MDC_IDC_STAT_EPISODE_TOTAL_COUNT: 0
MDC_IDC_STAT_EPISODE_TOTAL_COUNT_DTM_END: NORMAL
MDC_IDC_STAT_EPISODE_TOTAL_COUNT_DTM_START: NORMAL
MDC_IDC_STAT_EPISODE_TYPE: NORMAL

## 2020-07-27 ENCOUNTER — ANCILLARY PROCEDURE (OUTPATIENT)
Dept: CARDIOLOGY | Facility: CLINIC | Age: 85
End: 2020-07-27
Attending: INTERNAL MEDICINE
Payer: MEDICARE

## 2020-07-27 DIAGNOSIS — I49.5 SICK SINUS SYNDROME (H): ICD-10-CM

## 2020-07-27 DIAGNOSIS — Z95.0 CARDIAC PACEMAKER IN SITU: ICD-10-CM

## 2020-07-27 DIAGNOSIS — Z98.890 HX OF ATRIOVENTRICULAR NODE ABLATION: ICD-10-CM

## 2020-07-27 DIAGNOSIS — I42.9 CARDIOMYOPATHY (H): Primary | ICD-10-CM

## 2020-07-27 LAB
MDC_IDC_LEAD_IMPLANT_DT: NORMAL
MDC_IDC_LEAD_LOCATION: NORMAL
MDC_IDC_LEAD_LOCATION_DETAIL_1: NORMAL
MDC_IDC_LEAD_MFG: NORMAL
MDC_IDC_LEAD_MODEL: NORMAL
MDC_IDC_LEAD_POLARITY_TYPE: NORMAL
MDC_IDC_LEAD_SERIAL: NORMAL
MDC_IDC_MSMT_BATTERY_DTM: NORMAL
MDC_IDC_MSMT_BATTERY_REMAINING_LONGEVITY: 140 MO
MDC_IDC_MSMT_BATTERY_RRT_TRIGGER: 2.6
MDC_IDC_MSMT_BATTERY_STATUS: NORMAL
MDC_IDC_MSMT_BATTERY_VOLTAGE: 3.06 V
MDC_IDC_MSMT_LEADCHNL_LV_IMPEDANCE_VALUE: 437 OHM
MDC_IDC_MSMT_LEADCHNL_LV_IMPEDANCE_VALUE: 437 OHM
MDC_IDC_MSMT_LEADCHNL_LV_IMPEDANCE_VALUE: 551 OHM
MDC_IDC_MSMT_LEADCHNL_LV_IMPEDANCE_VALUE: 551 OHM
MDC_IDC_MSMT_LEADCHNL_LV_IMPEDANCE_VALUE: 798 OHM
MDC_IDC_MSMT_LEADCHNL_LV_PACING_THRESHOLD_AMPLITUDE: 5 V
MDC_IDC_MSMT_LEADCHNL_LV_PACING_THRESHOLD_PULSEWIDTH: 1 MS
MDC_IDC_MSMT_LEADCHNL_RA_IMPEDANCE_VALUE: 266 OHM
MDC_IDC_MSMT_LEADCHNL_RA_IMPEDANCE_VALUE: 304 OHM
MDC_IDC_MSMT_LEADCHNL_RV_IMPEDANCE_VALUE: 323 OHM
MDC_IDC_MSMT_LEADCHNL_RV_IMPEDANCE_VALUE: 399 OHM
MDC_IDC_MSMT_LEADCHNL_RV_PACING_THRESHOLD_AMPLITUDE: 0.88 V
MDC_IDC_MSMT_LEADCHNL_RV_PACING_THRESHOLD_PULSEWIDTH: 0.4 MS
MDC_IDC_MSMT_LEADCHNL_RV_SENSING_INTR_AMPL: 13.5 MV
MDC_IDC_MSMT_LEADCHNL_RV_SENSING_INTR_AMPL: 13.5 MV
MDC_IDC_PG_IMPLANT_DTM: NORMAL
MDC_IDC_PG_MFG: NORMAL
MDC_IDC_PG_MODEL: NORMAL
MDC_IDC_PG_SERIAL: NORMAL
MDC_IDC_PG_TYPE: NORMAL
MDC_IDC_SESS_CLINIC_NAME: NORMAL
MDC_IDC_SESS_DTM: NORMAL
MDC_IDC_SESS_TYPE: NORMAL
MDC_IDC_SET_BRADY_LOWRATE: 60 {BEATS}/MIN
MDC_IDC_SET_BRADY_MAX_SENSOR_RATE: 130 {BEATS}/MIN
MDC_IDC_SET_BRADY_MODE: NORMAL
MDC_IDC_SET_CRT_PACED_CHAMBERS: NORMAL
MDC_IDC_SET_LEADCHNL_RA_SENSING_ANODE_ELECTRODE_1: NORMAL
MDC_IDC_SET_LEADCHNL_RA_SENSING_ANODE_LOCATION_1: NORMAL
MDC_IDC_SET_LEADCHNL_RA_SENSING_CATHODE_ELECTRODE_1: NORMAL
MDC_IDC_SET_LEADCHNL_RA_SENSING_CATHODE_LOCATION_1: NORMAL
MDC_IDC_SET_LEADCHNL_RA_SENSING_POLARITY: NORMAL
MDC_IDC_SET_LEADCHNL_RA_SENSING_SENSITIVITY: 4 MV
MDC_IDC_SET_LEADCHNL_RV_PACING_AMPLITUDE: 2 V
MDC_IDC_SET_LEADCHNL_RV_PACING_ANODE_ELECTRODE_1: NORMAL
MDC_IDC_SET_LEADCHNL_RV_PACING_ANODE_LOCATION_1: NORMAL
MDC_IDC_SET_LEADCHNL_RV_PACING_CAPTURE_MODE: NORMAL
MDC_IDC_SET_LEADCHNL_RV_PACING_CATHODE_ELECTRODE_1: NORMAL
MDC_IDC_SET_LEADCHNL_RV_PACING_CATHODE_LOCATION_1: NORMAL
MDC_IDC_SET_LEADCHNL_RV_PACING_POLARITY: NORMAL
MDC_IDC_SET_LEADCHNL_RV_PACING_PULSEWIDTH: 0.4 MS
MDC_IDC_SET_LEADCHNL_RV_SENSING_ANODE_ELECTRODE_1: NORMAL
MDC_IDC_SET_LEADCHNL_RV_SENSING_ANODE_LOCATION_1: NORMAL
MDC_IDC_SET_LEADCHNL_RV_SENSING_CATHODE_ELECTRODE_1: NORMAL
MDC_IDC_SET_LEADCHNL_RV_SENSING_CATHODE_LOCATION_1: NORMAL
MDC_IDC_SET_LEADCHNL_RV_SENSING_POLARITY: NORMAL
MDC_IDC_SET_LEADCHNL_RV_SENSING_SENSITIVITY: 0.9 MV
MDC_IDC_SET_ZONE_DETECTION_INTERVAL: 350 MS
MDC_IDC_SET_ZONE_DETECTION_INTERVAL: 400 MS
MDC_IDC_SET_ZONE_TYPE: NORMAL
MDC_IDC_STAT_BRADY_AP_VP_PERCENT: 0 %
MDC_IDC_STAT_BRADY_AP_VS_PERCENT: 0 %
MDC_IDC_STAT_BRADY_AS_VP_PERCENT: 98.02 %
MDC_IDC_STAT_BRADY_AS_VS_PERCENT: 1.98 %
MDC_IDC_STAT_BRADY_DTM_END: NORMAL
MDC_IDC_STAT_BRADY_DTM_START: NORMAL
MDC_IDC_STAT_BRADY_RA_PERCENT_PACED: 0 %
MDC_IDC_STAT_BRADY_RV_PERCENT_PACED: 98.02 %
MDC_IDC_STAT_CRT_DTM_END: NORMAL
MDC_IDC_STAT_CRT_DTM_START: NORMAL
MDC_IDC_STAT_CRT_LV_PERCENT_PACED: 0 %
MDC_IDC_STAT_CRT_PERCENT_PACED: 0 %
MDC_IDC_STAT_EPISODE_RECENT_COUNT: 0
MDC_IDC_STAT_EPISODE_RECENT_COUNT_DTM_END: NORMAL
MDC_IDC_STAT_EPISODE_RECENT_COUNT_DTM_START: NORMAL
MDC_IDC_STAT_EPISODE_TOTAL_COUNT: 0
MDC_IDC_STAT_EPISODE_TOTAL_COUNT_DTM_END: NORMAL
MDC_IDC_STAT_EPISODE_TOTAL_COUNT_DTM_START: NORMAL
MDC_IDC_STAT_EPISODE_TYPE: NORMAL

## 2020-07-27 PROCEDURE — 93296 REM INTERROG EVL PM/IDS: CPT | Performed by: INTERNAL MEDICINE

## 2020-07-27 PROCEDURE — 93294 REM INTERROG EVL PM/LDLS PM: CPT | Performed by: INTERNAL MEDICINE

## 2020-10-26 ENCOUNTER — ANCILLARY PROCEDURE (OUTPATIENT)
Dept: CARDIOLOGY | Facility: CLINIC | Age: 85
End: 2020-10-26
Attending: INTERNAL MEDICINE
Payer: COMMERCIAL

## 2020-10-26 DIAGNOSIS — Z98.890 HX OF ATRIOVENTRICULAR NODE ABLATION: ICD-10-CM

## 2020-10-26 DIAGNOSIS — I42.9 CARDIOMYOPATHY (H): ICD-10-CM

## 2020-10-26 DIAGNOSIS — Z95.0 CARDIAC PACEMAKER IN SITU: ICD-10-CM

## 2020-10-26 PROCEDURE — 93294 REM INTERROG EVL PM/LDLS PM: CPT | Performed by: INTERNAL MEDICINE

## 2020-10-26 PROCEDURE — 93296 REM INTERROG EVL PM/IDS: CPT | Performed by: INTERNAL MEDICINE

## 2020-10-31 LAB
MDC_IDC_EPISODE_DTM: NORMAL
MDC_IDC_EPISODE_DURATION: 2 S
MDC_IDC_EPISODE_DURATION: 5 S
MDC_IDC_EPISODE_DURATION: 8 S
MDC_IDC_EPISODE_ID: 1
MDC_IDC_EPISODE_ID: 1
MDC_IDC_EPISODE_ID: 2
MDC_IDC_EPISODE_TYPE: NORMAL
MDC_IDC_LEAD_IMPLANT_DT: NORMAL
MDC_IDC_LEAD_LOCATION: NORMAL
MDC_IDC_LEAD_LOCATION_DETAIL_1: NORMAL
MDC_IDC_LEAD_MFG: NORMAL
MDC_IDC_LEAD_MODEL: NORMAL
MDC_IDC_LEAD_POLARITY_TYPE: NORMAL
MDC_IDC_LEAD_SERIAL: NORMAL
MDC_IDC_MSMT_BATTERY_DTM: NORMAL
MDC_IDC_MSMT_BATTERY_REMAINING_LONGEVITY: 137 MO
MDC_IDC_MSMT_BATTERY_RRT_TRIGGER: 2.6
MDC_IDC_MSMT_BATTERY_STATUS: NORMAL
MDC_IDC_MSMT_BATTERY_VOLTAGE: 3.03 V
MDC_IDC_MSMT_LEADCHNL_LV_IMPEDANCE_VALUE: 437 OHM
MDC_IDC_MSMT_LEADCHNL_LV_IMPEDANCE_VALUE: 456 OHM
MDC_IDC_MSMT_LEADCHNL_LV_IMPEDANCE_VALUE: 532 OHM
MDC_IDC_MSMT_LEADCHNL_LV_IMPEDANCE_VALUE: 551 OHM
MDC_IDC_MSMT_LEADCHNL_LV_IMPEDANCE_VALUE: 760 OHM
MDC_IDC_MSMT_LEADCHNL_LV_PACING_THRESHOLD_AMPLITUDE: 5 V
MDC_IDC_MSMT_LEADCHNL_LV_PACING_THRESHOLD_PULSEWIDTH: 1 MS
MDC_IDC_MSMT_LEADCHNL_RA_IMPEDANCE_VALUE: 266 OHM
MDC_IDC_MSMT_LEADCHNL_RA_IMPEDANCE_VALUE: 304 OHM
MDC_IDC_MSMT_LEADCHNL_RV_IMPEDANCE_VALUE: 323 OHM
MDC_IDC_MSMT_LEADCHNL_RV_IMPEDANCE_VALUE: 399 OHM
MDC_IDC_MSMT_LEADCHNL_RV_PACING_THRESHOLD_AMPLITUDE: 0.75 V
MDC_IDC_MSMT_LEADCHNL_RV_PACING_THRESHOLD_PULSEWIDTH: 0.4 MS
MDC_IDC_MSMT_LEADCHNL_RV_SENSING_INTR_AMPL: 13.5 MV
MDC_IDC_MSMT_LEADCHNL_RV_SENSING_INTR_AMPL: 13.5 MV
MDC_IDC_PG_IMPLANT_DTM: NORMAL
MDC_IDC_PG_MFG: NORMAL
MDC_IDC_PG_MODEL: NORMAL
MDC_IDC_PG_SERIAL: NORMAL
MDC_IDC_PG_TYPE: NORMAL
MDC_IDC_SESS_CLINIC_NAME: NORMAL
MDC_IDC_SESS_DTM: NORMAL
MDC_IDC_SESS_TYPE: NORMAL
MDC_IDC_SET_BRADY_LOWRATE: 60 {BEATS}/MIN
MDC_IDC_SET_BRADY_MAX_SENSOR_RATE: 130 {BEATS}/MIN
MDC_IDC_SET_BRADY_MODE: NORMAL
MDC_IDC_SET_CRT_PACED_CHAMBERS: NORMAL
MDC_IDC_SET_LEADCHNL_RA_SENSING_ANODE_ELECTRODE_1: NORMAL
MDC_IDC_SET_LEADCHNL_RA_SENSING_ANODE_LOCATION_1: NORMAL
MDC_IDC_SET_LEADCHNL_RA_SENSING_CATHODE_ELECTRODE_1: NORMAL
MDC_IDC_SET_LEADCHNL_RA_SENSING_CATHODE_LOCATION_1: NORMAL
MDC_IDC_SET_LEADCHNL_RA_SENSING_POLARITY: NORMAL
MDC_IDC_SET_LEADCHNL_RA_SENSING_SENSITIVITY: 4 MV
MDC_IDC_SET_LEADCHNL_RV_PACING_AMPLITUDE: 2 V
MDC_IDC_SET_LEADCHNL_RV_PACING_ANODE_ELECTRODE_1: NORMAL
MDC_IDC_SET_LEADCHNL_RV_PACING_ANODE_LOCATION_1: NORMAL
MDC_IDC_SET_LEADCHNL_RV_PACING_CAPTURE_MODE: NORMAL
MDC_IDC_SET_LEADCHNL_RV_PACING_CATHODE_ELECTRODE_1: NORMAL
MDC_IDC_SET_LEADCHNL_RV_PACING_CATHODE_LOCATION_1: NORMAL
MDC_IDC_SET_LEADCHNL_RV_PACING_POLARITY: NORMAL
MDC_IDC_SET_LEADCHNL_RV_PACING_PULSEWIDTH: 0.4 MS
MDC_IDC_SET_LEADCHNL_RV_SENSING_ANODE_ELECTRODE_1: NORMAL
MDC_IDC_SET_LEADCHNL_RV_SENSING_ANODE_LOCATION_1: NORMAL
MDC_IDC_SET_LEADCHNL_RV_SENSING_CATHODE_ELECTRODE_1: NORMAL
MDC_IDC_SET_LEADCHNL_RV_SENSING_CATHODE_LOCATION_1: NORMAL
MDC_IDC_SET_LEADCHNL_RV_SENSING_POLARITY: NORMAL
MDC_IDC_SET_LEADCHNL_RV_SENSING_SENSITIVITY: 0.9 MV
MDC_IDC_SET_ZONE_DETECTION_INTERVAL: 350 MS
MDC_IDC_SET_ZONE_DETECTION_INTERVAL: 400 MS
MDC_IDC_SET_ZONE_TYPE: NORMAL
MDC_IDC_STAT_BRADY_AP_VP_PERCENT: 0 %
MDC_IDC_STAT_BRADY_AP_VS_PERCENT: 0 %
MDC_IDC_STAT_BRADY_AS_VP_PERCENT: 96.43 %
MDC_IDC_STAT_BRADY_AS_VS_PERCENT: 3.57 %
MDC_IDC_STAT_BRADY_DTM_END: NORMAL
MDC_IDC_STAT_BRADY_DTM_START: NORMAL
MDC_IDC_STAT_BRADY_RA_PERCENT_PACED: 0 %
MDC_IDC_STAT_BRADY_RV_PERCENT_PACED: 96.43 %
MDC_IDC_STAT_CRT_DTM_END: NORMAL
MDC_IDC_STAT_CRT_DTM_START: NORMAL
MDC_IDC_STAT_CRT_LV_PERCENT_PACED: 0 %
MDC_IDC_STAT_CRT_PERCENT_PACED: 0 %
MDC_IDC_STAT_EPISODE_RECENT_COUNT: 0
MDC_IDC_STAT_EPISODE_RECENT_COUNT: 0
MDC_IDC_STAT_EPISODE_RECENT_COUNT: 1
MDC_IDC_STAT_EPISODE_RECENT_COUNT_DTM_END: NORMAL
MDC_IDC_STAT_EPISODE_RECENT_COUNT_DTM_START: NORMAL
MDC_IDC_STAT_EPISODE_TOTAL_COUNT: 0
MDC_IDC_STAT_EPISODE_TOTAL_COUNT: 0
MDC_IDC_STAT_EPISODE_TOTAL_COUNT: 1
MDC_IDC_STAT_EPISODE_TOTAL_COUNT_DTM_END: NORMAL
MDC_IDC_STAT_EPISODE_TOTAL_COUNT_DTM_START: NORMAL
MDC_IDC_STAT_EPISODE_TYPE: NORMAL

## 2021-02-15 ENCOUNTER — ANCILLARY PROCEDURE (OUTPATIENT)
Dept: CARDIOLOGY | Facility: CLINIC | Age: 86
End: 2021-02-15
Attending: INTERNAL MEDICINE
Payer: COMMERCIAL

## 2021-02-15 DIAGNOSIS — I44.2 ATRIOVENTRICULAR BLOCK, COMPLETE (H): Primary | ICD-10-CM

## 2021-02-15 DIAGNOSIS — Z95.0 CARDIAC PACEMAKER IN SITU: ICD-10-CM

## 2021-02-15 PROCEDURE — 93296 REM INTERROG EVL PM/IDS: CPT | Performed by: INTERNAL MEDICINE

## 2021-02-15 PROCEDURE — 93294 REM INTERROG EVL PM/LDLS PM: CPT | Performed by: INTERNAL MEDICINE

## 2021-02-22 LAB
MDC_IDC_LEAD_IMPLANT_DT: NORMAL
MDC_IDC_LEAD_LOCATION: NORMAL
MDC_IDC_LEAD_LOCATION_DETAIL_1: NORMAL
MDC_IDC_LEAD_MFG: NORMAL
MDC_IDC_LEAD_MODEL: NORMAL
MDC_IDC_LEAD_POLARITY_TYPE: NORMAL
MDC_IDC_LEAD_SERIAL: NORMAL
MDC_IDC_MSMT_BATTERY_DTM: NORMAL
MDC_IDC_MSMT_BATTERY_REMAINING_LONGEVITY: 133 MO
MDC_IDC_MSMT_BATTERY_RRT_TRIGGER: 2.6
MDC_IDC_MSMT_BATTERY_STATUS: NORMAL
MDC_IDC_MSMT_BATTERY_VOLTAGE: 3.02 V
MDC_IDC_MSMT_LEADCHNL_LV_IMPEDANCE_VALUE: 418 OHM
MDC_IDC_MSMT_LEADCHNL_LV_IMPEDANCE_VALUE: 418 OHM
MDC_IDC_MSMT_LEADCHNL_LV_IMPEDANCE_VALUE: 532 OHM
MDC_IDC_MSMT_LEADCHNL_LV_IMPEDANCE_VALUE: 532 OHM
MDC_IDC_MSMT_LEADCHNL_LV_IMPEDANCE_VALUE: 760 OHM
MDC_IDC_MSMT_LEADCHNL_LV_PACING_THRESHOLD_AMPLITUDE: 5 V
MDC_IDC_MSMT_LEADCHNL_LV_PACING_THRESHOLD_PULSEWIDTH: 1 MS
MDC_IDC_MSMT_LEADCHNL_RA_IMPEDANCE_VALUE: 266 OHM
MDC_IDC_MSMT_LEADCHNL_RA_IMPEDANCE_VALUE: 304 OHM
MDC_IDC_MSMT_LEADCHNL_RV_IMPEDANCE_VALUE: 323 OHM
MDC_IDC_MSMT_LEADCHNL_RV_IMPEDANCE_VALUE: 399 OHM
MDC_IDC_MSMT_LEADCHNL_RV_PACING_THRESHOLD_AMPLITUDE: 0.88 V
MDC_IDC_MSMT_LEADCHNL_RV_PACING_THRESHOLD_PULSEWIDTH: 0.4 MS
MDC_IDC_MSMT_LEADCHNL_RV_SENSING_INTR_AMPL: 13.5 MV
MDC_IDC_MSMT_LEADCHNL_RV_SENSING_INTR_AMPL: 13.5 MV
MDC_IDC_PG_IMPLANT_DTM: NORMAL
MDC_IDC_PG_MFG: NORMAL
MDC_IDC_PG_MODEL: NORMAL
MDC_IDC_PG_SERIAL: NORMAL
MDC_IDC_PG_TYPE: NORMAL
MDC_IDC_SESS_CLINIC_NAME: NORMAL
MDC_IDC_SESS_DTM: NORMAL
MDC_IDC_SESS_TYPE: NORMAL
MDC_IDC_SET_BRADY_LOWRATE: 60 {BEATS}/MIN
MDC_IDC_SET_BRADY_MAX_SENSOR_RATE: 130 {BEATS}/MIN
MDC_IDC_SET_BRADY_MODE: NORMAL
MDC_IDC_SET_CRT_PACED_CHAMBERS: NORMAL
MDC_IDC_SET_LEADCHNL_RA_SENSING_ANODE_ELECTRODE_1: NORMAL
MDC_IDC_SET_LEADCHNL_RA_SENSING_ANODE_LOCATION_1: NORMAL
MDC_IDC_SET_LEADCHNL_RA_SENSING_CATHODE_ELECTRODE_1: NORMAL
MDC_IDC_SET_LEADCHNL_RA_SENSING_CATHODE_LOCATION_1: NORMAL
MDC_IDC_SET_LEADCHNL_RA_SENSING_POLARITY: NORMAL
MDC_IDC_SET_LEADCHNL_RA_SENSING_SENSITIVITY: 4 MV
MDC_IDC_SET_LEADCHNL_RV_PACING_AMPLITUDE: 2 V
MDC_IDC_SET_LEADCHNL_RV_PACING_ANODE_ELECTRODE_1: NORMAL
MDC_IDC_SET_LEADCHNL_RV_PACING_ANODE_LOCATION_1: NORMAL
MDC_IDC_SET_LEADCHNL_RV_PACING_CAPTURE_MODE: NORMAL
MDC_IDC_SET_LEADCHNL_RV_PACING_CATHODE_ELECTRODE_1: NORMAL
MDC_IDC_SET_LEADCHNL_RV_PACING_CATHODE_LOCATION_1: NORMAL
MDC_IDC_SET_LEADCHNL_RV_PACING_POLARITY: NORMAL
MDC_IDC_SET_LEADCHNL_RV_PACING_PULSEWIDTH: 0.4 MS
MDC_IDC_SET_LEADCHNL_RV_SENSING_ANODE_ELECTRODE_1: NORMAL
MDC_IDC_SET_LEADCHNL_RV_SENSING_ANODE_LOCATION_1: NORMAL
MDC_IDC_SET_LEADCHNL_RV_SENSING_CATHODE_ELECTRODE_1: NORMAL
MDC_IDC_SET_LEADCHNL_RV_SENSING_CATHODE_LOCATION_1: NORMAL
MDC_IDC_SET_LEADCHNL_RV_SENSING_POLARITY: NORMAL
MDC_IDC_SET_LEADCHNL_RV_SENSING_SENSITIVITY: 0.9 MV
MDC_IDC_SET_ZONE_DETECTION_INTERVAL: 350 MS
MDC_IDC_SET_ZONE_DETECTION_INTERVAL: 400 MS
MDC_IDC_SET_ZONE_TYPE: NORMAL
MDC_IDC_STAT_BRADY_AP_VP_PERCENT: 0 %
MDC_IDC_STAT_BRADY_AP_VS_PERCENT: 0 %
MDC_IDC_STAT_BRADY_AS_VP_PERCENT: 99.05 %
MDC_IDC_STAT_BRADY_AS_VS_PERCENT: 0.95 %
MDC_IDC_STAT_BRADY_DTM_END: NORMAL
MDC_IDC_STAT_BRADY_DTM_START: NORMAL
MDC_IDC_STAT_BRADY_RA_PERCENT_PACED: 0 %
MDC_IDC_STAT_BRADY_RV_PERCENT_PACED: 99.05 %
MDC_IDC_STAT_CRT_DTM_END: NORMAL
MDC_IDC_STAT_CRT_DTM_START: NORMAL
MDC_IDC_STAT_CRT_LV_PERCENT_PACED: 0 %
MDC_IDC_STAT_CRT_PERCENT_PACED: 0 %
MDC_IDC_STAT_EPISODE_RECENT_COUNT: 0
MDC_IDC_STAT_EPISODE_RECENT_COUNT_DTM_END: NORMAL
MDC_IDC_STAT_EPISODE_RECENT_COUNT_DTM_START: NORMAL
MDC_IDC_STAT_EPISODE_TOTAL_COUNT: 0
MDC_IDC_STAT_EPISODE_TOTAL_COUNT: 0
MDC_IDC_STAT_EPISODE_TOTAL_COUNT: 1
MDC_IDC_STAT_EPISODE_TOTAL_COUNT_DTM_END: NORMAL
MDC_IDC_STAT_EPISODE_TOTAL_COUNT_DTM_START: NORMAL
MDC_IDC_STAT_EPISODE_TYPE: NORMAL

## 2021-03-15 DIAGNOSIS — I10 ESSENTIAL HYPERTENSION: ICD-10-CM

## 2021-03-15 RX ORDER — LISINOPRIL 10 MG/1
10 TABLET ORAL DAILY
Qty: 90 TABLET | Refills: 0 | Status: SHIPPED | OUTPATIENT
Start: 2021-03-15 | End: 2021-06-14

## 2021-04-23 ENCOUNTER — OFFICE VISIT (OUTPATIENT)
Dept: CARDIOLOGY | Facility: CLINIC | Age: 86
End: 2021-04-23
Attending: NURSE PRACTITIONER
Payer: COMMERCIAL

## 2021-04-23 ENCOUNTER — ANCILLARY PROCEDURE (OUTPATIENT)
Dept: CARDIOLOGY | Facility: CLINIC | Age: 86
End: 2021-04-23
Attending: INTERNAL MEDICINE
Payer: COMMERCIAL

## 2021-04-23 VITALS
BODY MASS INDEX: 25.04 KG/M2 | WEIGHT: 146.7 LBS | SYSTOLIC BLOOD PRESSURE: 134 MMHG | HEIGHT: 64 IN | HEART RATE: 66 BPM | DIASTOLIC BLOOD PRESSURE: 72 MMHG

## 2021-04-23 DIAGNOSIS — I42.0 DILATED CARDIOMYOPATHY (H): ICD-10-CM

## 2021-04-23 DIAGNOSIS — I44.2 ATRIOVENTRICULAR BLOCK, COMPLETE (H): ICD-10-CM

## 2021-04-23 DIAGNOSIS — Z98.890 HX OF ATRIOVENTRICULAR NODE ABLATION: ICD-10-CM

## 2021-04-23 DIAGNOSIS — I48.20 CHRONIC ATRIAL FIBRILLATION (H): Primary | ICD-10-CM

## 2021-04-23 DIAGNOSIS — Z95.0 CARDIAC PACEMAKER IN SITU: Primary | ICD-10-CM

## 2021-04-23 DIAGNOSIS — I10 ESSENTIAL HYPERTENSION: ICD-10-CM

## 2021-04-23 PROCEDURE — 99214 OFFICE O/P EST MOD 30 MIN: CPT | Mod: 25 | Performed by: INTERNAL MEDICINE

## 2021-04-23 PROCEDURE — 93280 PM DEVICE PROGR EVAL DUAL: CPT | Performed by: INTERNAL MEDICINE

## 2021-04-23 ASSESSMENT — MIFFLIN-ST. JEOR: SCORE: 1045.43

## 2021-04-23 NOTE — LETTER
4/23/2021    Kirit Michel MD  [address] [address]    RE: Haritha LAVERNE Ronnie       Dear Colleague,    I had the pleasure of seeing Haritha LAVERNE Trujillo in the [facility].    HPI and Plan:   See dictation [id number]    Orders Placed This Encounter   Procedures     Follow-Up with Cardiac Advanced Practice Provider       No orders of the defined types were placed in this encounter.      There are no discontinued medications.      Encounter Diagnosis   Name Primary?     Chronic atrial fibrillation (H) Yes       CURRENT MEDICATIONS:  Current Outpatient Medications   Medication Sig Dispense Refill     Alendronate Sodium (FOSAMAX PO) Take 70 mg by mouth once a week       amLODIPine (NORVASC) 5 MG tablet Take 1 tablet (5 mg) by mouth daily 90 tablet 3     atorvastatin (LIPITOR) 40 MG tablet Take 40 mg by mouth At Bedtime        calcium carbonate-vitamin D (CALCIUM + D) 600-200 MG-UNIT TABS Take 1 tablet by mouth daily.       cycloSPORINE (RESTASIS) 0.05 % ophthalmic emulsion Place 1 drop into both eyes every 12 hours.       furosemide (LASIX) 20 MG tablet Take 0.5 tablets (10 mg) by mouth daily 30 tablet 0     lisinopril (ZESTRIL) 10 MG tablet Take 1 tablet (10 mg) by mouth daily 90 tablet 0     metoprolol succinate ER (TOPROL-XL) 50 MG 24 hr tablet Take 1 tablet (50 mg) by mouth 2 times daily 180 tablet 3     Multiple Vitamins-Minerals (CENTRUM SILVER) per tablet Take 1 tablet by mouth daily.         nitroGLYcerin (NITROSTAT) 0.4 MG sublingual tablet For chest pain place 1 tablet under the tongue every 5 minutes for 3 doses. If symptoms persist 5 minutes after 1st dose call 911. 25 tablet 1     Probiotic Product (PROBIOTIC DAILY PO) Take by mouth daily       sodium chloride 1 GM tablet Take 1 g by mouth daily       WARFARIN SODIUM PO Take 4.5 mg by mouth daily 1 & 1/2 tablet of 3mg       acetaminophen (TYLENOL) 500 MG  tablet Take 500-1,000 mg by mouth every 6 hours as needed for mild pain       Celecoxib (CELEBREX PO) Take 200 mg by mouth daily         ALLERGIES     Allergies   Allergen Reactions     Amiodarone Nausea     Hctz      Lansoprazole      diarrhea   Prevacid       PAST MEDICAL HISTORY:  Past Medical History:   Diagnosis Date     Aortic regurgitation 12/14/2014    mild (1+) per echo     Atrial fibrillation (H)      Cardiomyopathy (H)      CHF (congestive heart failure) (H)      Chronic kidney disease, stage 3 (H)      Coronary atherosclerosis of unspecified type of vessel, native or graft 5/16/2000    normal left coronary arteries and tight obstruction in distal RCA, too small for revascularization, medical management     Degeneration of cervical intervertebral disc     cervical spine     Essential hypertension, benign      Mitral regurgitation 12/14/2014    mod-mod/sev (2-3+) per echo     Other and unspecified hyperlipidemia      Pacemaker      Pulmonary hypertension (H) 3/16/2013    mild per echo with RVSP 31mmHg +RAP      Pulmonary valve regurgitation 12/14/2014    mod (2+) per echo     Tricuspid regurgitation 12/14/2014    mild (1+) per echo     Unspecified tinnitus     chronic       PAST SURGICAL HISTORY:  Past Surgical History:   Procedure Laterality Date     CORONARY ANGIOGRAPHY ADULT ORDER  5/16/2000     EP PPM RMVL&REPL OF GEN W/ DUAL LEAD SYS N/A 4/14/2020    Procedure: Permanent Pacemakers  Removal and Replacement Generator  with Multi Lead System;  Surgeon: Fay Tatum MD;  Location:  [facility]     HRW PACEMAKER PERMANENT  1/2015    BI- ventricular     IMPLANT PACEMAKER  11/12/2010    dual chamber Medtronic     ZZC NONSPECIFIC PROCEDURE  1999    right rotator cuff tear OR     ZZC NONSPECIFIC PROCEDURE  1983    microdiscectomy     ZZC NONSPECIFIC PROCEDURE      C-sections x 3      ZZC NONSPECIFIC PROCEDURE      appendectomy     ZZC NONSPECIFIC PROCEDURE      bilateral benign  breast biopsy      ZZC NONSPECIFIC PROCEDURE      right knee arthroscopic OR     ZZC NONSPECIFIC PROCEDURE  1974    enteritis       FAMILY HISTORY:  Family History   Problem Relation Age of Onset     Hypertension Mother      Cancer Mother         pancreatic     Eye Disorder Mother         cristian     FAZALAMISAEL. Father          53     Lipids Father      Diabetes Maternal Grandmother      FAZALABEATRIZ Brother         open heart surgery age 53     Cancer Daughter         ovavian     Neurologic Disorder Son         MS       SOCIAL HISTORY:  Social History     Socioeconomic History     Marital status:      Spouse name: Not on file     Number of children: Not on file     Years of education: Not on file     Highest education level: Not on file   Occupational History     Not on file   Social Needs     Financial resource strain: Not on file     Food insecurity     Worry: Not on file     Inability: Not on file     Transportation needs     Medical: Not on file     Non-medical: Not on file   Tobacco Use     Smoking status: Former Smoker     Packs/day: 0.50     Years: 15.00     Pack years: 7.50     Types: Cigarettes     Start date:      Quit date:      Years since quittin.3     Smokeless tobacco: Never Used   Substance and Sexual Activity     Alcohol use: No     Drug use: No     Sexual activity: Not on file   Lifestyle     Physical activity     Days per week: Not on file     Minutes per session: Not on file     Stress: Not on file   Relationships     Social connections     Talks on phone: Not on file     Gets together: Not on file     Attends Worship service: Not on file     Active member of club or organization: Not on file     Attends meetings of clubs or organizations: Not on file     Relationship status: Not on file     Intimate partner violence     Fear of current or ex partner: Not on file     Emotionally abused: Not on file     Physically abused: Not on file     Forced sexual activity: Not on file   Other  Topics Concern     Parent/sibling w/ CABG, MI or angioplasty before 65F 55M? Yes      Service Not Asked     Blood Transfusions Not Asked     Caffeine Concern Not Asked     Occupational Exposure Not Asked     Hobby Hazards Not Asked     Sleep Concern Not Asked     Stress Concern Not Asked     Weight Concern Not Asked     Special Diet Yes     Comment: low sodium     Back Care Not Asked     Exercise Yes     Comment: active life style     Bike Helmet Not Asked     Seat Belt Yes     Self-Exams Not Asked   Social History Narrative     Not on file       Review of Systems:  Skin:  Negative rash;bruising     Eyes:  Positive for glasses    ENT:  Negative epistaxis    Respiratory:  Negative (when walking fast)     Cardiovascular:  Negative Positive for;heaviness When walking fast tightness in ches  Gastroenterology: Negative      Genitourinary:  Negative      Musculoskeletal:  Positive for arthritis Negative for Statin Related Myalgias  Neurologic:  Negative   numbing in toes at night   Psychiatric:  Negative      Heme/Lymph/Imm:  Positive for allergies;easy bruising    Endocrine:  Negative diabetes      Thank you for allowing me to participate in the care of your patient.      Sincerely,     Fay Tatum MD     [facility]    cc:   Kat Yo, APRN CNP  [address]  [place] [address]

## 2021-04-23 NOTE — PROGRESS NOTES
HPI and Plan:   See dictation [id number]    Orders Placed This Encounter   Procedures     Follow-Up with Cardiac Advanced Practice Provider       No orders of the defined types were placed in this encounter.      There are no discontinued medications.      Encounter Diagnosis   Name Primary?     Chronic atrial fibrillation (H) Yes       CURRENT MEDICATIONS:  Current Outpatient Medications   Medication Sig Dispense Refill     Alendronate Sodium (FOSAMAX PO) Take 70 mg by mouth once a week       amLODIPine (NORVASC) 5 MG tablet Take 1 tablet (5 mg) by mouth daily 90 tablet 3     atorvastatin (LIPITOR) 40 MG tablet Take 40 mg by mouth At Bedtime        calcium carbonate-vitamin D (CALCIUM + D) 600-200 MG-UNIT TABS Take 1 tablet by mouth daily.       cycloSPORINE (RESTASIS) 0.05 % ophthalmic emulsion Place 1 drop into both eyes every 12 hours.       furosemide (LASIX) 20 MG tablet Take 0.5 tablets (10 mg) by mouth daily 30 tablet 0     lisinopril (ZESTRIL) 10 MG tablet Take 1 tablet (10 mg) by mouth daily 90 tablet 0     metoprolol succinate ER (TOPROL-XL) 50 MG 24 hr tablet Take 1 tablet (50 mg) by mouth 2 times daily 180 tablet 3     Multiple Vitamins-Minerals (CENTRUM SILVER) per tablet Take 1 tablet by mouth daily.         nitroGLYcerin (NITROSTAT) 0.4 MG sublingual tablet For chest pain place 1 tablet under the tongue every 5 minutes for 3 doses. If symptoms persist 5 minutes after 1st dose call 911. 25 tablet 1     Probiotic Product (PROBIOTIC DAILY PO) Take by mouth daily       sodium chloride 1 GM tablet Take 1 g by mouth daily       WARFARIN SODIUM PO Take 4.5 mg by mouth daily 1 & 1/2 tablet of 3mg       acetaminophen (TYLENOL) 500 MG tablet Take 500-1,000 mg by mouth every 6 hours as needed for mild pain       Celecoxib (CELEBREX PO) Take 200 mg by mouth daily         ALLERGIES     Allergies   Allergen Reactions     Amiodarone Nausea     Hctz      Lansoprazole      diarrhea   Prevacid       PAST MEDICAL  HISTORY:  Past Medical History:   Diagnosis Date     Aortic regurgitation 12/14/2014    mild (1+) per echo     Atrial fibrillation (H)      Cardiomyopathy (H)      CHF (congestive heart failure) (H)      Chronic kidney disease, stage 3 (H)      Coronary atherosclerosis of unspecified type of vessel, native or graft 5/16/2000    normal left coronary arteries and tight obstruction in distal RCA, too small for revascularization, medical management     Degeneration of cervical intervertebral disc     cervical spine     Essential hypertension, benign      Mitral regurgitation 12/14/2014    mod-mod/sev (2-3+) per echo     Other and unspecified hyperlipidemia      Pacemaker      Pulmonary hypertension (H) 3/16/2013    mild per echo with RVSP 31mmHg +RAP      Pulmonary valve regurgitation 12/14/2014    mod (2+) per echo     Tricuspid regurgitation 12/14/2014    mild (1+) per echo     Unspecified tinnitus     chronic       PAST SURGICAL HISTORY:  Past Surgical History:   Procedure Laterality Date     CORONARY ANGIOGRAPHY ADULT ORDER  5/16/2000     EP PPM RMVL&REPL OF GEN W/ DUAL LEAD SYS N/A 4/14/2020    Procedure: Permanent Pacemakers  Removal and Replacement Generator  with Multi Lead System;  Surgeon: Fay Tatum MD;  Location:  [facility]     HRW PACEMAKER PERMANENT  1/2015    BI- ventricular     IMPLANT PACEMAKER  11/12/2010    dual chamber Medtronic     ZZC NONSPECIFIC PROCEDURE  1999    right rotator cuff tear OR     ZZC NONSPECIFIC PROCEDURE  1983    microdiscectomy     ZZC NONSPECIFIC PROCEDURE      C-sections x 3      ZZC NONSPECIFIC PROCEDURE      appendectomy     ZZC NONSPECIFIC PROCEDURE      bilateral benign breast biopsy      ZZC NONSPECIFIC PROCEDURE      right knee arthroscopic OR     ZZC NONSPECIFIC PROCEDURE  1974    enteritis       FAMILY HISTORY:  Family History   Problem Relation Age of Onset     Hypertension Mother      Cancer Mother         pancreatic     Eye Disorder  Mother         cristian     ANIA Father          53     Lipids Father      Diabetes Maternal Grandmother      ANIA Brother         open heart surgery age 53     Cancer Daughter         ovavian     Neurologic Disorder Son         MS       SOCIAL HISTORY:  Social History     Socioeconomic History     Marital status:      Spouse name: Not on file     Number of children: Not on file     Years of education: Not on file     Highest education level: Not on file   Occupational History     Not on file   Social Needs     Financial resource strain: Not on file     Food insecurity     Worry: Not on file     Inability: Not on file     Transportation needs     Medical: Not on file     Non-medical: Not on file   Tobacco Use     Smoking status: Former Smoker     Packs/day: 0.50     Years: 15.00     Pack years: 7.50     Types: Cigarettes     Start date:      Quit date:      Years since quittin.3     Smokeless tobacco: Never Used   Substance and Sexual Activity     Alcohol use: No     Drug use: No     Sexual activity: Not on file   Lifestyle     Physical activity     Days per week: Not on file     Minutes per session: Not on file     Stress: Not on file   Relationships     Social connections     Talks on phone: Not on file     Gets together: Not on file     Attends Zoroastrianism service: Not on file     Active member of club or organization: Not on file     Attends meetings of clubs or organizations: Not on file     Relationship status: Not on file     Intimate partner violence     Fear of current or ex partner: Not on file     Emotionally abused: Not on file     Physically abused: Not on file     Forced sexual activity: Not on file   Other Topics Concern     Parent/sibling w/ CABG, MI or angioplasty before 65F 55M? Yes      Service Not Asked     Blood Transfusions Not Asked     Caffeine Concern Not Asked     Occupational Exposure Not Asked     Hobby Hazards Not Asked     Sleep Concern Not Asked      Stress Concern Not Asked     Weight Concern Not Asked     Special Diet Yes     Comment: low sodium     Back Care Not Asked     Exercise Yes     Comment: active life style     Bike Helmet Not Asked     Seat Belt Yes     Self-Exams Not Asked   Social History Narrative     Not on file       Review of Systems:  Skin:  Negative rash;bruising     Eyes:  Positive for glasses    ENT:  Negative epistaxis    Respiratory:  Negative (when walking fast)     Cardiovascular:  Negative Positive for;heaviness When walking fast tightness in ches  Gastroenterology: Negative      Genitourinary:  Negative      Musculoskeletal:  Positive for arthritis Negative for Statin Related Myalgias  Neurologic:  Negative   numbing in toes at night   Psychiatric:  Negative      Heme/Lymph/Imm:  Positive for allergies;easy bruising    Endocrine:  Negative diabetes

## 2021-04-24 NOTE — PROGRESS NOTES
"Service Date: 04/23/2021    HISTORY OF PRESENT ILLNESS:    It was my distinct pleasure seeing Ms. Mg \"Lori\" Ronnie, an absolutely wonderful and mentally alert 89-year-old woman in follow-up of atrial fibrillation and cardiomyopathy.    Lori has the following chronic medical issues:    1.  Nonischemic cardiomyopathy with CHF.  EF improved with CRT and medical therapy.  Most recent EF 45%-50% (during active CRT).  Unfortunately, we had to turn off her LV lead in 06/2020 because of diaphragmatic stimulation.  2.  Permanent atrial fibrillation with previous AV node ablation.  Biventricular pacemaker upgrade in 01/2015.  High LV threshold with diaphragmatic stimulation prompted LV lead deactivation.  3.  Dyslipidemia.  4.  Hypertension.    Ms. Trujillo has been feeling well.  I was worried she may slip into CHF after deactivating her LV lead in June 2020.  Diaphragmatic stimulation worsened after device replacement earlier in the spring.  The reason for this remains unclear.  Lori's EF was 30%-35% before device upgrade in 2015.  It later improved to 45%-50%.  We have not checked her EF recently.    She lives in a condo by herself and manages her daily chores well, including driving.  She has had no weight gain, unusual dyspnea, orthopnea or PND.  No chest pain.      PHYSICAL EXAMINATION:    VITAL SIGNS:  Blood pressure 134/72, heart rate 66 and regular.  Weight 67 kg, height 163 cm.  GENERAL:  She is a wonderfully alert woman in no distress.  She provides excellent history.  She is not hard of hearing at all, quite remarkable.  NECK:  Supple without bruits.  CHEST:  Lungs clear.  Chest nicely healed left pectoral incision.  ABDOMEN:  Soft, nontender.  EXTREMITIES:  Trace edema.  There is some venostasis dermatitis changes.      DIAGNOSTIC STUDIES:    - Device interrogation earlier today showed normal device function.  Estimated battery longevity 11 years.      IMPRESSION:    1.  Nonischemic cardiomyopathy.  She is " doing well and she is not in heart failure.  I am concerned that over time, EF may decrease, but I would not preemptively replace the LV lead or plan other intervention in this soon to be 89-year-old.  Continue cardiomyopathy medications (metoprolol XL, lisinopril and low-dose furosemide).  2.  Permanent atrial fibrillation.  Previous AV node ablation.  She has been stable on warfarin.  We did not see any strong reason to switch her to a DOAC.    3.  CRT pacemaker.  Replaced in the spring of 2020.  Normal function.      RECOMMENDATIONS:    Lori is doing well.  Continue same medications.  Follow-up in the [facility].  She will see Kta in 1 year, sooner if she has problems.  We will order an echocardiogram if she complains of dyspnea, weight gain or other CHF symptomatology.    She is a delight.  It has been a privilege being involved in her care.      Fay Tatum MD, FACC        cc:   Kirit Michel MD   [facility]  [address] [place] [id number]       D: 2021   T: 2021   MT: ILAN    Name:     SEVERIANO VALLEJO  MRN:      [id number]        Account:      [id number]   :      1925           Service Date: 2021       Document: [id number]

## 2021-05-04 LAB
MDC_IDC_EPISODE_DTM: NORMAL
MDC_IDC_EPISODE_DURATION: 4 S
MDC_IDC_EPISODE_ID: 3
MDC_IDC_EPISODE_TYPE: NORMAL
MDC_IDC_LEAD_IMPLANT_DT: NORMAL
MDC_IDC_LEAD_LOCATION: NORMAL
MDC_IDC_LEAD_LOCATION_DETAIL_1: NORMAL
MDC_IDC_LEAD_MFG: NORMAL
MDC_IDC_LEAD_MODEL: NORMAL
MDC_IDC_LEAD_POLARITY_TYPE: NORMAL
MDC_IDC_LEAD_SERIAL: NORMAL
MDC_IDC_MSMT_BATTERY_DTM: NORMAL
MDC_IDC_MSMT_BATTERY_REMAINING_LONGEVITY: 133 MO
MDC_IDC_MSMT_BATTERY_RRT_TRIGGER: 2.6
MDC_IDC_MSMT_BATTERY_STATUS: NORMAL
MDC_IDC_MSMT_BATTERY_VOLTAGE: 3.02 V
MDC_IDC_MSMT_LEADCHNL_LV_IMPEDANCE_VALUE: 475 OHM
MDC_IDC_MSMT_LEADCHNL_LV_IMPEDANCE_VALUE: 513 OHM
MDC_IDC_MSMT_LEADCHNL_LV_IMPEDANCE_VALUE: 532 OHM
MDC_IDC_MSMT_LEADCHNL_LV_IMPEDANCE_VALUE: 551 OHM
MDC_IDC_MSMT_LEADCHNL_LV_IMPEDANCE_VALUE: 798 OHM
MDC_IDC_MSMT_LEADCHNL_LV_PACING_THRESHOLD_AMPLITUDE: 5 V
MDC_IDC_MSMT_LEADCHNL_LV_PACING_THRESHOLD_PULSEWIDTH: 1 MS
MDC_IDC_MSMT_LEADCHNL_RA_IMPEDANCE_VALUE: 285 OHM
MDC_IDC_MSMT_LEADCHNL_RA_IMPEDANCE_VALUE: 323 OHM
MDC_IDC_MSMT_LEADCHNL_RV_IMPEDANCE_VALUE: 342 OHM
MDC_IDC_MSMT_LEADCHNL_RV_IMPEDANCE_VALUE: 437 OHM
MDC_IDC_MSMT_LEADCHNL_RV_PACING_THRESHOLD_AMPLITUDE: 0.75 V
MDC_IDC_MSMT_LEADCHNL_RV_PACING_THRESHOLD_PULSEWIDTH: 0.4 MS
MDC_IDC_MSMT_LEADCHNL_RV_SENSING_INTR_AMPL: 13.5 MV
MDC_IDC_MSMT_LEADCHNL_RV_SENSING_INTR_AMPL: 13.5 MV
MDC_IDC_PG_IMPLANT_DTM: NORMAL
MDC_IDC_PG_MFG: NORMAL
MDC_IDC_PG_MODEL: NORMAL
MDC_IDC_PG_SERIAL: NORMAL
MDC_IDC_PG_TYPE: NORMAL
MDC_IDC_SESS_CLINIC_NAME: NORMAL
MDC_IDC_SESS_DTM: NORMAL
MDC_IDC_SESS_TYPE: NORMAL
MDC_IDC_SET_BRADY_LOWRATE: 60 {BEATS}/MIN
MDC_IDC_SET_BRADY_MAX_SENSOR_RATE: 130 {BEATS}/MIN
MDC_IDC_SET_BRADY_MODE: NORMAL
MDC_IDC_SET_CRT_PACED_CHAMBERS: NORMAL
MDC_IDC_SET_LEADCHNL_RA_SENSING_ANODE_ELECTRODE_1: NORMAL
MDC_IDC_SET_LEADCHNL_RA_SENSING_ANODE_LOCATION_1: NORMAL
MDC_IDC_SET_LEADCHNL_RA_SENSING_CATHODE_ELECTRODE_1: NORMAL
MDC_IDC_SET_LEADCHNL_RA_SENSING_CATHODE_LOCATION_1: NORMAL
MDC_IDC_SET_LEADCHNL_RA_SENSING_POLARITY: NORMAL
MDC_IDC_SET_LEADCHNL_RA_SENSING_SENSITIVITY: 4 MV
MDC_IDC_SET_LEADCHNL_RV_PACING_AMPLITUDE: 2 V
MDC_IDC_SET_LEADCHNL_RV_PACING_ANODE_ELECTRODE_1: NORMAL
MDC_IDC_SET_LEADCHNL_RV_PACING_ANODE_LOCATION_1: NORMAL
MDC_IDC_SET_LEADCHNL_RV_PACING_CAPTURE_MODE: NORMAL
MDC_IDC_SET_LEADCHNL_RV_PACING_CATHODE_ELECTRODE_1: NORMAL
MDC_IDC_SET_LEADCHNL_RV_PACING_CATHODE_LOCATION_1: NORMAL
MDC_IDC_SET_LEADCHNL_RV_PACING_POLARITY: NORMAL
MDC_IDC_SET_LEADCHNL_RV_PACING_PULSEWIDTH: 0.4 MS
MDC_IDC_SET_LEADCHNL_RV_SENSING_ANODE_ELECTRODE_1: NORMAL
MDC_IDC_SET_LEADCHNL_RV_SENSING_ANODE_LOCATION_1: NORMAL
MDC_IDC_SET_LEADCHNL_RV_SENSING_CATHODE_ELECTRODE_1: NORMAL
MDC_IDC_SET_LEADCHNL_RV_SENSING_CATHODE_LOCATION_1: NORMAL
MDC_IDC_SET_LEADCHNL_RV_SENSING_POLARITY: NORMAL
MDC_IDC_SET_LEADCHNL_RV_SENSING_SENSITIVITY: 0.9 MV
MDC_IDC_SET_ZONE_DETECTION_INTERVAL: 350 MS
MDC_IDC_SET_ZONE_DETECTION_INTERVAL: 400 MS
MDC_IDC_SET_ZONE_TYPE: NORMAL
MDC_IDC_STAT_AT_BURDEN_PERCENT: 0 %
MDC_IDC_STAT_AT_DTM_END: NORMAL
MDC_IDC_STAT_AT_DTM_START: NORMAL
MDC_IDC_STAT_BRADY_AP_VP_PERCENT: 0 %
MDC_IDC_STAT_BRADY_AP_VS_PERCENT: 0 %
MDC_IDC_STAT_BRADY_AS_VP_PERCENT: 98.16 %
MDC_IDC_STAT_BRADY_AS_VS_PERCENT: 1.84 %
MDC_IDC_STAT_BRADY_DTM_END: NORMAL
MDC_IDC_STAT_BRADY_DTM_START: NORMAL
MDC_IDC_STAT_BRADY_RA_PERCENT_PACED: 0 %
MDC_IDC_STAT_BRADY_RV_PERCENT_PACED: 98.16 %
MDC_IDC_STAT_CRT_DTM_END: NORMAL
MDC_IDC_STAT_CRT_DTM_START: NORMAL
MDC_IDC_STAT_CRT_LV_PERCENT_PACED: 0 %
MDC_IDC_STAT_CRT_PERCENT_PACED: 0 %
MDC_IDC_STAT_EPISODE_RECENT_COUNT: 0
MDC_IDC_STAT_EPISODE_RECENT_COUNT: 0
MDC_IDC_STAT_EPISODE_RECENT_COUNT: 1
MDC_IDC_STAT_EPISODE_RECENT_COUNT_DTM_END: NORMAL
MDC_IDC_STAT_EPISODE_RECENT_COUNT_DTM_START: NORMAL
MDC_IDC_STAT_EPISODE_TOTAL_COUNT: 0
MDC_IDC_STAT_EPISODE_TOTAL_COUNT: 0
MDC_IDC_STAT_EPISODE_TOTAL_COUNT: 1
MDC_IDC_STAT_EPISODE_TOTAL_COUNT_DTM_END: NORMAL
MDC_IDC_STAT_EPISODE_TOTAL_COUNT_DTM_START: NORMAL
MDC_IDC_STAT_EPISODE_TYPE: NORMAL

## 2021-06-14 DIAGNOSIS — I10 ESSENTIAL HYPERTENSION: ICD-10-CM

## 2021-06-14 RX ORDER — LISINOPRIL 10 MG/1
10 TABLET ORAL DAILY
Qty: 90 TABLET | Refills: 2 | Status: SHIPPED | OUTPATIENT
Start: 2021-06-14 | End: 2022-01-01

## 2021-06-24 ENCOUNTER — HOSPITAL ENCOUNTER (EMERGENCY)
Facility: CLINIC | Age: 86
Discharge: HOME | End: 2021-06-24
Attending: EMERGENCY MEDICINE | Admitting: EMERGENCY MEDICINE
Payer: COMMERCIAL

## 2021-06-24 ENCOUNTER — APPOINTMENT (OUTPATIENT)
Dept: GENERAL RADIOLOGY | Facility: CLINIC | Age: 86
End: 2021-06-24
Attending: EMERGENCY MEDICINE
Payer: COMMERCIAL

## 2021-06-24 VITALS
DIASTOLIC BLOOD PRESSURE: 84 MMHG | WEIGHT: 146 LBS | BODY MASS INDEX: 24.92 KG/M2 | HEART RATE: 60 BPM | OXYGEN SATURATION: 96 % | SYSTOLIC BLOOD PRESSURE: 162 MMHG | RESPIRATION RATE: 13 BRPM | HEIGHT: 64 IN | TEMPERATURE: 98.3 F

## 2021-06-24 DIAGNOSIS — R42 POSTURAL DIZZINESS: ICD-10-CM

## 2021-06-24 DIAGNOSIS — I50.9 ACUTE CONGESTIVE HEART FAILURE, UNSPECIFIED HEART FAILURE TYPE (H): ICD-10-CM

## 2021-06-24 LAB
ALBUMIN SERPL-MCNC: 3.2 G/DL (ref 3.4–5)
ALBUMIN UR-MCNC: NEGATIVE MG/DL
ALP SERPL-CCNC: 84 U/L (ref 40–150)
ALT SERPL W P-5'-P-CCNC: 46 U/L (ref 0–50)
ANION GAP SERPL CALCULATED.3IONS-SCNC: 6 MMOL/L (ref 3–14)
APPEARANCE UR: CLEAR
AST SERPL W P-5'-P-CCNC: 38 U/L (ref 0–45)
BACTERIA #/AREA URNS HPF: ABNORMAL /HPF
BASOPHILS # BLD AUTO: 0 10E9/L (ref 0–0.2)
BASOPHILS NFR BLD AUTO: 0.5 %
BILIRUB SERPL-MCNC: 1.1 MG/DL (ref 0.2–1.3)
BILIRUB UR QL STRIP: NEGATIVE
BUN SERPL-MCNC: 19 MG/DL (ref 7–30)
CALCIUM SERPL-MCNC: 8.7 MG/DL (ref 8.5–10.1)
CHLORIDE SERPL-SCNC: 100 MMOL/L (ref 94–109)
CO2 SERPL-SCNC: 26 MMOL/L (ref 20–32)
COLOR UR AUTO: ABNORMAL
CREAT SERPL-MCNC: 1.01 MG/DL (ref 0.52–1.04)
DIFFERENTIAL METHOD BLD: ABNORMAL
EOSINOPHIL # BLD AUTO: 0.1 10E9/L (ref 0–0.7)
EOSINOPHIL NFR BLD AUTO: 0.9 %
ERYTHROCYTE [DISTWIDTH] IN BLOOD BY AUTOMATED COUNT: 13.9 % (ref 10–15)
GFR SERPL CREATININE-BSD FRML MDRD: 47 ML/MIN/{1.73_M2}
GLUCOSE SERPL-MCNC: 131 MG/DL (ref 70–99)
GLUCOSE UR STRIP-MCNC: NEGATIVE MG/DL
HCT VFR BLD AUTO: 38.6 % (ref 35–47)
HGB BLD-MCNC: 12.8 G/DL (ref 11.7–15.7)
HGB UR QL STRIP: NEGATIVE
IMM GRANULOCYTES # BLD: 0 10E9/L (ref 0–0.4)
IMM GRANULOCYTES NFR BLD: 0.3 %
INR PPP: 3.22 (ref 0.86–1.14)
KETONES UR STRIP-MCNC: NEGATIVE MG/DL
LEUKOCYTE ESTERASE UR QL STRIP: ABNORMAL
LYMPHOCYTES # BLD AUTO: 0.5 10E9/L (ref 0.8–5.3)
LYMPHOCYTES NFR BLD AUTO: 7.3 %
MCH RBC QN AUTO: 30.8 PG (ref 26.5–33)
MCHC RBC AUTO-ENTMCNC: 33.2 G/DL (ref 31.5–36.5)
MCV RBC AUTO: 93 FL (ref 78–100)
MONOCYTES # BLD AUTO: 0.8 10E9/L (ref 0–1.3)
MONOCYTES NFR BLD AUTO: 10.7 %
MUCOUS THREADS #/AREA URNS LPF: PRESENT /LPF
NEUTROPHILS # BLD AUTO: 5.9 10E9/L (ref 1.6–8.3)
NEUTROPHILS NFR BLD AUTO: 80.3 %
NITRATE UR QL: NEGATIVE
NRBC # BLD AUTO: 0 10*3/UL
NRBC BLD AUTO-RTO: 0 /100
NT-PROBNP SERPL-MCNC: 3060 PG/ML (ref 0–1800)
PH UR STRIP: 6 PH (ref 5–7)
PLATELET # BLD AUTO: 213 10E9/L (ref 150–450)
POTASSIUM SERPL-SCNC: 3.9 MMOL/L (ref 3.4–5.3)
PROT SERPL-MCNC: 6.5 G/DL (ref 6.8–8.8)
RBC # BLD AUTO: 4.16 10E12/L (ref 3.8–5.2)
RBC #/AREA URNS AUTO: 0 /HPF (ref 0–2)
SODIUM SERPL-SCNC: 132 MMOL/L (ref 133–144)
SOURCE: ABNORMAL
SP GR UR STRIP: 1.01 (ref 1–1.03)
SQUAMOUS #/AREA URNS AUTO: 0 /HPF (ref 0–1)
TROPONIN I SERPL-MCNC: <0.015 UG/L (ref 0–0.04)
UROBILINOGEN UR STRIP-MCNC: 0 MG/DL (ref 0–2)
WBC # BLD AUTO: 7.4 10E9/L (ref 4–11)
WBC #/AREA URNS AUTO: 14 /HPF (ref 0–5)

## 2021-06-24 PROCEDURE — 96374 THER/PROPH/DIAG INJ IV PUSH: CPT

## 2021-06-24 PROCEDURE — 85025 COMPLETE CBC W/AUTO DIFF WBC: CPT | Performed by: EMERGENCY MEDICINE

## 2021-06-24 PROCEDURE — 80053 COMPREHEN METABOLIC PANEL: CPT | Performed by: EMERGENCY MEDICINE

## 2021-06-24 PROCEDURE — 99285 EMERGENCY DEPT VISIT HI MDM: CPT | Mod: 25

## 2021-06-24 PROCEDURE — 83880 ASSAY OF NATRIURETIC PEPTIDE: CPT | Performed by: EMERGENCY MEDICINE

## 2021-06-24 PROCEDURE — 250N000011 HC RX IP 250 OP 636: Performed by: EMERGENCY MEDICINE

## 2021-06-24 PROCEDURE — 85610 PROTHROMBIN TIME: CPT | Performed by: EMERGENCY MEDICINE

## 2021-06-24 PROCEDURE — 81001 URINALYSIS AUTO W/SCOPE: CPT | Performed by: EMERGENCY MEDICINE

## 2021-06-24 PROCEDURE — 71045 X-RAY EXAM CHEST 1 VIEW: CPT

## 2021-06-24 PROCEDURE — 93005 ELECTROCARDIOGRAM TRACING: CPT

## 2021-06-24 PROCEDURE — 84484 ASSAY OF TROPONIN QUANT: CPT | Performed by: EMERGENCY MEDICINE

## 2021-06-24 RX ORDER — FUROSEMIDE 20 MG
20 TABLET ORAL 2 TIMES DAILY
Qty: 6 TABLET | Refills: 0 | Status: SHIPPED | OUTPATIENT
Start: 2021-06-24 | End: 2021-07-08

## 2021-06-24 RX ORDER — FUROSEMIDE 10 MG/ML
40 INJECTION INTRAMUSCULAR; INTRAVENOUS ONCE
Status: COMPLETED | OUTPATIENT
Start: 2021-06-24 | End: 2021-06-24

## 2021-06-24 RX ADMIN — FUROSEMIDE 40 MG: 10 INJECTION, SOLUTION INTRAVENOUS at 21:18

## 2021-06-24 ASSESSMENT — ENCOUNTER SYMPTOMS
VOMITING: 0
CHEST TIGHTNESS: 0
PALPITATIONS: 1
ABDOMINAL PAIN: 0
FATIGUE: 1
NAUSEA: 1

## 2021-06-24 ASSESSMENT — MIFFLIN-ST. JEOR: SCORE: 1037.25

## 2021-06-25 LAB — INTERPRETATION ECG - MUSE: NORMAL

## 2021-06-25 NOTE — DISCHARGE INSTRUCTIONS
You should take 20 mg of Lasix while you currently take 10 mg of Lasix (half a 20 mg tablet) once a day, you should take 1 full tablet twice a day for 2 days, then 1 full tablet once a day for 2 days then start taking your half tablet again.  You should make appointment to follow-up with your primary doctor for recheck, you should also make appointment to follow-up with your cardiologist.  You should transition from lying flat to sitting to standing slowly giving herself 1 to 2 minutes at each position to see if this helps with your symptoms.  You should however return the emergency department should develop chest pain, chest pressure passout or have trouble breathing.

## 2021-06-25 NOTE — ED NOTES
Bed: ED19  Expected date:   Expected time:   Means of arrival:   Comments:  593 89F weak palpitations

## 2021-06-25 NOTE — ED TRIAGE NOTES
Pt was brought in from home by EMS due to palpitations, dizziness, weakness and difficulty ambulating. Pt states she has a pacemaker and hs been feeling the palpitations more often. Pt went to have family provider yesterday.     Upon arrival pt is awake, alert and orientated

## 2021-06-25 NOTE — ED PROVIDER NOTES
History   Chief Complaint:  Palpitations     HPI   Haritha Trujillo is a 89 year old female s/p cardiac pacemaker replacement 4/14/20 on Coumadin with a history of atrial fibrillation, bicuspid/tricuspid regurgitation, CHF, CAD stage III chronic kidney disease, hypertension, and hyperlipidemia who presents to the ED via EMS for intermittent episodes of palpitations upon standing from a seated position and increased fatigue since a month ago. The patient states that the episodes typically last for 20 minutes after which she is able to ambulate normally. Since onset, the episodes have not become more frequent nor worsened in severity. Four days ago, she saw Dr. Michel at the [facility] who increased her Toprol intake (2 in the morning and 1 night) which she started yesterday. Her INR was 2.5 at the time. She was told to come to the ED for further evaluation. Here, she also complains of nausea without emesis. Notably, she was awaken last night due to feeling nauseous. She otherwise denies chest tightness or chest pain. She did feel some upper abdominal tenderness a few nights ago but that has since resolved. She believes it could have been due to her pancreatitis. [facility] and receives care from two nurses who regularly check her blood pressure. She notes that those readings have been normal.     Review of Systems   Constitutional: Positive for fatigue.   Respiratory: Negative for chest tightness.    Cardiovascular: Positive for palpitations. Negative for chest pain.   Gastrointestinal: Positive for nausea. Negative for abdominal pain and vomiting.   All other systems reviewed and are negative.    Allergies:  Amiodarone  Hydrochlorothiazide  Lansoprazole    Medications:  Fosamax  Norvasc  Lipitor  Celebrex  Lasix  Zestril  Toprol  Nitrostat  Coumadin    Past Medical History:    Bicuspid regurgitation  Tricuspid regurgitation  Atrial fibrillation  "  Cardiomyopathy  CHF  CAD  Stage III chronic kidney disease  Coronary atherosclerosis  Cervical intervertebral disc degeneration  Hypertension  Hyperlipidemia  Pulmonary hypertension  Pulmonary valve regurgitation  Diverticulitis  Pancreatitis  GERD  Osteoporosis  UTI  SIADH    Past Surgical History:    Coronary angiography  Permanent pacemaker placement  Pacemaker removal and replacement  Right rotator cuff repair  Microdiscectomy  C-sections x3  Appendectomy  Bilateral breast biopsy  Right knee arthroscopy  Enteritis surgery    Family History:    Hypertension  Pancreatic cancer  Glaucoma  CAD  Lipids  Ovarian cancer  MS    Social History:  The patient presented via EMS with son/family member.    Physical Exam     Patient Vitals for the past 24 hrs:   BP Temp Temp src Pulse Resp SpO2 Height Weight   06/24/21 2200 (!) 162/84 -- -- 60 13 96 % -- --   06/24/21 2145 (!) 156/81 -- -- 67 29 96 % -- --   06/24/21 2130 (!) 153/76 -- -- 60 18 95 % -- --   06/24/21 2115 (!) 147/82 -- -- 62 14 96 % -- --   06/24/21 2100 (!) 158/80 -- -- 62 17 95 % -- --   06/24/21 2000 (!) 145/79 -- -- 84 21 94 % -- --   06/24/21 1930 (!) 166/81 98.3  F (36.8  C) Oral 84 18 97 % 1.626 m (5' 4\") 66.2 kg (146 lb)       Physical Exam  Constitutional: Alert, attentive, GCS 15  HENT:    Nose: Nose normal.    Mouth/Throat: Oropharynx is clear, mucous membranes are moist  Eyes: EOM are normal, anicteric, conjugate gaze  CV: Irregularly irregular; no murmurs  Chest: Left upper chest pulse generator, effort normal and breath sounds clear without wheezing or rales, symmetric bilaterally  GI:  non tender. No distension. No guarding or rebound.    MSK: No LE edema, no tenderness to palpation of BLE.  Neurological: Alert, attentive, moving all extremities equally.   Skin: Skin is warm and dry.    Emergency Department Course   ECG  ECG taken at 1946, ECG read at 1951  Ventricular-paced rhythm with frequent premature ventricular complexes. Abnormal ECG. "   No significant change as compared to prior, dated 20.  Rate 88 bpm. GA interval * ms. QRS duration 136 ms. QT/QTc 406/491 ms. P-R-T axes * -71 104.     Imaging:  XR Chest, portable, 1 view:  Small pleural effusions are suspected. No pneumothorax.   There is interstitial edema the cardiomediastinal silhouette is   enlarged. There is aortic calcification. A left chest wall   biventricular pacemaker is present. There are degenerative changes in   the spine and shoulders.     Imaging independently reviewed and agree with radiologist interpretation.      Laboratory:  CBC: WBC 7.4, HGB 12.8,      CMP:  (L),  (H), GFR 47 (L), albumin 3.2 (L), protein 6.5 (L), o/w WNL (Creatinine 1.01)     INR: 3.22 (H)    BNP: 3060 (H)    Troponin (Collected ): <0.015    UA with microscopic: leukocyte esterase large (A), WBC/HPF 14 (H), bacteria few (A), mucous present (A), o/w WNL     Emergency Department Course:    Reviewed:   I reviewed nursing notes, vitals and past medical history.    Assessments:   I obtained history and examined the patient as noted above.    I rechecked the patient and discussed all results.     Consults:    I spoke with [employer] regarding the patient's catheter.    Interventions:  : Lasix 40 mg IV - patient declined    Disposition:  The patient was discharged to home.       Impression & Plan   Medical Decision Makin-year-old woman past medical history significant for hypertension, CHF CKD, CAD, A. fib status post pacemaker implantation presenting for evaluation of 1 month positional palpitations when going from sitting to standing. Her symptoms last approximately 20 minutes then resolve on their own and she denies any exertional chest pain, chest pressure or shortness of breath though was awakened from sleep with nausea evening prior. Her EKG here shows paced rhythm with frequent PVCs but no overt ischemic changes, her troponin is negative with low  suspicion for ACS. Her pacemaker was interrogated which shows no tachyarrhythmias since last interrogation in April. Her labs here were notable for mildly elevated BNP chest x-ray is mildly congested but she has no chest symptoms including shortness of breath dyspnea on exertion or hypoxia. I do not see any indication for admission for diuresis given no significant symptoms associated with her volume overload. In chart review, she is to be taking 10 mg of Lasix daily but she is not sure if she takes this as she has not been cutting any pills in half. I recommended increasing her Lasix to 20 mg twice daily for 2 days, 20 mg daily for 2 days and back to her 10 mg daily with close follow-up with her PCP and she plans to follow-up with her cardiology for her continued symptoms as she has already seen her PCP for this. Return precautions were reviewed with patient and her son and she was discharged home.    Diagnosis:    ICD-10-CM    1. Acute congestive heart failure, unspecified heart failure type (H)  I50.9    2. Postural dizziness  R42      Aden Peck MD  [employer]  2:45 AM 06/25/21     Aden Peck MD  [employer]  2:45 AM 06/25/21     Scribe Disclosure:  I, Jo Ann Tian, am serving as a scribe at 8:10 PM on 6/24/2021 to document services personally performed by Aden Peck MD based on my observations and the provider's statements to me.               Aden Peck MD  06/25/21 0246

## 2021-07-08 ENCOUNTER — OFFICE VISIT (OUTPATIENT)
Dept: CARDIOLOGY | Facility: CLINIC | Age: 86
End: 2021-07-08
Payer: COMMERCIAL

## 2021-07-08 VITALS
DIASTOLIC BLOOD PRESSURE: 54 MMHG | WEIGHT: 149.2 LBS | OXYGEN SATURATION: 97 % | SYSTOLIC BLOOD PRESSURE: 128 MMHG | HEART RATE: 58 BPM | BODY MASS INDEX: 25.47 KG/M2 | HEIGHT: 64 IN

## 2021-07-08 DIAGNOSIS — I48.20 CHRONIC ATRIAL FIBRILLATION (H): ICD-10-CM

## 2021-07-08 DIAGNOSIS — I50.23 ACUTE ON CHRONIC SYSTOLIC CONGESTIVE HEART FAILURE (H): Primary | ICD-10-CM

## 2021-07-08 DIAGNOSIS — I50.23 ACUTE ON CHRONIC SYSTOLIC CONGESTIVE HEART FAILURE (H): ICD-10-CM

## 2021-07-08 DIAGNOSIS — E87.1 HYPONATREMIA: ICD-10-CM

## 2021-07-08 DIAGNOSIS — I42.0 DILATED CARDIOMYOPATHY (H): ICD-10-CM

## 2021-07-08 LAB
ANION GAP SERPL CALCULATED.3IONS-SCNC: 5 MMOL/L (ref 3–14)
BUN SERPL-MCNC: 17 MG/DL (ref 7–30)
CALCIUM SERPL-MCNC: 8.8 MG/DL (ref 8.5–10.1)
CHLORIDE SERPL-SCNC: 95 MMOL/L (ref 94–109)
CO2 SERPL-SCNC: 30 MMOL/L (ref 20–32)
CREAT SERPL-MCNC: 0.97 MG/DL (ref 0.52–1.04)
GFR SERPL CREATININE-BSD FRML MDRD: 49 ML/MIN/{1.73_M2}
GLUCOSE SERPL-MCNC: 124 MG/DL (ref 70–99)
NT-PROBNP SERPL-MCNC: 3130 PG/ML (ref 0–450)
POTASSIUM SERPL-SCNC: 4.3 MMOL/L (ref 3.4–5.3)
SODIUM SERPL-SCNC: 130 MMOL/L (ref 133–144)

## 2021-07-08 PROCEDURE — 99215 OFFICE O/P EST HI 40 MIN: CPT | Performed by: NURSE PRACTITIONER

## 2021-07-08 PROCEDURE — 36415 COLL VENOUS BLD VENIPUNCTURE: CPT | Performed by: NURSE PRACTITIONER

## 2021-07-08 PROCEDURE — 80048 BASIC METABOLIC PNL TOTAL CA: CPT | Performed by: NURSE PRACTITIONER

## 2021-07-08 PROCEDURE — 83880 ASSAY OF NATRIURETIC PEPTIDE: CPT | Performed by: NURSE PRACTITIONER

## 2021-07-08 RX ORDER — ESCITALOPRAM OXALATE 10 MG/1
5 TABLET ORAL DAILY
COMMUNITY

## 2021-07-08 RX ORDER — FUROSEMIDE 20 MG
20 TABLET ORAL 2 TIMES DAILY
Qty: 6 TABLET | Refills: 0
Start: 2021-07-08 | End: 2021-07-29

## 2021-07-08 ASSESSMENT — MIFFLIN-ST. JEOR: SCORE: 1051.77

## 2021-07-08 NOTE — PROGRESS NOTES
HPI and Plan: #[id number]  See dictation    Orders Placed This Encounter   Procedures     Basic metabolic panel     N terminal pro BNP outpatient     Basic metabolic panel     Follow-Up with [facility] Visit     Echocardiogram Complete       Orders Placed This Encounter   Medications     escitalopram (LEXAPRO) 10 MG tablet     Sig: Take 10 mg by mouth daily       Medications Discontinued During This Encounter   Medication Reason     Celecoxib (CELEBREX PO) Therapy completed         Encounter Diagnoses   Name Primary?     Dilated cardiomyopathy (H) Yes     Acute on chronic systolic congestive heart failure (H)        CURRENT MEDICATIONS:  Current Outpatient Medications   Medication Sig Dispense Refill     acetaminophen (TYLENOL) 500 MG tablet Take 500-1,000 mg by mouth every 6 hours as needed for mild pain       amLODIPine (NORVASC) 5 MG tablet Take 1 tablet (5 mg) by mouth daily 90 tablet 3     atorvastatin (LIPITOR) 40 MG tablet Take 40 mg by mouth At Bedtime        calcium carbonate-vitamin D (CALCIUM + D) 600-200 MG-UNIT TABS Take 1 tablet by mouth daily.       cycloSPORINE (RESTASIS) 0.05 % ophthalmic emulsion Place 1 drop into both eyes every 12 hours.       escitalopram (LEXAPRO) 10 MG tablet Take 10 mg by mouth daily       furosemide (LASIX) 20 MG tablet Take 0.5 tablets (10 mg) by mouth daily 30 tablet 0     lisinopril (ZESTRIL) 10 MG tablet Take 1 tablet (10 mg) by mouth daily 90 tablet 2     metoprolol succinate ER (TOPROL-XL) 50 MG 24 hr tablet Take 1 tablet (50 mg) by mouth 2 times daily 180 tablet 3     Multiple Vitamins-Minerals (CENTRUM SILVER) per tablet Take 1 tablet by mouth daily.         nitroGLYcerin (NITROSTAT) 0.4 MG sublingual tablet For chest pain place 1 tablet under the tongue every 5 minutes for 3 doses. If symptoms persist 5 minutes after 1st dose call 911. 25 tablet 1     Probiotic Product (PROBIOTIC DAILY PO) Take by mouth daily       sodium chloride 1 GM tablet Take 1 g by mouth  daily       WARFARIN SODIUM PO Take 2 mg by mouth daily (4 mg x 0.5) every Mon, Wed, Fri. 4 mg (4mg x1) all other days       Alendronate Sodium (FOSAMAX PO) Take 70 mg by mouth once a week       furosemide (LASIX) 20 MG tablet Take 1 tablet (20 mg) by mouth 2 times daily (Patient not taking: Reported on 7/8/2021) 6 tablet 0       ALLERGIES     Allergies   Allergen Reactions     Amiodarone Nausea     Hctz      Lansoprazole      diarrhea   Prevacid       PAST MEDICAL HISTORY:  Past Medical History:   Diagnosis Date     Aortic regurgitation 12/14/2014    mild (1+) per echo     Atrial fibrillation (H)      Cardiomyopathy (H)      CHF (congestive heart failure) (H)      Chronic kidney disease, stage 3      Coronary atherosclerosis of unspecified type of vessel, native or graft 5/16/2000    normal left coronary arteries and tight obstruction in distal RCA, too small for revascularization, medical management     Degeneration of cervical intervertebral disc     cervical spine     Essential hypertension, benign      Mitral regurgitation 12/14/2014    mod-mod/sev (2-3+) per echo     Other and unspecified hyperlipidemia      Pacemaker      Pulmonary hypertension (H) 3/16/2013    mild per echo with RVSP 31mmHg +RAP      Pulmonary valve regurgitation 12/14/2014    mod (2+) per echo     Tricuspid regurgitation 12/14/2014    mild (1+) per echo     Unspecified tinnitus     chronic       PAST SURGICAL HISTORY:  Past Surgical History:   Procedure Laterality Date     CORONARY ANGIOGRAPHY ADULT ORDER  5/16/2000     EP PPM RMVL&REPL OF GEN W/ DUAL LEAD SYS N/A 4/14/2020    Procedure: Permanent Pacemakers  Removal and Replacement Generator  with Multi Lead System;  Surgeon: Fay Tatum MD;  Location:  [facility]     HRW PACEMAKER PERMANENT  1/2015    BI- ventricular     IMPLANT PACEMAKER  11/12/2010    dual chamber Medtronic     ZZC NONSPECIFIC PROCEDURE  1999    right rotator cuff tear OR     ZZC  NONSPECIFIC PROCEDURE  1983    microdiscectomy     ZC NONSPECIFIC PROCEDURE      C-sections x 3      ZZC NONSPECIFIC PROCEDURE      appendectomy     ZZC NONSPECIFIC PROCEDURE      bilateral benign breast biopsy      ZZC NONSPECIFIC PROCEDURE      right knee arthroscopic OR     ZZC NONSPECIFIC PROCEDURE  1974    enteritis       FAMILY HISTORY:  Family History   Problem Relation Age of Onset     Hypertension Mother      Cancer Mother         pancreatic     Eye Disorder Mother         cristian     C.A.D. Father          53     Lipids Father      Diabetes Maternal Grandmother      LARON.A.WARREN. Brother         open heart surgery age 53     Cancer Daughter         ovavian     Neurologic Disorder Son         MS       SOCIAL HISTORY:  Social History     Socioeconomic History     Marital status:      Spouse name: None     Number of children: None     Years of education: None     Highest education level: None   Occupational History     None   Social Needs     Financial resource strain: None     Food insecurity     Worry: None     Inability: None     Transportation needs     Medical: None     Non-medical: None   Tobacco Use     Smoking status: Former Smoker     Packs/day: 0.50     Years: 15.00     Pack years: 7.50     Types: Cigarettes     Start date:      Quit date:      Years since quittin.5     Smokeless tobacco: Never Used   Substance and Sexual Activity     Alcohol use: No     Drug use: No     Sexual activity: None   Lifestyle     Physical activity     Days per week: None     Minutes per session: None     Stress: None   Relationships     Social connections     Talks on phone: None     Gets together: None     Attends Hoahaoism service: None     Active member of club or organization: None     Attends meetings of clubs or organizations: None     Relationship status: None     Intimate partner violence     Fear of current or ex partner: None     Emotionally abused: None     Physically abused: None     Forced  "sexual activity: None   Other Topics Concern     Parent/sibling w/ CABG, MI or angioplasty before 65F 55M? Yes      Service Not Asked     Blood Transfusions Not Asked     Caffeine Concern Not Asked     Occupational Exposure Not Asked     Hobby Hazards Not Asked     Sleep Concern Not Asked     Stress Concern Not Asked     Weight Concern Not Asked     Special Diet Yes     Comment: low sodium     Back Care Not Asked     Exercise Yes     Comment: active life style     Bike Helmet Not Asked     Seat Belt Yes     Self-Exams Not Asked   Social History Narrative     None       Review of Systems:  Skin:  Negative rash;bruising Severe brusing and bleeding spots on lower legs   Eyes:  Positive for glasses    ENT:  Negative      Respiratory:  Positive for shortness of breath;dyspnea on exertion(when walking fast)     Cardiovascular:  Negative for;palpitations;chest pain Positive for;lightheadedness;dizziness    Gastroenterology: Positive for diarrhea    Genitourinary:  Negative      Musculoskeletal:  Positive for arthritis Negative for Statin Related Myalgias  Neurologic:  Positive for numbness or tingling of hands Began after starting anxiety medicaiton  Psychiatric:  Negative      Heme/Lymph/Imm:  Positive for allergies;easy bruising    Endocrine:  Negative diabetes      Physical Exam:  Vitals: /54   Pulse 58   Ht 1.626 m (5' 4\")   Wt 67.7 kg (149 lb 3.2 oz)   SpO2 97%   BMI 25.61 kg/m      Constitutional:  cooperative, alert and oriented, well developed, well nourished, in no acute distress        Skin:  warm and dry to the touch, no apparent skin lesions or masses noted   pacemaker incision in the left infraclavicular area was well-healed      Head:  normocephalic, no masses or lesions        Eyes:  pupils equal and round;conjunctivae and lids unremarkable        Lymph:      ENT:  no pallor or cyanosis, dentition good        Neck:    JVP 8-10      Respiratory:  normal breath sounds, clear to auscultation, " normal A-P diameter, normal symmetry, normal respiratory excursion, no use of accessory muscles         Cardiac: regular rhythm;normal S1 and S2;no S3 or S4;no murmurs, gallops or rubs detected                not assessed this visit                                        GI:  abdomen soft;no HSM        Extremities and Muscular Skeletal:  no deformities, clubbing, cyanosis, erythema observed;no edema              Neurological:  no gross motor deficits        Psych:  Alert and Oriented x 3;affect appropriate, oriented to time, person and place        CC  No referring provider defined for this encounter.

## 2021-07-08 NOTE — PATIENT INSTRUCTIONS
Call my nurse with any questions or concerns:  [phone]  *If you have concerns after hours, please call [phone], option 2 to speak with on call Cardiologist.    1. Medication changes from today:    Lab work today  Decrease sodium tablet to once daily   Limit water intake to 6- 1 cup serving daily  Echocardiogram and see me in a few weeks with lab work  Call with any concerns      2. Lab Results:     Results for YOLY VALLEJO (MRN [id number]) as of 7/8/2021 14:17   Ref. Range 6/24/2021 19:41   Sodium Latest Ref Range: 133 - 144 mmol/L 132 (L)   Potassium Latest Ref Range: 3.4 - 5.3 mmol/L 3.9   Chloride Latest Ref Range: 94 - 109 mmol/L 100   Carbon Dioxide Latest Ref Range: 20 - 32 mmol/L 26   Urea Nitrogen Latest Ref Range: 7 - 30 mg/dL 19   Creatinine Latest Ref Range: 0.52 - 1.04 mg/dL 1.01   GFR Estimate Latest Ref Range: >60 mL/min/1.73_m2 47 (L)   GFR Estimate If Black Latest Ref Range: >60 mL/min/1.73_m2 54 (L)   Calcium Latest Ref Range: 8.5 - 10.1 mg/dL 8.7   Anion Gap Latest Ref Range: 3 - 14 mmol/L 6   Albumin Latest Ref Range: 3.4 - 5.0 g/dL 3.2 (L)   Protein Total Latest Ref Range: 6.8 - 8.8 g/dL 6.5 (L)   Bilirubin Total Latest Ref Range: 0.2 - 1.3 mg/dL 1.1   Alkaline Phosphatase Latest Ref Range: 40 - 150 U/L 84   ALT Latest Ref Range: 0 - 50 U/L 46   AST Latest Ref Range: 0 - 45 U/L 38         Results for YOLY VALLEJO (MRN [id number]) as of 7/8/2021 14:17   Ref. Range 12/14/2014 03:55 12/30/2014 12:30 6/24/2021 19:41   N-Terminal Pro BNP Inpatient Latest Ref Range: 0 - 1,800 pg/mL 5,105 (H) 12,057 (H) 3,060 (H)

## 2021-07-08 NOTE — LETTER
7/8/2021    Kirit Michel MD  [address] [address]    RE: Haritha LAVERNE Ronnie       Dear Colleague,    I had the pleasure of seeing Haritha LAVERNE Trujillo in the [facility].    HPI and Plan: #[id number]  See dictation    Orders Placed This Encounter   Procedures     Basic metabolic panel     N terminal pro BNP outpatient     Basic metabolic panel     Follow-Up with [facility] Visit     Echocardiogram Complete       Orders Placed This Encounter   Medications     escitalopram (LEXAPRO) 10 MG tablet     Sig: Take 10 mg by mouth daily       Medications Discontinued During This Encounter   Medication Reason     Celecoxib (CELEBREX PO) Therapy completed         Encounter Diagnoses   Name Primary?     Dilated cardiomyopathy (H) Yes     Acute on chronic systolic congestive heart failure (H)        CURRENT MEDICATIONS:  Current Outpatient Medications   Medication Sig Dispense Refill     acetaminophen (TYLENOL) 500 MG tablet Take 500-1,000 mg by mouth every 6 hours as needed for mild pain       amLODIPine (NORVASC) 5 MG tablet Take 1 tablet (5 mg) by mouth daily 90 tablet 3     atorvastatin (LIPITOR) 40 MG tablet Take 40 mg by mouth At Bedtime        calcium carbonate-vitamin D (CALCIUM + D) 600-200 MG-UNIT TABS Take 1 tablet by mouth daily.       cycloSPORINE (RESTASIS) 0.05 % ophthalmic emulsion Place 1 drop into both eyes every 12 hours.       escitalopram (LEXAPRO) 10 MG tablet Take 10 mg by mouth daily       furosemide (LASIX) 20 MG tablet Take 0.5 tablets (10 mg) by mouth daily 30 tablet 0     lisinopril (ZESTRIL) 10 MG tablet Take 1 tablet (10 mg) by mouth daily 90 tablet 2     metoprolol succinate ER (TOPROL-XL) 50 MG 24 hr tablet Take 1 tablet (50 mg) by mouth 2 times daily 180 tablet 3     Multiple Vitamins-Minerals (CENTRUM SILVER) per tablet Take 1 tablet by mouth daily.         nitroGLYcerin (NITROSTAT) 0.4 MG  sublingual tablet For chest pain place 1 tablet under the tongue every 5 minutes for 3 doses. If symptoms persist 5 minutes after 1st dose call 911. 25 tablet 1     Probiotic Product (PROBIOTIC DAILY PO) Take by mouth daily       sodium chloride 1 GM tablet Take 1 g by mouth daily       WARFARIN SODIUM PO Take 2 mg by mouth daily (4 mg x 0.5) every Mon, Wed, Fri. 4 mg (4mg x1) all other days       Alendronate Sodium (FOSAMAX PO) Take 70 mg by mouth once a week       furosemide (LASIX) 20 MG tablet Take 1 tablet (20 mg) by mouth 2 times daily (Patient not taking: Reported on 7/8/2021) 6 tablet 0       ALLERGIES     Allergies   Allergen Reactions     Amiodarone Nausea     Hctz      Lansoprazole      diarrhea   Prevacid       PAST MEDICAL HISTORY:  Past Medical History:   Diagnosis Date     Aortic regurgitation 12/14/2014    mild (1+) per echo     Atrial fibrillation (H)      Cardiomyopathy (H)      CHF (congestive heart failure) (H)      Chronic kidney disease, stage 3      Coronary atherosclerosis of unspecified type of vessel, native or graft 5/16/2000    normal left coronary arteries and tight obstruction in distal RCA, too small for revascularization, medical management     Degeneration of cervical intervertebral disc     cervical spine     Essential hypertension, benign      Mitral regurgitation 12/14/2014    mod-mod/sev (2-3+) per echo     Other and unspecified hyperlipidemia      Pacemaker      Pulmonary hypertension (H) 3/16/2013    mild per echo with RVSP 31mmHg +RAP      Pulmonary valve regurgitation 12/14/2014    mod (2+) per echo     Tricuspid regurgitation 12/14/2014    mild (1+) per echo     Unspecified tinnitus     chronic       PAST SURGICAL HISTORY:  Past Surgical History:   Procedure Laterality Date     CORONARY ANGIOGRAPHY ADULT ORDER  5/16/2000     EP PPM RMVL&REPL OF GEN W/ DUAL LEAD SYS N/A 4/14/2020    Procedure: Permanent Pacemakers  Removal and Replacement Generator  with Multi Lead System;   Surgeon: Fay Tatum MD;  Location:  [facility]     HRW PACEMAKER PERMANENT  2015    BI- ventricular     IMPLANT PACEMAKER  2010    dual chamber Medtronic     Z NONSPECIFIC PROCEDURE  1999    right rotator cuff tear OR     ZZC NONSPECIFIC PROCEDURE  1983    microdiscectomy     ZC NONSPECIFIC PROCEDURE      C-sections x 3      ZZC NONSPECIFIC PROCEDURE      appendectomy     ZZC NONSPECIFIC PROCEDURE      bilateral benign breast biopsy      ZZC NONSPECIFIC PROCEDURE      right knee arthroscopic OR     ZZC NONSPECIFIC PROCEDURE  1974    enteritis       FAMILY HISTORY:  Family History   Problem Relation Age of Onset     Hypertension Mother      Cancer Mother         pancreatic     Eye Disorder Mother         [place]     C.A.D. Father          53     Lipids Father      Diabetes Maternal Grandmother      C.A.D. Brother         open heart surgery age 53     Cancer Daughter         ovavian     Neurologic Disorder Son         MS       SOCIAL HISTORY:  Social History     Socioeconomic History     Marital status:      Spouse name: None     Number of children: None     Years of education: None     Highest education level: None   Occupational History     None   Social Needs     Financial resource strain: None     Food insecurity     Worry: None     Inability: None     Transportation needs     Medical: None     Non-medical: None   Tobacco Use     Smoking status: Former Smoker     Packs/day: 0.50     Years: 15.00     Pack years: 7.50     Types: Cigarettes     Start date:      Quit date:      Years since quittin.5     Smokeless tobacco: Never Used   Substance and Sexual Activity     Alcohol use: No     Drug use: No     Sexual activity: None   Lifestyle     Physical activity     Days per week: None     Minutes per session: None     Stress: None   Relationships     Social connections     Talks on phone: None     Gets together: None     Attends Rastafarian service: None  "    Active member of club or organization: None     Attends meetings of clubs or organizations: None     Relationship status: None     Intimate partner violence     Fear of current or ex partner: None     Emotionally abused: None     Physically abused: None     Forced sexual activity: None   Other Topics Concern     Parent/sibling w/ CABG, MI or angioplasty before 65F 55M? Yes      Service Not Asked     Blood Transfusions Not Asked     Caffeine Concern Not Asked     Occupational Exposure Not Asked     Hobby Hazards Not Asked     Sleep Concern Not Asked     Stress Concern Not Asked     Weight Concern Not Asked     Special Diet Yes     Comment: low sodium     Back Care Not Asked     Exercise Yes     Comment: active life style     Bike Helmet Not Asked     Seat Belt Yes     Self-Exams Not Asked   Social History Narrative     None       Review of Systems:  Skin:  Negative rash;bruising Severe brusing and bleeding spots on lower legs   Eyes:  Positive for glasses    ENT:  Negative      Respiratory:  Positive for shortness of breath;dyspnea on exertion(when walking fast)     Cardiovascular:  Negative for;palpitations;chest pain Positive for;lightheadedness;dizziness    Gastroenterology: Positive for diarrhea    Genitourinary:  Negative      Musculoskeletal:  Positive for arthritis Negative for Statin Related Myalgias  Neurologic:  Positive for numbness or tingling of hands Began after starting anxiety medicaiton  Psychiatric:  Negative      Heme/Lymph/Imm:  Positive for allergies;easy bruising    Endocrine:  Negative diabetes      Physical Exam:  Vitals: /54   Pulse 58   Ht 1.626 m (5' 4\")   Wt 67.7 kg (149 lb 3.2 oz)   SpO2 97%   BMI 25.61 kg/m      Constitutional:  cooperative, alert and oriented, well developed, well nourished, in no acute distress        Skin:  warm and dry to the touch, no apparent skin lesions or masses noted   pacemaker incision in the left infraclavicular area was well-healed  "     Head:  normocephalic, no masses or lesions        Eyes:  pupils equal and round;conjunctivae and lids unremarkable        Lymph:      ENT:  no pallor or cyanosis, dentition good        Neck:    JVP 8-10      Respiratory:  normal breath sounds, clear to auscultation, normal A-P diameter, normal symmetry, normal respiratory excursion, no use of accessory muscles         Cardiac: regular rhythm;normal S1 and S2;no S3 or S4;no murmurs, gallops or rubs detected                not assessed this visit                                        GI:  abdomen soft;no HSM        Extremities and Muscular Skeletal:  no deformities, clubbing, cyanosis, erythema observed;no edema              Neurological:  no gross motor deficits        Psych:  Alert and Oriented x 3;affect appropriate, oriented to time, person and place        CC  No referring provider defined for this encounter.                  Thank you for allowing me to participate in the care of your patient.      Sincerely,     Kat Yo NP, APRN CNP     [facility]  cc:   No referring provider defined for this encounter.

## 2021-07-08 NOTE — PROGRESS NOTES
"Service Date: 07/08/2021    HISTORY OF PRESENT ILLNESS:  Mrs. Trujillo is a delightful 89-year-old woman well known to me here at the [facility].  She is here today with her son.  Her past medical history includes:  1.  Nonischemic cardiomyopathy.  EF did improve following CRT and medical therapy.  Last echocardiogram in 2019 showed her EF to be 45%-50% (during active CRT).  Unfortunately, her LV lead had to be shut off 06/2020 because of diaphragmatic stimulation.  2.  Permanent atrial fibrillation with previous AV node ablation.  Biventricular pacer upgrade in 01/2015.  High LV threshold with diaphragmatic stimulation prompted LV lead deactivation, as briefly stated above.  The patient did reach MICHAEL and had a generator change in 04/2020.  3.  Dyslipidemia.  4.  Hypertension.    Dr. Tatum and I have been concerned that \"Peg\" may slip back into heart failure after her LV lead was deactivated.  She presented to the Emergency Department on 06/24 with concerns of nausea, bilateral arm tingling and shortness of breath.  She underwent a chest x-ray which showed small pleural effusions.  She had some interstitial edema and her cardiac silhouette is enlarged.    ProBNP was elevated at 3060.  BMP showed a stable, but low serum sodium at 132, creatinine at 1.01.  It was felt that she was perhaps slightly fluid overloaded.  It was recommended that she increase her Lasix to 20 mg twice daily for 2 days, then 20 mg daily for 2 days, then back to 10 mg daily.  Haritha has continued at 20 mg twice daily of Lasix, in fact, some days has taken 20 mg 3 times a day.  She states it just depends on how she is feeling.  She is currently staying with her son.  She just was not comfortable staying in her condo until she felt better.    She was recently seen by Dr. Michel and started on Lexapro for increased anxiety.  This was just initiated on 06/28.  Also, in reviewing her chart it appears that she has been on sodium chloride 1 gram " twice daily since 2019 for hyponatremia.  The patient has complaints today of increased shortness of breath with exertion, and some lower extremity edema.  She has not been weighing herself, but plans to do so.  Her son is concerned that perhaps she does not drink enough water.  She does try to watch her sodium intake.    Lab work was completed after our visit and available at the time of this dictation.  Today's BMP shows a serum sodium of 130, BUN 17, creatinine 0.97, GFR 49, potassium is 4.3.  ProBNP is elevated at 3130.  This is similar to the reading in the Emergency Department.  All other review of systems and past medical history are noted below.    ASSESSMENT AND PLAN:  Mrs. Trujillo is a delightful 89-year-old woman here today for a post-Emergency Department visit.  1.  Nonischemic cardiomyopathy.  Her lungs are clear bilaterally.  I am concerned, however, that she is having some decompensated heart failure symptoms.  I would like to get her BMP, which was available after our visit.  I have also ordered an echocardiogram to reassess her LV and RV function.    For this time, she will continue with her metoprolol succinate at 50 mg twice daily, lisinopril 10 mg daily, furosemide 20 mg twice daily.  Also, I have asked that she start a fluid restriction for her hyponatremia.  I would like to decrease her sodium tablets to 1 daily.  A repeat BMP will be completed at her next visit in 2 weeks.    2.  Hypertension, currently normotensive on amlodipine 5 mg daily, furosemide 20 mg twice daily, Zestril 10 mg daily, metoprolol ER 50 mg twice daily.    3.  Permanent AFib with AV node ablation and CRT upgrade 01/2015.  Unfortunately, her LV lead is turned off.  She did have a generator change in 2020.  At that time, she was doing well.  We have not discussed doing an LV lead revision.  Although she is 89 years old, she continues to live independently in her condo and drives.  Peg has felt quite well.  As of late,  however, she feels that she is slowly going downhill.  She is currently at her son's house.  He is helping to make sure her meds are taken appropriately.  He also wants to make sure that she feels more secure and less anxious before going back to her condo.  She is hopeful to go back to her condo in the next week or two.    I have ordered an echocardiogram.  If her LV function has deteriorated, I would recommend that we stop lisinopril and trial Entresto.  I am scheduled to see [facility] back in the [facility] in a few weeks' time.  Echocardiogram is scheduled for .  A followup BMP is scheduled for  and a visit to see me thereafter.  Once her medications are optimized, we will then discuss changing her medications accordingly.    I did go through the [facility] educational booklet with her.  I do think that it is important that she knows the warning signs of congestive heart failure.  Also, she needs to be on a fluid restriction for her hyponatremia.  I am hopeful that we can discontinue the sodium chloride tablets altogether.    As always, thank you for including me in Peg's care.  If you have questions or concerns, please feel free to contact us.    Forty-five minutes was completed today.  Greater than 50% of this was related to counseling, reviewing medications and the plan.    Kat Yo NP        D: 2021   T: 2021   MT: maral    Name:     SEVERIANO VALLEJO  MRN:      [id number]        Account:      [id number]   :      1925           Service Date: 2021       Document: [id number]

## 2021-07-11 ENCOUNTER — HEALTH MAINTENANCE LETTER (OUTPATIENT)
Age: 86
End: 2021-07-11

## 2021-07-23 ENCOUNTER — HOSPITAL ENCOUNTER (OUTPATIENT)
Dept: CARDIOLOGY | Facility: CLINIC | Age: 86
Discharge: HOME | End: 2021-07-23
Attending: NURSE PRACTITIONER | Admitting: NURSE PRACTITIONER
Payer: COMMERCIAL

## 2021-07-23 ENCOUNTER — ANCILLARY PROCEDURE (OUTPATIENT)
Dept: CARDIOLOGY | Facility: CLINIC | Age: 86
End: 2021-07-23
Attending: INTERNAL MEDICINE
Payer: COMMERCIAL

## 2021-07-23 DIAGNOSIS — I44.2 ATRIOVENTRICULAR BLOCK, COMPLETE (H): ICD-10-CM

## 2021-07-23 DIAGNOSIS — Z95.0 CARDIAC PACEMAKER IN SITU: ICD-10-CM

## 2021-07-23 DIAGNOSIS — I42.0 DILATED CARDIOMYOPATHY (H): ICD-10-CM

## 2021-07-23 LAB — LVEF ECHO: NORMAL

## 2021-07-23 PROCEDURE — 93306 TTE W/DOPPLER COMPLETE: CPT

## 2021-07-23 PROCEDURE — 93306 TTE W/DOPPLER COMPLETE: CPT | Mod: 26 | Performed by: INTERNAL MEDICINE

## 2021-07-23 PROCEDURE — 93294 REM INTERROG EVL PM/LDLS PM: CPT | Performed by: INTERNAL MEDICINE

## 2021-07-23 PROCEDURE — 93296 REM INTERROG EVL PM/IDS: CPT | Performed by: INTERNAL MEDICINE

## 2021-07-25 LAB
MDC_IDC_LEAD_IMPLANT_DT: NORMAL
MDC_IDC_LEAD_LOCATION: NORMAL
MDC_IDC_LEAD_LOCATION_DETAIL_1: NORMAL
MDC_IDC_LEAD_MFG: NORMAL
MDC_IDC_LEAD_MODEL: NORMAL
MDC_IDC_LEAD_POLARITY_TYPE: NORMAL
MDC_IDC_LEAD_SERIAL: NORMAL
MDC_IDC_MSMT_BATTERY_DTM: NORMAL
MDC_IDC_MSMT_BATTERY_REMAINING_LONGEVITY: 129 MO
MDC_IDC_MSMT_BATTERY_RRT_TRIGGER: 2.6
MDC_IDC_MSMT_BATTERY_STATUS: NORMAL
MDC_IDC_MSMT_BATTERY_VOLTAGE: 3.01 V
MDC_IDC_MSMT_LEADCHNL_LV_IMPEDANCE_VALUE: 437 OHM
MDC_IDC_MSMT_LEADCHNL_LV_IMPEDANCE_VALUE: 456 OHM
MDC_IDC_MSMT_LEADCHNL_LV_IMPEDANCE_VALUE: 551 OHM
MDC_IDC_MSMT_LEADCHNL_LV_IMPEDANCE_VALUE: 570 OHM
MDC_IDC_MSMT_LEADCHNL_LV_IMPEDANCE_VALUE: 779 OHM
MDC_IDC_MSMT_LEADCHNL_LV_PACING_THRESHOLD_AMPLITUDE: 5 V
MDC_IDC_MSMT_LEADCHNL_LV_PACING_THRESHOLD_PULSEWIDTH: 1 MS
MDC_IDC_MSMT_LEADCHNL_RA_IMPEDANCE_VALUE: 285 OHM
MDC_IDC_MSMT_LEADCHNL_RA_IMPEDANCE_VALUE: 323 OHM
MDC_IDC_MSMT_LEADCHNL_RV_IMPEDANCE_VALUE: 323 OHM
MDC_IDC_MSMT_LEADCHNL_RV_IMPEDANCE_VALUE: 418 OHM
MDC_IDC_MSMT_LEADCHNL_RV_PACING_THRESHOLD_AMPLITUDE: 0.75 V
MDC_IDC_MSMT_LEADCHNL_RV_PACING_THRESHOLD_PULSEWIDTH: 0.4 MS
MDC_IDC_MSMT_LEADCHNL_RV_SENSING_INTR_AMPL: 13.5 MV
MDC_IDC_MSMT_LEADCHNL_RV_SENSING_INTR_AMPL: 13.5 MV
MDC_IDC_PG_IMPLANT_DTM: NORMAL
MDC_IDC_PG_MFG: NORMAL
MDC_IDC_PG_MODEL: NORMAL
MDC_IDC_PG_SERIAL: NORMAL
MDC_IDC_PG_TYPE: NORMAL
MDC_IDC_SESS_CLINIC_NAME: NORMAL
MDC_IDC_SESS_DTM: NORMAL
MDC_IDC_SESS_TYPE: NORMAL
MDC_IDC_SET_BRADY_LOWRATE: 60 {BEATS}/MIN
MDC_IDC_SET_BRADY_MAX_SENSOR_RATE: 130 {BEATS}/MIN
MDC_IDC_SET_BRADY_MODE: NORMAL
MDC_IDC_SET_CRT_PACED_CHAMBERS: NORMAL
MDC_IDC_SET_LEADCHNL_RA_SENSING_ANODE_ELECTRODE_1: NORMAL
MDC_IDC_SET_LEADCHNL_RA_SENSING_ANODE_LOCATION_1: NORMAL
MDC_IDC_SET_LEADCHNL_RA_SENSING_CATHODE_ELECTRODE_1: NORMAL
MDC_IDC_SET_LEADCHNL_RA_SENSING_CATHODE_LOCATION_1: NORMAL
MDC_IDC_SET_LEADCHNL_RA_SENSING_POLARITY: NORMAL
MDC_IDC_SET_LEADCHNL_RA_SENSING_SENSITIVITY: 4 MV
MDC_IDC_SET_LEADCHNL_RV_PACING_AMPLITUDE: 2 V
MDC_IDC_SET_LEADCHNL_RV_PACING_ANODE_ELECTRODE_1: NORMAL
MDC_IDC_SET_LEADCHNL_RV_PACING_ANODE_LOCATION_1: NORMAL
MDC_IDC_SET_LEADCHNL_RV_PACING_CAPTURE_MODE: NORMAL
MDC_IDC_SET_LEADCHNL_RV_PACING_CATHODE_ELECTRODE_1: NORMAL
MDC_IDC_SET_LEADCHNL_RV_PACING_CATHODE_LOCATION_1: NORMAL
MDC_IDC_SET_LEADCHNL_RV_PACING_POLARITY: NORMAL
MDC_IDC_SET_LEADCHNL_RV_PACING_PULSEWIDTH: 0.4 MS
MDC_IDC_SET_LEADCHNL_RV_SENSING_ANODE_ELECTRODE_1: NORMAL
MDC_IDC_SET_LEADCHNL_RV_SENSING_ANODE_LOCATION_1: NORMAL
MDC_IDC_SET_LEADCHNL_RV_SENSING_CATHODE_ELECTRODE_1: NORMAL
MDC_IDC_SET_LEADCHNL_RV_SENSING_CATHODE_LOCATION_1: NORMAL
MDC_IDC_SET_LEADCHNL_RV_SENSING_POLARITY: NORMAL
MDC_IDC_SET_LEADCHNL_RV_SENSING_SENSITIVITY: 0.9 MV
MDC_IDC_SET_ZONE_DETECTION_INTERVAL: 350 MS
MDC_IDC_SET_ZONE_DETECTION_INTERVAL: 400 MS
MDC_IDC_SET_ZONE_TYPE: NORMAL
MDC_IDC_STAT_BRADY_AP_VP_PERCENT: 0 %
MDC_IDC_STAT_BRADY_AP_VS_PERCENT: 0 %
MDC_IDC_STAT_BRADY_AS_VP_PERCENT: 90.86 %
MDC_IDC_STAT_BRADY_AS_VS_PERCENT: 9.14 %
MDC_IDC_STAT_BRADY_DTM_END: NORMAL
MDC_IDC_STAT_BRADY_DTM_START: NORMAL
MDC_IDC_STAT_BRADY_RA_PERCENT_PACED: 0 %
MDC_IDC_STAT_BRADY_RV_PERCENT_PACED: 90.86 %
MDC_IDC_STAT_CRT_DTM_END: NORMAL
MDC_IDC_STAT_CRT_DTM_START: NORMAL
MDC_IDC_STAT_CRT_LV_PERCENT_PACED: 0 %
MDC_IDC_STAT_CRT_PERCENT_PACED: 0 %
MDC_IDC_STAT_EPISODE_RECENT_COUNT: 0
MDC_IDC_STAT_EPISODE_RECENT_COUNT_DTM_END: NORMAL
MDC_IDC_STAT_EPISODE_RECENT_COUNT_DTM_START: NORMAL
MDC_IDC_STAT_EPISODE_TOTAL_COUNT: 0
MDC_IDC_STAT_EPISODE_TOTAL_COUNT: 0
MDC_IDC_STAT_EPISODE_TOTAL_COUNT: 1
MDC_IDC_STAT_EPISODE_TOTAL_COUNT_DTM_END: NORMAL
MDC_IDC_STAT_EPISODE_TOTAL_COUNT_DTM_START: NORMAL
MDC_IDC_STAT_EPISODE_TYPE: NORMAL

## 2021-07-29 ENCOUNTER — LAB (OUTPATIENT)
Dept: LAB | Facility: CLINIC | Age: 86
End: 2021-07-29
Payer: COMMERCIAL

## 2021-07-29 ENCOUNTER — OFFICE VISIT (OUTPATIENT)
Dept: CARDIOLOGY | Facility: CLINIC | Age: 86
End: 2021-07-29
Attending: NURSE PRACTITIONER
Payer: COMMERCIAL

## 2021-07-29 VITALS
SYSTOLIC BLOOD PRESSURE: 136 MMHG | BODY MASS INDEX: 23.85 KG/M2 | OXYGEN SATURATION: 96 % | HEART RATE: 62 BPM | HEIGHT: 64 IN | WEIGHT: 139.7 LBS | DIASTOLIC BLOOD PRESSURE: 60 MMHG

## 2021-07-29 DIAGNOSIS — E87.1 HYPONATREMIA: ICD-10-CM

## 2021-07-29 DIAGNOSIS — I42.0 DILATED CARDIOMYOPATHY (H): Primary | ICD-10-CM

## 2021-07-29 DIAGNOSIS — I49.5 SICK SINUS SYNDROME (H): ICD-10-CM

## 2021-07-29 DIAGNOSIS — I10 ESSENTIAL HYPERTENSION: ICD-10-CM

## 2021-07-29 DIAGNOSIS — I48.20 CHRONIC ATRIAL FIBRILLATION (H): ICD-10-CM

## 2021-07-29 DIAGNOSIS — I42.0 DILATED CARDIOMYOPATHY (H): ICD-10-CM

## 2021-07-29 LAB
ANION GAP SERPL CALCULATED.3IONS-SCNC: 3 MMOL/L (ref 3–14)
BUN SERPL-MCNC: 22 MG/DL (ref 7–30)
CALCIUM SERPL-MCNC: 9.1 MG/DL (ref 8.5–10.1)
CHLORIDE BLD-SCNC: 96 MMOL/L (ref 94–109)
CO2 SERPL-SCNC: 29 MMOL/L (ref 20–32)
CREAT SERPL-MCNC: 1.16 MG/DL (ref 0.52–1.04)
GFR SERPL CREATININE-BSD FRML MDRD: 40 ML/MIN/1.73M2
GLUCOSE BLD-MCNC: 107 MG/DL (ref 70–99)
POTASSIUM BLD-SCNC: 4.7 MMOL/L (ref 3.4–5.3)
SODIUM SERPL-SCNC: 128 MMOL/L (ref 133–144)

## 2021-07-29 PROCEDURE — 80048 BASIC METABOLIC PNL TOTAL CA: CPT | Performed by: NURSE PRACTITIONER

## 2021-07-29 PROCEDURE — 99214 OFFICE O/P EST MOD 30 MIN: CPT | Performed by: NURSE PRACTITIONER

## 2021-07-29 PROCEDURE — 36415 COLL VENOUS BLD VENIPUNCTURE: CPT | Performed by: NURSE PRACTITIONER

## 2021-07-29 RX ORDER — FUROSEMIDE 20 MG
10 TABLET ORAL DAILY
Qty: 45 TABLET | Refills: 3 | Status: ON HOLD | OUTPATIENT
Start: 2021-07-29 | End: 2021-10-07

## 2021-07-29 ASSESSMENT — MIFFLIN-ST. JEOR: SCORE: 1008.68

## 2021-07-29 NOTE — PATIENT INSTRUCTIONS
Call my nurse with any questions or concerns:  [phone]  *If you have concerns after hours, please call [phone], option 2 to speak with on call Cardiologist.    1. Medication changes from today:    Lasix 10 MG daily  Continue to limit fluid intake          2. Lab Results:   Results for YOLY VALLEJO (MRN [id number]) as of 7/29/2021 16:14   Ref. Range 7/8/2021 15:08 7/29/2021 15:10   Sodium Latest Ref Range: 133 - 144 mmol/L 130 (L) 128 (L)   Potassium Latest Ref Range: 3.4 - 5.3 mmol/L 4.3 4.7   Chloride Latest Ref Range: 94 - 109 mmol/L 95 96   Carbon Dioxide Latest Ref Range: 20 - 32 mmol/L 30 29   Urea Nitrogen Latest Ref Range: 7 - 30 mg/dL 17 22   Creatinine Latest Ref Range: 0.52 - 1.04 mg/dL 0.97 1.16 (H)   GFR Estimate Latest Ref Range: >60 mL/min/1.73m2 49 (L) 40 (L)   GFR Estimate If Black Latest Ref Range: >60 mL/min/1.73_m2 57 (L)    Calcium Latest Ref Range: 8.5 - 10.1 mg/dL 8.8 9.1   Anion Gap Latest Ref Range: 3 - 14 mmol/L 5 3

## 2021-07-29 NOTE — LETTER
7/29/2021    Kirit Michel MD  [address] [address]    RE: Haritha Trujillo       Dear Colleague,    I had the pleasure of seeing Haritha LAVERNE Trujillo in the [facility].    HPI and Plan: #[id number]  See dictation    Orders Placed This Encounter   Procedures     Basic metabolic panel     [facility]-Up with [facility]- [facility] Visit       Orders Placed This Encounter   Medications     Multiple Vitamins-Minerals (PRESERVISION AREDS 2 PO)     Sig: Take by mouth daily     furosemide (LASIX) 20 MG tablet     Sig: Take 0.5 tablets (10 mg) by mouth daily     Dispense:  45 tablet     Refill:  3       Medications Discontinued During This Encounter   Medication Reason     furosemide (LASIX) 20 MG tablet          Encounter Diagnoses   Name Primary?     Dilated cardiomyopathy (H)      Essential hypertension Yes     Chronic atrial fibrillation (H)        CURRENT MEDICATIONS:  Current Outpatient Medications   Medication Sig Dispense Refill     acetaminophen (TYLENOL) 500 MG tablet Take 500-1,000 mg by mouth every 6 hours as needed for mild pain       Alendronate Sodium (FOSAMAX PO) Take 70 mg by mouth once a week       amLODIPine (NORVASC) 5 MG tablet Take 1 tablet (5 mg) by mouth daily 90 tablet 3     atorvastatin (LIPITOR) 40 MG tablet Take 40 mg by mouth At Bedtime        calcium carbonate-vitamin D (CALCIUM + D) 600-200 MG-UNIT TABS Take 1 tablet by mouth daily.       cycloSPORINE (RESTASIS) 0.05 % ophthalmic emulsion Place 1 drop into both eyes every 12 hours.       escitalopram (LEXAPRO) 10 MG tablet Take 5 mg by mouth daily        furosemide (LASIX) 20 MG tablet Take 0.5 tablets (10 mg) by mouth daily 45 tablet 3     lisinopril (ZESTRIL) 10 MG tablet Take 1 tablet (10 mg) by mouth daily 90 tablet 2     metoprolol succinate ER (TOPROL-XL) 50 MG 24 hr tablet Take 1 tablet (50 mg) by mouth 2 times daily 180 tablet 3     Multiple  Vitamins-Minerals (CENTRUM SILVER) per tablet Take 1 tablet by mouth daily.         Multiple Vitamins-Minerals (PRESERVISION AREDS 2 PO) Take by mouth daily       nitroGLYcerin (NITROSTAT) 0.4 MG sublingual tablet For chest pain place 1 tablet under the tongue every 5 minutes for 3 doses. If symptoms persist 5 minutes after 1st dose call 911. 25 tablet 1     Probiotic Product (PROBIOTIC DAILY PO) Take by mouth daily       sodium chloride 1 GM tablet Take 1 g by mouth daily       WARFARIN SODIUM PO Take 2 mg by mouth daily (4 mg x 0.5) every Mon, Wed, Fri. 4 mg (4mg x1) all other days         ALLERGIES     Allergies   Allergen Reactions     Amiodarone Nausea     Hctz      Lansoprazole      diarrhea   Prevacid       PAST MEDICAL HISTORY:  Past Medical History:   Diagnosis Date     Aortic regurgitation 12/14/2014    mild (1+) per echo     Atrial fibrillation (H)      Cardiomyopathy (H)      CHF (congestive heart failure) (H)      Chronic kidney disease, stage 3      Coronary atherosclerosis of unspecified type of vessel, native or graft 5/16/2000    normal left coronary arteries and tight obstruction in distal RCA, too small for revascularization, medical management     Degeneration of cervical intervertebral disc     cervical spine     Essential hypertension, benign      Mitral regurgitation 12/14/2014    mod-mod/sev (2-3+) per echo     Other and unspecified hyperlipidemia      Pacemaker      Pulmonary hypertension (H) 3/16/2013    mild per echo with RVSP 31mmHg +RAP      Pulmonary valve regurgitation 12/14/2014    mod (2+) per echo     Tricuspid regurgitation 12/14/2014    mild (1+) per echo     Unspecified tinnitus     chronic       PAST SURGICAL HISTORY:  Past Surgical History:   Procedure Laterality Date     CORONARY ANGIOGRAPHY ADULT ORDER  5/16/2000     EP PPM RMVL&REPL OF GEN W/ DUAL LEAD SYS N/A 4/14/2020    Procedure: Permanent Pacemakers  Removal and Replacement Generator  with Multi Lead System;  Surgeon:  Fay Tatum MD;  Location:  [facility]     HRW PACEMAKER PERMANENT  2015    BI- ventricular     IMPLANT PACEMAKER  2010    dual chamber Medtronic     Z NONSPECIFIC PROCEDURE  1999    right rotator cuff tear OR     ZZC NONSPECIFIC PROCEDURE  1983    microdiscectomy     ZC NONSPECIFIC PROCEDURE      C-sections x 3      ZZC NONSPECIFIC PROCEDURE      appendectomy     ZZC NONSPECIFIC PROCEDURE      bilateral benign breast biopsy      ZZC NONSPECIFIC PROCEDURE      right knee arthroscopic OR     ZZC NONSPECIFIC PROCEDURE  1974    enteritis       FAMILY HISTORY:  Family History   Problem Relation Age of Onset     Hypertension Mother      Cancer Mother         pancreatic     Eye Disorder Mother         gl[place]     C.A.D. Father          53     Lipids Father      Diabetes Maternal Grandmother      C.A.D. Brother         open heart surgery age 53     Cancer Daughter         ovavian     Neurologic Disorder Son         MS       SOCIAL HISTORY:  Social History     Socioeconomic History     Marital status:      Spouse name: None     Number of children: None     Years of education: None     Highest education level: None   Occupational History     None   Tobacco Use     Smoking status: Former Smoker     Packs/day: 0.50     Years: 15.00     Pack years: 7.50     Types: Cigarettes     Start date:      Quit date:      Years since quittin.5     Smokeless tobacco: Never Used   Substance and Sexual Activity     Alcohol use: No     Drug use: No     Sexual activity: None   Other Topics Concern     Parent/sibling w/ CABG, MI or angioplasty before 65F 55M? Yes      Service Not Asked     Blood Transfusions Not Asked     Caffeine Concern Not Asked     Occupational Exposure Not Asked     Hobby Hazards Not Asked     Sleep Concern Not Asked     Stress Concern Not Asked     Weight Concern Not Asked     Special Diet Yes     Comment: low sodium     Back Care Not Asked      "Exercise Yes     Comment: active life style     Bike Helmet Not Asked     Seat Belt Yes     Self-Exams Not Asked   Social History Narrative     None     Social Determinants of Health     Financial Resource Strain:      Difficulty of Paying Living Expenses:    Food Insecurity:      Worried About Running Out of Food in the Last Year:      Ran Out of Food in the Last Year:    Transportation Needs:      Lack of Transportation (Medical):      Lack of Transportation (Non-Medical):    Physical Activity:      Days of Exercise per Week:      Minutes of Exercise per Session:    Stress:      Feeling of Stress :    Social Connections:      Frequency of Communication with Friends and Family:      Frequency of Social Gatherings with Friends and Family:      Attends Anabaptist Services:      Active Member of Clubs or Organizations:      Attends Club or Organization Meetings:      Marital Status:    Intimate Partner Violence:      Fear of Current or Ex-Partner:      Emotionally Abused:      Physically Abused:      Sexually Abused:        Review of Systems:  Skin:  Negative bruising Severe brusing and bleeding spots on lower legs   Eyes:  Positive for glasses    ENT:  Positive for hearing loss wears hearing aides  Respiratory:  Positive for shortness of breath;dyspnea on exertion (when walking fast) improved   Cardiovascular:  Negative      Gastroenterology: Negative      Genitourinary:  Negative      Musculoskeletal:  Negative   Negative for Statin Related Myalgias  Neurologic:  Negative      Psychiatric:  Positive for anxiety treated  Heme/Lymph/Imm:  Positive for easy bruising    Endocrine:  Negative        Physical Exam:  Vitals: /60   Pulse 62   Ht 1.626 m (5' 4\")   Wt 63.4 kg (139 lb 11.2 oz)   SpO2 96%   BMI 23.98 kg/m      Constitutional:  cooperative, alert and oriented, well developed, well nourished, in no acute distress        Skin:  warm and dry to the touch, no apparent skin lesions or masses noted   " pacemaker incision in the left infraclavicular area was well-healed      Head:  normocephalic, no masses or lesions        Eyes:  pupils equal and round;conjunctivae and lids unremarkable        Lymph:      ENT:  no pallor or cyanosis, dentition good        Neck:    JVP 8-10      Respiratory:  normal breath sounds, clear to auscultation, normal A-P diameter, normal symmetry, normal respiratory excursion, no use of accessory muscles         Cardiac: regular rhythm;normal S1 and S2;no S3 or S4;no murmurs, gallops or rubs detected                not assessed this visit                                        GI:  abdomen soft;no HSM        Extremities and Muscular Skeletal:  no deformities, clubbing, cyanosis, erythema observed;no edema stasis pigmentation            Neurological:  no gross motor deficits        Psych:  Alert and Oriented x 3;affect appropriate, oriented to time, person and place        CC  NAVYA Allen CNP  [address]  [place] [address]                  Thank you for allowing me to participate in the care of your patient.      Sincerely,     Kat Yo, ERIK, APRN CNP     [facility]  cc:   NAVYA Allen CNP  [address]  [place] [address]

## 2021-07-29 NOTE — PROGRESS NOTES
Service Date: 07/29/2021    HISTORY OF PRESENT ILLNESS:  Ms. Trujillo is a delightful, 89-year-old woman well known to me here at the [facility].  She is an established patient of Dr. Tatum.  She is accompanied to the [facility] today with her son, Philipp.    As you recall, I saw her last on 07/08.  Unfortunately, she presented to the emergency department on 06/24 with concerns of nausea, bilateral arm tingling and shortness of breath.  Chest x-ray showed small pleural effusions.  She also had interstitial edema.  Her Lasix was increased to 20 mg twice daily for 2 days, then back to 20 mg daily.  At my visit with her on 07/08, I decreased the Lasix back to 10 mg daily.  I also discontinued one of her sodium tablets.  She has a history of hyponatremia and was taking 1 g b.i.d. of sodium chloride.    Lori is here today for followup to reassess her heart failure symptoms.    Her past medical history includes:  1.  Nonischemic cardiomyopathy.  EF did improve following CRT and medical therapy.  Echo in 2019 showed EF of 45%-50%. Echo recently completed confirmed that her EF is stable at 45%-50%.  She does have mild dysfunction of her RV.  Her LV lead was shut off 06/2020 due to diaphragmatic stimulation.  2.  Permanent AFib with AV node ablation.  Bi-V pacer upgrade was in 01/2015.  Again, LV lead was deactivated last year.  The patient did reach MICHAEL, and generator change was in 04/2020.  The pocket is well healed.  She is on chronic warfarin therapy.  3.  Dyslipidemia.  4.  Hypertension.  5.  Osteoporosis, on Fosamax therapy.    I am pleased to see that Lori is down 10 pounds from her last visit.  Her shortness of breath has improved.  She has no peripheral edema noted on exam.  Her sons have been helping managing her medication as well as making low-sodium meals.  They did try [employer], but Lori stated that the food was not tolerable.    She remains on 1 sodium chloride tablet.  Her serum sodium was checked today  at 128.  BUN and creatinine are 22/1.16 respectively with a GFR of 40.  There was some confusion with the patient's furosemide.  Her son, Philipp, did put in 20 mg of Lasix in the afternoon starting on Sunday, so she did receive 4 doses.  She was also still getting the 10 mg in the morning.    All other review of systems and past medical history are noted below.    ASSESSMENT AND PLAN:  Ms. Trujillo is a delightful, 89-year-old woman here today for followup.  1.  Nonischemic cardiomyopathy.  I was pleased to see that her EF was stable at 45%-50%.  Other echo results showed mild decrease of RV function.  She had 1+ MR, 2-3+ TR with RVSP at 38 mmHg plus RAP and 2+ AR and 1-2+ FL.  Ascending aorta was mildly dilated at 4.3 cm.  When compared to her previous echo, the aorta was slightly more dilated, and valvular regurgitations were stable.    I reviewed these results with the patient and her son.  I believe she is doing well on her current regimen.  On a low-sodium diet and fluid restriction, her serum sodium is stable, and her weight is down 10 pounds.  I have asked that we keep Lasix at 10 mg daily.  I would like to repeat a BMP in 1 month when I see her.  Should her weight increase more than 3 pounds in a day or 5 pounds in a week, they should contact us.  If it happens on a weekend, then her son, Philipp, can give her an additional 20 mg dose of Lasix once.  2.  Permanent AFib with AV node ablation and pacemaker implantation.  The patient is on chronic warfarin therapy.  Her last INR was 2.7.  It is managed at Dr. Michel' office.  The patient needs to have some dental work done.  I have asked that they discuss this with Dr. Michel.  There is some concern about her being on Fosamax and whether or not a tooth can be pulled.  From an anticoagulation standpoint, warfarin can be held, although I would recommend holding it 3-4 days.  The patient is nervous about holding the warfarin for a long period of time due to increased risk  of stroke.  The patient does not have a stroke history.  3.  Dyslipidemia, on atorvastatin.  4.  Hypertension, currently normotensive.    I am very pleased with how well Lori is doing.  Again, I will see her back in 4-6 weeks with a repeat BMP.  Should there be questions or concerns in the interim, I have asked that they contact us.    Forty minutes was completed today.  Greater than 50% of this was related to counseling, reviewing medication and a long-term plan.  If her medications stabilize, one consideration could be a pill pack.  Lori is back living independently, but her son helps her with her pills.  Another son helps make low-sodium foods.  They are happy to help their mom out.  I am pleased to see how well she is thriving.    Kat Yo NP        D: 2021   T: 2021   MT: derik    Name:     SEVERIANO VALLEJO  MRN:      [id number]        Account:      [id number]   :      1925           Service Date: 2021       Document: [id number]

## 2021-07-29 NOTE — PROGRESS NOTES
HPI and Plan: #[id number]  See dictation    Orders Placed This Encounter   Procedures     Basic metabolic panel     [facility] with [facility]- MARGIE Visit       Orders Placed This Encounter   Medications     Multiple Vitamins-Minerals (PRESERVISION AREDS 2 PO)     Sig: Take by mouth daily     furosemide (LASIX) 20 MG tablet     Sig: Take 0.5 tablets (10 mg) by mouth daily     Dispense:  45 tablet     Refill:  3       Medications Discontinued During This Encounter   Medication Reason     furosemide (LASIX) 20 MG tablet          Encounter Diagnoses   Name Primary?     Dilated cardiomyopathy (H)      Essential hypertension Yes     Chronic atrial fibrillation (H)        CURRENT MEDICATIONS:  Current Outpatient Medications   Medication Sig Dispense Refill     acetaminophen (TYLENOL) 500 MG tablet Take 500-1,000 mg by mouth every 6 hours as needed for mild pain       Alendronate Sodium (FOSAMAX PO) Take 70 mg by mouth once a week       amLODIPine (NORVASC) 5 MG tablet Take 1 tablet (5 mg) by mouth daily 90 tablet 3     atorvastatin (LIPITOR) 40 MG tablet Take 40 mg by mouth At Bedtime        calcium carbonate-vitamin D (CALCIUM + D) 600-200 MG-UNIT TABS Take 1 tablet by mouth daily.       cycloSPORINE (RESTASIS) 0.05 % ophthalmic emulsion Place 1 drop into both eyes every 12 hours.       escitalopram (LEXAPRO) 10 MG tablet Take 5 mg by mouth daily        furosemide (LASIX) 20 MG tablet Take 0.5 tablets (10 mg) by mouth daily 45 tablet 3     lisinopril (ZESTRIL) 10 MG tablet Take 1 tablet (10 mg) by mouth daily 90 tablet 2     metoprolol succinate ER (TOPROL-XL) 50 MG 24 hr tablet Take 1 tablet (50 mg) by mouth 2 times daily 180 tablet 3     Multiple Vitamins-Minerals (CENTRUM SILVER) per tablet Take 1 tablet by mouth daily.         Multiple Vitamins-Minerals (PRESERVISION AREDS 2 PO) Take by mouth daily       nitroGLYcerin (NITROSTAT) 0.4 MG sublingual tablet For chest pain place 1 tablet under the tongue every 5 minutes for  3 doses. If symptoms persist 5 minutes after 1st dose call 911. 25 tablet 1     Probiotic Product (PROBIOTIC DAILY PO) Take by mouth daily       sodium chloride 1 GM tablet Take 1 g by mouth daily       WARFARIN SODIUM PO Take 2 mg by mouth daily (4 mg x 0.5) every Mon, Wed, Fri. 4 mg (4mg x1) all other days         ALLERGIES     Allergies   Allergen Reactions     Amiodarone Nausea     Hctz      Lansoprazole      diarrhea   Prevacid       PAST MEDICAL HISTORY:  Past Medical History:   Diagnosis Date     Aortic regurgitation 12/14/2014    mild (1+) per echo     Atrial fibrillation (H)      Cardiomyopathy (H)      CHF (congestive heart failure) (H)      Chronic kidney disease, stage 3      Coronary atherosclerosis of unspecified type of vessel, native or graft 5/16/2000    normal left coronary arteries and tight obstruction in distal RCA, too small for revascularization, medical management     Degeneration of cervical intervertebral disc     cervical spine     Essential hypertension, benign      Mitral regurgitation 12/14/2014    mod-mod/sev (2-3+) per echo     Other and unspecified hyperlipidemia      Pacemaker      Pulmonary hypertension (H) 3/16/2013    mild per echo with RVSP 31mmHg +RAP      Pulmonary valve regurgitation 12/14/2014    mod (2+) per echo     Tricuspid regurgitation 12/14/2014    mild (1+) per echo     Unspecified tinnitus     chronic       PAST SURGICAL HISTORY:  Past Surgical History:   Procedure Laterality Date     CORONARY ANGIOGRAPHY ADULT ORDER  5/16/2000     EP PPM RMVL&REPL OF GEN W/ DUAL LEAD SYS N/A 4/14/2020    Procedure: Permanent Pacemakers  Removal and Replacement Generator  with Multi Lead System;  Surgeon: Fay Tatum MD;  Location:  [facility]     HRW PACEMAKER PERMANENT  1/2015    BI- ventricular     IMPLANT PACEMAKER  11/12/2010    dual chamber Medtronic     ZZC NONSPECIFIC PROCEDURE  1999    right rotator cuff tear OR     ZZC NONSPECIFIC PROCEDURE       microdiscectomy     Z NONSPECIFIC PROCEDURE      C-sections x 3      ZZC NONSPECIFIC PROCEDURE      appendectomy     ZZC NONSPECIFIC PROCEDURE      bilateral benign breast biopsy      ZZC NONSPECIFIC PROCEDURE      right knee arthroscopic OR     ZC NONSPECIFIC PROCEDURE  1974    enteritis       FAMILY HISTORY:  Family History   Problem Relation Age of Onset     Hypertension Mother      Cancer Mother         pancreatic     Eye Disorder Mother         cristian     LARON.A.D. Father          53     Lipids Father      Diabetes Maternal Grandmother      LARON.A.WARREN. Brother         open heart surgery age 53     Cancer Daughter         ovavian     Neurologic Disorder Son         MS       SOCIAL HISTORY:  Social History     Socioeconomic History     Marital status:      Spouse name: None     Number of children: None     Years of education: None     Highest education level: None   Occupational History     None   Tobacco Use     Smoking status: Former Smoker     Packs/day: 0.50     Years: 15.00     Pack years: 7.50     Types: Cigarettes     Start date:      Quit date:      Years since quittin.5     Smokeless tobacco: Never Used   Substance and Sexual Activity     Alcohol use: No     Drug use: No     Sexual activity: None   Other Topics Concern     Parent/sibling w/ CABG, MI or angioplasty before 65F 55M? Yes      Service Not Asked     Blood Transfusions Not Asked     Caffeine Concern Not Asked     Occupational Exposure Not Asked     Hobby Hazards Not Asked     Sleep Concern Not Asked     Stress Concern Not Asked     Weight Concern Not Asked     Special Diet Yes     Comment: low sodium     Back Care Not Asked     Exercise Yes     Comment: active life style     Bike Helmet Not Asked     Seat Belt Yes     Self-Exams Not Asked   Social History Narrative     None     Social Determinants of Health     Financial Resource Strain:      Difficulty of Paying Living Expenses:    Food Insecurity:       "Worried About Running Out of Food in the Last Year:      Ran Out of Food in the Last Year:    Transportation Needs:      Lack of Transportation (Medical):      Lack of Transportation (Non-Medical):    Physical Activity:      Days of Exercise per Week:      Minutes of Exercise per Session:    Stress:      Feeling of Stress :    Social Connections:      Frequency of Communication with Friends and Family:      Frequency of Social Gatherings with Friends and Family:      Attends Worship Services:      Active Member of Clubs or Organizations:      Attends Club or Organization Meetings:      Marital Status:    Intimate Partner Violence:      Fear of Current or Ex-Partner:      Emotionally Abused:      Physically Abused:      Sexually Abused:        Review of Systems:  Skin:  Negative bruising Severe brusing and bleeding spots on lower legs   Eyes:  Positive for glasses    ENT:  Positive for hearing loss wears hearing aides  Respiratory:  Positive for shortness of breath;dyspnea on exertion (when walking fast) improved   Cardiovascular:  Negative      Gastroenterology: Negative      Genitourinary:  Negative      Musculoskeletal:  Negative   Negative for Statin Related Myalgias  Neurologic:  Negative      Psychiatric:  Positive for anxiety treated  Heme/Lymph/Imm:  Positive for easy bruising    Endocrine:  Negative        Physical Exam:  Vitals: /60   Pulse 62   Ht 1.626 m (5' 4\")   Wt 63.4 kg (139 lb 11.2 oz)   SpO2 96%   BMI 23.98 kg/m      Constitutional:  cooperative, alert and oriented, well developed, well nourished, in no acute distress        Skin:  warm and dry to the touch, no apparent skin lesions or masses noted   pacemaker incision in the left infraclavicular area was well-healed      Head:  normocephalic, no masses or lesions        Eyes:  pupils equal and round;conjunctivae and lids unremarkable        Lymph:      ENT:  no pallor or cyanosis, dentition good        Neck:    JVP 8-10  "     Respiratory:  normal breath sounds, clear to auscultation, normal A-P diameter, normal symmetry, normal respiratory excursion, no use of accessory muscles         Cardiac: regular rhythm;normal S1 and S2;no S3 or S4;no murmurs, gallops or rubs detected                not assessed this visit                                        GI:  abdomen soft;no HSM        Extremities and Muscular Skeletal:  no deformities, clubbing, cyanosis, erythema observed;no edema stasis pigmentation            Neurological:  no gross motor deficits        Psych:  Alert and Oriented x 3;affect appropriate, oriented to time, person and place        CC  Kat Yo, APRN CNP  [address]  [place] [address]

## 2021-09-05 ENCOUNTER — HEALTH MAINTENANCE LETTER (OUTPATIENT)
Age: 86
End: 2021-09-05

## 2021-09-10 ENCOUNTER — LAB (OUTPATIENT)
Dept: LAB | Facility: CLINIC | Age: 86
End: 2021-09-10
Payer: COMMERCIAL

## 2021-09-10 ENCOUNTER — OFFICE VISIT (OUTPATIENT)
Dept: CARDIOLOGY | Facility: CLINIC | Age: 86
End: 2021-09-10
Attending: NURSE PRACTITIONER
Payer: COMMERCIAL

## 2021-09-10 VITALS
DIASTOLIC BLOOD PRESSURE: 85 MMHG | HEIGHT: 64 IN | SYSTOLIC BLOOD PRESSURE: 133 MMHG | HEART RATE: 86 BPM | BODY MASS INDEX: 23.9 KG/M2 | WEIGHT: 140 LBS

## 2021-09-10 DIAGNOSIS — I48.20 CHRONIC ATRIAL FIBRILLATION (H): ICD-10-CM

## 2021-09-10 DIAGNOSIS — I42.0 DILATED CARDIOMYOPATHY (H): Primary | ICD-10-CM

## 2021-09-10 DIAGNOSIS — I42.0 DILATED CARDIOMYOPATHY (H): ICD-10-CM

## 2021-09-10 DIAGNOSIS — Z98.890 HX OF ATRIOVENTRICULAR NODE ABLATION: ICD-10-CM

## 2021-09-10 LAB
ANION GAP SERPL CALCULATED.3IONS-SCNC: 6 MMOL/L (ref 3–14)
BUN SERPL-MCNC: 22 MG/DL (ref 7–30)
CALCIUM SERPL-MCNC: 8.7 MG/DL (ref 8.5–10.1)
CHLORIDE BLD-SCNC: 102 MMOL/L (ref 94–109)
CO2 SERPL-SCNC: 28 MMOL/L (ref 20–32)
CREAT SERPL-MCNC: 1.25 MG/DL (ref 0.52–1.04)
GFR SERPL CREATININE-BSD FRML MDRD: 36 ML/MIN/1.73M2
GLUCOSE BLD-MCNC: 167 MG/DL (ref 70–99)
POTASSIUM BLD-SCNC: 4.7 MMOL/L (ref 3.4–5.3)
SODIUM SERPL-SCNC: 136 MMOL/L (ref 133–144)

## 2021-09-10 PROCEDURE — 80048 BASIC METABOLIC PNL TOTAL CA: CPT | Performed by: NURSE PRACTITIONER

## 2021-09-10 PROCEDURE — 36415 COLL VENOUS BLD VENIPUNCTURE: CPT | Performed by: NURSE PRACTITIONER

## 2021-09-10 PROCEDURE — 99213 OFFICE O/P EST LOW 20 MIN: CPT | Performed by: NURSE PRACTITIONER

## 2021-09-10 RX ORDER — METOPROLOL SUCCINATE 50 MG/1
TABLET, EXTENDED RELEASE ORAL
Start: 2021-09-10 | End: 2022-01-01

## 2021-09-10 ASSESSMENT — MIFFLIN-ST. JEOR: SCORE: 1010.04

## 2021-09-10 NOTE — PATIENT INSTRUCTIONS
Call my nurse with any questions or concerns:  [phone]  *If you have concerns after hours, please call [phone], option 2 to speak with on call Cardiologist.    1. Medication changes from today:    No changes  See me in 6 months BUT if you have problems please call and I will see you sooner       2. Lab Results:     Results for YOLY VALLEJO (MRN [id number]) as of 9/10/2021 11:12   Ref. Range 7/29/2021 15:10 9/10/2021 09:28   Sodium Latest Ref Range: 133 - 144 mmol/L 128 (L) 136   Potassium Latest Ref Range: 3.4 - 5.3 mmol/L 4.7 4.7   Chloride Latest Ref Range: 94 - 109 mmol/L 96 102   Carbon Dioxide Latest Ref Range: 20 - 32 mmol/L 29 28   Urea Nitrogen Latest Ref Range: 7 - 30 mg/dL 22 22   Creatinine Latest Ref Range: 0.52 - 1.04 mg/dL 1.16 (H) 1.25 (H)   GFR Estimate Latest Ref Range: >60 mL/min/1.73m2 40 (L) 36 (L)   Calcium Latest Ref Range: 8.5 - 10.1 mg/dL 9.1 8.7   Anion Gap Latest Ref Range: 3 - 14 mmol/L 3 6   Glucose Latest Ref Range: 70 - 99 mg/dL 107 (H) 167 (H)

## 2021-09-10 NOTE — LETTER
9/10/2021    Kirit Michel MD  [address] [address]    RE: Haritha GALLOWAY Ronnie       Dear Colleague,    I had the pleasure of seeing Haritha GALLOWAY Ronnie in the [facility].    HPI and Plan: [id number]  See dictation    Orders Placed This Encounter   Procedures     Basic metabolic panel     Follow-Up with Cardiac Advanced Practice Provider       Orders Placed This Encounter   Medications     metoprolol succinate ER (TOPROL-XL) 50 MG 24 hr tablet     Si in am and 50mg at night       Medications Discontinued During This Encounter   Medication Reason     metoprolol succinate ER (TOPROL-XL) 50 MG 24 hr tablet          Encounter Diagnoses   Name Primary?     Dilated cardiomyopathy (H)      CHF (congestive heart failure) (H)        CURRENT MEDICATIONS:  Current Outpatient Medications   Medication Sig Dispense Refill     acetaminophen (TYLENOL) 500 MG tablet Take 500-1,000 mg by mouth every 6 hours as needed for mild pain       Alendronate Sodium (FOSAMAX PO) Take 70 mg by mouth once a week       amLODIPine (NORVASC) 5 MG tablet Take 1 tablet (5 mg) by mouth daily 90 tablet 3     atorvastatin (LIPITOR) 40 MG tablet Take 40 mg by mouth At Bedtime        calcium carbonate-vitamin D (CALCIUM + D) 600-200 MG-UNIT TABS Take 1 tablet by mouth daily.       cycloSPORINE (RESTASIS) 0.05 % ophthalmic emulsion Place 1 drop into both eyes every 12 hours.       escitalopram (LEXAPRO) 10 MG tablet Take 5 mg by mouth daily        furosemide (LASIX) 20 MG tablet Take 0.5 tablets (10 mg) by mouth daily 45 tablet 3     lisinopril (ZESTRIL) 10 MG tablet Take 1 tablet (10 mg) by mouth daily 90 tablet 2     metoprolol succinate ER (TOPROL-XL) 50 MG 24 hr tablet 100 in am and 50mg at night       Multiple Vitamins-Minerals (CENTRUM SILVER) per tablet Take 1 tablet by mouth daily.         Multiple Vitamins-Minerals (PRESERVISION AREDS 2 PO)  Take by mouth daily       nitroGLYcerin (NITROSTAT) 0.4 MG sublingual tablet For chest pain place 1 tablet under the tongue every 5 minutes for 3 doses. If symptoms persist 5 minutes after 1st dose call 911. 25 tablet 1     Probiotic Product (PROBIOTIC DAILY PO) Take by mouth daily       sodium chloride 1 GM tablet Take 1 g by mouth daily       WARFARIN SODIUM PO Take 2 mg by mouth daily (4 mg x 0.5) every Mon, Wed, Fri. 4 mg (4mg x1) all other days         ALLERGIES     Allergies   Allergen Reactions     Amiodarone Nausea     Hctz      Lansoprazole      diarrhea   Prevacid       PAST MEDICAL HISTORY:  Past Medical History:   Diagnosis Date     Aortic regurgitation 12/14/2014    mild (1+) per echo     Atrial fibrillation (H)      Cardiomyopathy (H)      CHF (congestive heart failure) (H)      Chronic kidney disease, stage 3      Coronary atherosclerosis of unspecified type of vessel, native or graft 5/16/2000    normal left coronary arteries and tight obstruction in distal RCA, too small for revascularization, medical management     Degeneration of cervical intervertebral disc     cervical spine     Essential hypertension, benign      Mitral regurgitation 12/14/2014    mod-mod/sev (2-3+) per echo     Other and unspecified hyperlipidemia      Pacemaker      Pulmonary hypertension (H) 3/16/2013    mild per echo with RVSP 31mmHg +RAP      Pulmonary valve regurgitation 12/14/2014    mod (2+) per echo     Tricuspid regurgitation 12/14/2014    mild (1+) per echo     Unspecified tinnitus     chronic       PAST SURGICAL HISTORY:  Past Surgical History:   Procedure Laterality Date     CORONARY ANGIOGRAPHY ADULT ORDER  5/16/2000     EP PPM RMVL&REPL OF GEN W/ DUAL LEAD SYS N/A 4/14/2020    Procedure: Permanent Pacemakers  Removal and Replacement Generator  with Multi Lead System;  Surgeon: Fay Tatum MD;  Location:  [facility]ARD[facility] CATH LAB     HRW PACEMAKER PERMANENT  1/2015    BI- ventricular     IMPLANT  PACEMAKER  2010    dual chamber Medtronic     ZZC NONSPECIFIC PROCEDURE  1999    right rotator cuff tear OR     ZZC NONSPECIFIC PROCEDURE  1983    microdiscectomy     ZZC NONSPECIFIC PROCEDURE      C-sections x 3      ZZC NONSPECIFIC PROCEDURE      appendectomy     ZZC NONSPECIFIC PROCEDURE      bilateral benign breast biopsy      ZZC NONSPECIFIC PROCEDURE      right knee arthroscopic OR     ZZC NONSPECIFIC PROCEDURE  1974    enteritis       FAMILY HISTORY:  Family History   Problem Relation Age of Onset     Hypertension Mother      Cancer Mother         pancreatic     Eye Disorder Mother         glacoma     LARON.ABEATRIZ Father          53     Lipids Father      Diabetes Maternal Grandmother      C.A.GIDEON Brother         open heart surgery age 53     Cancer Daughter         ovavian     Neurologic Disorder Son         MS       SOCIAL HISTORY:  Social History     Socioeconomic History     Marital status:      Spouse name: None     Number of children: None     Years of education: None     Highest education level: None   Occupational History     None   Tobacco Use     Smoking status: Former Smoker     Packs/day: 0.50     Years: 15.00     Pack years: 7.50     Types: Cigarettes     Start date:      Quit date:      Years since quittin.7     Smokeless tobacco: Never Used   Substance and Sexual Activity     Alcohol use: No     Drug use: No     Sexual activity: None   Other Topics Concern     Parent/sibling w/ CABG, MI or angioplasty before 65F 55M? Yes      Service Not Asked     Blood Transfusions Not Asked     Caffeine Concern Not Asked     Occupational Exposure Not Asked     Hobby Hazards Not Asked     Sleep Concern Not Asked     Stress Concern Not Asked     Weight Concern Not Asked     Special Diet Yes     Comment: low sodium     Back Care Not Asked     Exercise Yes     Comment: active life style     Bike Helmet Not Asked     Seat Belt Yes     Self-Exams Not Asked   Social History Narrative  "    None     Social Determinants of Health     Financial Resource Strain:      Difficulty of Paying Living Expenses:    Food Insecurity:      Worried About Running Out of Food in the Last Year:      Ran Out of Food in the Last Year:    Transportation Needs:      Lack of Transportation (Medical):      Lack of Transportation (Non-Medical):    Physical Activity:      Days of Exercise per Week:      Minutes of Exercise per Session:    Stress:      Feeling of Stress :    Social Connections:      Frequency of Communication with Friends and Family:      Frequency of Social Gatherings with Friends and Family:      Attends Restorationism Services:      Active Member of Clubs or Organizations:      Attends Club or Organization Meetings:      Marital Status:    Intimate Partner Violence:      Fear of Current or Ex-Partner:      Emotionally Abused:      Physically Abused:      Sexually Abused:        Review of Systems:  Skin:  Negative bruising Severe brusing and bleeding spots on lower legs   Eyes:  Positive for glasses    ENT:  Positive for hearing loss wears hearing aides  Respiratory:  Positive for shortness of breath;dyspnea on exertion (when walking fast) improved   Cardiovascular:    lightheadedness;dizziness;Positive for when does too much  Gastroenterology: Negative      Genitourinary:  Negative      Musculoskeletal:  Negative   Negative for Statin Related Myalgias  Neurologic:  Negative      Psychiatric:  Positive for anxiety treated  Heme/Lymph/Imm:  Positive for easy bruising    Endocrine:  Negative        Physical Exam:  Vitals: /85   Pulse 86   Ht 1.626 m (5' 4\")   Wt 63.5 kg (140 lb)   BMI 24.03 kg/m      Constitutional:  cooperative, alert and oriented, well developed, well nourished, in no acute distress        Skin:  warm and dry to the touch, no apparent skin lesions or masses noted   pacemaker incision in the left infraclavicular area was well-healed      Head:  normocephalic, no masses or lesions    "     Eyes:  pupils equal and round;conjunctivae and lids unremarkable        ENT:  no pallor or cyanosis, dentition good        Neck:    JVP 8-10      Respiratory:  normal breath sounds, clear to auscultation, normal A-P diameter, normal symmetry, normal respiratory excursion, no use of accessory muscles         Cardiac: regular rhythm;normal S1 and S2;no S3 or S4;no murmurs, gallops or rubs detected                not assessed this visit                                        GI:  abdomen soft;no HSM        Extremities and Muscular Skeletal:  no deformities, clubbing, cyanosis, erythema observed;no edema stasis pigmentation            Neurological:  no gross motor deficits        Psych:  Alert and Oriented x 3;affect appropriate, oriented to time, person and place        CC  NAVYA Allen CNP  [address]  [place]  [place] [address]                  Thank you for allowing me to participate in the care of your patient.      Sincerely,     Kat Yo, ERIK, APRN CNP     [facility]  cc:   NAVYA Allen CNP  [address]  [place] [address]

## 2021-09-10 NOTE — PROGRESS NOTES
HPI and Plan: [id number]  See dictation    Orders Placed This Encounter   Procedures     Basic metabolic panel     Follow-Up with Cardiac Advanced Practice Provider       Orders Placed This Encounter   Medications     metoprolol succinate ER (TOPROL-XL) 50 MG 24 hr tablet     Si in am and 50mg at night       Medications Discontinued During This Encounter   Medication Reason     metoprolol succinate ER (TOPROL-XL) 50 MG 24 hr tablet          Encounter Diagnoses   Name Primary?     Dilated cardiomyopathy (H)      CHF (congestive heart failure) (H)        CURRENT MEDICATIONS:  Current Outpatient Medications   Medication Sig Dispense Refill     acetaminophen (TYLENOL) 500 MG tablet Take 500-1,000 mg by mouth every 6 hours as needed for mild pain       Alendronate Sodium (FOSAMAX PO) Take 70 mg by mouth once a week       amLODIPine (NORVASC) 5 MG tablet Take 1 tablet (5 mg) by mouth daily 90 tablet 3     atorvastatin (LIPITOR) 40 MG tablet Take 40 mg by mouth At Bedtime        calcium carbonate-vitamin D (CALCIUM + D) 600-200 MG-UNIT TABS Take 1 tablet by mouth daily.       cycloSPORINE (RESTASIS) 0.05 % ophthalmic emulsion Place 1 drop into both eyes every 12 hours.       escitalopram (LEXAPRO) 10 MG tablet Take 5 mg by mouth daily        furosemide (LASIX) 20 MG tablet Take 0.5 tablets (10 mg) by mouth daily 45 tablet 3     lisinopril (ZESTRIL) 10 MG tablet Take 1 tablet (10 mg) by mouth daily 90 tablet 2     metoprolol succinate ER (TOPROL-XL) 50 MG 24 hr tablet 100 in am and 50mg at night       Multiple Vitamins-Minerals (CENTRUM SILVER) per tablet Take 1 tablet by mouth daily.         Multiple Vitamins-Minerals (PRESERVISION AREDS 2 PO) Take by mouth daily       nitroGLYcerin (NITROSTAT) 0.4 MG sublingual tablet For chest pain place 1 tablet under the tongue every 5 minutes for 3 doses. If symptoms persist 5 minutes after 1st dose call 911. 25 tablet 1     Probiotic Product (PROBIOTIC DAILY PO) Take by mouth  daily       sodium chloride 1 GM tablet Take 1 g by mouth daily       WARFARIN SODIUM PO Take 2 mg by mouth daily (4 mg x 0.5) every Mon, Wed, Fri. 4 mg (4mg x1) all other days         ALLERGIES     Allergies   Allergen Reactions     Amiodarone Nausea     Hctz      Lansoprazole      diarrhea   Prevacid       PAST MEDICAL HISTORY:  Past Medical History:   Diagnosis Date     Aortic regurgitation 12/14/2014    mild (1+) per echo     Atrial fibrillation (H)      Cardiomyopathy (H)      CHF (congestive heart failure) (H)      Chronic kidney disease, stage 3      Coronary atherosclerosis of unspecified type of vessel, native or graft 5/16/2000    normal left coronary arteries and tight obstruction in distal RCA, too small for revascularization, medical management     Degeneration of cervical intervertebral disc     cervical spine     Essential hypertension, benign      Mitral regurgitation 12/14/2014    mod-mod/sev (2-3+) per echo     Other and unspecified hyperlipidemia      Pacemaker      Pulmonary hypertension (H) 3/16/2013    mild per echo with RVSP 31mmHg +RAP      Pulmonary valve regurgitation 12/14/2014    mod (2+) per echo     Tricuspid regurgitation 12/14/2014    mild (1+) per echo     Unspecified tinnitus     chronic       PAST SURGICAL HISTORY:  Past Surgical History:   Procedure Laterality Date     CORONARY ANGIOGRAPHY ADULT ORDER  5/16/2000     EP PPM RMVL&REPL OF GEN W/ DUAL LEAD SYS N/A 4/14/2020    Procedure: Permanent Pacemakers  Removal and Replacement Generator  with Multi Lead System;  Surgeon: Fay Tatum MD;  Location:  HE[facility]ARD[facility] CATH LAB     HRW PACEMAKER PERMANENT  1/2015    BI- ventricular     IMPLANT PACEMAKER  11/12/2010    dual chamber Medtronic     [facility] NONSPECIFIC PROCEDURE  1999    right rotator cuff tear OR     [facility] NONSPECIFIC PROCEDURE  1983    microdiscectomy     [facility] NONSPECIFIC PROCEDURE      C-sections x 3      [facility] NONSPECIFIC PROCEDURE      appendectomy     [facility]  NONSPECIFIC PROCEDURE      bilateral benign breast biopsy      ZZC NONSPECIFIC PROCEDURE      right knee arthroscopic OR     ZZC NONSPECIFIC PROCEDURE  1974    enteritis       FAMILY HISTORY:  Family History   Problem Relation Age of Onset     Hypertension Mother      Cancer Mother         pancreatic     Eye Disorder Mother         cristian     ANIA Father          53     Lipids Father      Diabetes Maternal Grandmother      ANIA Brother         open heart surgery age 53     Cancer Daughter         ovavian     Neurologic Disorder Son         MS       SOCIAL HISTORY:  Social History     Socioeconomic History     Marital status:      Spouse name: None     Number of children: None     Years of education: None     Highest education level: None   Occupational History     None   Tobacco Use     Smoking status: Former Smoker     Packs/day: 0.50     Years: 15.00     Pack years: 7.50     Types: Cigarettes     Start date:      Quit date:      Years since quittin.7     Smokeless tobacco: Never Used   Substance and Sexual Activity     Alcohol use: No     Drug use: No     Sexual activity: None   Other Topics Concern     Parent/sibling w/ CABG, MI or angioplasty before 65F 55M? Yes      Service Not Asked     Blood Transfusions Not Asked     Caffeine Concern Not Asked     Occupational Exposure Not Asked     Hobby Hazards Not Asked     Sleep Concern Not Asked     Stress Concern Not Asked     Weight Concern Not Asked     Special Diet Yes     Comment: low sodium     Back Care Not Asked     Exercise Yes     Comment: active life style     Bike Helmet Not Asked     Seat Belt Yes     Self-Exams Not Asked   Social History Narrative     None     Social Determinants of Health     Financial Resource Strain:      Difficulty of Paying Living Expenses:    Food Insecurity:      Worried About Running Out of Food in the Last Year:      Ran Out of Food in the Last Year:    Transportation Needs:      Lack of  "Transportation (Medical):      Lack of Transportation (Non-Medical):    Physical Activity:      Days of Exercise per Week:      Minutes of Exercise per Session:    Stress:      Feeling of Stress :    Social Connections:      Frequency of Communication with Friends and Family:      Frequency of Social Gatherings with Friends and Family:      Attends Voodoo Services:      Active Member of Clubs or Organizations:      Attends Club or Organization Meetings:      Marital Status:    Intimate Partner Violence:      Fear of Current or Ex-Partner:      Emotionally Abused:      Physically Abused:      Sexually Abused:        Review of Systems:  Skin:  Negative bruising Severe brusing and bleeding spots on lower legs   Eyes:  Positive for glasses    ENT:  Positive for hearing loss wears hearing aides  Respiratory:  Positive for shortness of breath;dyspnea on exertion (when walking fast) improved   Cardiovascular:    lightheadedness;dizziness;Positive for when does too much  Gastroenterology: Negative      Genitourinary:  Negative      Musculoskeletal:  Negative   Negative for Statin Related Myalgias  Neurologic:  Negative      Psychiatric:  Positive for anxiety treated  Heme/Lymph/Imm:  Positive for easy bruising    Endocrine:  Negative        Physical Exam:  Vitals: /85   Pulse 86   Ht 1.626 m (5' 4\")   Wt 63.5 kg (140 lb)   BMI 24.03 kg/m      Constitutional:  cooperative, alert and oriented, well developed, well nourished, in no acute distress        Skin:  warm and dry to the touch, no apparent skin lesions or masses noted   pacemaker incision in the left infraclavicular area was well-healed      Head:  normocephalic, no masses or lesions        Eyes:  pupils equal and round;conjunctivae and lids unremarkable        ENT:  no pallor or cyanosis, dentition good        Neck:    JVP 8-10      Respiratory:  normal breath sounds, clear to auscultation, normal A-P diameter, normal symmetry, normal respiratory " excursion, no use of accessory muscles         Cardiac: regular rhythm;normal S1 and S2;no S3 or S4;no murmurs, gallops or rubs detected                not assessed this visit                                        GI:  abdomen soft;no HSM        Extremities and Muscular Skeletal:  no deformities, clubbing, cyanosis, erythema observed;no edema stasis pigmentation            Neurological:  no gross motor deficits        Psych:  Alert and Oriented x 3;affect appropriate, oriented to time, person and place        CC  Kat Yo, NAVYA CNP  [address]  [place] [id number]

## 2021-09-10 NOTE — PROGRESS NOTES
Service Date: 09/10/2021    HISTORY OF PRESENT ILLNESS:  Ms. Trujillo is a delightful 89-year-old woman well known to me here at the [facility].  She is an established patient of Dr. Tatum.  She is here today with her son, Philipp.    Her last visit was on 07/29.  Unfortunately, she presented to the emergency department in June with nausea, bilateral arm tingling and shortness of breath.  Chest x-ray showed small pleural effusions and interstitial edema.  Her Lasix was increased to 20 mg daily.  She was also noted to be on sodium tablets, which I discontinued one of the tablets.  She has a history of hyponatremia.  She is now off her sodium tablets, on furosemide 10 mg daily and on a low-sodium diet.  Followup BMP today shows a serum sodium of 136, creatinine 1.25 and a GFR of 36.    She is doing well.  She is back living in her senior apartment.  Her son-in-law cooks her low-sodium meals and ships them from [place] on a weekly basis.    Her other past medical history includes:  1.  Nonischemic cardiomyopathy, EF of 45%-50%.  LV lead on her CRT device is shut off due to diaphragmatic stimulation.  It was shut off 06/2020.  2.  Permanent atrial fibrillation with AV node ablation.  Bi-V upgrade was completed 01/2015.  The patient reached MICHAEL 04/2020 when her generator was changed out.  Her pocket is well healed and she is on chronic warfarin therapy.  2.  Dyslipidemia.  3.  Hypertension.  4.  Osteoporosis, on weekly Fosamax.    Peg denies chest pain, shortness of breath, lightheadedness or dizziness.  Her weights have been stable.  In fact, over the past 6 weeks, her weight is down 8 pounds.  She does get dyspnea with heavy exertion.  All other review of systems and past medical history are noted below.    ASSESSMENT AND PLAN:  Ms. Trujillo is a delightful 89-year-old woman well known to me here at the [facility].  1.  Nonischemic cardiomyopathy.  She is euvolemic on exam.  EF 45%-50% with mild RV dysfunction.  She  will continue on her current regimen.  Her labs are stable and she is euvolemic on exam.  She does understand should she have any shortness of breath with weight gain her furosemide can be increased from 10 to 20 mg for a day or two.  2.  Permanent atrial fibrillation with AV richard ablation.  She had a generator change completed in 2020, replacement of the CRT pacemaker.  Her device was implanted 2015.  The patient had strong diaphragmatic stimulation on several vectors on her LV lead.  Her LV lead remains turned off.  She is 90% V paced, chronic AFib.  Her OptiVol fluid index was below baseline.  This is not surprising with the increased diuresis and the low sodium diet.  She had 1 nonsustained VT of 11 beats with no associated symptoms.  Again, EF of 45%-50%.  3.  Hypertension, well controlled.  4.  Dyslipidemia, on atorvastatin.    I am happy Peg is feeling well.  No medication changes were warranted today.  Dr. Michel did increase her metoprolol to 100 mg in the morning and 50 mg in the evening.  Her blood pressure has tolerated this.  I will see her back in 3 months.  I would be happy to see her sooner should she have any questions or concerns.    Thank you for including me in her care.    Kat Yo NP        D: 09/10/2021   T: 09/10/2021   MT: marek    Name:     SEVERIANO VALLEJO  MRN:      [id number]        Account:      [id number]   :      1925           Service Date: 09/10/2021       Document: [id number]

## 2021-09-30 ENCOUNTER — TELEPHONE (OUTPATIENT)
Dept: CARDIOLOGY | Facility: CLINIC | Age: 86
End: 2021-09-30

## 2021-09-30 NOTE — TELEPHONE ENCOUNTER
Spoke to Philipp and made him aware that Kat would like pt to increased the lasix to 20 mg for 3 day and then back down to 10 mg daily. Have asked that pt call in on Monday with an update.  Son Philipp states understanding. Zulma

## 2021-09-30 NOTE — TELEPHONE ENCOUNTER
Lets try increasing lasix to 20 mg daily for 3 days and then back to 10 mg daily. Call with an update on Monday.  Thanks

## 2021-09-30 NOTE — TELEPHONE ENCOUNTER
Pt son called and states that pt has energy. Has tingling in both arms and across the chest, but not like when pt went to the ER in July.  Pt is short of breath and stats that pt walked about 15 feet and slumped over her walker and had to sit down to recover (pt was with Philipp). Son states that her breathing is very labored.  Feet are a little swollen. Pt states that pt weight is steady at 140 pounds, diet is the same, has had some loose stools, continues drinking 6 bottles of water each day, but has a decreased appetite.  Pt continues on lasix 10 mg daily and she is urinating just fine.  Pt BP last night was very good per a neighbor nurse who came up to pt room.  Stated that the heart rate was elevated, but states that the heart rate was not racing. Told Philipp that this information will be discussed with Prakash Maya

## 2021-10-04 ENCOUNTER — TELEPHONE (OUTPATIENT)
Dept: CARDIOLOGY | Facility: CLINIC | Age: 86
End: 2021-10-04

## 2021-10-04 ENCOUNTER — ANCILLARY PROCEDURE (OUTPATIENT)
Dept: CARDIOLOGY | Facility: CLINIC | Age: 86
End: 2021-10-04
Attending: INTERNAL MEDICINE
Payer: COMMERCIAL

## 2021-10-04 DIAGNOSIS — Z95.0 CARDIAC PACEMAKER IN SITU: ICD-10-CM

## 2021-10-04 NOTE — TELEPHONE ENCOUNTER
"10/4/21 Spoke w son Philipp and explained results of courtesy check. Philipp states his mom is now up and eating some lunch/dinner and doing better. She denies SOB and breathing appears normal. Her swelling is gone. To stated she seems to be \" perking up a bit\".   Philipp said she has a PCP appt tomorrow at 1 pm . He will review things with Dr Michel and call back if Cardiology follow up is recommended.  Explained to Philipp that if pt appears to worsen he should bring her to UC or ED  Philipp and pt voiced understanding and agreement w plan  Kenzie 410 pm  "

## 2021-10-04 NOTE — TELEPHONE ENCOUNTER
Ida,    Here are the results from your patient's device check:    Medtronic Percepta CRT-P Remote PPM Device Check- Courtesy per BAKARI Prieto  : 86%. Chronic AFib, AVNA, taking Warfarin  Mode: VVIR   Presenting Rhythm: AFib with   Heart Rate: Adequate rates per histogram: OptiVol fluid index above baseline (graph below)  Sensing: Stable  Pacing Threshold: Stable  Impedance: Stable  Battery Status: 10.5 years  Atrial Arrhythmia: None  Ventricular Arrhythmia: 1 V. sensing episode. Episode occurred on 8/26/21 at 157am. EGM shows VS events suggestive of NSVT (AVNA) lasting 12 beats, rates 98-155bpm.    Care Plan: F/u continue on current schedule. Sent results to BAKARI Prieto. DANNI BolesT

## 2021-10-04 NOTE — TELEPHONE ENCOUNTER
10/04 Attempted to call son Philipp on mobile # but reached RISHABH VILLAREAL and requested callback. Kenzie 777 am

## 2021-10-04 NOTE — TELEPHONE ENCOUNTER
"10/04/21 Son Philipp called back to report pt is doing much better. He spoke on the phone w her this morning and she said the chest pressure in her chest is gone. She is down 1/2 pound ( now at 141.5)  She stated she has some \" vigor\" to day and has made her bed and has been up walking in the halls. The swelling is gone in her feet. Philipp is on his way over to take her grocery shopping and will call back after he assesses things in person.  All in agreement w plan.  Kenzie [phone] am  "

## 2021-10-04 NOTE — TELEPHONE ENCOUNTER
"10/4/21 Spoke w son Philipp as he is with pt right now. He said when he showed up at her place she was lying in the recliner, wrapped in 2 blankets and felt like she had \" no energy\". She does not feel SOB but son thiks he hears some wheezing. This is a significant change from phone call this am. /80 and HR in the 90's. Oral temp is 95.0.  Will have son send remote device check and re-evaluate.  Kenzie 245 pm    "

## 2021-10-05 ENCOUNTER — APPOINTMENT (OUTPATIENT)
Dept: GENERAL RADIOLOGY | Facility: CLINIC | Age: 86
DRG: 292 | End: 2021-10-05
Attending: EMERGENCY MEDICINE
Payer: COMMERCIAL

## 2021-10-05 ENCOUNTER — HOSPITAL ENCOUNTER (INPATIENT)
Facility: CLINIC | Age: 86
LOS: 2 days | Discharge: HOME | DRG: 292 | End: 2021-10-07
Attending: EMERGENCY MEDICINE | Admitting: HOSPITALIST
Payer: COMMERCIAL

## 2021-10-05 DIAGNOSIS — R07.9 CHEST PAIN, UNSPECIFIED TYPE: Primary | ICD-10-CM

## 2021-10-05 DIAGNOSIS — N18.9 CHRONIC KIDNEY DISEASE, UNSPECIFIED CKD STAGE: ICD-10-CM

## 2021-10-05 DIAGNOSIS — I42.0 DILATED CARDIOMYOPATHY (H): ICD-10-CM

## 2021-10-05 DIAGNOSIS — I50.9 CONGESTIVE HEART FAILURE, UNSPECIFIED HF CHRONICITY, UNSPECIFIED HEART FAILURE TYPE (H): ICD-10-CM

## 2021-10-05 DIAGNOSIS — R60.0 PEDAL EDEMA: ICD-10-CM

## 2021-10-05 DIAGNOSIS — E87.1 HYPONATREMIA: ICD-10-CM

## 2021-10-05 LAB
ALBUMIN SERPL-MCNC: 3.3 G/DL (ref 3.4–5)
ALP SERPL-CCNC: 75 U/L (ref 40–150)
ALT SERPL W P-5'-P-CCNC: 46 U/L (ref 0–50)
ANION GAP SERPL CALCULATED.3IONS-SCNC: 8 MMOL/L (ref 3–14)
AST SERPL W P-5'-P-CCNC: 38 U/L (ref 0–45)
ATRIAL RATE - MUSE: 85 BPM
BASOPHILS # BLD AUTO: 0 10E3/UL (ref 0–0.2)
BASOPHILS NFR BLD AUTO: 0 %
BILIRUB SERPL-MCNC: 1.4 MG/DL (ref 0.2–1.3)
BUN SERPL-MCNC: 19 MG/DL (ref 7–30)
CALCIUM SERPL-MCNC: 8.9 MG/DL (ref 8.5–10.1)
CHLORIDE BLD-SCNC: 94 MMOL/L (ref 94–109)
CO2 SERPL-SCNC: 27 MMOL/L (ref 20–32)
CREAT SERPL-MCNC: 1.24 MG/DL (ref 0.52–1.04)
DIASTOLIC BLOOD PRESSURE - MUSE: NORMAL MMHG
EOSINOPHIL # BLD AUTO: 0 10E3/UL (ref 0–0.7)
EOSINOPHIL NFR BLD AUTO: 0 %
ERYTHROCYTE [DISTWIDTH] IN BLOOD BY AUTOMATED COUNT: 14.6 % (ref 10–15)
GFR SERPL CREATININE-BSD FRML MDRD: 37 ML/MIN/1.73M2
GLUCOSE BLD-MCNC: 127 MG/DL (ref 70–99)
HCT VFR BLD AUTO: 41.6 % (ref 35–47)
HGB BLD-MCNC: 14.1 G/DL (ref 11.7–15.7)
HOLD SPECIMEN: NORMAL
IMM GRANULOCYTES # BLD: 0 10E3/UL
IMM GRANULOCYTES NFR BLD: 1 %
INR PPP: 2.81 (ref 0.85–1.15)
INTERPRETATION ECG - MUSE: NORMAL
LYMPHOCYTES # BLD AUTO: 0.7 10E3/UL (ref 0.8–5.3)
LYMPHOCYTES NFR BLD AUTO: 10 %
MCH RBC QN AUTO: 31.5 PG (ref 26.5–33)
MCHC RBC AUTO-ENTMCNC: 33.9 G/DL (ref 31.5–36.5)
MCV RBC AUTO: 93 FL (ref 78–100)
MONOCYTES # BLD AUTO: 0.6 10E3/UL (ref 0–1.3)
MONOCYTES NFR BLD AUTO: 8 %
NEUTROPHILS # BLD AUTO: 5.7 10E3/UL (ref 1.6–8.3)
NEUTROPHILS NFR BLD AUTO: 81 %
NRBC # BLD AUTO: 0 10E3/UL
NRBC BLD AUTO-RTO: 0 /100
NT-PROBNP SERPL-MCNC: 5247 PG/ML (ref 0–1800)
P AXIS - MUSE: NORMAL DEGREES
PLATELET # BLD AUTO: 218 10E3/UL (ref 150–450)
POTASSIUM BLD-SCNC: 4.3 MMOL/L (ref 3.4–5.3)
PR INTERVAL - MUSE: NORMAL MS
PROT SERPL-MCNC: 6.4 G/DL (ref 6.8–8.8)
QRS DURATION - MUSE: 176 MS
QT - MUSE: 426 MS
QTC - MUSE: 535 MS
R AXIS - MUSE: -68 DEGREES
RBC # BLD AUTO: 4.48 10E6/UL (ref 3.8–5.2)
SARS-COV-2 RNA RESP QL NAA+PROBE: NEGATIVE
SODIUM SERPL-SCNC: 129 MMOL/L (ref 133–144)
SYSTOLIC BLOOD PRESSURE - MUSE: NORMAL MMHG
T AXIS - MUSE: 106 DEGREES
TROPONIN I SERPL-MCNC: <0.015 UG/L (ref 0–0.04)
VENTRICULAR RATE- MUSE: 95 BPM
WBC # BLD AUTO: 7.1 10E3/UL (ref 4–11)

## 2021-10-05 PROCEDURE — 71046 X-RAY EXAM CHEST 2 VIEWS: CPT

## 2021-10-05 PROCEDURE — 93005 ELECTROCARDIOGRAM TRACING: CPT

## 2021-10-05 PROCEDURE — 85025 COMPLETE CBC W/AUTO DIFF WBC: CPT | Performed by: EMERGENCY MEDICINE

## 2021-10-05 PROCEDURE — 250N000011 HC RX IP 250 OP 636: Performed by: EMERGENCY MEDICINE

## 2021-10-05 PROCEDURE — 99223 1ST HOSP IP/OBS HIGH 75: CPT | Mod: AI | Performed by: HOSPITALIST

## 2021-10-05 PROCEDURE — 99285 EMERGENCY DEPT VISIT HI MDM: CPT | Mod: 25

## 2021-10-05 PROCEDURE — 87635 SARS-COV-2 COVID-19 AMP PRB: CPT | Performed by: EMERGENCY MEDICINE

## 2021-10-05 PROCEDURE — 83880 ASSAY OF NATRIURETIC PEPTIDE: CPT | Performed by: EMERGENCY MEDICINE

## 2021-10-05 PROCEDURE — 36415 COLL VENOUS BLD VENIPUNCTURE: CPT | Performed by: EMERGENCY MEDICINE

## 2021-10-05 PROCEDURE — 84484 ASSAY OF TROPONIN QUANT: CPT | Performed by: EMERGENCY MEDICINE

## 2021-10-05 PROCEDURE — 210N000002 HC R&B HEART CARE

## 2021-10-05 PROCEDURE — 85610 PROTHROMBIN TIME: CPT | Performed by: EMERGENCY MEDICINE

## 2021-10-05 PROCEDURE — 80053 COMPREHEN METABOLIC PANEL: CPT | Performed by: EMERGENCY MEDICINE

## 2021-10-05 PROCEDURE — C9803 HOPD COVID-19 SPEC COLLECT: HCPCS

## 2021-10-05 RX ORDER — FUROSEMIDE 10 MG/ML
20 INJECTION INTRAMUSCULAR; INTRAVENOUS ONCE
Status: COMPLETED | OUTPATIENT
Start: 2021-10-05 | End: 2021-10-05

## 2021-10-05 RX ORDER — METOPROLOL SUCCINATE 50 MG/1
100 TABLET, EXTENDED RELEASE ORAL EVERY MORNING
COMMUNITY
End: 2021-11-11

## 2021-10-05 RX ADMIN — FUROSEMIDE 20 MG: 10 INJECTION, SOLUTION INTRAVENOUS at 22:38

## 2021-10-05 ASSESSMENT — ENCOUNTER SYMPTOMS
UNEXPECTED WEIGHT CHANGE: 0
BACK PAIN: 0
SHORTNESS OF BREATH: 1
COUGH: 0
FEVER: 0
ABDOMINAL PAIN: 0
WEAKNESS: 1

## 2021-10-05 ASSESSMENT — MIFFLIN-ST. JEOR: SCORE: 1019.11

## 2021-10-05 NOTE — TELEPHONE ENCOUNTER
Pt son called and pt saw Dr Michel today, who did pt EKG and then told pt and son to go to the ER.  Pt is now in the ER and lab work has been completed. Trop was negative. Pt is still waiting to be seen.  Will continue to monitor ER visit. Zulma

## 2021-10-06 ENCOUNTER — APPOINTMENT (OUTPATIENT)
Dept: CARDIOLOGY | Facility: CLINIC | Age: 86
DRG: 292 | End: 2021-10-06
Attending: INTERNAL MEDICINE
Payer: COMMERCIAL

## 2021-10-06 LAB
ANION GAP SERPL CALCULATED.3IONS-SCNC: 8 MMOL/L (ref 3–14)
BUN SERPL-MCNC: 17 MG/DL (ref 7–30)
CALCIUM SERPL-MCNC: 8.4 MG/DL (ref 8.5–10.1)
CHLORIDE BLD-SCNC: 94 MMOL/L (ref 94–109)
CO2 SERPL-SCNC: 27 MMOL/L (ref 20–32)
CREAT SERPL-MCNC: 1.15 MG/DL (ref 0.52–1.04)
GFR SERPL CREATININE-BSD FRML MDRD: 40 ML/MIN/1.73M2
GLUCOSE BLD-MCNC: 88 MG/DL (ref 70–99)
INR PPP: 2.53 (ref 0.85–1.15)
LVEF ECHO: NORMAL
POTASSIUM BLD-SCNC: 4.2 MMOL/L (ref 3.4–5.3)
SODIUM SERPL-SCNC: 129 MMOL/L (ref 133–144)
TROPONIN I SERPL-MCNC: <0.015 UG/L (ref 0–0.04)

## 2021-10-06 PROCEDURE — 93321 DOPPLER ECHO F-UP/LMTD STD: CPT

## 2021-10-06 PROCEDURE — 93321 DOPPLER ECHO F-UP/LMTD STD: CPT | Mod: 26 | Performed by: INTERNAL MEDICINE

## 2021-10-06 PROCEDURE — 84484 ASSAY OF TROPONIN QUANT: CPT | Performed by: INTERNAL MEDICINE

## 2021-10-06 PROCEDURE — 250N000011 HC RX IP 250 OP 636: Performed by: INTERNAL MEDICINE

## 2021-10-06 PROCEDURE — 36415 COLL VENOUS BLD VENIPUNCTURE: CPT | Performed by: HOSPITALIST

## 2021-10-06 PROCEDURE — 250N000013 HC RX MED GY IP 250 OP 250 PS 637: Performed by: INTERNAL MEDICINE

## 2021-10-06 PROCEDURE — 250N000013 HC RX MED GY IP 250 OP 250 PS 637: Performed by: HOSPITALIST

## 2021-10-06 PROCEDURE — 80048 BASIC METABOLIC PNL TOTAL CA: CPT | Performed by: HOSPITALIST

## 2021-10-06 PROCEDURE — 99222 1ST HOSP IP/OBS MODERATE 55: CPT | Performed by: INTERNAL MEDICINE

## 2021-10-06 PROCEDURE — 210N000002 HC R&B HEART CARE

## 2021-10-06 PROCEDURE — 93325 DOPPLER ECHO COLOR FLOW MAPG: CPT | Mod: 26 | Performed by: INTERNAL MEDICINE

## 2021-10-06 PROCEDURE — 99232 SBSQ HOSP IP/OBS MODERATE 35: CPT | Performed by: INTERNAL MEDICINE

## 2021-10-06 PROCEDURE — 93308 TTE F-UP OR LMTD: CPT | Mod: 26 | Performed by: INTERNAL MEDICINE

## 2021-10-06 PROCEDURE — 85610 PROTHROMBIN TIME: CPT | Performed by: HOSPITALIST

## 2021-10-06 RX ORDER — ATORVASTATIN CALCIUM 40 MG/1
40 TABLET, FILM COATED ORAL AT BEDTIME
Status: DISCONTINUED | OUTPATIENT
Start: 2021-10-06 | End: 2021-10-07 | Stop reason: HOSPADM

## 2021-10-06 RX ORDER — ONDANSETRON 4 MG/1
4 TABLET, ORALLY DISINTEGRATING ORAL EVERY 6 HOURS PRN
Status: DISCONTINUED | OUTPATIENT
Start: 2021-10-06 | End: 2021-10-07 | Stop reason: HOSPADM

## 2021-10-06 RX ORDER — SODIUM CHLORIDE 1 G/1
1 TABLET ORAL DAILY
Status: DISCONTINUED | OUTPATIENT
Start: 2021-10-06 | End: 2021-10-07 | Stop reason: HOSPADM

## 2021-10-06 RX ORDER — ISOSORBIDE MONONITRATE 30 MG/1
30 TABLET, EXTENDED RELEASE ORAL DAILY
Status: DISCONTINUED | OUTPATIENT
Start: 2021-10-06 | End: 2021-10-07 | Stop reason: HOSPADM

## 2021-10-06 RX ORDER — METOPROLOL SUCCINATE 50 MG/1
50 TABLET, EXTENDED RELEASE ORAL EVERY EVENING
Status: DISCONTINUED | OUTPATIENT
Start: 2021-10-06 | End: 2021-10-07 | Stop reason: HOSPADM

## 2021-10-06 RX ORDER — ONDANSETRON 2 MG/ML
4 INJECTION INTRAMUSCULAR; INTRAVENOUS EVERY 6 HOURS PRN
Status: DISCONTINUED | OUTPATIENT
Start: 2021-10-06 | End: 2021-10-07 | Stop reason: HOSPADM

## 2021-10-06 RX ORDER — METOPROLOL SUCCINATE 100 MG/1
100 TABLET, EXTENDED RELEASE ORAL EVERY MORNING
Status: DISCONTINUED | OUTPATIENT
Start: 2021-10-06 | End: 2021-10-07 | Stop reason: HOSPADM

## 2021-10-06 RX ORDER — PROCHLORPERAZINE 25 MG
12.5 SUPPOSITORY, RECTAL RECTAL EVERY 12 HOURS PRN
Status: DISCONTINUED | OUTPATIENT
Start: 2021-10-06 | End: 2021-10-07 | Stop reason: HOSPADM

## 2021-10-06 RX ORDER — AMOXICILLIN 250 MG
2 CAPSULE ORAL 2 TIMES DAILY PRN
Status: DISCONTINUED | OUTPATIENT
Start: 2021-10-06 | End: 2021-10-07 | Stop reason: HOSPADM

## 2021-10-06 RX ORDER — ESCITALOPRAM OXALATE 5 MG/1
5 TABLET ORAL DAILY
Status: DISCONTINUED | OUTPATIENT
Start: 2021-10-06 | End: 2021-10-07 | Stop reason: HOSPADM

## 2021-10-06 RX ORDER — NITROGLYCERIN 0.4 MG/1
0.4 TABLET SUBLINGUAL EVERY 5 MIN PRN
Status: DISCONTINUED | OUTPATIENT
Start: 2021-10-06 | End: 2021-10-07 | Stop reason: HOSPADM

## 2021-10-06 RX ORDER — ACETAMINOPHEN 325 MG/1
650 TABLET ORAL EVERY 6 HOURS PRN
Status: DISCONTINUED | OUTPATIENT
Start: 2021-10-06 | End: 2021-10-07 | Stop reason: HOSPADM

## 2021-10-06 RX ORDER — PROCHLORPERAZINE MALEATE 5 MG
5 TABLET ORAL EVERY 6 HOURS PRN
Status: DISCONTINUED | OUTPATIENT
Start: 2021-10-06 | End: 2021-10-07 | Stop reason: HOSPADM

## 2021-10-06 RX ORDER — AMOXICILLIN 250 MG
1 CAPSULE ORAL 2 TIMES DAILY PRN
Status: DISCONTINUED | OUTPATIENT
Start: 2021-10-06 | End: 2021-10-07 | Stop reason: HOSPADM

## 2021-10-06 RX ORDER — GLIPIZIDE 10 MG/1
1 TABLET ORAL EVERY 12 HOURS
Status: DISCONTINUED | OUTPATIENT
Start: 2021-10-06 | End: 2021-10-07 | Stop reason: HOSPADM

## 2021-10-06 RX ORDER — LIDOCAINE 40 MG/G
CREAM TOPICAL
Status: DISCONTINUED | OUTPATIENT
Start: 2021-10-06 | End: 2021-10-07 | Stop reason: HOSPADM

## 2021-10-06 RX ORDER — FUROSEMIDE 20 MG
20 TABLET ORAL DAILY
Status: DISCONTINUED | OUTPATIENT
Start: 2021-10-07 | End: 2021-10-07 | Stop reason: HOSPADM

## 2021-10-06 RX ORDER — AMLODIPINE BESYLATE 5 MG/1
5 TABLET ORAL DAILY
Status: DISCONTINUED | OUTPATIENT
Start: 2021-10-06 | End: 2021-10-07 | Stop reason: HOSPADM

## 2021-10-06 RX ORDER — FUROSEMIDE 10 MG/ML
20 INJECTION INTRAMUSCULAR; INTRAVENOUS ONCE
Status: COMPLETED | OUTPATIENT
Start: 2021-10-06 | End: 2021-10-06

## 2021-10-06 RX ORDER — ACETAMINOPHEN 650 MG/1
650 SUPPOSITORY RECTAL EVERY 6 HOURS PRN
Status: DISCONTINUED | OUTPATIENT
Start: 2021-10-06 | End: 2021-10-07 | Stop reason: HOSPADM

## 2021-10-06 RX ORDER — MULTIPLE VITAMINS W/ MINERALS TAB 9MG-400MCG
1 TAB ORAL DAILY
Status: DISCONTINUED | OUTPATIENT
Start: 2021-10-06 | End: 2021-10-07 | Stop reason: HOSPADM

## 2021-10-06 RX ORDER — WARFARIN SODIUM 2 MG/1
2 TABLET ORAL
Status: COMPLETED | OUTPATIENT
Start: 2021-10-06 | End: 2021-10-06

## 2021-10-06 RX ORDER — LISINOPRIL 10 MG/1
10 TABLET ORAL DAILY
Status: DISCONTINUED | OUTPATIENT
Start: 2021-10-06 | End: 2021-10-07 | Stop reason: HOSPADM

## 2021-10-06 RX ORDER — VIT C/E/ZN/COPPR/LUTEIN/ZEAXAN 60 MG-6 MG
2 CAPSULE ORAL DAILY
Status: DISCONTINUED | OUTPATIENT
Start: 2021-10-06 | End: 2021-10-07 | Stop reason: HOSPADM

## 2021-10-06 RX ADMIN — AMLODIPINE BESYLATE 5 MG: 5 TABLET ORAL at 10:51

## 2021-10-06 RX ADMIN — METOPROLOL SUCCINATE 100 MG: 100 TABLET, EXTENDED RELEASE ORAL at 10:51

## 2021-10-06 RX ADMIN — FUROSEMIDE 20 MG: 10 INJECTION, SOLUTION INTRAVENOUS at 15:23

## 2021-10-06 RX ADMIN — WARFARIN SODIUM 2 MG: 2 TABLET ORAL at 18:09

## 2021-10-06 RX ADMIN — ATORVASTATIN CALCIUM 40 MG: 40 TABLET, FILM COATED ORAL at 21:19

## 2021-10-06 RX ADMIN — ESCITALOPRAM 5 MG: 5 TABLET, FILM COATED ORAL at 13:15

## 2021-10-06 RX ADMIN — DEXTRAN 70, GLYCERIN, HYPROMELLOSE 1 DROP: 1; 2; 3 SOLUTION/ DROPS OPHTHALMIC at 20:13

## 2021-10-06 RX ADMIN — MULTIPLE VITAMINS W/ MINERALS TAB 1 TABLET: TAB at 10:51

## 2021-10-06 RX ADMIN — METOPROLOL SUCCINATE 50 MG: 50 TABLET, EXTENDED RELEASE ORAL at 20:13

## 2021-10-06 RX ADMIN — LISINOPRIL 10 MG: 10 TABLET ORAL at 10:51

## 2021-10-06 RX ADMIN — Medication 2 CAPSULE: at 10:51

## 2021-10-06 RX ADMIN — ISOSORBIDE MONONITRATE 30 MG: 30 TABLET, EXTENDED RELEASE ORAL at 13:15

## 2021-10-06 ASSESSMENT — ACTIVITIES OF DAILY LIVING (ADL)
DRESSING/BATHING_DIFFICULTY: NO
DIFFICULTY_COMMUNICATING: NO
TOILETING_ISSUES: NO
DIFFICULTY_EATING/SWALLOWING: NO
ADLS_ACUITY_SCORE: 10
ADLS_ACUITY_SCORE: 10
ADLS_ACUITY_SCORE: 8

## 2021-10-06 ASSESSMENT — MIFFLIN-ST. JEOR: SCORE: 995.97

## 2021-10-06 NOTE — H&P
[facility]    History and Physical - Hospitalist Service       Date of Admission:  10/5/2021    Assessment & Plan    Haritha Trujillo is a 89 year old female with nonischemic cardiomyopathy who has been having episodes of chest pressure for about 2 weeks.  She has been having dyspnea on exertion as well.  She says her weight has been stable during this time.  Her Lasix was increased from 10 to 20 mg 3 days ago.  She is not feeling any better and came to the emergency room today to be evaluated.  In the emergency room her vital signs were stable and she was not hypoxic.  Her EKG showed a paced rhythm with PVCs and a chest x-ray showed perhaps mild pulmonary edema.  Her BNP was up to around 5200 from 3100 in July.  Her troponin was negative.  She had no chest pressure in the emergency room.  She is receiving IV furosemide and is being admitted to the hospital.    Chest pressure   Probable acute diastolic congestive heart failure   Non-ischemic cardiomyopathy   Permanent atrial fibrillation on anticoagulation   Hypertension   Status post AV node ablation an permanent pacemaker   She is stable-she is not having any chest pressure or arm tingling in the emergency room.  Troponin was undetectable.  Her BNP was 5247 compared with 3130 in July.  EKG shows a paced rhythm and a couple of PVCs.  Her symptoms seem similar to what she experienced in June of this year and was having acute heart failure.  However this time she seems to be having more of the chest pressure and possibly more severe chest pressure.  She she is going to get IV furosemide in the emergency room.    Admit to [facility] with telemetry    Continue her home medications: Lisinopril, metoprolol and amlodipine    Continue warfarin and monitor her INR    Consult cardiology and defer further diuresis and evaluation to the cardiology team    Stage 3 chronic kidney disease  Chronic hyponatremia  Creatinine 1.24 and serum sodium 129.    Monitor  renal function and electrolytes    Continue prior to admission oral NaCl     Depression     Continue escitalopram      Hyperlipidemia     Continue statin      Diet:  2G Na  DVT Prophylaxis: Warfarin  Witt Catheter: Not present  Central Lines: None  Code Status:   FULL    Clinically Significant Risk Factors Present on Admission         # Hyponatremia: Na = 129 mmol/L (Ref range: 133 - 144 mmol/L) on admission, will monitor as appropriate     # Coagulation Defect: home medication list includes an anticoagulant medication       Disposition Plan   Expected discharge:  recommended to prior living arrangement once cardiac status is stable.     The patient's care was discussed with the Patient and Patient's Family.    Stu Almeida MD  [facility]  Securely message with the Vocera Web Console (learn more here)  Text page via [facility] Paging/Directory      ______________________________________________________________________    Chief Complaint   Chest pressure     History is obtained from the patient, electronic health record, emergency department physician and patient's children    History of Present Illness   Haritha Trujillo is a 89 year old female with nonischemic cardiomyopathy who has been having episodes of chest pressure for about 2 weeks.  She has been having dyspnea on exertion as well.  She says her weight has been stable during this time.  Her Lasix was increased from 10 to 20 mg 3 days ago.  She is not feeling any better and came to the emergency room today to be evaluated.  In the emergency room her vital signs were stable and she was not hypoxic.  Her EKG showed a paced rhythm with PVCs and a chest x-ray showed perhaps mild pulmonary edema.  Her BNP was up to around 5200 from 3100 in July.  Her troponin was negative.  She had no chest pressure in the emergency room.  She is receiving IV furosemide and is being admitted to the hospital.  Her son is here and has been caring  for her.  She has not had any recent fever, cough, cold or sore throat.  She describes the chest pressure as a rock on her chest.  She occasionally has some tingling in the left upper extremity as well.  She is having several episodes a day of chest pressure and some of them lasted an hour and a half.  These events are not during exertion and almost always occur at rest.  She does not take any medication when it occurs she just remains sitting and waits for it to go away.  She has an old nitroglycerin prescription but has not tried using it.  She had similar symptoms over the summer when she was found to have acute heart failure.  However the episodes of chest pressure appear to be new.    Review of Systems    The 10 point Review of Systems is negative other than noted in the HPI or here.     Past Medical History    I have reviewed this patient's medical history and updated it with pertinent information if needed.   Past Medical History:   Diagnosis Date     Aortic regurgitation 12/14/2014    mild (1+) per echo     Atrial fibrillation (H)      Cardiomyopathy (H)      CHF (congestive heart failure) (H)      Chronic kidney disease, stage 3      Coronary atherosclerosis of unspecified type of vessel, native or graft 5/16/2000    normal left coronary arteries and tight obstruction in distal RCA, too small for revascularization, medical management     Degeneration of cervical intervertebral disc     cervical spine     Essential hypertension, benign      Mitral regurgitation 12/14/2014    mod-mod/sev (2-3+) per echo     Other and unspecified hyperlipidemia      Pacemaker      Pulmonary hypertension (H) 3/16/2013    mild per echo with RVSP 31mmHg +RAP      Pulmonary valve regurgitation 12/14/2014    mod (2+) per echo     Tricuspid regurgitation 12/14/2014    mild (1+) per echo     Unspecified tinnitus     chronic       Past Surgical History   I have reviewed this patient's surgical history and updated it with pertinent  information if needed.  Past Surgical History:   Procedure Laterality Date     CORONARY ANGIOGRAPHY ADULT ORDER  2000     EP PPM RMVL&REPL OF GEN W/ DUAL LEAD SYS N/A 2020    Procedure: Permanent Pacemakers  Removal and Replacement Generator  with Multi Lead System;  Surgeon: Fay Tatum MD;  Location:  HEART CARDIAC CATH LAB     HRW PACEMAKER PERMANENT  2015    BI- ventricular     IMPLANT PACEMAKER  2010    dual chamber Medtronic     [facility] NONSPECIFIC PROCEDURE  1999    right rotator cuff tear OR     [facility] NONSPECIFIC PROCEDURE  1983    microdiscectomy     [facility] NONSPECIFIC PROCEDURE      C-sections x 3      Z NONSPECIFIC PROCEDURE      appendectomy     [facility] NONSPECIFIC PROCEDURE      bilateral benign breast biopsy      [facility] NONSPECIFIC PROCEDURE      right knee arthroscopic OR     [facility] NONSPECIFIC PROCEDURE  1974    enteritis       Social History   I have reviewed this patient's social history and updated it with pertinent information if needed.  Social History     Tobacco Use     Smoking status: Former Smoker     Packs/day: 0.50     Years: 15.00     Pack years: 7.50     Types: Cigarettes     Start date:      Quit date:      Years since quittin.7     Smokeless tobacco: Never Used   Substance Use Topics     Alcohol use: No     Drug use: No       Family History   I have reviewed this patient's family history and updated it with pertinent information if needed.  Family History   Problem Relation Age of Onset     Hypertension Mother      Cancer Mother         pancreatic     Eye Disorder Mother         cristian JORGE Father          53     Lipids Father      Diabetes Maternal Grandmother      C.A.WARREN. Brother         open heart surgery age 53     Cancer Daughter         ovavian     Neurologic Disorder Son         MS       Prior to Admission Medications   Prior to Admission Medications   Prescriptions Last Dose Informant Patient Reported? Taking?   Alendronate Sodium  (FOSAMAX PO) Past Week at Unknown time  Yes Yes   Sig: Take 70 mg by mouth once a week  evenings   Multiple Vitamins-Minerals (CENTRUM SILVER) per tablet 10/5/2021 at Unknown time Self Yes Yes   Sig: Take 1 tablet by mouth daily.     Multiple Vitamins-Minerals (PRESERVISION AREDS 2 PO) 10/5/2021 at Unknown time  Yes Yes   Sig: Take by mouth daily   Probiotic Product (PROBIOTIC DAILY PO) 10/5/2021 at Unknown time  Yes Yes   Sig: Take by mouth daily   WARFARIN SODIUM PO 10/4/2021 at Unknown time Self Yes Yes   Sig: Take 2 mg by mouth daily (4 mg x 0.5) every Mon, Wed, Fri. 4 mg (4mg x1) all other days   acetaminophen (TYLENOL) 500 MG tablet prn  Yes No   Sig: Take 500-1,000 mg by mouth every 6 hours as needed for mild pain   amLODIPine (NORVASC) 5 MG tablet 10/5/2021 at Unknown time  No Yes   Sig: Take 1 tablet (5 mg) by mouth daily   atorvastatin (LIPITOR) 40 MG tablet 10/4/2021 at Unknown time Self Yes Yes   Sig: Take 40 mg by mouth At Bedtime    calcium carbonate-vitamin D (CALCIUM + D) 600-200 MG-UNIT TABS 10/5/2021 at Unknown time Self Yes Yes   Sig: Take 1 tablet by mouth daily.   cycloSPORINE (RESTASIS) 0.05 % ophthalmic emulsion 10/5/2021 at x1 Self Yes Yes   Sig: Place 1 drop into both eyes every 12 hours.   escitalopram (LEXAPRO) 10 MG tablet 10/5/2021 at Unknown time  Yes Yes   Sig: Take 5 mg by mouth daily    furosemide (LASIX) 20 MG tablet 10/5/2021 at Unknown time  No Yes   Sig: Take 0.5 tablets (10 mg) by mouth daily   lisinopril (ZESTRIL) 10 MG tablet 10/5/2021 at Unknown time  No Yes   Sig: Take 1 tablet (10 mg) by mouth daily   metoprolol succinate ER (TOPROL-XL) 50 MG 24 hr tablet 10/4/2021 at 50mg pm  No Yes   Si in am and 50mg at night   Patient taking differently: Take 50 mg by mouth every evening 100 in am and 50mg at night   metoprolol succinate ER (TOPROL-XL) 50 MG 24 hr tablet 10/5/2021 at Unknown time  Yes Yes   Sig: Take 100 mg by mouth every morning   nitroGLYcerin (NITROSTAT)  0.4 MG sublingual tablet never used  No No   Sig: For chest pain place 1 tablet under the tongue every 5 minutes for 3 doses. If symptoms persist 5 minutes after 1st dose call 911.   sodium chloride 1 GM tablet 10/5/2021 at Unknown time  Yes Yes   Sig: Take 1 g by mouth daily      Facility-Administered Medications: None     Allergies   Allergies   Allergen Reactions     Amiodarone Nausea     Hctz      Lansoprazole      diarrhea   Prevacid       Physical Exam   Vital Signs: Temp: 97.9  F (36.6  C) Temp src: Temporal BP: 127/80 Pulse: 87   Resp: 27 SpO2: 96 % O2 Device: None (Room air)    Weight: 142 lbs 0 oz    Constitutional: awake, alert, cooperative, no apparent distress  Eyes: Lids and lashes normal, pupils equal, round, sclera clear, conjunctiva normal  ENT: Normocephalic, without obvious abnormality, atraumatic, sinuses nontender on palpation, external ears without lesions, oral pharynx with moist mucous membranes, tonsils without erythema or exudates, gums normal and good dentition.  Respiratory: No increased work of breathing, good air exchange, clear to auscultation bilaterally, no crackles or wheezing  Cardiovascular:  regular rate and rhythm, normal S1 and S2, no S3 or S4, and no murmur noted, no significant lower extremity edema   GI: normal bowel sounds, soft, non-distended, non-tender, no masses palpated  Genitounirinary:   Skin: skin tear on posterior right lower extremity- about 2 centimeter diameter  Musculoskeletal: There is no redness, warmth, or swelling of the joints.  Full range of motion noted.  Motor strength is 5 out of 5 all extremities bilaterally.  Tone is normal.  Neurologic: Awake, alert, oriented to name, place and time.  Cranial nerves II-XII are grossly intact.  Motor is 5 out of 5 bilaterally  Neuropsychiatric: General: normal, calm and normal eye contact    Data   Data reviewed today: I reviewed all medications, new labs and imaging results over the last 24 hours. I personally  reviewed the EKG tracing showing paced with PVC's.    Recent Labs   Lab 10/05/21  1405   WBC 7.1   HGB 14.1   MCV 93      INR 2.81*   *   POTASSIUM 4.3   CHLORIDE 94   CO2 27   BUN 19   CR 1.24*   ANIONGAP 8   PATRICIA 8.9   *   ALBUMIN 3.3*   PROTTOTAL 6.4*   BILITOTAL 1.4*   ALKPHOS 75   ALT 46   AST 38   TROPONIN <0.015     7.1    \    14.1    /    218   N 81    L N/A    129 (L)    94    19 /   ------------------------------------ 127 (H)   ALT 46   AST 38   AP 75   ALB 3.3 (L)   Ca 8.9  4.3    27    1.24 (H) \    % RETIC N/A    LDH N/A  Troponin <0.015    BNP N/A    CK N/A  INR 2.81 (H)   PTT N/A    D-dimer N/A    Fibrinogen N/A    Antithrombin N/A  Ferritin N/A  CRP N/A    IL-6 N/A  No results found for this or any previous visit (from the past 24 hour(s)).

## 2021-10-06 NOTE — PLAN OF CARE
A/Ox4. VSS. O2 95% on RA. Lung sounds are clear. Up with assist x1. Purewick in place with good urine output. Skin tear to RLE has large amount of serous drainage. Dressing changed.  Tele 100% V paced with frequent PVCs. Echo completed. Plan to transition to oral diuretics tomorrow.

## 2021-10-06 NOTE — ED NOTES
[facility]  ED Nurse Handoff Report    ED Chief complaint: Chest Pain      ED Diagnosis:   Final diagnoses:   Hyponatremia   Chronic kidney disease, unspecified CKD stage       Code Status: as per hospitalist    Allergies:   Allergies   Allergen Reactions     Amiodarone Nausea     Hctz      Lansoprazole      diarrhea   Prevacid       Patient Story: pt was referred from her Dr's office c/o chest pain. Hx. Of A-Fib -on Coumadin, HTN, CAD, CKD, FIADH, atrial tachycardia, status post pacemaker placement.    Focused Assessment:  Ambulates with assistance, slow but steady gait. A&Ox4, respirations even and unlabored. Skin dry, warm, color wnl. Skin tear to right lower leg.   Results for orders placed or performed during the hospital encounter of 10/05/21   Troponin I (now)     Status: Normal   Result Value Ref Range    Troponin I <0.015 0.000 - 0.045 ug/L   Comprehensive metabolic panel     Status: Abnormal   Result Value Ref Range    Sodium 129 (L) 133 - 144 mmol/L    Potassium 4.3 3.4 - 5.3 mmol/L    Chloride 94 94 - 109 mmol/L    Carbon Dioxide (CO2) 27 20 - 32 mmol/L    Anion Gap 8 3 - 14 mmol/L    Urea Nitrogen 19 7 - 30 mg/dL    Creatinine 1.24 (H) 0.52 - 1.04 mg/dL    Calcium 8.9 8.5 - 10.1 mg/dL    Glucose 127 (H) 70 - 99 mg/dL    Alkaline Phosphatase 75 40 - 150 U/L    AST 38 0 - 45 U/L    ALT 46 0 - 50 U/L    Protein Total 6.4 (L) 6.8 - 8.8 g/dL    Albumin 3.3 (L) 3.4 - 5.0 g/dL    Bilirubin Total 1.4 (H) 0.2 - 1.3 mg/dL    GFR Estimate 37 (L) >60 mL/min/1.73m2   Extra Blue Top Tube     Status: None   Result Value Ref Range    Hold Specimen JIC    Extra Red Top Tube     Status: None   Result Value Ref Range    Hold Specimen JIC    Extra Green Top (Lithium Heparin) Tube     Status: None   Result Value Ref Range    Hold Specimen JIC    Extra Purple Top Tube     Status: None   Result Value Ref Range    Hold Specimen JIC    Extra Blood Bank Purple Top Tube     Status: None   Result Value Ref Range     Hold Specimen [facility]    CBC with platelets and differential     Status: Abnormal   Result Value Ref Range    WBC Count 7.1 4.0 - 11.0 10e3/uL    RBC Count 4.48 3.80 - 5.20 10e6/uL    Hemoglobin 14.1 11.7 - 15.7 g/dL    Hematocrit 41.6 35.0 - 47.0 %    MCV 93 78 - 100 fL    MCH 31.5 26.5 - 33.0 pg    MCHC 33.9 31.5 - 36.5 g/dL    RDW 14.6 10.0 - 15.0 %    Platelet Count 218 150 - 450 10e3/uL    % Neutrophils 81 %    % Lymphocytes 10 %    % Monocytes 8 %    % Eosinophils 0 %    % Basophils 0 %    % Immature Granulocytes 1 %    NRBCs per 100 WBC 0 <1 /100    Absolute Neutrophils 5.7 1.6 - 8.3 10e3/uL    Absolute Lymphocytes 0.7 (L) 0.8 - 5.3 10e3/uL    Absolute Monocytes 0.6 0.0 - 1.3 10e3/uL    Absolute Eosinophils 0.0 0.0 - 0.7 10e3/uL    Absolute Basophils 0.0 0.0 - 0.2 10e3/uL    Absolute Immature Granulocytes 0.0 <=0.0 10e3/uL    Absolute NRBCs 0.0 10e3/uL   INR     Status: Abnormal   Result Value Ref Range    INR 2.81 (H) 0.85 - 1.15   BNP     Status: Abnormal   Result Value Ref Range    N terminal Pro BNP Inpatient 5,247 (H) 0-1,800 pg/mL   Ryan Draw     Status: None    Narrative    The following orders were created for panel order Ryan Draw.  Procedure                               Abnormality         Status                     ---------                               -----------         ------                     Extra Blue Top Tube[[id number]]                              Final result               Extra Red Top Tube[[id number]]                               Final result               Extra Green Top (Lithium...[[id number]]                      Final result               Extra Purple Top Tube[[id number]]                            Final result               Extra Blood Bank Purple ...[[id number]]                      Final result                 Please view results for these tests on the individual orders.   CBC with platelets + differential     Status: Abnormal    Narrative    The following orders were  "created for panel order CBC with platelets + differential.  Procedure                               Abnormality         Status                     ---------                               -----------         ------                     CBC with platelets and d...[[id number]]  Abnormal            Final result                 Please view results for these tests on the individual orders.       Treatments and/or interventions provided: Cardiac monitoring.   Patient's response to treatments and/or interventions: pain free at this moment. VSS. Comfortably resting in stretcher.     To be done/followed up on inpatient unit:  continue with POC    Does this patient have any cognitive concerns?: Forgetful    Activity level - Baseline/Home:  Stand with Assist  Activity Level - Current:   Stand with Assist    Patient's Preferred language: English   Needed?: No    Isolation: None  Infection: Not Applicable  Patient tested for COVID 19 prior to admission: YES  Bariatric?: No    Vital Signs:   Vitals:    10/05/21 1357 10/05/21 1945   BP: 130/79 (!) 132/99   Pulse: 94 84   Resp: 18 28   Temp: 97.9  F (36.6  C)    TempSrc: Temporal    SpO2: 99%    Weight: 64.4 kg (142 lb)    Height: 1.626 m (5' 4\")        Cardiac Rhythm:     Was the PSS-3 completed:   Yes  What interventions are required if any?               Family Comments: son at bedside.  OBS brochure/video discussed/provided to patient/family: N/A               For the majority of the shift this patient's behavior was Green.   Behavioral interventions performed were n/a.    ED NURSE PHONE NUMBER: [phone]       "

## 2021-10-06 NOTE — PHARMACY-ADMISSION MEDICATION HISTORY
Pharmacy Medication History  Admission medication history interview status for the 10/5/2021  admission is complete. See EPIC admission navigator for prior to admission medications     Location of Interview: Patient room  Medication history sources: Patient with son also in room    Significant changes made to the medication list:  List was up to date, pt just went to heart clinic on 9/10/21 where they increased her metoprolol from 50mg BID to 100mg in AM and 50mg in PM.  Lasix was increased to 20mg for a couple days then back down to 10mg     In the past week, patient estimated taking medication this percent of the time: greater than 90%    Additional medication history information:   Son expressed concern with patient's left leg being very swollen, requested him to discuss with the doctor and also maybe a better drug than amlodipine for blood pressure (can cause swollen legs but usually both sides).    Also states they cannot find her nitroglycerin (rx from 2017)  and not sure when she should use it. Educated regarding its use and importance of replacing bottle 6 months - yearly.     Medication reconciliation completed by provider prior to medication history? No    Time spent in this activity: 15 mins    Prior to Admission medications    Medication Sig Last Dose Taking? Auth Provider   Alendronate Sodium (FOSAMAX PO) Take 70 mg by mouth once a week Sunday evenings Past Week at Unknown time Yes Reported, Patient   amLODIPine (NORVASC) 5 MG tablet Take 1 tablet (5 mg) by mouth daily 10/5/2021 at Unknown time Yes Fay Tatum MD   atorvastatin (LIPITOR) 40 MG tablet Take 40 mg by mouth At Bedtime  10/4/2021 at Unknown time Yes Kirit Michel MD   calcium carbonate-vitamin D (CALCIUM + D) 600-200 MG-UNIT TABS Take 1 tablet by mouth daily. 10/5/2021 at Unknown time Yes Unknown, Entered By History   cycloSPORINE (RESTASIS) 0.05 % ophthalmic emulsion Place 1 drop into both eyes every 12 hours. 10/5/2021  at x1 Yes Reported, Patient   escitalopram (LEXAPRO) 10 MG tablet Take 5 mg by mouth daily  10/5/2021 at Unknown time Yes Reported, Patient   furosemide (LASIX) 20 MG tablet Take 0.5 tablets (10 mg) by mouth daily 10/5/2021 at Unknown time Yes Kat Yo APRN CNP   lisinopril (ZESTRIL) 10 MG tablet Take 1 tablet (10 mg) by mouth daily 10/5/2021 at Unknown time Yes Kat Yo APRN CNP   metoprolol succinate ER (TOPROL-XL) 50 MG 24 hr tablet Take 100 mg by mouth every morning 10/5/2021 at Unknown time Yes Unknown, Entered By History   metoprolol succinate ER (TOPROL-XL) 50 MG 24 hr tablet 100 in am and 50mg at night  Patient taking differently: Take 50 mg by mouth every evening 100 in am and 50mg at night 10/4/2021 at 50mg pm Yes Kat Yo APRN CNP   Multiple Vitamins-Minerals (CENTRUM SILVER) per tablet Take 1 tablet by mouth daily.   10/5/2021 at Unknown time Yes Unknown, Entered By History   Multiple Vitamins-Minerals (PRESERVISION AREDS 2 PO) Take by mouth daily 10/5/2021 at Unknown time Yes Reported, Patient   Probiotic Product (PROBIOTIC DAILY PO) Take by mouth daily 10/5/2021 at Unknown time Yes Reported, Patient   sodium chloride 1 GM tablet Take 1 g by mouth daily 10/5/2021 at Unknown time Yes Reported, Patient   WARFARIN SODIUM PO Take 2 mg by mouth daily (4 mg x 0.5) every Mon, Wed, Fri. 4 mg (4mg x1) all other days 10/4/2021 at Unknown time Yes Unknown, Entered By History   acetaminophen (TYLENOL) 500 MG tablet Take 500-1,000 mg by mouth every 6 hours as needed for mild pain prn  Reported, Patient   nitroGLYcerin (NITROSTAT) 0.4 MG sublingual tablet For chest pain place 1 tablet under the tongue every 5 minutes for 3 doses. If symptoms persist 5 minutes after 1st dose call 911. never used  Kat Yo APRN CNP Tiffany M. Reinitz, PharmD     The information provided in this note is only as accurate as the sources available at the time of update(s)

## 2021-10-06 NOTE — PROGRESS NOTES
[facility]    Medicine Progress Note - Hospitalist Service       Date of Admission:  10/5/2021  Assessment & Plan   Haritha Trujillo is a 89 year old female with nonischemic cardiomyopathy who has been having episodes of chest pressure for about 2 weeks.  She has been having dyspnea on exertion as well.  She says her weight has been stable during this time.  Her Lasix was increased from 10 to 20 mg 3 days ago.  She is not feeling any better and came to the emergency room to be evaluated.  In the emergency room her vital signs were stable and she was not hypoxic.  Her EKG showed a paced rhythm with PVCs and a chest x-ray showed perhaps mild pulmonary edema.  Her BNP was up to around 5200 from 3100 in July.  Her troponin was negative.  She had no chest pressure in the emergency room.  She receivd IV furosemide and is admitted to the hospital 10/5/2021.     Chest pressure   Probable acute diastolic congestive heart failure   Non-ischemic cardiomyopathy   Permanent atrial fibrillation on anticoagulation   Hypertension   Status post AV node ablation an permanent pacemaker   Initial troponin was undetectable.  Her BNP was 5247 compared with 3130 in July.  EKG shows a paced rhythm and a couple of PVCs.  Her symptoms seem similar to what she experienced in June of this year and was having acute heart failure.  However this time she seems to be having more of the chest pressure and possibly more severe chest pressure.  - Cardiology consulted, appreciate assistance  - Additional IV furosemide given by cardiology with plan for PO 10/7/21  - Echocardiogram pending  - Continue warfarin with pharmacy to dose  - Continue prior to admission lisinopril, metoprolol, amlodipine     Stage 3 chronic kidney disease  Chronic hyponatremia  Creatinine 1.24 and serum sodium 129. Baseline sodium appears to run in low 130s, high 120s.   - BMP in AM      Depression   Continue escitalopram       Hyperlipidemia    Continue statin       Clinically Significant Risk Factors Present on Admission         # Hyponatremia: Na = 129 mmol/L (Ref range: 133 - 144 mmol/L) on admission, will monitor as appropriate  # Hypocalcemia: Ca = 8.4 mg/dL (Ref range: 8.5 - 10.1 mg/dL) and/or iCa = N/A on admission, will replace as needed    # Coagulation Defect: home medication list includes an anticoagulant medication          Diet: Combination Diet Regular Diet Adult; 2 gm NA Diet  Room Service    DVT Prophylaxis: Warfarin   Witt Catheter: Not present  Code Status: Full Code      Disposition Plan   Expected discharge: 10/07/2021 if symptoms improved.   Entered: Juan Campbell MD 10/06/2021, 3:41 PM       The patient's care was discussed with the patient, patient's son    Juan Campbell MD  Hospitalist Service  [facility]    ______________________________________________________________________    Interval History   No acute events overnight. Reports one brief episode of chest pressure and arm tingling while at rest. Denies any shortness of breath at present or with small amount of walking she has done today. Denies any abdominal pain, n/v.     Data reviewed today: I reviewed all medications, new labs and imaging results over the last 24 hours. I personally reviewed no images or EKG's today.    Physical Exam   Vital Signs: Temp: 97.5  F (36.4  C) Temp src: Oral BP: 109/67 Pulse: 74   Resp: 16 SpO2: 95 % O2 Device: None (Room air)    Weight: 136 lbs 14.4 oz    Constitutional:  NAD  Respiratory: Clear to auscultation bilaterally, good air movement bilaterally  Cardiovascular: RRR. No peripheral edema.  GI: Soft, non-tender, non-distended.    Skin/Integumen: Warm, dry  Other:      Data   Recent Labs   Lab 10/06/21  0842 10/05/21  1405   WBC  --  7.1   HGB  --  14.1   MCV  --  93   PLT  --  218   INR 2.53* 2.81*   * 129*   POTASSIUM 4.2 4.3   CHLORIDE 94 94   CO2 27 27   BUN 17 19   CR 1.15* 1.24*   ANIONGAP 8 8    PATRICIA 8.4* 8.9   GLC 88 127*   ALBUMIN  --  3.3*   PROTTOTAL  --  6.4*   BILITOTAL  --  1.4*   ALKPHOS  --  75   ALT  --  46   AST  --  38   TROPONIN <0.015 <0.015       Recent Results (from the past 24 hour(s))   XR Chest 2 Views    Narrative    CHEST TWO VIEWS 10/5/2021 6:55 PM     HISTORY: Chest pain, SOB.    COMPARISON: June 24, 2021       Impression    IMPRESSION: Small right effusion and associated atelectasis and or  infiltrate. Small left effusion and associated atelectasis and or  infiltrate. The cardiac silhouette is not enlarged. Pulmonary  vasculature is unremarkable.    GEORGIA RODGERS MD         SYSTEM ID:  [id number]     Medications     - MEDICATION INSTRUCTIONS -       Warfarin Therapy Reminder         amLODIPine  5 mg Oral Daily     artificial tears  1 drop Both Eyes Q12H     atorvastatin  40 mg Oral At Bedtime     escitalopram  5 mg Oral Daily     [START ON 10/7/2021] furosemide  20 mg Oral Daily     isosorbide mononitrate  30 mg Oral Daily     lisinopril  10 mg Oral Daily     metoprolol succinate ER  100 mg Oral QAM     metoprolol succinate ER  50 mg Oral QPM     multivitamin  with lutein  2 capsule Oral Daily     multivitamin w/minerals  1 tablet Oral Daily     sodium chloride (PF)  3 mL Intracatheter Q8H     sodium chloride  1 g Oral Daily     warfarin ANTICOAGULANT  2 mg Oral ONCE at 18:00

## 2021-10-06 NOTE — CONSULTS
CARDIOLOGY CONSULT    REASON FOR CONSULT: chest pain    PRIMARY CARE PHYSICIAN:  Kirit Michel    HISTORY OF PRESENT ILLNESS:   Ms. Trujillo is a 89 y/o woman with PMH significant for nonischemic cardiomyopathy with EF most recently 45-50%, permanent afib s/p AV node ablation and pacemaker , SIADH, CKD stage III who presents for the evaluation of arm tingling, shortness of breath and chest discomfort.  Peg states over the past week, she has felt numbness/tingling in both arms.  She states she has also noted chest discomfort associated with shortness of breath.  She is unable to tell me how frequently she gets the episodes.  She in general feels more fatigued.  She presented to her primary care MD who recommended transfer to the ED.  Her ECG demonstrated a paced ventricular rhythm with frequent PVCs, troponin negative.  Her CXR demonstrates some mild vascular congestion and her NTproBNP was elevated to 5,247.  She received lasix 20 mg IV x1.  Her Optivol index on her device demonstrated fluid index above baseline.   Output has not been documented.  ON September 30, she had been instructed to increase her lasix to 20 mg daily x3 days, then back down to 10 mg daily.     PAST MEDICAL HISTORY:  1.  Nonischemic cardiomyopathy, EF of 45%-50%.  LV lead on her CRT device is shut off due to diaphragmatic stimulation.  It was shut off 06/2020.  2.  Permanent atrial fibrillation with AV node ablation.  Bi-V upgrade was completed 01/2015.  The patient reached MICHAEL 04/2020 when her generator was changed out.  Her pocket is well healed and she is on chronic warfarin therapy.  2.  Dyslipidemia.  3.  Hypertension.  4.  Osteoporosis, on weekly Fosamax.  5.  Frequent PVCs  6.  CKD stage III      MEDICATIONS:  Current Facility-Administered Medications   Medication     acetaminophen (TYLENOL) tablet 650 mg    Or     acetaminophen (TYLENOL) Suppository 650 mg     amLODIPine (NORVASC) tablet 5 mg     artificial tears (GENTEAL)  0.1-0.2-0.3 % ophthalmic solution 1 drop     atorvastatin (LIPITOR) tablet 40 mg     escitalopram (LEXAPRO) tablet 5 mg     isosorbide mononitrate (IMDUR) 24 hr tablet 30 mg     lidocaine (LMX4) cream     lidocaine 1 % 0.1-1 mL     lisinopril (ZESTRIL) tablet 10 mg     melatonin tablet 1 mg     metoprolol succinate ER (TOPROL-XL) 24 hr tablet 100 mg     metoprolol succinate ER (TOPROL-XL) 24 hr tablet 50 mg     multivitamin  with lutein (OCUVITE WITH LUTEIN) per capsule 2 capsule     multivitamin w/minerals (THERA-VIT-M) tablet 1 tablet     nitroGLYcerin (NITROSTAT) sublingual tablet 0.4 mg     ondansetron (ZOFRAN-ODT) ODT tab 4 mg    Or     ondansetron (ZOFRAN) injection 4 mg     Patient is already receiving anticoagulation with heparin, enoxaparin (LOVENOX), warfarin (COUMADIN)  or other anticoagulant medication     prochlorperazine (COMPAZINE) injection 5 mg    Or     prochlorperazine (COMPAZINE) tablet 5 mg    Or     prochlorperazine (COMPAZINE) suppository 12.5 mg     senna-docusate (SENOKOT-S/PERICOLACE) 8.6-50 MG per tablet 1 tablet    Or     senna-docusate (SENOKOT-S/PERICOLACE) 8.6-50 MG per tablet 2 tablet     sodium chloride (PF) 0.9% PF flush 3 mL     sodium chloride (PF) 0.9% PF flush 3 mL     sodium chloride tablet 1 g     warfarin ANTICOAGULANT (COUMADIN) tablet 2 mg     Warfarin Therapy Reminder (Check START DATE - warfarin may be starting in the FUTURE)       ALLERGIES:  Allergies   Allergen Reactions     Amiodarone Nausea     Hctz      Lansoprazole      diarrhea   Prevacid       SOCIAL HISTORY:  Remote smoking hx, no significant ETOH    FAMILY HISTORY:  I have reviewed this patient's family history and updated it with pertinent information if needed.   Family History   Problem Relation Age of Onset     Hypertension Mother      Cancer Mother         pancreatic     Eye Disorder Mother         cristian     LISA. Father          53     Lipids Father      Diabetes Maternal Grandmother      ANIA  Brother         open heart surgery age 53     Cancer Daughter         ovavian     Neurologic Disorder Son         MS       REVIEW OF SYSTEMS:  A complete ROS was obtained and the pertinent positives are outlined in the history of present illness above.  The remainder of systems is negative.      PHYSICAL EXAM:  Temp: 97.5  F (36.4  C) Temp src: Oral BP: 109/67 Pulse: 74   Resp: 16 SpO2: 95 % O2 Device: None (Room air)    Vital Signs with Ranges  Temp:  [97.5  F (36.4  C)-97.9  F (36.6  C)] 97.5  F (36.4  C)  Pulse:  [59-95] 74  Resp:  [12-28] 16  BP: (108-140)/() 109/67  SpO2:  [91 %-99 %] 95 %  136 lbs 14.4 oz    Constitutional: awake, alert, no distress  Eyes: PERRL, sclera nonicteric  ENT: trachea midline  Respiratory: CTAB  Cardiovascular: RRR no m/r/g, no JVD  GI: nondistended, nontender, bowel sounds present  Lymph/Hematologic: no lymphadenopathy  Skin: dry, no rash  Musculoskeletal: good muscle tone, no edema bilaterally  Neurologic: no focal deficits  Neuropsychiatric: appropriate affact    DATA:  Labs: Reviewed personally in Epic    Troponin negative x2  Sodium 129 (Chronic)  Cr 1.24-1.15    CXR:  Small bilateral pleural effusions, mild interstitial edema    EKG:  Dated 10/5/21 reviewed personally, Ventricular paced rhythm with frequent PVCs, unchanged from previous ECG      ASSESSMENT:  1.  CHF exacerbation:  With mild volume overload.  CXR with mild congestion, BNP elevated and optivol index on device check with fluid index above baseline.  She has received lasix 20 mg IV x1  2.  Chest pain:  With troponin negative x2.  May be related to vascular congestion, could also be anginal in nature.  3.  Nonischemic cardiomyopathy:  Most recent EF 45-50%.    4.  Permanent atrial fibrillation:  S/P AV node ablation and pacemaker, recent normal device check    RECOMMENDATIONS:  1. Will give additional lasix 20 mg IV x1 now, then continue lasix 20 mg daily (previous outpatient dose 10 mg daily)  2.  Start imdur  30 mg daily  3.  Limited echocardiogram pending  4.  Recommend continued medical management as suspect symptoms are predominantly related to mild vascular congestion.  Troponins negative and reassuring.  If she continues to have chest discomfort in the outpatient setting, could consider nuclear stress imaging for risk stratification at that time.        Dania Fenton MD [facility]  Cardiology - [facility]  October 6, 2021

## 2021-10-07 VITALS
SYSTOLIC BLOOD PRESSURE: 127 MMHG | OXYGEN SATURATION: 96 % | TEMPERATURE: 97.6 F | DIASTOLIC BLOOD PRESSURE: 81 MMHG | HEIGHT: 64 IN | WEIGHT: 138.6 LBS | BODY MASS INDEX: 23.66 KG/M2 | HEART RATE: 80 BPM | RESPIRATION RATE: 16 BRPM

## 2021-10-07 LAB
ANION GAP SERPL CALCULATED.3IONS-SCNC: 8 MMOL/L (ref 3–14)
BUN SERPL-MCNC: 21 MG/DL (ref 7–30)
CALCIUM SERPL-MCNC: 7.8 MG/DL (ref 8.5–10.1)
CHLORIDE BLD-SCNC: 98 MMOL/L (ref 94–109)
CO2 SERPL-SCNC: 27 MMOL/L (ref 20–32)
CREAT SERPL-MCNC: 1.07 MG/DL (ref 0.52–1.04)
GFR SERPL CREATININE-BSD FRML MDRD: 44 ML/MIN/1.73M2
GLUCOSE BLD-MCNC: 96 MG/DL (ref 70–99)
INR PPP: 2.8 (ref 0.85–1.15)
MDC_IDC_EPISODE_DTM: NORMAL
MDC_IDC_EPISODE_DURATION: 6 S
MDC_IDC_EPISODE_ID: 5
MDC_IDC_EPISODE_TYPE: NORMAL
MDC_IDC_LEAD_IMPLANT_DT: NORMAL
MDC_IDC_LEAD_LOCATION: NORMAL
MDC_IDC_LEAD_LOCATION_DETAIL_1: NORMAL
MDC_IDC_LEAD_MFG: NORMAL
MDC_IDC_LEAD_MODEL: NORMAL
MDC_IDC_LEAD_POLARITY_TYPE: NORMAL
MDC_IDC_LEAD_SERIAL: NORMAL
MDC_IDC_MSMT_BATTERY_DTM: NORMAL
MDC_IDC_MSMT_BATTERY_REMAINING_LONGEVITY: 126 MO
MDC_IDC_MSMT_BATTERY_RRT_TRIGGER: 2.6
MDC_IDC_MSMT_BATTERY_STATUS: NORMAL
MDC_IDC_MSMT_BATTERY_VOLTAGE: 3.01 V
MDC_IDC_MSMT_LEADCHNL_LV_IMPEDANCE_VALUE: 418 OHM
MDC_IDC_MSMT_LEADCHNL_LV_IMPEDANCE_VALUE: 456 OHM
MDC_IDC_MSMT_LEADCHNL_LV_IMPEDANCE_VALUE: 513 OHM
MDC_IDC_MSMT_LEADCHNL_LV_IMPEDANCE_VALUE: 570 OHM
MDC_IDC_MSMT_LEADCHNL_LV_IMPEDANCE_VALUE: 760 OHM
MDC_IDC_MSMT_LEADCHNL_LV_PACING_THRESHOLD_AMPLITUDE: 5 V
MDC_IDC_MSMT_LEADCHNL_LV_PACING_THRESHOLD_PULSEWIDTH: 1 MS
MDC_IDC_MSMT_LEADCHNL_RA_IMPEDANCE_VALUE: 285 OHM
MDC_IDC_MSMT_LEADCHNL_RA_IMPEDANCE_VALUE: 323 OHM
MDC_IDC_MSMT_LEADCHNL_RV_IMPEDANCE_VALUE: 323 OHM
MDC_IDC_MSMT_LEADCHNL_RV_IMPEDANCE_VALUE: 418 OHM
MDC_IDC_MSMT_LEADCHNL_RV_PACING_THRESHOLD_AMPLITUDE: 0.25 V
MDC_IDC_MSMT_LEADCHNL_RV_PACING_THRESHOLD_PULSEWIDTH: 0.4 MS
MDC_IDC_MSMT_LEADCHNL_RV_SENSING_INTR_AMPL: 13.5 MV
MDC_IDC_MSMT_LEADCHNL_RV_SENSING_INTR_AMPL: 13.5 MV
MDC_IDC_PG_IMPLANT_DTM: NORMAL
MDC_IDC_PG_MFG: NORMAL
MDC_IDC_PG_MODEL: NORMAL
MDC_IDC_PG_SERIAL: NORMAL
MDC_IDC_PG_TYPE: NORMAL
MDC_IDC_SESS_CLINIC_NAME: NORMAL
MDC_IDC_SESS_DTM: NORMAL
MDC_IDC_SESS_TYPE: NORMAL
MDC_IDC_SET_BRADY_LOWRATE: 60 {BEATS}/MIN
MDC_IDC_SET_BRADY_MAX_SENSOR_RATE: 130 {BEATS}/MIN
MDC_IDC_SET_BRADY_MODE: NORMAL
MDC_IDC_SET_CRT_PACED_CHAMBERS: NORMAL
MDC_IDC_SET_LEADCHNL_RA_SENSING_ANODE_ELECTRODE_1: NORMAL
MDC_IDC_SET_LEADCHNL_RA_SENSING_ANODE_LOCATION_1: NORMAL
MDC_IDC_SET_LEADCHNL_RA_SENSING_CATHODE_ELECTRODE_1: NORMAL
MDC_IDC_SET_LEADCHNL_RA_SENSING_CATHODE_LOCATION_1: NORMAL
MDC_IDC_SET_LEADCHNL_RA_SENSING_POLARITY: NORMAL
MDC_IDC_SET_LEADCHNL_RA_SENSING_SENSITIVITY: 4 MV
MDC_IDC_SET_LEADCHNL_RV_PACING_AMPLITUDE: 2 V
MDC_IDC_SET_LEADCHNL_RV_PACING_ANODE_ELECTRODE_1: NORMAL
MDC_IDC_SET_LEADCHNL_RV_PACING_ANODE_LOCATION_1: NORMAL
MDC_IDC_SET_LEADCHNL_RV_PACING_CAPTURE_MODE: NORMAL
MDC_IDC_SET_LEADCHNL_RV_PACING_CATHODE_ELECTRODE_1: NORMAL
MDC_IDC_SET_LEADCHNL_RV_PACING_CATHODE_LOCATION_1: NORMAL
MDC_IDC_SET_LEADCHNL_RV_PACING_POLARITY: NORMAL
MDC_IDC_SET_LEADCHNL_RV_PACING_PULSEWIDTH: 0.4 MS
MDC_IDC_SET_LEADCHNL_RV_SENSING_ANODE_ELECTRODE_1: NORMAL
MDC_IDC_SET_LEADCHNL_RV_SENSING_ANODE_LOCATION_1: NORMAL
MDC_IDC_SET_LEADCHNL_RV_SENSING_CATHODE_ELECTRODE_1: NORMAL
MDC_IDC_SET_LEADCHNL_RV_SENSING_CATHODE_LOCATION_1: NORMAL
MDC_IDC_SET_LEADCHNL_RV_SENSING_POLARITY: NORMAL
MDC_IDC_SET_LEADCHNL_RV_SENSING_SENSITIVITY: 0.9 MV
MDC_IDC_SET_ZONE_DETECTION_INTERVAL: 350 MS
MDC_IDC_SET_ZONE_DETECTION_INTERVAL: 400 MS
MDC_IDC_SET_ZONE_TYPE: NORMAL
MDC_IDC_STAT_AT_BURDEN_PERCENT: 0 %
MDC_IDC_STAT_AT_DTM_END: NORMAL
MDC_IDC_STAT_AT_DTM_START: NORMAL
MDC_IDC_STAT_BRADY_AP_VP_PERCENT: 0 %
MDC_IDC_STAT_BRADY_AP_VS_PERCENT: 0 %
MDC_IDC_STAT_BRADY_AS_VP_PERCENT: 85.58 %
MDC_IDC_STAT_BRADY_AS_VS_PERCENT: 14.42 %
MDC_IDC_STAT_BRADY_DTM_END: NORMAL
MDC_IDC_STAT_BRADY_DTM_START: NORMAL
MDC_IDC_STAT_BRADY_RA_PERCENT_PACED: 0 %
MDC_IDC_STAT_BRADY_RV_PERCENT_PACED: 85.58 %
MDC_IDC_STAT_CRT_DTM_END: NORMAL
MDC_IDC_STAT_CRT_DTM_START: NORMAL
MDC_IDC_STAT_CRT_LV_PERCENT_PACED: 0 %
MDC_IDC_STAT_CRT_PERCENT_PACED: 0 %
MDC_IDC_STAT_EPISODE_RECENT_COUNT: 0
MDC_IDC_STAT_EPISODE_RECENT_COUNT_DTM_END: NORMAL
MDC_IDC_STAT_EPISODE_RECENT_COUNT_DTM_START: NORMAL
MDC_IDC_STAT_EPISODE_TOTAL_COUNT: 0
MDC_IDC_STAT_EPISODE_TOTAL_COUNT: 1
MDC_IDC_STAT_EPISODE_TOTAL_COUNT_DTM_END: NORMAL
MDC_IDC_STAT_EPISODE_TOTAL_COUNT_DTM_START: NORMAL
MDC_IDC_STAT_EPISODE_TYPE: NORMAL
POTASSIUM BLD-SCNC: 3.8 MMOL/L (ref 3.4–5.3)
SODIUM SERPL-SCNC: 133 MMOL/L (ref 133–144)

## 2021-10-07 PROCEDURE — 250N000013 HC RX MED GY IP 250 OP 250 PS 637: Performed by: HOSPITALIST

## 2021-10-07 PROCEDURE — 80048 BASIC METABOLIC PNL TOTAL CA: CPT | Performed by: INTERNAL MEDICINE

## 2021-10-07 PROCEDURE — 99239 HOSP IP/OBS DSCHRG MGMT >30: CPT | Performed by: INTERNAL MEDICINE

## 2021-10-07 PROCEDURE — 250N000013 HC RX MED GY IP 250 OP 250 PS 637: Performed by: INTERNAL MEDICINE

## 2021-10-07 PROCEDURE — 85610 PROTHROMBIN TIME: CPT | Performed by: HOSPITALIST

## 2021-10-07 PROCEDURE — 99232 SBSQ HOSP IP/OBS MODERATE 35: CPT | Performed by: INTERNAL MEDICINE

## 2021-10-07 PROCEDURE — 36415 COLL VENOUS BLD VENIPUNCTURE: CPT | Performed by: INTERNAL MEDICINE

## 2021-10-07 RX ORDER — NITROGLYCERIN 0.4 MG/1
TABLET SUBLINGUAL
Qty: 25 TABLET | Refills: 0 | Status: SHIPPED | OUTPATIENT
Start: 2021-10-07 | End: 2021-10-15

## 2021-10-07 RX ORDER — FUROSEMIDE 20 MG
20 TABLET ORAL DAILY
Qty: 30 TABLET | Refills: 0 | Status: SHIPPED | OUTPATIENT
Start: 2021-10-07 | End: 2022-01-01

## 2021-10-07 RX ORDER — ISOSORBIDE MONONITRATE 30 MG/1
30 TABLET, EXTENDED RELEASE ORAL DAILY
Qty: 30 TABLET | Refills: 0 | Status: ON HOLD | OUTPATIENT
Start: 2021-10-08 | End: 2021-10-20

## 2021-10-07 RX ORDER — NITROGLYCERIN 0.4 MG/1
0.4 TABLET SUBLINGUAL EVERY 5 MIN PRN
Status: DISCONTINUED | OUTPATIENT
Start: 2021-10-07 | End: 2021-10-07

## 2021-10-07 RX ORDER — WARFARIN SODIUM 2 MG/1
2 TABLET ORAL
Status: DISCONTINUED | OUTPATIENT
Start: 2021-10-07 | End: 2021-10-07 | Stop reason: HOSPADM

## 2021-10-07 RX ADMIN — METOPROLOL SUCCINATE 100 MG: 100 TABLET, EXTENDED RELEASE ORAL at 09:07

## 2021-10-07 RX ADMIN — MULTIPLE VITAMINS W/ MINERALS TAB 1 TABLET: TAB at 09:07

## 2021-10-07 RX ADMIN — ISOSORBIDE MONONITRATE 30 MG: 30 TABLET, EXTENDED RELEASE ORAL at 09:07

## 2021-10-07 RX ADMIN — DEXTRAN 70, GLYCERIN, HYPROMELLOSE 1 DROP: 1; 2; 3 SOLUTION/ DROPS OPHTHALMIC at 09:10

## 2021-10-07 RX ADMIN — AMLODIPINE BESYLATE 5 MG: 5 TABLET ORAL at 09:07

## 2021-10-07 RX ADMIN — FUROSEMIDE 20 MG: 20 TABLET ORAL at 09:07

## 2021-10-07 RX ADMIN — LISINOPRIL 10 MG: 10 TABLET ORAL at 09:07

## 2021-10-07 RX ADMIN — Medication 2 CAPSULE: at 09:06

## 2021-10-07 RX ADMIN — SODIUM CHLORIDE TAB 1 GM 1 G: 1 TAB at 09:07

## 2021-10-07 RX ADMIN — ESCITALOPRAM 5 MG: 5 TABLET, FILM COATED ORAL at 09:07

## 2021-10-07 ASSESSMENT — ACTIVITIES OF DAILY LIVING (ADL)
ADLS_ACUITY_SCORE: 8

## 2021-10-07 ASSESSMENT — MIFFLIN-ST. JEOR: SCORE: 1003.69

## 2021-10-07 NOTE — PROGRESS NOTES
"[facility]    Cardiology Progress Note    Primary Cardiology team: Dr. Tatum and Kat Yo, APRN, CNP    Date of Admission: 10/05/2021  Service Date: 10/07/2021    Summary:  Ms. Mg \"Peg\" Ronnie is a very pleasant 89 year old female with a past medical history of nonischemic cardiomyopathy with EF most recently 45-50%, permanent A.Fib s/p AV node ablation and pacemaker, CAD, SIADH, and CKD stage III who was admitted on 10/05/2021 after presenting with arm tingling, shortness of breath and chest discomfort.    Interval History   No acute events overnight. Patient is resting comfortably. Her son is at the bedside. She reports improvement in her shortness of breath. She has not had recurrent issues of chest discomfort overnight or this morning. We reviewed the plan of care as outlined below. She stated understanding and agreement.    Telemetry: Paced with occasional PVCs/PACs    Assessment & Plan   1. Acute on chronic HFrEF  - NT pro BNP elevated at 5,247 and CXR showing small bilateral pleural effusions consistent with mild volume overload.   - Diuresed with IV lasix and now switched back to PO lasix at a higher dose of 20 mg once daily (previous outpatient dose 10 mg daily).  - Wt trending down from 142 lbs upon admit to 138 lbs today.    - TTE 10/6/21 showing stable LVEF 45-50%, mildly decreased right ventricular systolic function, and mild to moderate (1-2+) tricuspid regurgitation.    2. Chest discomfort  - With troponin negative x2. May be related to vascular congestion, could also be anginal in nature.  - Diuresed as above and started on imdur 30 mg once daily.    3. Nonischemic cardiomyopathy  - EF of 45-50%. LV lead on her CRT device is shut off due to diaphragmatic stimulation. It was shut off 06/2020.    4. Permanent atrial fibrillation with AV node ablation and PPM  - Bi-V upgrade was completed 01/2015. The patient reached MICHAEL 04/2020 when her generator was changed out. " Her pocket is well healed and she is on chronic warfarin therapy.    5. Coronary artery disease  - Coronary angiography remotely in 2000 showing a 90% distal lesion and a very small RCA which was not amenable to PCI. LMCA, LAD, and Lcx were normal at that time.    6. Dyslipidemia, treated on atorvastatin 40 mg daily    7. Hypertension, well-controlled     8. Osteoporosis, on weekly Fosamax    9. Frequent PVCs    10. CKD stage III  - Baseline creatinine around 1.0 to 1.1. Improved with diuresis and at baseline.     Plan:   1. Continue with PO lasix at the higher dose of 20 mg once daily and with newly added imdur 30 mg daily.  2. Continue with daily weights, strict I/Os, low sodium diet, and close monitoring of renal function and electrolytes.  3. Recommend continued medical management as suspect symptoms of chest discomfort are predominantly related to mild vascular congestion. Troponins negative and reassuring. If she continues to have chest discomfort in the outpatient setting, could consider nuclear stress imaging for risk stratification at that time.    4. Okay from a cardiac standpoint for discharge home today. Close follow up scheduled with NAVYA Wilson, CNP on 10/15/21 with a repeat BMP and NT pro BNP beforehand.     Thank you for the opportunity to participate in this pleasant patient's care.     NAVYA Theodore, CNP   Nurse Practitioner  [facility] - Heart [facility]  Pager: [phone]  Text Page  (8am - 5pm, M-F)    Patient Active Problem List   Diagnosis     Hyperlipidemia     Essential hypertension     Cardiovascular disease     Palpitations     Esophageal reflux     Tinnitus     Acute pancreatitis     Diverticulitis of colon     Permanent atrial fibrillation (H)     Cardiac pacemaker in situ     CHF (congestive heart failure) (H)     Chronic kidney disease     Pulmonary hypertension (H)     Mitral regurgitation     Aortic regurgitation     Tricuspid regurgitation     Pulmonary valve  regurgitation     Hyponatremia     Cardiomyopathy (H)     Encounter for pacemaker at end of battery life     Hx of atrioventricular node ablation     Pedal edema     Physical Exam   Temp: 98  F (36.7  C) Temp src: Oral BP: 104/65 Pulse: 76   Resp: 16 SpO2: 92 % O2 Device: None (Room air)    Vitals:    10/05/21 1357 10/06/21 0817 10/07/21 0445   Weight: 64.4 kg (142 lb) 62.1 kg (136 lb 14.4 oz) 62.9 kg (138 lb 9.6 oz)     Vital Signs with Ranges  Temp:  [97.7  F (36.5  C)-98  F (36.7  C)] 98  F (36.7  C)  Pulse:  [] 76  Resp:  [16] 16  BP: (102-115)/(54-67) 104/65  SpO2:  [92 %-94 %] 92 %  I/O last 3 completed shifts:  In: 480 [P.O.:480]  Out: 1350 [Urine:1350]    Constitutional: Appears younger than her stated age, well nourished, and in no acute distress.  Eyes: Pupils equal, round. Sclerae anicteric.   HEENT: Normocephalic, atraumatic.   Neck: Supple. No JVD appreciated.  Respiratory: Breathing non-labored. Lungs clear to auscultation bilaterally. No crackles, wheezes, rhonchi, or rales.  Cardiovascular: Regular rate and rhythm with a few ectopic beats noted, normal S1 and S2. No murmur, rub, or gallop.  Skin: Warm, dry. No apparent rashes, cyanosis, or xanthelasma.  Musculoskeletal/Extremities: Moves all extremities well and symmetrically. No LE edema.  Neurologic: No gross focal deficits. Alert, awake, and oriented to person, place and time.  Psychiatric: Affect appropriate. Mentation normal.    Medications     - MEDICATION INSTRUCTIONS -       Warfarin Therapy Reminder         amLODIPine  5 mg Oral Daily     artificial tears  1 drop Both Eyes Q12H     atorvastatin  40 mg Oral At Bedtime     escitalopram  5 mg Oral Daily     furosemide  20 mg Oral Daily     isosorbide mononitrate  30 mg Oral Daily     lisinopril  10 mg Oral Daily     metoprolol succinate ER  100 mg Oral QAM     metoprolol succinate ER  50 mg Oral QPM     multivitamin  with lutein  2 capsule Oral Daily     multivitamin w/minerals  1 tablet  Oral Daily     sodium chloride (PF)  3 mL Intracatheter Q8H     sodium chloride  1 g Oral Daily     Data   Recent Labs   Lab 10/07/21  0534 10/06/21  0842 10/05/21  1405   WBC  --   --  7.1   HGB  --   --  14.1   MCV  --   --  93   PLT  --   --  218   INR 2.80* 2.53* 2.81*    129* 129*   POTASSIUM 3.8 4.2 4.3   CHLORIDE 98 94 94   CO2 27 27 27   BUN 21 17 19   CR 1.07* 1.15* 1.24*   ANIONGAP 8 8 8   PATRICIA 7.8* 8.4* 8.9   GLC 96 88 127*   ALBUMIN  --   --  3.3*   PROTTOTAL  --   --  6.4*   BILITOTAL  --   --  1.4*   ALKPHOS  --   --  75   ALT  --   --  46   AST  --   --  38   TROPONIN  --  <0.015 <0.015     This note was completed in part using Dragon voice recognition software. Although reviewed after completion, some word and grammatical errors may occur.

## 2021-10-07 NOTE — PLAN OF CARE
VSS. Monitor remains V.Paced rhythm with PVCs. Pt. Denies pain. Purewick in place. Right leg dressing CDI. Coccyx with blanchable redness. Sacral Mepilex applied. Pt. Had episode of confusion and agitation upon awaking from sleep. Pt. Calm after re-orientation and reasurrance. Continue to monitor.

## 2021-10-07 NOTE — PROGRESS NOTES
Met with patient and son Philipp briefly to review discharge follow up appointments and son Philipp states he will arrange follow up with patient's PCP with Dallin Angeles RN  Care Coordinator  [facility]  [phone] (text or call)

## 2021-10-07 NOTE — DISCHARGE SUMMARY
[facility]  Hospitalist Discharge Summary       Date of Admission:  10/5/2021  Date of Discharge:  10/7/2021  Discharging Provider: Juan Campbell MD      Discharge Diagnoses   Acute on chronic HFrEF   Non-ischemic cardiomyopathy   Chest pressure, possible angina   Permanent atrial fibrillation on anticoagulation   Hypertension   Status post AV node ablation an permanent pacemaker   Stage 3 chronic kidney disease  Chronic hyponatremia  Depression   Hyperlipidemia     Follow-ups Needed After Discharge   Follow-up Appointments     Follow-up and recommended labs and tests       Follow-up with cardiology with labs as scheduled. Resume INR monitoring   through anticoagulation/warfarin clinc or PCP as previous.             Hospital Course   Haritha Trujillo is a 89 year-old female with non-ischemic cardiomyopathy who presents with episodes of chest pressure for about 2 weeks, associated with dyspnea on exertion as well.  She says her weight has been stable during this time.  Her Lasix was increased from 10 to 20 mg 3 days ago. Due to not feeling any improvement, she presented to the emergency room to be evaluated.  In the emergency room her vital signs were stable and she was not hypoxic.  Her EKG showed a paced rhythm with PVCs and a chest x-ray demonstrating perhaps mild pulmonary edema.  Her BNP was up to around 5200 from 3100 in July. She received IV furosemide and was admitted to the hospital 10/5/2021.     Acute on chronic HFrEF   Non-ischemic cardiomyopathy   Chest pressure, possible angina   Permanent atrial fibrillation on anticoagulation   Hypertension   Status post AV node ablation an permanent pacemaker   *Serial troponins negative  *Echocardiogram with no significant changes from prior  *Cardiology consulted during admission, started on Imdur  - Diuresed with IV furosemide with improvement, transitioned to furosemide 20 mg daily for discharge   - Ambulated with RN with  improvement   - Family to stay with patient temporarily as needed   - Continue Imdur (new this admission). Refilled SL nitroglycerin. Discussed use with patient and son.   - Continue warfarin with pharmacy to dose  - Continue prior to admission lisinopril, metoprolol, amlodipine     Stage 3 chronic kidney disease  Chronic hyponatremia  Creatinine 1.24 and serum sodium 129. Baseline sodium appears to run in low 130s, high 120s.   - Creatinine stable with diuresis  - Sodium normal day of discharge      Depression   Continue escitalopram       Hyperlipidemia   Continue statin     Consultations This Hospital Stay   CARDIOLOGY IP CONSULT  PHARMACY TO DOSE WARFARIN    Code Status   Full Code    Time Spent on this Encounter   I, Juan Campbell MD, personally saw the patient today and spent greater than 30 minutes discharging this patient.       Juan Campbell MD  [facility]  ______________________________________________________________________    Physical Exam   Vital Signs: Temp: 97.6  F (36.4  C) Temp src: Oral BP: 127/81 Pulse: 80   Resp: 16 SpO2: 96 % O2 Device: None (Room air)    Weight: 138 lbs 9.6 oz    Constitutional: NAD  Respiratory: Clear to auscultation bilaterally, good air movement bilaterally  Cardiovascular: Irregularly irregular. No peripheral edema.  GI: Soft, non-tender, non-distended.   Skin/Integumen: Warm, dry  Other:         Primary Care Physician   Kirit Michel    Discharge Disposition   Discharged to home  Condition at discharge: Stable      Discharge Orders      Reason for your hospital stay    You were hospitalized for a mild exacerbation of congestive heart failure.     Follow-up and recommended labs and tests     Follow-up with cardiology with labs as scheduled. Resume INR monitoring through anticoagulation/warfarin clinc or PCP as previous.     Activity    Your activity upon discharge: Advance activity as tolerated     Diet    Follow this diet upon discharge:  Regular diet     Discharge Medications   Current Discharge Medication List      START taking these medications    Details   isosorbide mononitrate (IMDUR) 30 MG 24 hr tablet Take 1 tablet (30 mg) by mouth daily  Qty: 30 tablet, Refills: 0    Associated Diagnoses: Chest pain, unspecified type         CONTINUE these medications which have CHANGED    Details   furosemide (LASIX) 20 MG tablet Take 1 tablet (20 mg) by mouth daily  Qty: 30 tablet, Refills: 0    Comments: Future refills by cardiology  Associated Diagnoses: Dilated cardiomyopathy (H)      nitroGLYcerin (NITROSTAT) 0.4 MG sublingual tablet For chest pain place 1 tablet under the tongue every 5 minutes for 3 doses. If symptoms persist 5 minutes after 1st dose call 911.  Qty: 25 tablet, Refills: 0    Comments: Future refills by cardiology  Associated Diagnoses: Chest pain, unspecified type         CONTINUE these medications which have NOT CHANGED    Details   Alendronate Sodium (FOSAMAX PO) Take 70 mg by mouth once a week Sunday evenings      amLODIPine (NORVASC) 5 MG tablet Take 1 tablet (5 mg) by mouth daily  Qty: 90 tablet, Refills: 3    Associated Diagnoses: Essential hypertension      atorvastatin (LIPITOR) 40 MG tablet Take 40 mg by mouth At Bedtime       calcium carbonate-vitamin D (CALCIUM + D) 600-200 MG-UNIT TABS Take 1 tablet by mouth daily.      cycloSPORINE (RESTASIS) 0.05 % ophthalmic emulsion Place 1 drop into both eyes every 12 hours.      escitalopram (LEXAPRO) 10 MG tablet Take 5 mg by mouth daily       lisinopril (ZESTRIL) 10 MG tablet Take 1 tablet (10 mg) by mouth daily  Qty: 90 tablet, Refills: 2    Associated Diagnoses: Essential hypertension      !! metoprolol succinate ER (TOPROL-XL) 50 MG 24 hr tablet Take 100 mg by mouth every morning      !! metoprolol succinate ER (TOPROL-XL) 50 MG 24 hr tablet 100 in am and 50mg at night    Associated Diagnoses: Dilated cardiomyopathy (H)      Multiple Vitamins-Minerals (CENTRUM SILVER) per tablet  Take 1 tablet by mouth daily.        Multiple Vitamins-Minerals (PRESERVISION AREDS 2 PO) Take by mouth daily      Probiotic Product (PROBIOTIC DAILY PO) Take by mouth daily      sodium chloride 1 GM tablet Take 1 g by mouth daily      WARFARIN SODIUM PO Take 2 mg by mouth daily (4 mg x 0.5) every Mon, Wed, Fri. 4 mg (4mg x1) all other days      acetaminophen (TYLENOL) 500 MG tablet Take 500-1,000 mg by mouth every 6 hours as needed for mild pain       !! - Potential duplicate medications found. Please discuss with provider.          Significant Results and Procedures   Most Recent 3 CBC's:  Recent Labs   Lab Test 10/05/21  1405 06/24/21 1941 04/14/20  1130   WBC 7.1 7.4 7.6   HGB 14.1 12.8 14.0   MCV 93 93 93    213 185     Most Recent 3 BMP's:  Recent Labs   Lab Test 10/07/21  0534 10/06/21  0842 10/05/21  1405    129* 129*   POTASSIUM 3.8 4.2 4.3   CHLORIDE 98 94 94   CO2 27 27 27   BUN 21 17 19   CR 1.07* 1.15* 1.24*   ANIONGAP 8 8 8   PATRICIA 7.8* 8.4* 8.9   GLC 96 88 127*     Most Recent 2 LFT's:  Recent Labs   Lab Test 10/05/21  1405 06/24/21 1941   AST 38 38   ALT 46 46   ALKPHOS 75 84   BILITOTAL 1.4* 1.1     Most Recent 3 INR's:  Recent Labs   Lab Test 10/07/21  0534 10/06/21  0842 10/05/21  1405   INR 2.80* 2.53* 2.81*     Most Recent 3 Troponin's:  Recent Labs   Lab Test 10/06/21  0842 10/05/21  1405 06/24/21 1941 05/16/19  2159 07/18/18  0140   TROPI  --   --  <0.015 <0.015 <0.015   TROPONIN <0.015 <0.015  --   --   --      Most Recent 3 BNP's:  Recent Labs   Lab Test 10/05/21  1405 07/08/21  1508 06/24/21 1941 12/30/14  1230   NTBNPI 5,247*  --  3,060* 12,057*   NTBNP  --  3,130*  --   --      Most Recent Cholesterol Panel:  Recent Labs   Lab Test 10/14/15  0000   CHOL 156   LDL 92   HDL 43   TRIG 107     Most Recent 6 Bacteria Isolates From Any Culture (See EPIC Reports for Culture Details):  Recent Labs   Lab Test 05/16/19  2135 05/05/18  1028   CULT <10,000 colonies/mL  mixed urogenital  ayde  Susceptibility testing not routinely done   10,000 to 50,000 colonies/mL  Escherichia coli  *     Most Recent TSH and T4:  Recent Labs   Lab Test 14  1419   TSH 2.27     Most Recent Hemoglobin A1c:No lab results found.  Most Recent Urinalysis:  Recent Labs   Lab Test 21  2053 18  1140 18  1012   COLOR Light Yellow   < > Yellow   APPEARANCE Clear   < > Slightly Cloudy   URINEGLC Negative   < > Negative   URINEBILI Negative   < > Negative   URINEKETONE Negative   < > Negative   SG 1.009   < > 1.010   UBLD Negative   < > Moderate*   URINEPH 6.0   < > 6.5   PROTEIN Negative   < > 30*   UROBILINOGEN  --   --  0.2   NITRITE Negative   < > Negative   LEUKEST Large*   < > Large*   RBCU 0   < > O - 2   WBCU 14*   < > >100*    < > = values in this interval not displayed.     Most Recent ABG:No lab results found.  Most Recent ESR & CRP:No lab results found.,   Results for orders placed or performed during the hospital encounter of 10/05/21   XR Chest 2 Views    Narrative    CHEST TWO VIEWS 10/5/2021 6:55 PM     HISTORY: Chest pain, SOB.    COMPARISON: 2021       Impression    IMPRESSION: Small right effusion and associated atelectasis and or  infiltrate. Small left effusion and associated atelectasis and or  infiltrate. The cardiac silhouette is not enlarged. Pulmonary  vasculature is unremarkable.    GEORGIA RODGERS MD         SYSTEM ID:  [id number]   Echocardiogram Limited     Value    LVEF  45-50%    Narrative    [id number]  [id number]  [id number]  [id number]^NORMA^DARLYN^ISAAC     [facility]  Echocardiography Laboratory  [address] [place] [id number]     Name: SEVERIANO VALLEJO  MRN: [id number]  : 1925  Study Date: 10/06/2021 01:48 PM  Age: 89 yrs  Gender: Female  Patient Location: [facility]  Reason For Study: Chest Pain  Ordering Physician: DARLYN GREEN  Referring Physician: Kirit Michel  Performed By: Joe Mckoy     BSA: 1.7 m2  Height: 64  in  Weight: 136 lb  HR: 51  BP: 109/67 mmHg  ______________________________________________________________________________  Procedure  Limited Portable Echo Adult.  ______________________________________________________________________________  Interpretation Summary     There is moderate concentric left ventricular hypertrophy.  The visual ejection fraction is 45-50%.  Septal motion is consistent with conduction abnormality.  Mildly decreased right ventricular systolic function  The left atrium is severely dilated.  There is mild to moderate (1-2+) tricuspid regurgitation.  Right ventricle systolic pressure estimate normal  The ascending aorta is Mildly dilated.  There is no pericardial effusion.  Pleural effusion present  The patient exhibited frequent PVCs.  ______________________________________________________________________________  Left Ventricle  The left ventricle is normal in size. There is moderate concentric left  ventricular hypertrophy. The visual ejection fraction is 45-50%. Septal motion  is consistent with conduction abnormality.     Right Ventricle  There is a catheter/pacemaker lead seen in the right ventricle. Mildly  decreased right ventricular systolic function.     Atria  The left atrium is severely dilated. The right atrium is severely dilated.  Pacer wire in right atrium.     Mitral Valve  There is trace to mild mitral regurgitation.     Tricuspid Valve  There is mild to moderate (1-2+) tricuspid regurgitation. The right  ventricular systolic pressure is approximated at 28.9 mmHg plus the right  atrial pressure. IVC diameter and respiratory changes fall into an  intermediate range suggesting an RA pressure of 8 mmHg. Right ventricle  systolic pressure estimate normal.     Aortic Valve  There is mild trileaflet aortic sclerosis. There is mild (1+) aortic  regurgitation.     Pulmonic Valve  There is mild (1+) pulmonic valvular regurgitation.     Vessels  The ascending aorta is Mildly  dilated.     Pericardium  There is no pericardial effusion. Pleural effusion present.     Rhythm  The patient exhibited frequent PVCs.  ______________________________________________________________________________  MMode/2D Measurements & Calculations  IVSd: 1.3 cm     LVIDd: 4.3 cm  LVIDs: 3.6 cm  LVPWd: 1.4 cm  FS: 16.6 %  LV mass(C)d: 222.1 grams  LV mass(C)dI: 133.8 grams/m2  LA dimension: 3.2 cm  asc Aorta Diam: 3.8 cm  RWT: 0.67     Doppler Measurements & Calculations  TR max santy: 265.4 cm/sec  TR max P.9 mmHg     ______________________________________________________________________________  Report approved by: Vasquez Cueto 10/06/2021 03:54 PM             Allergies   Allergies   Allergen Reactions     Amiodarone Nausea     Hctz      Lansoprazole      diarrhea   Prevacid

## 2021-10-07 NOTE — TELEPHONE ENCOUNTER
10/07/21 Pt seeing Kat Yo NP on 10/15 post hospitalization follow up  Per Cardiology Benigno MELTON discharge note  Plan:   1. Continue with PO lasix at the higher dose of 20 mg once daily and with newly added imdur 30 mg daily.  2. Continue with daily weights, strict I/Os, low sodium diet, and close monitoring of renal function and electrolytes.  3. Recommend continued medical management as suspect symptoms of chest discomfort are predominantly related to mild vascular congestion. Troponins negative and reassuring. If she continues to have chest discomfort in the outpatient setting, could consider nuclear stress imaging for risk stratification at that time.    4. Okay from a cardiac standpoint for discharge home today. Close follow up scheduled with NAVYA Wilson, CNP on 10/15/21 with a repeat BMP and NT pro BNP beforehand.   KHerroRN 1027 am

## 2021-10-07 NOTE — PLAN OF CARE
..Neuro- AxO 4  Tele/Cardiac- V-Paced  Resp- RA  Activity- SBA G  Pain- Denies  Drips- None  Drains/Tubes- PIV x 1  Skin- Redness blanchable area on Sacrum Coccyx- Meplix in place, Skin tear Rt shin-Wrapped in bandage.  GI/- External Cath  Aggression Color- Green  COVID status- Neg   Plan- Pending Discharge 10/7

## 2021-10-07 NOTE — PLAN OF CARE
A/Ox4, and a little forgetful. VSS. Denies SOB and chest pain. Lung sounds are clear. Up with SBA. Discharge instructions discussed with patient and son. Verbalized understanding and discharged. Discharge medications given to patient.

## 2021-10-08 ENCOUNTER — TELEPHONE (OUTPATIENT)
Dept: CARDIOLOGY | Facility: CLINIC | Age: 86
End: 2021-10-08

## 2021-10-08 ENCOUNTER — PATIENT OUTREACH (OUTPATIENT)
Dept: CARE COORDINATION | Facility: CLINIC | Age: 86
End: 2021-10-08

## 2021-10-08 DIAGNOSIS — Z71.89 OTHER SPECIFIED COUNSELING: ICD-10-CM

## 2021-10-08 NOTE — PROGRESS NOTES
Clinic Care Coordination Contact  [facility]: Post-Discharge Note  SITUATION                                                      Admission:    Admission Date: 10/05/21   Reason for Admission: Acute on chronic HFrEF    Non-ischemic cardiomyopathy   Chest pressure, possible angina  Discharge:   Discharge Date: 10/07/21  Discharge Diagnosis: Acute on chronic HFrEF   Non-ischemic cardiomyopathy    Chest pressure, possible angina    BACKGROUND                                                      89 year old female with nonischemic cardiomyopathy who has been having episodes of chest pressure for about 2 weeks.  She has been having dyspnea on exertion as well.  She says her weight has been stable during this time.  Her Lasix was increased from 10 to 20 mg 3 days ago.  She is not feeling any better and came to the emergency room to be evaluated.  In the emergency room her vital signs were stable and she was not hypoxic.  Her EKG showed a paced rhythm with PVCs and a chest x-ray showed perhaps mild pulmonary edema.  Her BNP was up to around 5200 from 3100 in July.  Her troponin was negative.  She had no chest pressure in the emergency room.  She receivd IV furosemide and is admitted to the hospital 10/5/2021.     Acute on chronic HFrEF   Non-ischemic cardiomyopathy   Chest pressure, possible angina   Permanent atrial fibrillation on anticoagulation   Hypertension   Status post AV node ablation an permanent pacemaker   *Serial troponins negative  Echocardiogram unchanged from prior     Initial troponin was undetectable.  Her BNP was 5247 compared with 3130 in July.  EKG shows a paced rhythm and a couple of PVCs.  Her symptoms seem similar to what she experienced in June of this year and was having acute heart failure.  However this time she seems to be having more of the chest pressure and possibly more severe chest pressure.  - Cardiology consulted, appreciate assistance  - Additional IV furosemide given by  cardiology with plan for PO 10/7/21  - Echocardiogram pending  - Continue warfarin with pharmacy to dose  - Continue prior to admission lisinopril, metoprolol, amlodipine     Stage 3 chronic kidney disease  Chronic hyponatremia  Creatinine 1.24 and serum sodium 129. Baseline sodium appears to run in low 130s, high 120s.   - BMP in AM      Depression   Continue escitalopram       Hyperlipidemia   Continue statin     ASSESSMENT        Discharge Assessment  How are you doing now that you are home?: getting strength back  How are your symptoms? (Red Flag symptoms escalate to triage hotline per guidelines): Improved  Do you feel your condition is stable enough to be safe at home until your provider visit?: Yes  Does the patient have their discharge instructions? : Yes  Does the patient have questions regarding their discharge instructions? : No  Were you started on any new medications or were there changes to any of your previous medications? : Yes (no questions)  Does the patient have all of their medications?: Yes  Do you have questions regarding any of your medications? : No  Discharge follow-up appointment scheduled within 14 calendar days? : No (CTA encouraged Son to call today.)  Is patient agreeable to assistance with scheduling? : No    Post-op ([facility] CTA Only)  If the patient had a surgery or procedure, do they have any questions for a nurse?: No    Post-op (Clinicians Only)  Fever: No  Chills: No  Eating & Drinking: eating and drinking without complaints/concerns  PO Intake: regular diet (low Na)  Bowel Function: normal  Date of last BM: 10/08/21  Urinary Status: voiding without complaint/concerns        PLAN                                                      Outpatient Plan:  Resume INR monitoring through anticoagulation/warfarin clinc or  PRIMARY MD as previous. **Son Philipp to arrange follow up with primary  MD and INR monitoring    Future Appointments   Date Time Provider Department Center   10/15/2021  1:15  PM [facility]   10/15/2021  2:30 PM Kat Yo, NAVYA [facility]   10/29/2021 12:00 AM [facility] TECH1 [facility]   12/17/2021  1:10 PM Kat Yo, APRN CNP [facility]         For any urgent concerns, please contact our 24 hour nurse triage line: [phone] ([phone])         Nemo Avila MA

## 2021-10-08 NOTE — TELEPHONE ENCOUNTER
Patient was evaluated by cardiology while inpatient for SOB, chest pain (negative troponin x 2), acute on chronic HF exacerbation. PMH: NICM with EF most recently 45-50%, permanent A.Fib s/p AV node ablation and CRT-P (LV lead programmed off secondary to diaphragmatic stimulation),CAD, SIADH, and CKD stage III, PVC's. TTE 10/6/21 showing stable LVEF 45-50%, mildly decreased right ventricular systolic function, and mild to moderate (1-2+) tricuspid regurgitation. IV Lasix diuresed 4 lbs. Started on Imdur at time of discharge. Called patient to discuss any post hospital d/c questions she may have, review medication changes, and confirm f/u appts. Patient denied any questions regarding new medications or changes to PTA medications. Patient denied any SOB, chest pain, or light headedness. RN confirmed with patient that she is scheduled for labs on 10/15/21 at 1315, and a f/u OV at 1430 with MERY Kat Yo for CORE return both at our [facility]. Patient advised to call clinic with any cardiac related questions or concerns prior to this mery't. Patient verbalized understanding and agreed with plan. GIDEON Parham RN.

## 2021-10-12 DIAGNOSIS — I50.9 CONGESTIVE HEART FAILURE, UNSPECIFIED HF CHRONICITY, UNSPECIFIED HEART FAILURE TYPE (H): Primary | ICD-10-CM

## 2021-10-14 ENCOUNTER — TELEPHONE (OUTPATIENT)
Dept: CARDIOLOGY | Facility: CLINIC | Age: 86
End: 2021-10-14

## 2021-10-14 NOTE — TELEPHONE ENCOUNTER
Pt son called and wondered if pt needed to be fasting for her labs in the AM. Said no she does not. Son then wanted Kat aware that pt has been taking Nitroglycerin a couple times since coming home from the hospital for the tingling in her arms and that everything works.  Then she will say about a 1/2 later that she is hot.  Pt complained of this when son was going to leave today and so he stayed for awhile until the pain was gone, no nitroglycerin was given.  States that she did not use any NITROGLYCERIN in the hospital. Son questioned if this was anxiety.  He will talk to kat about this tomorrow. Zulma

## 2021-10-15 ENCOUNTER — OFFICE VISIT (OUTPATIENT)
Dept: CARDIOLOGY | Facility: CLINIC | Age: 86
End: 2021-10-15
Payer: COMMERCIAL

## 2021-10-15 ENCOUNTER — LAB (OUTPATIENT)
Dept: LAB | Facility: CLINIC | Age: 86
End: 2021-10-15
Payer: COMMERCIAL

## 2021-10-15 VITALS
SYSTOLIC BLOOD PRESSURE: 101 MMHG | HEART RATE: 68 BPM | HEIGHT: 64 IN | DIASTOLIC BLOOD PRESSURE: 62 MMHG | WEIGHT: 139.8 LBS | OXYGEN SATURATION: 95 % | BODY MASS INDEX: 23.87 KG/M2

## 2021-10-15 DIAGNOSIS — I50.23 ACUTE ON CHRONIC SYSTOLIC CONGESTIVE HEART FAILURE (H): ICD-10-CM

## 2021-10-15 DIAGNOSIS — I50.9 CONGESTIVE HEART FAILURE, UNSPECIFIED HF CHRONICITY, UNSPECIFIED HEART FAILURE TYPE (H): ICD-10-CM

## 2021-10-15 DIAGNOSIS — E87.1 HYPONATREMIA: ICD-10-CM

## 2021-10-15 DIAGNOSIS — I49.5 SICK SINUS SYNDROME (H): ICD-10-CM

## 2021-10-15 DIAGNOSIS — R07.9 CHEST PAIN, UNSPECIFIED TYPE: Primary | ICD-10-CM

## 2021-10-15 LAB
ANION GAP SERPL CALCULATED.3IONS-SCNC: 8 MMOL/L (ref 3–14)
BUN SERPL-MCNC: 20 MG/DL (ref 7–30)
CALCIUM SERPL-MCNC: 8.1 MG/DL (ref 8.5–10.1)
CHLORIDE BLD-SCNC: 95 MMOL/L (ref 94–109)
CO2 SERPL-SCNC: 26 MMOL/L (ref 20–32)
CREAT SERPL-MCNC: 1.14 MG/DL (ref 0.52–1.04)
GFR SERPL CREATININE-BSD FRML MDRD: 41 ML/MIN/1.73M2
GLUCOSE BLD-MCNC: 126 MG/DL (ref 70–99)
NT-PROBNP SERPL-MCNC: 4726 PG/ML (ref 0–450)
POTASSIUM BLD-SCNC: 4 MMOL/L (ref 3.4–5.3)
SODIUM SERPL-SCNC: 129 MMOL/L (ref 133–144)

## 2021-10-15 PROCEDURE — 99214 OFFICE O/P EST MOD 30 MIN: CPT | Performed by: NURSE PRACTITIONER

## 2021-10-15 PROCEDURE — 36415 COLL VENOUS BLD VENIPUNCTURE: CPT | Performed by: NURSE PRACTITIONER

## 2021-10-15 PROCEDURE — 83880 ASSAY OF NATRIURETIC PEPTIDE: CPT | Performed by: NURSE PRACTITIONER

## 2021-10-15 PROCEDURE — 80048 BASIC METABOLIC PNL TOTAL CA: CPT | Performed by: NURSE PRACTITIONER

## 2021-10-15 RX ORDER — NITROGLYCERIN 0.4 MG/1
TABLET SUBLINGUAL
Qty: 25 TABLET | Refills: 0 | Status: SHIPPED | OUTPATIENT
Start: 2021-10-15

## 2021-10-15 ASSESSMENT — MIFFLIN-ST. JEOR: SCORE: 1009.13

## 2021-10-15 NOTE — PROGRESS NOTES
HPI and Plan: #[id number]  See dictation    Orders Placed This Encounter   Procedures     Basic metabolic panel     N terminal pro BNP outpatient     Follow-[facility] with [facility]- [facility]IE Visit       Orders Placed This Encounter   Medications     nitroGLYcerin (NITROSTAT) 0.4 MG sublingual tablet     Sig: For chest pain place 1 tablet under the tongue every 5 minutes for 3 doses. If symptoms persist 5 minutes after 1st dose call 911.     Dispense:  25 tablet     Refill:  0       Medications Discontinued During This Encounter   Medication Reason     nitroGLYcerin (NITROSTAT) 0.4 MG sublingual tablet Reorder         Encounter Diagnoses   Name Primary?     Chest pain, unspecified type      Acute on chronic systolic congestive heart failure (H) Yes       CURRENT MEDICATIONS:  Current Outpatient Medications   Medication Sig Dispense Refill     acetaminophen (TYLENOL) 500 MG tablet Take 500-1,000 mg by mouth every 6 hours as needed for mild pain       Alendronate Sodium (FOSAMAX PO) Take 70 mg by mouth once a week Sunday evenings       amLODIPine (NORVASC) 5 MG tablet Take 1 tablet (5 mg) by mouth daily 90 tablet 3     atorvastatin (LIPITOR) 40 MG tablet Take 40 mg by mouth At Bedtime        calcium carbonate-vitamin D (CALCIUM + D) 600-200 MG-UNIT TABS Take 1 tablet by mouth daily.       cycloSPORINE (RESTASIS) 0.05 % ophthalmic emulsion Place 1 drop into both eyes every 12 hours.       escitalopram (LEXAPRO) 10 MG tablet Take 10 mg by mouth daily       furosemide (LASIX) 20 MG tablet Take 1 tablet (20 mg) by mouth daily 30 tablet 0     isosorbide mononitrate (IMDUR) 30 MG 24 hr tablet Take 1 tablet (30 mg) by mouth daily 30 tablet 0     lisinopril (ZESTRIL) 10 MG tablet Take 1 tablet (10 mg) by mouth daily 90 tablet 2     metoprolol succinate ER (TOPROL-XL) 50 MG 24 hr tablet Take 100 mg by mouth every morning       metoprolol succinate ER (TOPROL-XL) 50 MG 24 hr tablet 100 in am and 50mg at night (Patient taking differently:  Take 50 mg by mouth every evening 100 in am and 50mg at night)       Multiple Vitamins-Minerals (CENTRUM SILVER) per tablet Take 1 tablet by mouth daily.         Multiple Vitamins-Minerals (PRESERVISION AREDS 2 PO) Take by mouth daily       nitroGLYcerin (NITROSTAT) 0.4 MG sublingual tablet For chest pain place 1 tablet under the tongue every 5 minutes for 3 doses. If symptoms persist 5 minutes after 1st dose call 911. 25 tablet 0     Probiotic Product (PROBIOTIC DAILY PO) Take by mouth daily       sodium chloride 1 GM tablet Take 1 g by mouth daily       WARFARIN SODIUM PO Take 2 mg by mouth daily (4 mg x 0.5) every Mon, Wed, Fri. 4 mg (4mg x1) all other days         ALLERGIES     Allergies   Allergen Reactions     Amiodarone Nausea     Hctz      Lansoprazole      diarrhea   Prevacid       PAST MEDICAL HISTORY:  Past Medical History:   Diagnosis Date     Aortic regurgitation 12/14/2014    mild (1+) per echo     Atrial fibrillation (H)      Cardiomyopathy (H)      CHF (congestive heart failure) (H)      Chronic kidney disease, stage 3 (H)      Coronary atherosclerosis of unspecified type of vessel, native or graft 5/16/2000    normal left coronary arteries and tight obstruction in distal RCA, too small for revascularization, medical management     Degeneration of cervical intervertebral disc     cervical spine     Essential hypertension, benign      Mitral regurgitation 12/14/2014    mod-mod/sev (2-3+) per echo     Other and unspecified hyperlipidemia      Pacemaker      Pulmonary hypertension (H) 3/16/2013    mild per echo with RVSP 31mmHg +RAP      Pulmonary valve regurgitation 12/14/2014    mod (2+) per echo     Tricuspid regurgitation 12/14/2014    mild (1+) per echo     Unspecified tinnitus     chronic       PAST SURGICAL HISTORY:  Past Surgical History:   Procedure Laterality Date     CORONARY ANGIOGRAPHY ADULT ORDER  5/16/2000     EP PPM RMVL&REPL OF GEN W/ DUAL LEAD SYS N/A 4/14/2020    Procedure: Permanent  Pacemakers  Removal and Replacement Generator  with Multi Lead System;  Surgeon: Fay Tatum MD;  Location:  [facility]     HRW PACEMAKER PERMANENT  2015    BI- ventricular     IMPLANT PACEMAKER  2010    dual chamber Medtronic     [facility] NONSPECIFIC PROCEDURE  1999    right rotator cuff tear OR     ZZC NONSPECIFIC PROCEDURE  1983    microdiscectomy     Z NONSPECIFIC PROCEDURE      C-sections x 3      ZZC NONSPECIFIC PROCEDURE      appendectomy     ZZC NONSPECIFIC PROCEDURE      bilateral benign breast biopsy      Z NONSPECIFIC PROCEDURE      right knee arthroscopic OR     Z NONSPECIFIC PROCEDURE  1974    enteritis       FAMILY HISTORY:  Family History   Problem Relation Age of Onset     Hypertension Mother      Cancer Mother         pancreatic     Eye Disorder Mother         glacoma     ALRON.A.D. Father          53     Lipids Father      Diabetes Maternal Grandmother      C.A.D. Brother         open heart surgery age 53     Cancer Daughter         ovavian     Neurologic Disorder Son         MS       SOCIAL HISTORY:  Social History     Socioeconomic History     Marital status:      Spouse name: None     Number of children: None     Years of education: None     Highest education level: None   Occupational History     None   Tobacco Use     Smoking status: Former Smoker     Packs/day: 0.50     Years: 15.00     Pack years: 7.50     Types: Cigarettes     Start date:      Quit date:      Years since quittin.8     Smokeless tobacco: Never Used   Substance and Sexual Activity     Alcohol use: No     Drug use: No     Sexual activity: None   Other Topics Concern     Parent/sibling w/ CABG, MI or angioplasty before 65F 55M? Yes      Service Not Asked     Blood Transfusions Not Asked     Caffeine Concern Not Asked     Occupational Exposure Not Asked     Hobby Hazards Not Asked     Sleep Concern Not Asked     Stress Concern Not Asked     Weight Concern Not  "Asked     Special Diet Yes     Comment: low sodium     Back Care Not Asked     Exercise Yes     Comment: active life style     Bike Helmet Not Asked     Seat Belt Yes     Self-Exams Not Asked   Social History Narrative     None     Social Determinants of Health     Financial Resource Strain:      Difficulty of Paying Living Expenses:    Food Insecurity:      Worried About Running Out of Food in the Last Year:      Ran Out of Food in the Last Year:    Transportation Needs:      Lack of Transportation (Medical):      Lack of Transportation (Non-Medical):    Physical Activity:      Days of Exercise per Week:      Minutes of Exercise per Session:    Stress:      Feeling of Stress :    Social Connections:      Frequency of Communication with Friends and Family:      Frequency of Social Gatherings with Friends and Family:      Attends Yarsanism Services:      Active Member of Clubs or Organizations:      Attends Club or Organization Meetings:      Marital Status:    Intimate Partner Violence:      Fear of Current or Ex-Partner:      Emotionally Abused:      Physically Abused:      Sexually Abused:        Review of Systems:  Skin:  Negative       Eyes:  Positive for glasses    ENT:  Negative      Respiratory:          Cardiovascular:  Negative;palpitations;lightheadedness;dizziness;syncope or near-syncope;cyanosis Positive for;fatigue;edema chest pain episodes 3X since hospitalization, took nitro on Sat, Tues, Thursday, effective  Gastroenterology: Positive for poor appetite burping  Genitourinary:  Negative      Musculoskeletal:  Positive for   balance off  Neurologic:  Positive for memory problems tingling and heaviness in arms with chest pain episodes  Psychiatric:  Negative      Heme/Lymph/Imm:  Positive for allergies    Endocrine:  Negative        Physical Exam:  Vitals: /62   Pulse 68   Ht 1.626 m (5' 4\")   Wt 63.4 kg (139 lb 12.8 oz)   SpO2 95%   BMI 24.00 kg/m      Constitutional:  cooperative, alert and " oriented, well developed, well nourished, in no acute distress        Skin:  warm and dry to the touch, no apparent skin lesions or masses noted   pacemaker incision in the left infraclavicular area was well-healed      Head:  normocephalic, no masses or lesions        Eyes:  pupils equal and round;conjunctivae and lids unremarkable        Lymph:      ENT:  no pallor or cyanosis, dentition good        Neck:  JVP normal        Respiratory:  normal breath sounds, clear to auscultation, normal A-P diameter, normal symmetry, normal respiratory excursion, no use of accessory muscles         Cardiac: regular rhythm;normal S1 and S2;no S3 or S4;no murmurs, gallops or rubs detected                not assessed this visit                                        GI:  abdomen soft;no HSM        Extremities and Muscular Skeletal:  no deformities, clubbing, cyanosis, erythema observed;no edema stasis pigmentation            Neurological:  no gross motor deficits        Psych:  Alert and Oriented x 3;affect appropriate, oriented to time, person and place        CC  No referring provider defined for this encounter.

## 2021-10-15 NOTE — LETTER
10/15/2021    Kirit Michel MD  [address] [address]    RE: Haritha GALLOWAY Ronnie       Dear Colleague,    I had the pleasure of seeing Haritha GALLOWAY Ronnie in the [facility].    HPI and Plan: #[id number]  See dictation    Orders Placed This Encounter   Procedures     Basic metabolic panel     N terminal pro BNP outpatient     [facility] with [facility]- [facility] Visit       Orders Placed This Encounter   Medications     nitroGLYcerin (NITROSTAT) 0.4 MG sublingual tablet     Sig: For chest pain place 1 tablet under the tongue every 5 minutes for 3 doses. If symptoms persist 5 minutes after 1st dose call 911.     Dispense:  25 tablet     Refill:  0       Medications Discontinued During This Encounter   Medication Reason     nitroGLYcerin (NITROSTAT) 0.4 MG sublingual tablet Reorder         Encounter Diagnoses   Name Primary?     Chest pain, unspecified type      Acute on chronic systolic congestive heart failure (H) Yes       CURRENT MEDICATIONS:  Current Outpatient Medications   Medication Sig Dispense Refill     acetaminophen (TYLENOL) 500 MG tablet Take 500-1,000 mg by mouth every 6 hours as needed for mild pain       Alendronate Sodium (FOSAMAX PO) Take 70 mg by mouth once a week Sunday evenings       amLODIPine (NORVASC) 5 MG tablet Take 1 tablet (5 mg) by mouth daily 90 tablet 3     atorvastatin (LIPITOR) 40 MG tablet Take 40 mg by mouth At Bedtime        calcium carbonate-vitamin D (CALCIUM + D) 600-200 MG-UNIT TABS Take 1 tablet by mouth daily.       cycloSPORINE (RESTASIS) 0.05 % ophthalmic emulsion Place 1 drop into both eyes every 12 hours.       escitalopram (LEXAPRO) 10 MG tablet Take 10 mg by mouth daily       furosemide (LASIX) 20 MG tablet Take 1 tablet (20 mg) by mouth daily 30 tablet 0     isosorbide mononitrate (IMDUR) 30 MG 24 hr tablet Take 1 tablet (30 mg) by mouth daily 30 tablet 0     lisinopril  (ZESTRIL) 10 MG tablet Take 1 tablet (10 mg) by mouth daily 90 tablet 2     metoprolol succinate ER (TOPROL-XL) 50 MG 24 hr tablet Take 100 mg by mouth every morning       metoprolol succinate ER (TOPROL-XL) 50 MG 24 hr tablet 100 in am and 50mg at night (Patient taking differently: Take 50 mg by mouth every evening 100 in am and 50mg at night)       Multiple Vitamins-Minerals (CENTRUM SILVER) per tablet Take 1 tablet by mouth daily.         Multiple Vitamins-Minerals (PRESERVISION AREDS 2 PO) Take by mouth daily       nitroGLYcerin (NITROSTAT) 0.4 MG sublingual tablet For chest pain place 1 tablet under the tongue every 5 minutes for 3 doses. If symptoms persist 5 minutes after 1st dose call 911. 25 tablet 0     Probiotic Product (PROBIOTIC DAILY PO) Take by mouth daily       sodium chloride 1 GM tablet Take 1 g by mouth daily       WARFARIN SODIUM PO Take 2 mg by mouth daily (4 mg x 0.5) every Mon, Wed, Fri. 4 mg (4mg x1) all other days         ALLERGIES     Allergies   Allergen Reactions     Amiodarone Nausea     Hctz      Lansoprazole      diarrhea   Prevacid       PAST MEDICAL HISTORY:  Past Medical History:   Diagnosis Date     Aortic regurgitation 12/14/2014    mild (1+) per echo     Atrial fibrillation (H)      Cardiomyopathy (H)      CHF (congestive heart failure) (H)      Chronic kidney disease, stage 3 (H)      Coronary atherosclerosis of unspecified type of vessel, native or graft 5/16/2000    normal left coronary arteries and tight obstruction in distal RCA, too small for revascularization, medical management     Degeneration of cervical intervertebral disc     cervical spine     Essential hypertension, benign      Mitral regurgitation 12/14/2014    mod-mod/sev (2-3+) per echo     Other and unspecified hyperlipidemia      Pacemaker      Pulmonary hypertension (H) 3/16/2013    mild per echo with RVSP 31mmHg +RAP      Pulmonary valve regurgitation 12/14/2014    mod (2+) per echo     Tricuspid  regurgitation 2014    mild (1+) per echo     Unspecified tinnitus     chronic       PAST SURGICAL HISTORY:  Past Surgical History:   Procedure Laterality Date     CORONARY ANGIOGRAPHY ADULT ORDER  2000     EP PPM RMVL&REPL OF GEN W/ DUAL LEAD SYS N/A 2020    Procedure: Permanent Pacemakers  Removal and Replacement Generator  with Multi Lead System;  Surgeon: Fay Tatum MD;  Location:  [facility]     HRW PACEMAKER PERMANENT  2015    BI- ventricular     IMPLANT PACEMAKER  2010    dual chamber Medtronic     ZZC NONSPECIFIC PROCEDURE  1999    right rotator cuff tear OR     ZZC NONSPECIFIC PROCEDURE  1983    microdiscectomy     ZZC NONSPECIFIC PROCEDURE      C-sections x 3      ZZC NONSPECIFIC PROCEDURE      appendectomy     ZZC NONSPECIFIC PROCEDURE      bilateral benign breast biopsy      ZZC NONSPECIFIC PROCEDURE      right knee arthroscopic OR     ZZC NONSPECIFIC PROCEDURE  1974    enteritis       FAMILY HISTORY:  Family History   Problem Relation Age of Onset     Hypertension Mother      Cancer Mother         pancreatic     Eye Disorder Mother         glacoma     LARON.A.GIDEON Father          53     Lipids Father      Diabetes Maternal Grandmother      C.A.D. Brother         open heart surgery age 53     Cancer Daughter         ovavian     Neurologic Disorder Son         MS       SOCIAL HISTORY:  Social History     Socioeconomic History     Marital status:      Spouse name: None     Number of children: None     Years of education: None     Highest education level: None   Occupational History     None   Tobacco Use     Smoking status: Former Smoker     Packs/day: 0.50     Years: 15.00     Pack years: 7.50     Types: Cigarettes     Start date:      Quit date:      Years since quittin.8     Smokeless tobacco: Never Used   Substance and Sexual Activity     Alcohol use: No     Drug use: No     Sexual activity: None   Other Topics Concern      Parent/sibling w/ CABG, MI or angioplasty before 65F 55M? Yes      Service Not Asked     Blood Transfusions Not Asked     Caffeine Concern Not Asked     Occupational Exposure Not Asked     Hobby Hazards Not Asked     Sleep Concern Not Asked     Stress Concern Not Asked     Weight Concern Not Asked     Special Diet Yes     Comment: low sodium     Back Care Not Asked     Exercise Yes     Comment: active life style     Bike Helmet Not Asked     Seat Belt Yes     Self-Exams Not Asked   Social History Narrative     None     Social Determinants of Health     Financial Resource Strain:      Difficulty of Paying Living Expenses:    Food Insecurity:      Worried About Running Out of Food in the Last Year:      Ran Out of Food in the Last Year:    Transportation Needs:      Lack of Transportation (Medical):      Lack of Transportation (Non-Medical):    Physical Activity:      Days of Exercise per Week:      Minutes of Exercise per Session:    Stress:      Feeling of Stress :    Social Connections:      Frequency of Communication with Friends and Family:      Frequency of Social Gatherings with Friends and Family:      Attends Oriental orthodox Services:      Active Member of Clubs or Organizations:      Attends Club or Organization Meetings:      Marital Status:    Intimate Partner Violence:      Fear of Current or Ex-Partner:      Emotionally Abused:      Physically Abused:      Sexually Abused:        Review of Systems:  Skin:  Negative       Eyes:  Positive for glasses    ENT:  Negative      Respiratory:          Cardiovascular:  Negative;palpitations;lightheadedness;dizziness;syncope or near-syncope;cyanosis Positive for;fatigue;edema chest pain episodes 3X since hospitalization, took nitro on Sat, Tues, Thursday, effective  Gastroenterology: Positive for poor appetite burping  Genitourinary:  Negative      Musculoskeletal:  Positive for   balance off  Neurologic:  Positive for memory problems tingling and heaviness in  "arms with chest pain episodes  Psychiatric:  Negative      Heme/Lymph/Imm:  Positive for allergies    Endocrine:  Negative        Physical Exam:  Vitals: /62   Pulse 68   Ht 1.626 m (5' 4\")   Wt 63.4 kg (139 lb 12.8 oz)   SpO2 95%   BMI 24.00 kg/m      Constitutional:  cooperative, alert and oriented, well developed, well nourished, in no acute distress        Skin:  warm and dry to the touch, no apparent skin lesions or masses noted   pacemaker incision in the left infraclavicular area was well-healed      Head:  normocephalic, no masses or lesions        Eyes:  pupils equal and round;conjunctivae and lids unremarkable        Lymph:      ENT:  no pallor or cyanosis, dentition good        Neck:  JVP normal        Respiratory:  normal breath sounds, clear to auscultation, normal A-P diameter, normal symmetry, normal respiratory excursion, no use of accessory muscles         Cardiac: regular rhythm;normal S1 and S2;no S3 or S4;no murmurs, gallops or rubs detected                not assessed this visit                                        GI:  abdomen soft;no HSM        Extremities and Muscular Skeletal:  no deformities, clubbing, cyanosis, erythema observed;no edema stasis pigmentation            Neurological:  no gross motor deficits        Psych:  Alert and Oriented x 3;affect appropriate, oriented to time, person and place        CC  No referring provider defined for this encounter.                  Thank you for allowing me to participate in the care of your patient.      Sincerely,     Kat Yo NP, APRN CNP     [facility]  cc:   No referring provider defined for this encounter.        "

## 2021-10-15 NOTE — PATIENT INSTRUCTIONS
Call my nurse with any questions or concerns:  [phone]  *If you have concerns after hours, please call [phone], option 2 to speak with on call Cardiologist.    1. Medication changes from today:          2. Lab Results:   Results for YOLY VALLEJO (MRN [id number]) as of 10/15/2021 14:39   Ref. Range 10/7/2021 05:34 10/15/2021 13:35   Sodium Latest Ref Range: 133 - 144 mmol/L 133 129 (L)   Potassium Latest Ref Range: 3.4 - 5.3 mmol/L 3.8 4.0   Chloride Latest Ref Range: 94 - 109 mmol/L 98 95   Carbon Dioxide Latest Ref Range: 20 - 32 mmol/L 27 26   Urea Nitrogen Latest Ref Range: 7 - 30 mg/dL 21 20   Creatinine Latest Ref Range: 0.52 - 1.04 mg/dL 1.07 (H) 1.14 (H)   GFR Estimate Latest Ref Range: >60 mL/min/1.73m2 44 (L) 41 (L)   Calcium Latest Ref Range: 8.5 - 10.1 mg/dL 7.8 (L) 8.1 (L)   Anion Gap Latest Ref Range: 3 - 14 mmol/L 8 8   N-Terminal Pro Bnp Latest Ref Range: 0 - 450 pg/mL  4,726 (H)

## 2021-10-15 NOTE — PROGRESS NOTES
Service Date: 10/15/2021    HISTORY OF PRESENT ILLNESS:  Ms. Trujillo is a delightful 89-year-old woman well known to me [facility].  She is here today with her son, Philipp.  She is an established patient of Dr. Tatum.  She is 89 years of age.  She lives independently in a senior apartment facility.    The last few months have been a struggle for Peg.  In June, she presented to theEmergency Department with nausea, bilateral arm tingling and shortness of breath.  Chest x-ray showed small bilateral pleural effusions with some interstitial edema.  Her Lasix was increased to 20 mg daily.  I had also discontinued her sodium tablets and placed her on a low-sodium diet.  She was doing well, but unfortunately presented back to the Emergency Department on 10/05/2021.      She had been at Dr. Michel' office and had a concern of chest pain.  Dr. Michel did a 12-lead EKG, which showed some lateral leads changes, so he sent her to the Emergency Department.  While there, she stated that she was having intermittent chest pain for 2 weeks at rest and with exertion.  She also still was having bilateral arm tingling.  She also noted some shortness of breath.  She was admitted and monitored.      She was seen in consultation by Dr. Fenton and Benigno Reid, nurse practitioner.  Her troponins were negative x2.  They thought perhaps the chest discomfort could be from vascular congestion.  She was started on isosorbide 30 mg daily.  Peg feels that this may have helped some.  However, she has taken 4 sublingual nitro since her discharge 1 week ago.    She has a history of coronary artery disease and coronary angiography in 2000 showed a 90% distal lesion in a very small RCA, which was not amenable to PCI.  Left main, LAD and circumflex were all normal at that time.    Echocardiogram was completed, showed an EF of 45%-50%.  She does have a CRT device.  Her LV lead to shut off due to diaphragmatic stimulation.  It was shut off  06/2020.    She has a history of permanent atrial fibrillation with AV richard ablation.  Her BiV upgrade was 01/2015.  She reached MICHAEL 04/2020.  She is on chronic warfarin therapy.    At today's visit, she states she feels fine.  Her energy level is okay.  Her son, Philipp, thinks that the patient is more anxious.  Her Lexapro was decreased from 10 mg to 5 mg due to increased somnolence.  Her son states that she has been wanting somebody to stay the night with her at her apartment.  This is not like Peg.  He thinks that perhaps she is becoming nervous residing alone.  She has also had a friend at her apartment building stay the night with her.      She is normotensive today at 101/62.  Weight stable at 139 pounds.  Serum sodium has dropped down, but stable at 129, BUN of 20, creatinine 1.14, GFR of 41.  Her shortness of breath has improved.  She did respond to the IV Lasix.  Again, her Lasix was increased from 10 mg to 20 mg daily.  She had already been following a sodium and fluid restriction.    All other review of systems and past medical history noted below.    ASSESSMENT AND PLAN:  Ms. Trujillo is a delightful 89-year-old woman here today for a 1-week hospital followup.  1.  Nonischemic cardiomyopathy.  She does have a small vessel that as noted above, that was not amenable to angiography 21 years ago.  She had a Lexiscan back in 2018 that was negative for ischemia.  I opted to do a Lexiscan on today to assess for infarction or ischemia.  At 89 years of age, unless the stress test was positive, I am not certain I would have her go forward with an angiogram.  I would like to discuss that further with Dr. Tatum.  We will start with the Lexiscan.  I have asked that she remain on isosorbide mononitrate 30 mg daily.  I have also asked that she have a calendar and put a checkmark every time she takes sublingual nitro.  She states that the numbness and tingling will start in her left arm, go up to her shoulder and then be a  tight band across her breasts.  She states that this can happen at rest or with exertion.  It sounds like this happens every other day.  2.  Anxiety.  I did ask that the patient go back from 5 mg to 10 mg of Lexapro.  This is a therapeutic dose.  The patient's son states that he has noticed his mom being more anxious, especially with residing alone.  3.  Permanent atrial fibrillation with AV richard ablation and CRT device upgrade.  Unfortunately, she developed diaphragmatic stimulation and LV lead to shut off.  EF on her echocardiogram is stable at 45%-50% with 1+ tricuspid insufficiency.  She does have mild RV dysfunction noted.  Pulmonary pressures are 28 mmHg plus RAP.    I would like to see her back in 2 weeks in [facility] with lab work.  I reviewed her lab work today with the patient and her son.  BMP results are noted above.  Her proBNP is 4726.  I will repeat these labs in 2 weeks' time.    Thank you, as always, for including me in the care of this delightful patient.    Kat Yo NP        D: 10/15/2021   T: 10/15/2021   MT: ILAN    Name:     SEVERIANO VALLEJO  MRN:      [id number]        Account:      [id number]   :      1925           Service Date: 10/15/2021       Document: [id number]

## 2021-10-17 ENCOUNTER — TELEPHONE (OUTPATIENT)
Facility: CLINIC | Age: 86
End: 2021-10-17

## 2021-10-17 ENCOUNTER — APPOINTMENT (OUTPATIENT)
Dept: GENERAL RADIOLOGY | Facility: CLINIC | Age: 86
End: 2021-10-17
Attending: EMERGENCY MEDICINE
Payer: COMMERCIAL

## 2021-10-17 ENCOUNTER — HOSPITAL ENCOUNTER (OUTPATIENT)
Facility: CLINIC | Age: 86
Setting detail: OBSERVATION
Discharge: HOME | End: 2021-10-20
Attending: EMERGENCY MEDICINE | Admitting: INTERNAL MEDICINE
Payer: COMMERCIAL

## 2021-10-17 DIAGNOSIS — I25.118 CORONARY ARTERY DISEASE OF NATIVE ARTERY OF NATIVE HEART WITH STABLE ANGINA PECTORIS (H): Primary | ICD-10-CM

## 2021-10-17 DIAGNOSIS — I20.0 UNSTABLE ANGINA (H): ICD-10-CM

## 2021-10-17 LAB
ALBUMIN SERPL-MCNC: 3 G/DL (ref 3.4–5)
ALP SERPL-CCNC: 70 U/L (ref 40–150)
ALT SERPL W P-5'-P-CCNC: 39 U/L (ref 0–50)
ANION GAP SERPL CALCULATED.3IONS-SCNC: 9 MMOL/L (ref 3–14)
AST SERPL W P-5'-P-CCNC: 32 U/L (ref 0–45)
BILIRUB SERPL-MCNC: 1 MG/DL (ref 0.2–1.3)
BUN SERPL-MCNC: 20 MG/DL (ref 7–30)
CALCIUM SERPL-MCNC: 8.4 MG/DL (ref 8.5–10.1)
CHLORIDE BLD-SCNC: 95 MMOL/L (ref 94–109)
CO2 SERPL-SCNC: 23 MMOL/L (ref 20–32)
CREAT SERPL-MCNC: 1 MG/DL (ref 0.52–1.04)
ERYTHROCYTE [DISTWIDTH] IN BLOOD BY AUTOMATED COUNT: 14.3 % (ref 10–15)
GFR SERPL CREATININE-BSD FRML MDRD: 48 ML/MIN/1.73M2
GLUCOSE BLD-MCNC: 111 MG/DL (ref 70–99)
HCT VFR BLD AUTO: 39.1 % (ref 35–47)
HGB BLD-MCNC: 13.2 G/DL (ref 11.7–15.7)
HOLD SPECIMEN: NORMAL
INR PPP: 2.77 (ref 0.85–1.15)
MCH RBC QN AUTO: 30.9 PG (ref 26.5–33)
MCHC RBC AUTO-ENTMCNC: 33.8 G/DL (ref 31.5–36.5)
MCV RBC AUTO: 92 FL (ref 78–100)
PLATELET # BLD AUTO: 217 10E3/UL (ref 150–450)
POTASSIUM BLD-SCNC: 4.3 MMOL/L (ref 3.4–5.3)
PROT SERPL-MCNC: 6.3 G/DL (ref 6.8–8.8)
RBC # BLD AUTO: 4.27 10E6/UL (ref 3.8–5.2)
SARS-COV-2 RNA RESP QL NAA+PROBE: NEGATIVE
SODIUM SERPL-SCNC: 127 MMOL/L (ref 133–144)
TROPONIN I SERPL-MCNC: <0.015 UG/L (ref 0–0.04)
TROPONIN I SERPL-MCNC: <0.015 UG/L (ref 0–0.04)
WBC # BLD AUTO: 6.7 10E3/UL (ref 4–11)

## 2021-10-17 PROCEDURE — 93005 ELECTROCARDIOGRAM TRACING: CPT

## 2021-10-17 PROCEDURE — 250N000013 HC RX MED GY IP 250 OP 250 PS 637: Performed by: INTERNAL MEDICINE

## 2021-10-17 PROCEDURE — 80053 COMPREHEN METABOLIC PANEL: CPT | Performed by: EMERGENCY MEDICINE

## 2021-10-17 PROCEDURE — 99285 EMERGENCY DEPT VISIT HI MDM: CPT | Mod: 25

## 2021-10-17 PROCEDURE — 87635 SARS-COV-2 COVID-19 AMP PRB: CPT | Performed by: EMERGENCY MEDICINE

## 2021-10-17 PROCEDURE — 36415 COLL VENOUS BLD VENIPUNCTURE: CPT | Performed by: EMERGENCY MEDICINE

## 2021-10-17 PROCEDURE — 250N000013 HC RX MED GY IP 250 OP 250 PS 637: Performed by: EMERGENCY MEDICINE

## 2021-10-17 PROCEDURE — 71045 X-RAY EXAM CHEST 1 VIEW: CPT

## 2021-10-17 PROCEDURE — 99220 PR INITIAL OBSERVATION CARE,LEVEL III: CPT | Performed by: INTERNAL MEDICINE

## 2021-10-17 PROCEDURE — 36415 COLL VENOUS BLD VENIPUNCTURE: CPT | Performed by: INTERNAL MEDICINE

## 2021-10-17 PROCEDURE — 85610 PROTHROMBIN TIME: CPT | Performed by: EMERGENCY MEDICINE

## 2021-10-17 PROCEDURE — C9803 HOPD COVID-19 SPEC COLLECT: HCPCS

## 2021-10-17 PROCEDURE — 93005 ELECTROCARDIOGRAM TRACING: CPT | Mod: 76

## 2021-10-17 PROCEDURE — 85027 COMPLETE CBC AUTOMATED: CPT | Performed by: EMERGENCY MEDICINE

## 2021-10-17 PROCEDURE — 84484 ASSAY OF TROPONIN QUANT: CPT | Performed by: EMERGENCY MEDICINE

## 2021-10-17 PROCEDURE — 84484 ASSAY OF TROPONIN QUANT: CPT | Performed by: INTERNAL MEDICINE

## 2021-10-17 RX ORDER — METOPROLOL SUCCINATE 50 MG/1
50 TABLET, EXTENDED RELEASE ORAL EVERY EVENING
Status: DISCONTINUED | OUTPATIENT
Start: 2021-10-17 | End: 2021-10-20 | Stop reason: HOSPADM

## 2021-10-17 RX ORDER — ASPIRIN 81 MG/1
324 TABLET, CHEWABLE ORAL ONCE
Status: COMPLETED | OUTPATIENT
Start: 2021-10-17 | End: 2021-10-17

## 2021-10-17 RX ORDER — ATORVASTATIN CALCIUM 40 MG/1
40 TABLET, FILM COATED ORAL AT BEDTIME
Status: DISCONTINUED | OUTPATIENT
Start: 2021-10-17 | End: 2021-10-20 | Stop reason: HOSPADM

## 2021-10-17 RX ORDER — CARBOXYMETHYLCELLULOSE SODIUM 5 MG/ML
1 SOLUTION/ DROPS OPHTHALMIC EVERY 12 HOURS
Status: DISCONTINUED | OUTPATIENT
Start: 2021-10-17 | End: 2021-10-20 | Stop reason: HOSPADM

## 2021-10-17 RX ORDER — WARFARIN SODIUM 4 MG/1
2 TABLET ORAL DAILY
COMMUNITY

## 2021-10-17 RX ADMIN — ASPIRIN 81 MG CHEWABLE TABLET 324 MG: 81 TABLET CHEWABLE at 19:52

## 2021-10-17 RX ADMIN — ATORVASTATIN CALCIUM 40 MG: 40 TABLET, FILM COATED ORAL at 22:11

## 2021-10-17 RX ADMIN — CARBOXYMETHYLCELLULOSE SODIUM 1 DROP: 5 SOLUTION/ DROPS OPHTHALMIC at 22:10

## 2021-10-17 RX ADMIN — METOPROLOL SUCCINATE 50 MG: 50 TABLET, EXTENDED RELEASE ORAL at 22:10

## 2021-10-17 ASSESSMENT — ENCOUNTER SYMPTOMS
NAUSEA: 1
CHILLS: 0
SHORTNESS OF BREATH: 0
COUGH: 0
FEVER: 0

## 2021-10-17 NOTE — TELEPHONE ENCOUNTER
Telephone call:  Patient's daughter calls with complaints that the mother is having arm tingling and chest pain.  This is been an ongoing issue per review of the chart.  The patient received multiple nitroglycerin with the assistance of the daughter.  Her arm tingling and other symptoms have persisted.    She called the on-call cardiologist to ask what to do.  There are not very many options to manage this over the phone.  The patient needs to come to the emergency department.    What is not clear is what the patient's goals of care are.  I do see that a stress test was ordered so it appears there is at least some interest in restorative care.    I would recommend that the patient come to the ER and have a chest pain evaluation.  We should work to control her chest pain most likely with nitroglycerin and/or narcotics pending her vital signs and the judgment of the emergency room providers.    The daughter is in agreement.

## 2021-10-17 NOTE — ED PROVIDER NOTES
History   Chief Complaint:  Chest Pain      HPI   Haritha Trujillo is a 89 year old female with history of hypertension, hyperlipidemia, cardiomyopathy, CHF, and atrial fibrillation with a pacemaker currently on Coumadin who presents via EMS accompanied by her daughter for evaluation of chest pain. The patient was recently admitted to the hospital between 10/5 and 10/7 for chest pain. She is currently following with cardiology regarding this and is scheduled for a stress test. Over the last week she has had intermittent episodes of chest pain with associated nausea. Today she had five episodes of similar chest pain that have improved with nitroglycerin at home, prompting her to come into the ED for evaluation. Here in the ED she denies any ongoing pain. She has not had any measured fever, chills, cough, or shortness of breath.     Review of Systems   Constitutional: Negative for chills and fever.   Respiratory: Negative for cough and shortness of breath.    Cardiovascular: Positive for chest pain.   Gastrointestinal: Positive for nausea.   All other systems reviewed and are negative.      Allergies:  Amiodarone  Hydrochlorothiazide  Lansoprazole    Medications:  Tylenol   Fosamax  Amlodipine   Lipitor   Lexapro  Lasix   Imdur  Lisinopril   Toprol-XL   Nitroglycerin   Coumadin     Past Medical History:     Aortic regurgitation   Atrial fibrillation  Cardiomyopathy  CHF   Chronic kidney disease   Hypertension  Hyperlipidemia   Pacemaker  Pulmonary hypertension  Pulmonary valve regurgitation   Tricuspid regurgitation   Esophageal reflux   Cardiovascular disease       Past Surgical History:    Coronary angiography adult order   Pacemaker placement   Right shoulder surgery  Microdiscectomy   section   Appendectomy   Breast biopsy   Right knee arthroscopy     Family History:    Hypertension - Mother   Pancreatic cancer - Mother   Glaucoma - Mother   CAD - Father and brother   Lipids - Father   Ovarian cancer -  Daughter   MS - Son     Social History:  The patient presents to the ED via EMS accompanied by her daughter.   The patient currently lives alone but her son has been staying with her since her recent hospital stay.   The patient is vaccinated for COVID-19.     Physical Exam     Patient Vitals for the past 24 hrs:   BP Temp Temp src Pulse Resp SpO2   10/17/21 2045 128/82 -- -- 80 11 95 %   10/17/21 1859 -- -- -- 85 9 96 %   10/17/21 1820 -- 97.9  F (36.6  C) Temporal -- -- --   10/17/21 1808 128/82 -- -- 96 12 98 %       Physical Exam  GENERAL: well developed, pleasant  HEAD: atraumatic  EYES: pupils reactive, extraocular muscles intact, conjunctivae normal  ENT:  mucus membranes moist  NECK:  trachea midline, normal range of motion  RESPIRATORY: no tachypnea, breath sounds clear to auscultation   CVS: normal S1/S2, no murmurs, intact distal pulses  ABDOMEN: soft, nontender, nondistention  MUSCULOSKELETAL: no deformities  SKIN: warm and dry, no acute rashes or ulceration  NEURO: GCS 15, cranial nerves intact, alert and oriented x3  PSYCH:  Mood/affect normal     Emergency Department Course   ECG  ECG obtained at 18:18:15, ECG read at 1819  Ventricular-paced rhythm with frequent premature ventricular complexes, Abnormal ECG   Rate 90 bpm. NY interval * ms. QRS duration 136 ms. QT/QTc 392/479 ms. P-R-T axes * -68 108.     Imaging:  XR Chest Port 1 View   Final Result   IMPRESSION: Triple lead cardiac device left chest wall, with lead tips projected over the RA, RV, and coronary sinus. Pulmonary vascularity is within normal limits. Small bilateral pleural effusions. Aortic calcification. No pneumothorax.            Laboratory:  CBC: WBC 6.7, HGB 13.2,    CMP: Sodium 127 low, Calcium 8.4 low, Glucose 111 high, Protein total 6.3 low, Albumin 3.0 low, GFR estimate 48 low, o/w WNL (Creatinine 1.00)   Troponin (Collected 1821): <0.015   INR: 2.77 high   Asymptomatic COVID19 Virus PCR by nasopharyngeal swab pending       Procedures  na    Emergency Department Course:  Reviewed:  I reviewed nursing notes, vitals and past medical history    Assessments:  1810: I obtained history and examined the patient as noted above.    I rechecked the patient and explained findings.       Consults:  na    Interventions:  Medications   aspirin (ASA) chewable tablet 324 mg (324 mg Oral Given 10/17/21 1952)         Disposition:  The patient was admitted to the hospital under the care of Dr. Arroyo.     Impression & Plan     CMS Diagnoses: None      Medical Decision Making:  Presents with chest pain and increased use of nitroglycerin.  She was recently admitted and had serial enzymes and is scheduled for an outpatient nuclear stress test.  She been using nitroglycerin intermittently but today is required at 5 times.  She describes chest pressure with radicular symptoms into her jaw.  Certainly GI versus cardiac is considered.  Given her age and risk factors cardiac still seems high in the differential.  She is recently started on Imdur.  Initial EKG shows paced rhythm with occasional PVCs and initial troponin is normal.  She is anticoagulated with therapeutic INR 2.77.  Given the increased chest pain and use of nitroglycerin patient be admitted for further work-up of her what seems to be unstable angina.    Critical Care Time: was 0 minutes for this patient excluding procedures    Diagnosis:    ICD-10-CM    1. Unstable angina (H)  I20.0        Discharge Medications:  New Prescriptions    No medications on file       Scribe Disclosure:  I, Jam Jarrell, am serving as a scribe at 6:10 PM on 10/17/2021 to document services personally performed by Que Rodriguez MD based on my observations and the provider's statements to me.         Que Rodriguez MD  10/17/21 2057     Spironolactone Pregnancy And Lactation Text: This medication can cause feminization of the male fetus and should be avoided during pregnancy. The active metabolite is also found in breast milk.

## 2021-10-17 NOTE — ED NOTES
Bed: ED11  Expected date: 10/17/21  Expected time: 5:52 PM  Means of arrival: Ambulance  Comments:  532 89f pacemaker malfunction ETA 1800

## 2021-10-17 NOTE — ED TRIAGE NOTES
Chest pain all week, saw her cardiologist on Thursday and they ordered a stress test. Pt has [employer] pacemaker. Pt denies SOB, takes coumadin.

## 2021-10-18 ENCOUNTER — APPOINTMENT (OUTPATIENT)
Dept: NUCLEAR MEDICINE | Facility: CLINIC | Age: 86
End: 2021-10-18
Attending: INTERNAL MEDICINE
Payer: COMMERCIAL

## 2021-10-18 ENCOUNTER — TELEPHONE (OUTPATIENT)
Dept: CARDIOLOGY | Facility: CLINIC | Age: 86
End: 2021-10-18

## 2021-10-18 ENCOUNTER — APPOINTMENT (OUTPATIENT)
Dept: CARDIOLOGY | Facility: CLINIC | Age: 86
End: 2021-10-18
Attending: INTERNAL MEDICINE
Payer: COMMERCIAL

## 2021-10-18 PROBLEM — I20.0 UNSTABLE ANGINA (H): Status: ACTIVE | Noted: 2021-10-18

## 2021-10-18 LAB
ANION GAP SERPL CALCULATED.3IONS-SCNC: 8 MMOL/L (ref 3–14)
ATRIAL RATE - MUSE: 49 BPM
ATRIAL RATE - MUSE: 76 BPM
BASOPHILS # BLD AUTO: 0 10E3/UL (ref 0–0.2)
BASOPHILS NFR BLD AUTO: 1 %
BUN SERPL-MCNC: 15 MG/DL (ref 7–30)
CALCIUM SERPL-MCNC: 7.8 MG/DL (ref 8.5–10.1)
CHLORIDE BLD-SCNC: 97 MMOL/L (ref 94–109)
CO2 SERPL-SCNC: 26 MMOL/L (ref 20–32)
CREAT SERPL-MCNC: 0.93 MG/DL (ref 0.52–1.04)
CV STRESS MAX HR HE: 71 BPM
DIASTOLIC BLOOD PRESSURE - MUSE: NORMAL MMHG
DIASTOLIC BLOOD PRESSURE - MUSE: NORMAL MMHG
EOSINOPHIL # BLD AUTO: 0.1 10E3/UL (ref 0–0.7)
EOSINOPHIL NFR BLD AUTO: 1 %
ERYTHROCYTE [DISTWIDTH] IN BLOOD BY AUTOMATED COUNT: 14.3 % (ref 10–15)
GFR SERPL CREATININE-BSD FRML MDRD: 52 ML/MIN/1.73M2
GLUCOSE BLD-MCNC: 94 MG/DL (ref 70–99)
HCT VFR BLD AUTO: 36.2 % (ref 35–47)
HGB BLD-MCNC: 12.3 G/DL (ref 11.7–15.7)
IMM GRANULOCYTES # BLD: 0 10E3/UL
IMM GRANULOCYTES NFR BLD: 0 %
INR PPP: 2.56 (ref 0.85–1.15)
INTERPRETATION ECG - MUSE: NORMAL
INTERPRETATION ECG - MUSE: NORMAL
LYMPHOCYTES # BLD AUTO: 0.5 10E3/UL (ref 0.8–5.3)
LYMPHOCYTES NFR BLD AUTO: 11 %
MCH RBC QN AUTO: 30.7 PG (ref 26.5–33)
MCHC RBC AUTO-ENTMCNC: 34 G/DL (ref 31.5–36.5)
MCV RBC AUTO: 90 FL (ref 78–100)
MONOCYTES # BLD AUTO: 0.5 10E3/UL (ref 0–1.3)
MONOCYTES NFR BLD AUTO: 11 %
NEUTROPHILS # BLD AUTO: 3.8 10E3/UL (ref 1.6–8.3)
NEUTROPHILS NFR BLD AUTO: 76 %
NRBC # BLD AUTO: 0 10E3/UL
NRBC BLD AUTO-RTO: 0 /100
P AXIS - MUSE: NORMAL DEGREES
P AXIS - MUSE: NORMAL DEGREES
PLATELET # BLD AUTO: 182 10E3/UL (ref 150–450)
POTASSIUM BLD-SCNC: 4.2 MMOL/L (ref 3.4–5.3)
PR INTERVAL - MUSE: NORMAL MS
PR INTERVAL - MUSE: NORMAL MS
QRS DURATION - MUSE: 132 MS
QRS DURATION - MUSE: 136 MS
QT - MUSE: 392 MS
QT - MUSE: 422 MS
QTC - MUSE: 474 MS
QTC - MUSE: 479 MS
R AXIS - MUSE: -68 DEGREES
R AXIS - MUSE: -75 DEGREES
RATE PRESSURE PRODUCT: 8520
RBC # BLD AUTO: 4.01 10E6/UL (ref 3.8–5.2)
SODIUM SERPL-SCNC: 131 MMOL/L (ref 133–144)
STRESS ECHO BASELINE DIASTOLIC HE: 68
STRESS ECHO BASELINE HR: 74 BPM
STRESS ECHO BASELINE SYSTOLIC BP: 133
STRESS ECHO CALCULATED PERCENT HR: 57 %
STRESS ECHO LAST STRESS DIASTOLIC BP: 63
STRESS ECHO LAST STRESS SYSTOLIC BP: 120
STRESS ECHO TARGET HR: 124
SYSTOLIC BLOOD PRESSURE - MUSE: NORMAL MMHG
SYSTOLIC BLOOD PRESSURE - MUSE: NORMAL MMHG
T AXIS - MUSE: 105 DEGREES
T AXIS - MUSE: 108 DEGREES
TROPONIN I SERPL-MCNC: 0.05 UG/L (ref 0–0.04)
TROPONIN I SERPL-MCNC: <0.015 UG/L (ref 0–0.04)
VENTRICULAR RATE- MUSE: 76 BPM
VENTRICULAR RATE- MUSE: 90 BPM
WBC # BLD AUTO: 4.9 10E3/UL (ref 4–11)

## 2021-10-18 PROCEDURE — 999N000049 HC STATISTIC ECHO STRESS OR NM NPI

## 2021-10-18 PROCEDURE — G0378 HOSPITAL OBSERVATION PER HR: HCPCS

## 2021-10-18 PROCEDURE — 99226 PR SUBSEQUENT OBSERVATION CARE,LEVEL III: CPT | Performed by: STUDENT IN AN ORGANIZED HEALTH CARE EDUCATION/TRAINING PROGRAM

## 2021-10-18 PROCEDURE — 85610 PROTHROMBIN TIME: CPT | Performed by: INTERNAL MEDICINE

## 2021-10-18 PROCEDURE — A9502 TC99M TETROFOSMIN: HCPCS | Performed by: INTERNAL MEDICINE

## 2021-10-18 PROCEDURE — 36415 COLL VENOUS BLD VENIPUNCTURE: CPT | Performed by: INTERNAL MEDICINE

## 2021-10-18 PROCEDURE — 78452 HT MUSCLE IMAGE SPECT MULT: CPT | Mod: 26 | Performed by: INTERNAL MEDICINE

## 2021-10-18 PROCEDURE — 84484 ASSAY OF TROPONIN QUANT: CPT | Performed by: INTERNAL MEDICINE

## 2021-10-18 PROCEDURE — 93005 ELECTROCARDIOGRAM TRACING: CPT

## 2021-10-18 PROCEDURE — 93017 CV STRESS TEST TRACING ONLY: CPT

## 2021-10-18 PROCEDURE — 250N000011 HC RX IP 250 OP 636: Performed by: INTERNAL MEDICINE

## 2021-10-18 PROCEDURE — 78452 HT MUSCLE IMAGE SPECT MULT: CPT

## 2021-10-18 PROCEDURE — 343N000001 HC RX 343: Performed by: INTERNAL MEDICINE

## 2021-10-18 PROCEDURE — 93016 CV STRESS TEST SUPVJ ONLY: CPT | Performed by: INTERNAL MEDICINE

## 2021-10-18 PROCEDURE — 250N000013 HC RX MED GY IP 250 OP 250 PS 637: Performed by: INTERNAL MEDICINE

## 2021-10-18 PROCEDURE — 93018 CV STRESS TEST I&R ONLY: CPT | Performed by: INTERNAL MEDICINE

## 2021-10-18 PROCEDURE — 85025 COMPLETE CBC W/AUTO DIFF WBC: CPT | Performed by: INTERNAL MEDICINE

## 2021-10-18 PROCEDURE — 99215 OFFICE O/P EST HI 40 MIN: CPT | Mod: 25 | Performed by: INTERNAL MEDICINE

## 2021-10-18 PROCEDURE — 80048 BASIC METABOLIC PNL TOTAL CA: CPT | Performed by: INTERNAL MEDICINE

## 2021-10-18 RX ORDER — ACETAMINOPHEN 325 MG/1
650 TABLET ORAL EVERY 6 HOURS PRN
Status: DISCONTINUED | OUTPATIENT
Start: 2021-10-18 | End: 2021-10-19 | Stop reason: DRUGHIGH

## 2021-10-18 RX ORDER — ACETAMINOPHEN 650 MG/1
650 SUPPOSITORY RECTAL EVERY 6 HOURS PRN
Status: DISCONTINUED | OUTPATIENT
Start: 2021-10-18 | End: 2021-10-20 | Stop reason: HOSPADM

## 2021-10-18 RX ORDER — MULTIPLE VITAMINS W/ MINERALS TAB 9MG-400MCG
1 TAB ORAL DAILY
Status: DISCONTINUED | OUTPATIENT
Start: 2021-10-18 | End: 2021-10-20 | Stop reason: HOSPADM

## 2021-10-18 RX ORDER — ALBUTEROL SULFATE 90 UG/1
2 AEROSOL, METERED RESPIRATORY (INHALATION) EVERY 5 MIN PRN
Status: DISCONTINUED | OUTPATIENT
Start: 2021-10-18 | End: 2021-10-18

## 2021-10-18 RX ORDER — ACYCLOVIR 200 MG/1
0-1 CAPSULE ORAL
Status: DISCONTINUED | OUTPATIENT
Start: 2021-10-18 | End: 2021-10-18

## 2021-10-18 RX ORDER — SODIUM CHLORIDE 1 G/1
1 TABLET ORAL DAILY
Status: DISCONTINUED | OUTPATIENT
Start: 2021-10-18 | End: 2021-10-20 | Stop reason: HOSPADM

## 2021-10-18 RX ORDER — FUROSEMIDE 20 MG
20 TABLET ORAL DAILY
Status: DISCONTINUED | OUTPATIENT
Start: 2021-10-18 | End: 2021-10-20 | Stop reason: HOSPADM

## 2021-10-18 RX ORDER — MAGNESIUM HYDROXIDE/ALUMINUM HYDROXICE/SIMETHICONE 120; 1200; 1200 MG/30ML; MG/30ML; MG/30ML
30 SUSPENSION ORAL EVERY 4 HOURS PRN
Status: DISCONTINUED | OUTPATIENT
Start: 2021-10-18 | End: 2021-10-20 | Stop reason: HOSPADM

## 2021-10-18 RX ORDER — NITROGLYCERIN 0.4 MG/1
0.4 TABLET SUBLINGUAL EVERY 5 MIN PRN
Status: DISCONTINUED | OUTPATIENT
Start: 2021-10-18 | End: 2021-10-20 | Stop reason: HOSPADM

## 2021-10-18 RX ORDER — ASPIRIN 81 MG/1
81 TABLET ORAL DAILY
Status: DISCONTINUED | OUTPATIENT
Start: 2021-10-18 | End: 2021-10-20 | Stop reason: HOSPADM

## 2021-10-18 RX ORDER — REGADENOSON 0.08 MG/ML
0.4 INJECTION, SOLUTION INTRAVENOUS ONCE
Status: COMPLETED | OUTPATIENT
Start: 2021-10-18 | End: 2021-10-18

## 2021-10-18 RX ORDER — AMINOPHYLLINE 25 MG/ML
50-100 INJECTION, SOLUTION INTRAVENOUS
Status: COMPLETED | OUTPATIENT
Start: 2021-10-18 | End: 2021-10-18

## 2021-10-18 RX ORDER — ISOSORBIDE MONONITRATE 30 MG/1
30 TABLET, EXTENDED RELEASE ORAL DAILY
Status: DISCONTINUED | OUTPATIENT
Start: 2021-10-18 | End: 2021-10-20

## 2021-10-18 RX ORDER — ESCITALOPRAM OXALATE 10 MG/1
10 TABLET ORAL DAILY
Status: DISCONTINUED | OUTPATIENT
Start: 2021-10-18 | End: 2021-10-20 | Stop reason: HOSPADM

## 2021-10-18 RX ORDER — LISINOPRIL 10 MG/1
10 TABLET ORAL DAILY
Status: DISCONTINUED | OUTPATIENT
Start: 2021-10-18 | End: 2021-10-20 | Stop reason: HOSPADM

## 2021-10-18 RX ORDER — METOPROLOL SUCCINATE 100 MG/1
100 TABLET, EXTENDED RELEASE ORAL EVERY MORNING
Status: DISCONTINUED | OUTPATIENT
Start: 2021-10-18 | End: 2021-10-20 | Stop reason: HOSPADM

## 2021-10-18 RX ORDER — CAFFEINE CITRATE 20 MG/ML
60 SOLUTION INTRAVENOUS
Status: DISCONTINUED | OUTPATIENT
Start: 2021-10-18 | End: 2021-10-18

## 2021-10-18 RX ORDER — AMLODIPINE BESYLATE 5 MG/1
5 TABLET ORAL DAILY
Status: DISCONTINUED | OUTPATIENT
Start: 2021-10-18 | End: 2021-10-20 | Stop reason: HOSPADM

## 2021-10-18 RX ORDER — ASPIRIN 81 MG/1
162 TABLET, CHEWABLE ORAL ONCE
Status: DISCONTINUED | OUTPATIENT
Start: 2021-10-18 | End: 2021-10-18

## 2021-10-18 RX ADMIN — LISINOPRIL 10 MG: 10 TABLET ORAL at 11:38

## 2021-10-18 RX ADMIN — AMLODIPINE BESYLATE 5 MG: 5 TABLET ORAL at 11:38

## 2021-10-18 RX ADMIN — TETROFOSMIN 9.4 MCI.: 1.38 INJECTION, POWDER, LYOPHILIZED, FOR SOLUTION INTRAVENOUS at 07:35

## 2021-10-18 RX ADMIN — ATORVASTATIN CALCIUM 40 MG: 40 TABLET, FILM COATED ORAL at 21:27

## 2021-10-18 RX ADMIN — ESCITALOPRAM OXALATE 10 MG: 10 TABLET ORAL at 11:38

## 2021-10-18 RX ADMIN — METOPROLOL SUCCINATE 100 MG: 100 TABLET, EXTENDED RELEASE ORAL at 11:39

## 2021-10-18 RX ADMIN — FUROSEMIDE 20 MG: 20 TABLET ORAL at 11:35

## 2021-10-18 RX ADMIN — TETROFOSMIN 28.6 MCI.: 1.38 INJECTION, POWDER, LYOPHILIZED, FOR SOLUTION INTRAVENOUS at 09:30

## 2021-10-18 RX ADMIN — AMINOPHYLLINE 50 MG: 25 INJECTION, SOLUTION INTRAVENOUS at 09:40

## 2021-10-18 RX ADMIN — CARBOXYMETHYLCELLULOSE SODIUM 1 DROP: 5 SOLUTION/ DROPS OPHTHALMIC at 11:34

## 2021-10-18 RX ADMIN — ISOSORBIDE MONONITRATE 30 MG: 30 TABLET, EXTENDED RELEASE ORAL at 11:35

## 2021-10-18 RX ADMIN — MULTIPLE VITAMINS W/ MINERALS TAB 1 TABLET: TAB at 11:35

## 2021-10-18 RX ADMIN — REGADENOSON 0.4 MG: 0.08 INJECTION, SOLUTION INTRAVENOUS at 09:31

## 2021-10-18 RX ADMIN — ASPIRIN 81 MG: 81 TABLET, COATED ORAL at 11:35

## 2021-10-18 RX ADMIN — SODIUM CHLORIDE TAB 1 GM 1 G: 1 TAB at 11:38

## 2021-10-18 RX ADMIN — CARBOXYMETHYLCELLULOSE SODIUM 1 DROP: 5 SOLUTION/ DROPS OPHTHALMIC at 21:27

## 2021-10-18 RX ADMIN — METOPROLOL SUCCINATE 50 MG: 50 TABLET, EXTENDED RELEASE ORAL at 21:27

## 2021-10-18 ASSESSMENT — MIFFLIN-ST. JEOR: SCORE: 1001.42

## 2021-10-18 NOTE — PHARMACY-ANTICOAGULATION SERVICE
Clinical Pharmacy - Warfarin Dosing Consult     Pharmacy has been consulted to manage this patient s warfarin therapy.  Indication: Atrial Fibrillation  Therapy Goal: INR 2-3  Warfarin Prior to Admission: Yes  Warfarin PTA Regimen: 2mg MWF; 4mg TTSS    INR   Date Value Ref Range Status   10/17/2021 2.77 (H) 0.85 - 1.15 Final   10/07/2021 2.80 (H) 0.85 - 1.15 Final     Comment:     Effective 7/11/2021, the reference range for this assay has changed.         Pharmacy will monitor Haritha LAVERNE Trujillo daily and order warfarin doses to achieve specified goal.      Please contact pharmacy as soon as possible if the warfarin needs to be held for a procedure or if the warfarin goals change.

## 2021-10-18 NOTE — PROGRESS NOTES
RECEIVING UNIT ED HANDOFF REVIEW    ED Nurse Handoff Report was reviewed by: Deandra Morocho RN on October 18, 2021 at 2:46 AM

## 2021-10-18 NOTE — H&P
[facility]    History and Physical  Hospitalist       Date of Admission:  10/17/2021    Assessment & Plan   Haritha Trujillo is a 89 year old female with complex past medical history including nonischemic cardiomyopathy, history of acute on chronic heart failure with reduced ejection fraction, chest pain history, permanent atrial fibrillation on anticoagulation, hypertension, status post AV node ablation with permanent pacemaker, stage III CKD, chronic hyponatremia, dyslipidemia, depression with recent hospitalization [facility] earlier this month for chest pain and dyspnea on exertion who presents with chest pain today.  Patient states chest pain is similar to prior admission.  She had an episode yesterday and 4 today.  Improved for times with nitroglycerin.  Negative troponin proximally 8 hours after onset of chest pain.  Normal CBC.  Chest x-ray unremarkable.  eKG with paced rhythm    Active Problems:  Chest pain  Assessment-  -suspect this could be angina.  Patient had similar chest pain during last admission for CHF.  Initiated on Imdur.  Echocardiogram without significant change from prior.  She was seen by cardiology.  Outpatient stress test was arranged.  She is on metoprolol.  She is on Coumadin.  INR therapeutic.  -She now presents with approximately 4-5 episodes of chest pain over the last 24 hours.  4 episodes today fluctuating in intensity seem to clearly improved with nitroglycerin.  -EKG shows paced rhythm.  -Initial troponin about 68 hours after onset of pain is negative.  Patient is chest pain-free currently.  -Normal vital signs.  -chest x-ray unremarkable.  Exam unremarkable.  Plan-register to observation, telemetry, serial troponins, Lexiscan tomorrow morning, continue prior to admission medications, daily INR, continue Coumadin, aspirin given in the emergency room.          Pulmonary hypertension (H)    Mitral regurgitation    Aortic regurgitation    Tricuspid regurgitation     Pulmonary valve regurgitation  Nonischemic cardiomyopathy (H)  History of acute on chronic systolic heart failure    Assessment:   -Recent admission for acute on chronic heart failure with reduced ejection fraction earlier this month.  Discharge weight 38 pounds.  Patient appears euvolemic.  She has been checking her weight daily.  It has been stable.  Has been taking the newly prescribed Lasix and Imdur daily.  Plan: Daily BMP, continue prior to admission Lasix and other cardiac meds.  I's and O's, we discussed importance of daily weights and low-sodium diet.  Close follow-up with cardiology and PCP.          Hyponatremia    Assessment: Chronic.  Levels run from 1 27-1 33.  Sodium 127 today.  She appears euvolemic.  She has been on Lasix.  Asymptomatic    Plan: A.m. BMP continue her prior to admission sodium tablet 1 g daily.        Hyperlipidemia    Assessment: Continue prior to admission Lipitor 40 mg nightly      Essential hypertension    Assessment: Normotensive on admission    Plan: Continue prior to admission lisinopril 10 mg daily, Toprol- mg every morning and 50 mg at night        Esophageal reflux    Assessment: She denies presenting symptoms similar to her GERD.  They do not seem GERD-like.    Plan: Follow, patient is not on medication for this issue at this time      Permanent atrial fibrillation (H)    Assessment: Only rate controlled.  Patient on Coumadin with goal INR of 2-3.  INR therapeutic.  No bleeding.  She is on Toprol-XL twice daily.  Continue Coumadin.  Pharm.D. consult, daily INR, telemetry, continue Toprol-XL at prior to admission dosing       Cardiac pacemaker in situ       Chronic kidney disease    Assessment: 99 baseline.  Avoid nephrotoxins.  Patient is on lisinopril.  A.m. BMP         Hx of atrioventricular node ablation    Assessment: Noted      Covid status.  Patient has had both vaccination injections.  Covid negative last admission.  No Covid  symptoms.  Covid swab pending.  Low suspicion    DVT Prophylaxis: She is on Coumadin, daily INR    Code Status: DNR / DNI elvis with patient in detail with daughter present.  Patient had previously been full code now wishes to be DNR/DNI    Disposition: Expected discharge in 1 to 2 days once her cardiac work-up has been completed.  Likely discharge home.  Physical therapy consult    Dev Arroyo MD    Primary Care Physician   Kirit Michel    Chief Complaint   Chest pain    History is obtained from the patient EDMD, chart    History of Present Illness   Haritha Trujillo is a 89 year old female with complex past medical history including nonischemic cardiomyopathy, history of acute on chronic heart failure with reduced ejection fraction, chest pain history, permanent atrial fibrillation on anticoagulation, hypertension, status post AV node ablation with permanent pacemaker, stage III CKD, chronic hyponatremia, dyslipidemia, depression with recent hospitalization [facility] earlier this month for chest pain and dyspnea on exertion who presents with chest pain today.    She was hospitalized [facility] October 5 to October 7.  She had serial negative troponins.  Echocardiogram showed no change from prior.  Cardiology was consulted and she was started on Imdur.  Patient was diuresed for her acute on chronic systolic CHF with IV furosemide and transition to furosemide 20 mg daily.  She was continued at warfarin her current dose along with lisinopril metoprolol amlodipine.  Her creatinine was stable during her hospitalization.  With guards to her hyponatremia her sodiums were roughly stable in the 129 to low 130 range.  Her discharge weight was 138 pounds.  Her BMP on admission was 5200.    Patient now returns for episode of chest pain.    Patient states since discharge home she has been not feeling well.  She has been taking her Imdur and daily Lasix.  She has been checking her  weights daily and they have been stable.  No edema.  Gets around with a walker.  In her son had been staying with her for several days.    Today she had an episode of chest pain for which she took nitroglycerin and that may have helped.    Days she had multiple episodes of chest pain.  She states she had chest pain starting at about 930.  She says it has fluctuated in intensity but has never completely gone away.  She received 3 or 4 doses of nitroglycerin intermittently through the day all of which have helped with her chest pain.  He describes the chest pain is bandlike across her chest pressure-like.  Seems to radiate up to her neck in both her arms feel heavy.  No focal weakness or paresthesias in the arms.  She felt cold or clammy with one episode.  No shortness of breath.  No palpitations.  No cough fevers chills.  No abdominal pain no nausea or vomiting.  No falls or syncope.    Otherwise been feeling well.  No cough no UTI symptoms no bleeding.  No focal joint pain rash.      In the emergency room vital signs are unremarkable with a temperature of 97.9, pulse in the 80s to 90s.  Respiratory rate 9 blood pressure normal.  Oxygen saturations normal  Sodium 127, BUN and creatinine normal.  Normal LFTs.  Troponin negative.  Glucose 111.  White blood cell count 6.7, hemoglobin 13.2 platelet count 217.  INR therapeutic at 2.77.  Chest x-ray shows-  Triple lead cardiac device left chest wall, with lead tips projected over the RA, RV, and coronary sinus. Pulmonary vascularity is within normal limits. Small bilateral pleural effusions. Aortic calcification. No pneumothorax.    Emergency room she was given an aspirin    [place] to observation.       Past Medical History    I have reviewed this patient's medical history and updated it with pertinent information if needed.   Past Medical History:   Diagnosis Date     Aortic regurgitation 12/14/2014    mild (1+) per echo     Atrial fibrillation (H)      Cardiomyopathy  (H)      CHF (congestive heart failure) (H)      Chronic kidney disease, stage 3 (H)      Coronary atherosclerosis of unspecified type of vessel, native or graft 5/16/2000    normal left coronary arteries and tight obstruction in distal RCA, too small for revascularization, medical management     Degeneration of cervical intervertebral disc     cervical spine     Essential hypertension, benign      Mitral regurgitation 12/14/2014    mod-mod/sev (2-3+) per echo     Other and unspecified hyperlipidemia      Pacemaker      Pulmonary hypertension (H) 3/16/2013    mild per echo with RVSP 31mmHg +RAP      Pulmonary valve regurgitation 12/14/2014    mod (2+) per echo     Tricuspid regurgitation 12/14/2014    mild (1+) per echo     Unspecified tinnitus     chronic       Past Surgical History   I have reviewed this patient's surgical history and updated it with pertinent information if needed.  Past Surgical History:   Procedure Laterality Date     CORONARY ANGIOGRAPHY ADULT ORDER  5/16/2000     EP PPM RMVL&REPL OF GEN W/ DUAL LEAD SYS N/A 4/14/2020    Procedure: Permanent Pacemakers  Removal and Replacement Generator  with Multi Lead System;  Surgeon: Fay Tatum MD;  Location:  [facility]     HRW PACEMAKER PERMANENT  1/2015    BI- ventricular     IMPLANT PACEMAKER  11/12/2010    dual chamber Medtronic     ZZC NONSPECIFIC PROCEDURE  1999    right rotator cuff tear OR     ZZC NONSPECIFIC PROCEDURE  1983    microdiscectomy     ZZC NONSPECIFIC PROCEDURE      C-sections x 3      ZZC NONSPECIFIC PROCEDURE      appendectomy     ZZC NONSPECIFIC PROCEDURE      bilateral benign breast biopsy      ZZC NONSPECIFIC PROCEDURE      right knee arthroscopic OR     ZZC NONSPECIFIC PROCEDURE  1974    enteritis       Prior to Admission Medications   Prior to Admission Medications   Prescriptions Last Dose Informant Patient Reported? Taking?   Alendronate Sodium (FOSAMAX PO) 10/10/2021  Yes No   Sig: Take 70 mg  by mouth once a week  evenings   Multiple Vitamins-Minerals (CENTRUM SILVER) per tablet 10/17/2021 at Unknown time Self Yes Yes   Sig: Take 1 tablet by mouth daily.     Multiple Vitamins-Minerals (PRESERVISION AREDS 2 PO) 10/17/2021 at Unknown time  Yes Yes   Sig: Take by mouth daily   Probiotic Product (PROBIOTIC DAILY PO) 10/17/2021 at Unknown time  Yes Yes   Sig: Take 1 tablet by mouth daily    acetaminophen (TYLENOL) 500 MG tablet prn at prn  Yes Yes   Sig: Take 500-1,000 mg by mouth every 6 hours as needed for mild pain   amLODIPine (NORVASC) 5 MG tablet 10/10/2021 at Unknown time  No Yes   Sig: Take 1 tablet (5 mg) by mouth daily   atorvastatin (LIPITOR) 40 MG tablet 10/16/2021 at Unknown time Self Yes Yes   Sig: Take 40 mg by mouth At Bedtime    calcium carbonate-vitamin D (CALCIUM + D) 600-200 MG-UNIT TABS 10/17/2021 at Unknown time Self Yes Yes   Sig: Take 1 tablet by mouth daily.   cycloSPORINE (RESTASIS) 0.05 % ophthalmic emulsion 10/17/2021 at AM Self Yes Yes   Sig: Place 1 drop into both eyes every 12 hours.   escitalopram (LEXAPRO) 10 MG tablet 10/17/2021 at Unknown time  Yes Yes   Sig: Take 10 mg by mouth daily   furosemide (LASIX) 20 MG tablet 10/17/2021 at Unknown time  No Yes   Sig: Take 1 tablet (20 mg) by mouth daily   isosorbide mononitrate (IMDUR) 30 MG 24 hr tablet 10/17/2021 at Unknown time  No Yes   Sig: Take 1 tablet (30 mg) by mouth daily   lisinopril (ZESTRIL) 10 MG tablet 10/17/2021 at Unknown time  No Yes   Sig: Take 1 tablet (10 mg) by mouth daily   metoprolol succinate ER (TOPROL-XL) 50 MG 24 hr tablet 10/16/2021 at Unknown time  No Yes   Si in am and 50mg at night   Patient taking differently: Take 50 mg by mouth every evening (100mg in am and 50mg at night)   metoprolol succinate ER (TOPROL-XL) 50 MG 24 hr tablet 10/17/2021 at Unknown time  Yes Yes   Sig: Take 100 mg by mouth every morning   nitroGLYcerin (NITROSTAT) 0.4 MG sublingual tablet prn at prn  No Yes   Sig: For  chest pain place 1 tablet under the tongue every 5 minutes for 3 doses. If symptoms persist 5 minutes after 1st dose call 911.   sodium chloride 1 GM tablet 10/17/2021 at Unknown time  Yes Yes   Sig: Take 1 g by mouth daily   warfarin ANTICOAGULANT (COUMADIN) 4 MG tablet 10/17/2021 at Unknown time  Yes Yes   Sig: Take 4 mg by mouth daily 2mg (0.5 tablet) Mon, Wed, Fri  4mg (1 tablet) Tues, Th. Sat, Sun      Facility-Administered Medications: None     Allergies   Allergies   Allergen Reactions     Amiodarone Nausea     Hctz      Hydrochlorothiazide Other (See Comments)     Other reaction(s): *Unknown     Lansoprazole      diarrhea   Prevacid       Social History   I have reviewed this patient's social history and updated it with pertinent information if needed. Haritha Trujillo  reports that she quit smoking about 36 years ago. Her smoking use included cigarettes. She started smoking about 51 years ago. She has a 7.50 pack-year smoking history. She has never used smokeless tobacco. She reports that she does not drink alcohol and does not use drugs.    Family History   I have reviewed this patient's family history and updated it with pertinent information if needed.   Family History   Problem Relation Age of Onset     Hypertension Mother      Cancer Mother         pancreatic     Eye Disorder Mother         cristian [email or web address].WARREN. Father          53     Lipids Father      Diabetes Maternal Grandmother      C.A.D. Brother         open heart surgery age 53     Cancer Daughter         ovavian     Neurologic Disorder Son         MS       Review of Systems   The 10 point Review of Systems is negative other than noted in the HPI or here.     Physical Exam   Temp: 97.9  F (36.6  C) Temp src: Temporal BP: 128/82 Pulse: 85   Resp: 9 SpO2: 96 % O2 Device: None (Room air)    Vital Signs with Ranges  Temp:  [97.9  F (36.6  C)] 97.9  F (36.6  C)  Pulse:  [85-96] 85  Resp:  [9-12] 9  BP: (128)/(82) 128/82  SpO2:  [96 %-98 %] 96  %  0 lbs 0 oz    Constitutional: No acute distress  Eyes: Normal sclera, pupils equal round reactive to light and accommodating  HEENT: Normal oropharynx  Respiratory: Clear to auscultation bilaterally  Cardiovascular: Irregularly irregular rhythm, no rubs gallops or murmurs appreciated  GI: Soft nontender nondistended no hepatosplenomegaly appreciated  Lymph/Hematologic: No cervical or axillary lymphadenopathy  Skin: She has some chronic venous stasis changes bilateral pretibial region.  No cyanosis.  No edema  Musculoskeletal: No focal joint swelling erythema.  No pain with passive range of motion of major joints upper lower extremities.  No spinal tenderness  Neurologic: Cranial nerves II through XII grossly intact she is fully oriented, strength 5 out of 5 in extremities x4, toes downgoing bilaterally, normal dorsal and plantar flexion.  Psychiatric: Normal affect    Data   Data reviewed today:  I personally reviewed lab  studies and imaging studies performed the emergency room.  EKG shows paced rhythm  Recent Labs   Lab 10/17/21  1821 10/15/21  1335   WBC 6.7  --    HGB 13.2  --    MCV 92  --      --    INR 2.77*  --    * 129*   POTASSIUM 4.3 4.0   CHLORIDE 95 95   CO2 23 26   BUN 20 20   CR 1.00 1.14*   ANIONGAP 9 8   PATRICIA 8.4* 8.1*   * 126*   ALBUMIN 3.0*  --    PROTTOTAL 6.3*  --    BILITOTAL 1.0  --    ALKPHOS 70  --    ALT 39  --    AST 32  --    TROPONIN <0.015  --        Imaging:  Recent Results (from the past 24 hour(s))   XR Chest Port 1 View    Narrative    EXAM: XR CHEST PORT 1 VIEW  LOCATION: [facility]  DATE/TIME: 10/17/2021 6:53 PM    INDICATION: Chest pain.  COMPARISON: None.      Impression    IMPRESSION: Triple lead cardiac device left chest wall, with lead tips projected over the RA, RV, and coronary sinus. Pulmonary vascularity is within normal limits. Small bilateral pleural effusions. Aortic calcification. No pneumothorax.

## 2021-10-18 NOTE — PLAN OF CARE
[facility] 0[facility]. VSS on RA. Tele: V-Paced with frequent PVC's, bigeminal at times. Up AX1-2. HUERTA, denies SOB at rest. Denies chest pain. [place], hearing aids at bedside. Plan for lexiscan today.

## 2021-10-18 NOTE — PLAN OF CARE
VSS. Monitor remains V.Paced rhythm. Pt. Denies pain. Pt. Sat up in chair much of shift. Continue to monitor. Tentative plan for Angiogram tomorrow pending INR result.

## 2021-10-18 NOTE — CONSULTS
Cardiology Consultation      Haritha Trujillo MRN# [id number]   YOB: 1925 Age: 89 year old   Date of Admission: 10/17/2021     Reason for consult:            Assessment and Plan:     Active Problems:      1)  Unstable Angina      Plan Coronary angiogram .  Risks and benefits explained.    Patient understands needs to reverse DNR/DNI status for angiogram.     2) Chronic AFib- Coagulopathy on coumadin  S/p AVN ablation  Hold Coumadin    3) Cardiomyopathy  Stable    4) Hyponatremia    5) HUERTA    6) Chronic Systolic HF    7) Anxiety/depression    8) Chronic disability frailty     9) Pulmonary hypertension- mild. Medical therapy             Chief Complaint:     Chest Pain         History of Present Illness:   This patient is a 89 year old female patient is followed by Dr. DARIAN TORRES and Kat Yo,  has had recurrent chest pain in June and then earlier in October she had nuclear stress test was negative.  She was planning to have follow-up in the clinic.  However again upon returning home she had recurrent chest discomfort relieved with nitroglycerin.  Her sons described the discomfort as tingling or heaviness of her arms going to her heaviness in the chest and radiating up to the jaws.  She seems to do well with it when she is in the hospital but then has these recurrent symptoms at home.  Because of this concern with increasing nitroglycerin use she was brought into the emergency room here at [facility].  Her EKG reviewed is 100% paced.  She did have a slight troponin bump and cardiology was consulted.    On last admission cardiology recommended outpatient follow-up and echocardiogram was without significant change she had a history of an ischemic cardiomyopathy and was placed on Imdur for her pain and discomfort.  Again as stated her second troponin was mildly positive at 0.047.  Her chest x-ray was unremarkable.    She has permanent atrial fibrillation on anticoagulation, hypertension,  "status post AV richard ablation and permanent pacemaker placed, stage III chronic kidney disease chronic hyponatremia dyslipidemia depression and anxiety.    This is a complex patient with complex decision making.  60 minutes was spent with chart review reviewing echocardiogram nuclear stress test EKGs and chart review and patient at bedside.         Physical Exam:   Vitals were reviewed  Blood pressure 124/69, pulse 76, temperature 97.3  F (36.3  C), temperature source Oral, resp. rate 16, height 1.626 m (5' 4\"), weight 62.6 kg (138 lb 1.6 oz), SpO2 94 %, not currently breastfeeding.  Temperatures:  Current - Temp: 97.3  F (36.3  C); Max - Temp  Av.8  F (36.6  C)  Min: 97.3  F (36.3  C)  Max: 98.3  F (36.8  C)  Respiration range: Resp  Av.9  Min: 9  Max: 18  Pulse range: Pulse  Av.6  Min: 65  Max: 96  Blood pressure range: Systolic (24hrs), Av , Min:97 , Max:139   ; Diastolic (24hrs), Av, Min:59, Max:82    Pulse oximetry range: SpO2  Av.2 %  Min: 92 %  Max: 98 %  No intake or output data in the 24 hours ending 10/18/21 1357  Constitutional:   awake, alert, cooperative, no apparent distress, and appears stated age     Eyes:   Lids and lashes normal, pupils equal, round and reactive to light, extra ocular muscles intact, sclera clear, conjunctiva normal     Neck:   supple, symmetrical, trachea midline, no JVD     Back:   symmetric     Lungs:   No increased work of breathing, good air exchange, clear to auscultation bilaterally, no crackles or wheezing  clear to auscultation     Cardiovascular:   Normal apical impulse, regular rate and rhythm, normal S1 and S2, no S3 or S4, and no murmur noted.   displaced, irregularly irregular rhythm, no murmur noted, pulses 2 plus all extermities bilaterally  carotids without bruits bilaterally     Abdomen:   non-tender     Musculoskeletal:   motor strength is 5 out of 5 all extremities bilaterally     Neurologic:   Grossly nonfocal     Skin:   no " bruising or bleeding     Additional findings:   Edema   No edema               Past Medical History:   I have reviewed this patient's past medical history  Past Medical History:   Diagnosis Date     Aortic regurgitation 12/14/2014    mild (1+) per echo     Atrial fibrillation (H)      Cardiomyopathy (H)      CHF (congestive heart failure) (H)      Chronic kidney disease, stage 3 (H)      Coronary atherosclerosis of unspecified type of vessel, native or graft 5/16/2000    normal left coronary arteries and tight obstruction in distal RCA, too small for revascularization, medical management     Degeneration of cervical intervertebral disc     cervical spine     Essential hypertension, benign      Mitral regurgitation 12/14/2014    mod-mod/sev (2-3+) per echo     Other and unspecified hyperlipidemia      Pacemaker      Pulmonary hypertension (H) 3/16/2013    mild per echo with RVSP 31mmHg +RAP      Pulmonary valve regurgitation 12/14/2014    mod (2+) per echo     Tricuspid regurgitation 12/14/2014    mild (1+) per echo     Unspecified tinnitus     chronic             Past Surgical History:   I have reviewed this patient's past surgical history  Past Surgical History:   Procedure Laterality Date     CORONARY ANGIOGRAPHY ADULT ORDER  5/16/2000     EP PPM RMVL&REPL OF GEN W/ DUAL LEAD SYS N/A 4/14/2020    Procedure: Permanent Pacemakers  Removal and Replacement Generator  with Multi Lead System;  Surgeon: Fay Tatum MD;  Location:  [facility]     HRW PACEMAKER PERMANENT  1/2015    BI- ventricular     IMPLANT PACEMAKER  11/12/2010    dual chamber Medtronic     Z NONSPECIFIC PROCEDURE  1999    right rotator cuff tear OR     ZZC NONSPECIFIC PROCEDURE  1983    microdiscectomy     ZZC NONSPECIFIC PROCEDURE      C-sections x 3      ZZC NONSPECIFIC PROCEDURE      appendectomy     ZZC NONSPECIFIC PROCEDURE      bilateral benign breast biopsy      ZZC NONSPECIFIC PROCEDURE      right knee  arthroscopic OR     ZZC NONSPECIFIC PROCEDURE  1974    enteritis               Social History:   I have reviewed this patient's social history  Social History     Tobacco Use     Smoking status: Former Smoker     Packs/day: 0.50     Years: 15.00     Pack years: 7.50     Types: Cigarettes     Start date:      Quit date:      Years since quittin.8     Smokeless tobacco: Never Used   Substance Use Topics     Alcohol use: No             Family History:   I have reviewed this patient's family history  Family History   Problem Relation Age of Onset     Hypertension Mother      Cancer Mother         pancreatic     Eye Disorder Mother         cristian     LARON.AMISAEL. Father          53     Lipids Father      Diabetes Maternal Grandmother      C.A.D. Brother         open heart surgery age 53     Cancer Daughter         ovavian     Neurologic Disorder Son         MS             Allergies:     Allergies   Allergen Reactions     Amiodarone Nausea     Hctz      Hydrochlorothiazide Other (See Comments)     Other reaction(s): *Unknown     Lansoprazole      diarrhea   Prevacid             Medications:   I have reviewed this patient's current medications  Medications Prior to Admission   Medication Sig Dispense Refill Last Dose     acetaminophen (TYLENOL) 500 MG tablet Take 500-1,000 mg by mouth every 6 hours as needed for mild pain   prn at prn     amLODIPine (NORVASC) 5 MG tablet Take 1 tablet (5 mg) by mouth daily 90 tablet 3 10/10/2021 at Unknown time     atorvastatin (LIPITOR) 40 MG tablet Take 40 mg by mouth At Bedtime    10/16/2021 at Unknown time     calcium carbonate-vitamin D (CALCIUM + D) 600-200 MG-UNIT TABS Take 1 tablet by mouth daily.   10/17/2021 at Unknown time     cycloSPORINE (RESTASIS) 0.05 % ophthalmic emulsion Place 1 drop into both eyes every 12 hours.   10/17/2021 at AM     escitalopram (LEXAPRO) 10 MG tablet Take 10 mg by mouth daily   10/17/2021 at Unknown time     furosemide (LASIX) 20 MG tablet  Take 1 tablet (20 mg) by mouth daily 30 tablet 0 10/17/2021 at Unknown time     isosorbide mononitrate (IMDUR) 30 MG 24 hr tablet Take 1 tablet (30 mg) by mouth daily 30 tablet 0 10/17/2021 at Unknown time     lisinopril (ZESTRIL) 10 MG tablet Take 1 tablet (10 mg) by mouth daily 90 tablet 2 10/17/2021 at Unknown time     metoprolol succinate ER (TOPROL-XL) 50 MG 24 hr tablet Take 100 mg by mouth every morning   10/17/2021 at Unknown time     metoprolol succinate ER (TOPROL-XL) 50 MG 24 hr tablet 100 in am and 50mg at night (Patient taking differently: Take 50 mg by mouth every evening 100 in am and 50mg at night)   10/16/2021 at Unknown time     Multiple Vitamins-Minerals (CENTRUM SILVER) per tablet Take 1 tablet by mouth daily.     10/17/2021 at Unknown time     Multiple Vitamins-Minerals (PRESERVISION AREDS 2 PO) Take by mouth daily   10/17/2021 at Unknown time     nitroGLYcerin (NITROSTAT) 0.4 MG sublingual tablet For chest pain place 1 tablet under the tongue every 5 minutes for 3 doses. If symptoms persist 5 minutes after 1st dose call 911. 25 tablet 0 prn at prn     Probiotic Product (PROBIOTIC DAILY PO) Take 1 tablet by mouth daily    10/17/2021 at Unknown time     sodium chloride 1 GM tablet Take 1 g by mouth daily   10/17/2021 at Unknown time     warfarin ANTICOAGULANT (COUMADIN) 4 MG tablet Take 4 mg by mouth daily 2mg (0.5 tablet) Mon, Wed, Fri  4mg (1 tablet) Tues, Thurs. Sat, Sun   10/17/2021 at Unknown time     Alendronate Sodium (FOSAMAX PO) Take 70 mg by mouth once a week Sunday evenings   10/10/2021     Current Facility-Administered Medications Ordered in Epic   Medication Dose Route Frequency Last Rate Last Admin     acetaminophen (TYLENOL) tablet 650 mg  650 mg Oral Q6H PRN        Or     acetaminophen (TYLENOL) Suppository 650 mg  650 mg Rectal Q6H PRN         albuterol (PROAIR HFA/PROVENTIL HFA/VENTOLIN HFA) 108 (90 Base) MCG/ACT inhaler 2 puff  2 puff Inhalation Q5 Min PRN         alum & mag  hydroxide-simethicone (MAALOX) suspension 30 mL  30 mL Oral Q4H PRN         amLODIPine (NORVASC) tablet 5 mg  5 mg Oral Daily   5 mg at 10/18/21 1138     aspirin EC tablet 81 mg  81 mg Oral Daily   81 mg at 10/18/21 1135     atorvastatin (LIPITOR) tablet 40 mg  40 mg Oral At Bedtime   40 mg at 10/17/21 2211     caffeine citrate (CAFCIT) injection 60 mg  60 mg Intravenous Once PRN         carboxymethylcellulose PF (REFRESH PLUS) 0.5 % ophthalmic solution 1 drop  1 drop Both Eyes Q12H   1 drop at 10/18/21 1134     escitalopram (LEXAPRO) tablet 10 mg  10 mg Oral Daily   10 mg at 10/18/21 1138     furosemide (LASIX) tablet 20 mg  20 mg Oral Daily   20 mg at 10/18/21 1135     HOLD: Caffeine containing medications 12 hours prior to the procedure   Does not apply HOLD         HOLD: dipyridamole (PERSANTINE) or aspirin/dipyridamole (AGGRENOX) 48 hours prior to the procedure   Does not apply HOLD         HOLD: theophylline or aminophylline 12 hours prior to the procedure   Does not apply HOLD         IF patient diabetic - HOLD: ALL ORAL HYPOGLYCEMICS and include: glipizide, glyburide, glimepiride, gliclazide, metformin, any metformin containing medication, on day of the procedure   Does not apply HOLD         isosorbide mononitrate (IMDUR) 24 hr tablet 30 mg  30 mg Oral Daily   30 mg at 10/18/21 1135     lisinopril (ZESTRIL) tablet 10 mg  10 mg Oral Daily   10 mg at 10/18/21 1138     metoprolol succinate ER (TOPROL-XL) 24 hr tablet 100 mg  100 mg Oral QAM   100 mg at 10/18/21 1139     metoprolol succinate ER (TOPROL-XL) 24 hr tablet 50 mg  50 mg Oral QPM   50 mg at 10/17/21 2210     multivitamin w/minerals (THERA-VIT-M) tablet 1 tablet  1 tablet Oral Daily   1 tablet at 10/18/21 1135     nitroGLYcerin (NITROSTAT) sublingual tablet 0.4 mg  0.4 mg Sublingual Q5 Min PRN         sodium chloride (PF) 0.9% PF flush 1-10 mL  1-10 mL Intravenous Q10 Min PRN         sodium chloride (PF) 0.9% PF flush 10 mL  10 mL Intravenous Q10 Min  PRN   10 mL at 10/18/21 0942     sodium chloride (PF) 0.9% PF flush 5-10 mL  5-10 mL Intravenous q1 min prn         sodium chloride bacteriostatic 0.9 % flush 0-1 mL  0-1 mL Intradermal Once PRN         sodium chloride tablet 1 g  1 g Oral Daily   1 g at 10/18/21 1138     Warfarin Therapy Reminder (Check START DATE - warfarin may be starting in the FUTURE)  1 each Does not apply Continuous PRN         No current [facility]-ordered outpatient medications on file.             Review of Systems:   The 10 point Review of Systems is negative other than noted in the HPI            Data:   All laboratory data reviewed  Results for orders placed or performed during the hospital encounter of 10/17/21 (from the past 24 hour(s))   EKG 12-lead, tracing only   Result Value Ref Range    Systolic Blood Pressure  mmHg    Diastolic Blood Pressure  mmHg    Ventricular Rate 90 BPM    Atrial Rate 49 BPM    PA Interval  ms    QRS Duration 136 ms     ms    QTc 479 ms    P Axis  degrees    R AXIS -68 degrees    T Axis 108 degrees    Interpretation ECG       Ventricular-paced rhythm with frequent Premature ventricular complexes  Abnormal ECG  When compared with ECG of 05-OCT-2021 14:07,  Vent. rate has decreased BY   5 BPM  Confirmed by GENERATED REPORT, COMPUTER ([id number]),  Yissel Rios ([id number]) on 10/18/2021 12:43:19 AM     CBC (+ platelets, no diff)   Result Value Ref Range    WBC Count 6.7 4.0 - 11.0 10e3/uL    RBC Count 4.27 3.80 - 5.20 10e6/uL    Hemoglobin 13.2 11.7 - 15.7 g/dL    Hematocrit 39.1 35.0 - 47.0 %    MCV 92 78 - 100 fL    MCH 30.9 26.5 - 33.0 pg    MCHC 33.8 31.5 - 36.5 g/dL    RDW 14.3 10.0 - 15.0 %    Platelet Count 217 150 - 450 10e3/uL   Comprehensive metabolic panel   Result Value Ref Range    Sodium 127 (L) 133 - 144 mmol/L    Potassium 4.3 3.4 - 5.3 mmol/L    Chloride 95 94 - 109 mmol/L    Carbon Dioxide (CO2) 23 20 - 32 mmol/L    Anion Gap 9 3 - 14 mmol/L    Urea Nitrogen 20 7 - 30 mg/dL    Creatinine 1.00  0.52 - 1.04 mg/dL    Calcium 8.4 (L) 8.5 - 10.1 mg/dL    Glucose 111 (H) 70 - 99 mg/dL    Alkaline Phosphatase 70 40 - 150 U/L    AST 32 0 - 45 U/L    ALT 39 0 - 50 U/L    Protein Total 6.3 (L) 6.8 - 8.8 g/dL    Albumin 3.0 (L) 3.4 - 5.0 g/dL    Bilirubin Total 1.0 0.2 - 1.3 mg/dL    GFR Estimate 48 (L) >60 mL/min/1.73m2   Troponin I (now)   Result Value Ref Range    Troponin I <0.015 0.000 - 0.045 ug/L   INR   Result Value Ref Range    INR 2.77 (H) 0.85 - 1.15   [place] Draw    Narrative    The following orders were created for panel order [place] [facility].  Procedure                               Abnormality         Status                     ---------                               -----------         ------                     Extra Red Top Tube[[id number]]                               Final result                 Please view results for these tests on the individual orders.   Extra Red Top Tube   Result Value Ref Range    Hold Specimen JIC    XR Chest Port 1 View    Narrative    EXAM: XR CHEST PORT 1 VIEW  LOCATION: [facility]  DATE/TIME: 10/17/2021 6:53 PM    INDICATION: Chest pain.  COMPARISON: None.      Impression    IMPRESSION: Triple lead cardiac device left chest wall, with lead tips projected over the RA, RV, and coronary sinus. Pulmonary vascularity is within normal limits. Small bilateral pleural effusions. Aortic calcification. No pneumothorax.   Asymptomatic COVID-19 Virus (Coronavirus) by PCR Nasopharyngeal    Specimen: Nasopharyngeal; Swab   Result Value Ref Range    SARS CoV2 PCR Negative Negative    Narrative    Testing was performed using the jose angel  SARS-CoV-2 & Influenza A/B Assay on the jose angel  [employer]  System.  This test should be ordered for the detection of SARS-COV-2 in individuals who meet SARS-CoV-2 clinical and/or epidemiological criteria. Test performance is unknown in asymptomatic patients.  This test is for in vitro diagnostic use under the FDA EUA for laboratories  certified under CLIA to perform moderate and/or high complexity testing. This test has not been FDA cleared or approved.  A negative test does not rule out the presence of PCR inhibitors in the specimen or target RNA in concentration below the limit of detection for the assay. The possibility of a false negative should be considered if the patient's recent exposure or clinical presentation suggests COVID-19.  [facility] are certified under the Clinical Laboratory Improvement Amendments of 1988 (CLIA-88) as qualified to perform moderate and/or high complexity laboratory testing.   Troponin I - Now then in 4 hours x 3   Result Value Ref Range    Troponin I <0.015 0.000 - 0.045 ug/L   Troponin I - Now then in 4 hours x 3   Result Value Ref Range    Troponin I 0.047 (H) 0.000 - 0.045 ug/L   EKG 12-lead, tracing only   Result Value Ref Range    Systolic Blood Pressure  mmHg    Diastolic Blood Pressure  mmHg    Ventricular Rate 76 BPM    Atrial Rate 76 BPM    NC Interval  ms    QRS Duration 132 ms     ms    QTc 474 ms    P Axis  degrees    R AXIS -75 degrees    T Axis 105 degrees    Interpretation ECG       Ventricular-paced rhythm with frequent Premature ventricular complexes  Abnormal ECG  When compared with ECG of 17-OCT-2021 18:18,  Vent. rate has decreased BY  14 BPM  Confirmed by GENERATED REPORT, COMPUTER ([id number]),  Yissel Rios ([id number]) on 10/18/2021 3:34:13 AM     CBC with platelets differential    Narrative    The following orders were created for panel order CBC with platelets differential.  Procedure                               Abnormality         Status                     ---------                               -----------         ------                     CBC with platelets and d...[[id number]]  Abnormal            Final result                 Please view results for these tests on the individual orders.   Troponin I - Now then in 4 hours x 3   Result Value Ref Range     Troponin I <0.015 0.000 - 0.045 ug/L   CBC with platelets and differential   Result Value Ref Range    WBC Count 4.9 4.0 - 11.0 10e3/uL    RBC Count 4.01 3.80 - 5.20 10e6/uL    Hemoglobin 12.3 11.7 - 15.7 g/dL    Hematocrit 36.2 35.0 - 47.0 %    MCV 90 78 - 100 fL    MCH 30.7 26.5 - 33.0 pg    MCHC 34.0 31.5 - 36.5 g/dL    RDW 14.3 10.0 - 15.0 %    Platelet Count 182 150 - 450 10e3/uL    % Neutrophils 76 %    % Lymphocytes 11 %    % Monocytes 11 %    % Eosinophils 1 %    % Basophils 1 %    % Immature Granulocytes 0 %    NRBCs per 100 WBC 0 <1 /100    Absolute Neutrophils 3.8 1.6 - 8.3 10e3/uL    Absolute Lymphocytes 0.5 (L) 0.8 - 5.3 10e3/uL    Absolute Monocytes 0.5 0.0 - 1.3 10e3/uL    Absolute Eosinophils 0.1 0.0 - 0.7 10e3/uL    Absolute Basophils 0.0 0.0 - 0.2 10e3/uL    Absolute Immature Granulocytes 0.0 <=0.0 10e3/uL    Absolute NRBCs 0.0 10e3/uL   Basic metabolic panel   Result Value Ref Range    Sodium 131 (L) 133 - 144 mmol/L    Potassium 4.2 3.4 - 5.3 mmol/L    Chloride 97 94 - 109 mmol/L    Carbon Dioxide (CO2) 26 20 - 32 mmol/L    Anion Gap 8 3 - 14 mmol/L    Urea Nitrogen 15 7 - 30 mg/dL    Creatinine 0.93 0.52 - 1.04 mg/dL    Calcium 7.8 (L) 8.5 - 10.1 mg/dL    Glucose 94 70 - 99 mg/dL    GFR Estimate 52 (L) >60 mL/min/1.73m2   INR   Result Value Ref Range    INR 2.56 (H) 0.85 - 1.15   NM Lexiscan stress test (nuc card)   Result Value Ref Range    Target      Baseline Systolic      Baseline Diastolic BP 68     Last Stress Systolic      Last Stress Diastolic BP 63     Baseline HR 74 bpm    Max Perdicted HR  71 bpm    Max Perdicted HR  57 %    Rate Pressure Product 8,520.0     Narrative       The nuclear stress test is negative for inducible myocardial ischemia   or infarction.     There were frequent PVCs and couplets both at rest and during stress.    Nondiagnostic ECG secondary to paced rhythm.     Left ventricular function is low normal EF 50% at rest.  EF 40% with   stress,  however, this likely is related to technical issues with gated   imaging in the setting of frequent PVCs rather than a true reduction in EF   with stress.     A prior study was conducted on 7/27/2018.  This study has no change   when compared with the prior study.       Clinically Significant Risk Factors Present on Admission        # Hyponatremia: Na = 131 mmol/L (Ref range: 133 - 144 mmol/L) on admission, will monitor as appropriate     # Coagulation Defect: home medication list includes an anticoagulant medication            EKG results:     I have reviewed this patient's EKG with the following interpretation:        100% paced     Echocardiology:       45-50% LVEF,  1-2+ TR    Results:         Cardiac stress testing:   Negative nuclear stress test 10/21        Results:         Cardiac angiography:           Results:         Positron emission tomography  (PET) scan of the heart:

## 2021-10-18 NOTE — PHARMACY-ADMISSION MEDICATION HISTORY
Pharmacy Medication History  Admission medication history interview status for the 10/17/2021  admission is complete. See EPIC admission navigator for prior to admission medications     Location of Interview: Patient room  Medication history sources: Patient, Surescripts and Patient's home med list    Significant changes made to the medication list:      In the past week, patient estimated taking medication this percent of the time: greater than 90%    Additional medication history information:       Medication reconciliation completed by provider prior to medication history? No    Time spent in this activity: 15min    Prior to Admission medications    Medication Sig Last Dose Taking? Auth Provider   acetaminophen (TYLENOL) 500 MG tablet Take 500-1,000 mg by mouth every 6 hours as needed for mild pain prn at prn Yes Reported, Patient   amLODIPine (NORVASC) 5 MG tablet Take 1 tablet (5 mg) by mouth daily 10/10/2021 at Unknown time Yes Fay Tatum MD   atorvastatin (LIPITOR) 40 MG tablet Take 40 mg by mouth At Bedtime  10/16/2021 at Unknown time Yes Kirit Michel MD   calcium carbonate-vitamin D (CALCIUM + D) 600-200 MG-UNIT TABS Take 1 tablet by mouth daily. 10/17/2021 at Unknown time Yes Unknown, Entered By History   cycloSPORINE (RESTASIS) 0.05 % ophthalmic emulsion Place 1 drop into both eyes every 12 hours. 10/17/2021 at AM Yes Reported, Patient   escitalopram (LEXAPRO) 10 MG tablet Take 10 mg by mouth daily 10/17/2021 at Unknown time Yes Reported, Patient   furosemide (LASIX) 20 MG tablet Take 1 tablet (20 mg) by mouth daily 10/17/2021 at Unknown time Yes Juan Campbell MD   isosorbide mononitrate (IMDUR) 30 MG 24 hr tablet Take 1 tablet (30 mg) by mouth daily 10/17/2021 at Unknown time Yes Juan Campbell MD   lisinopril (ZESTRIL) 10 MG tablet Take 1 tablet (10 mg) by mouth daily 10/17/2021 at Unknown time Yes Kat Yo APRN CNP   metoprolol succinate ER (TOPROL-XL) 50  MG 24 hr tablet Take 100 mg by mouth every morning 10/17/2021 at Unknown time Yes Unknown, Entered By History   metoprolol succinate ER (TOPROL-XL) 50 MG 24 hr tablet 100 in am and 50mg at night  Patient taking differently: Take 50 mg by mouth every evening (100mg in am and 50mg at night) 10/16/2021 at Unknown time Yes Kat Yo, APRN CNP   Multiple Vitamins-Minerals (CENTRUM SILVER) per tablet Take 1 tablet by mouth daily.   10/17/2021 at Unknown time Yes Unknown, Entered By History   Multiple Vitamins-Minerals (PRESERVISION AREDS 2 PO) Take by mouth daily 10/17/2021 at Unknown time Yes Reported, Patient   nitroGLYcerin (NITROSTAT) 0.4 MG sublingual tablet For chest pain place 1 tablet under the tongue every 5 minutes for 3 doses. If symptoms persist 5 minutes after 1st dose call 911. prn at prn Yes Kat Yo, APRN CNP   Probiotic Product (PROBIOTIC DAILY PO) Take 1 tablet by mouth daily  10/17/2021 at Unknown time Yes Reported, Patient   sodium chloride 1 GM tablet Take 1 g by mouth daily 10/17/2021 at Unknown time Yes Reported, Patient   warfarin ANTICOAGULANT (COUMADIN) 4 MG tablet Take 4 mg by mouth daily 2mg (0.5 tablet) Mon, Wed, Fri  4mg (1 tablet) Tues, Thurs. Sat, Sun 10/17/2021 at Unknown time Yes Unknown, Entered By History   Alendronate Sodium (FOSAMAX PO) Take 70 mg by mouth once a week Sunday evenings 10/10/2021  Reported, Patient       The information provided in this note is only as accurate as the sources available at the time of update(s)

## 2021-10-18 NOTE — TELEPHONE ENCOUNTER
Pt son Philipp called and pt is in the hospital for another episode of tingling and chest pain and use of nitroglycerin. Pt had a Lexiscan today and Philipp called about the stress test and explained that the one scheduled will be cancelled. Philipp also mentioned that pt was to have a cath tomorrow, this has not been scheduled as her INR was 2.56 today. Warfarin is on hold. Philipp was wondering what Kat and Dr Tatum had discussed and this writer stated that Kat is out of the office this week and thus can not see what they have discussed.  Did discuss that pt is in the hospital and he states that he will stop in and see pt tomorrow. Zulma

## 2021-10-18 NOTE — ED NOTES
[facility]  ED Nurse Handoff Report    ED Chief complaint: Chest Pain      ED Diagnosis:   Final diagnoses:   Unstable angina (H)       Code Status: hospitalist to address    Allergies:   Allergies   Allergen Reactions     Amiodarone Nausea     Hctz      Hydrochlorothiazide Other (See Comments)     Other reaction(s): *Unknown     Lansoprazole      diarrhea   Prevacid       Patient Story: patient brought to ED via EMS. She reports over the last week she has been having increasing bouts of chest pain and some nausea. Pt saw her cardiologist on Thursday and they scheduled her for a stress test. Patient had another bout of chest pain pta and took nitroglycerin which helped her pain. Patient has [employer] Pacemaker. EKG V-paced with frequent PVCs. Pt is alert and oriented.  Focused Assessment:  See above    Treatments and/or interventions provided:   Patient's response to treatments and/or interventions:     To be done/followed up on inpatient unit:  none at this time    Does this patient have any cognitive concerns?: N/A    Activity level - Baseline/Home:  Walker  Activity Level - Current:   Walker    Patient's Preferred language: English   Needed?: No    Isolation: None  Infection: Not Applicable  Patient tested for COVID 19 prior to admission: YES  Bariatric?: No    Vital Signs:   Vitals:    10/17/21 1808 10/17/21 1820 10/17/21 1859   BP: 128/82     Pulse: 96  85   Resp: 12  9   Temp:  97.9  F (36.6  C)    TempSrc:  Temporal    SpO2: 98%  96%       Cardiac Rhythm:Cardiac Rhythm: Ventricular paced (frequent PVCs)    Was the PSS-3 completed:   Yes  What interventions are required if any?               Family Comments: daughter at bedside- aware of plan of care  OBS brochure/video discussed/provided to patient/family: N/A              Name of person given brochure if not patient:               Relationship to patient:     For the majority of the shift this patient's behavior was Green.    Behavioral interventions performed were .    ED NURSE PHONE NUMBER: [phone]

## 2021-10-18 NOTE — PROGRESS NOTES
[facility]    Medicine Progress Note - Hospitalist Service       Date of Admission:  10/17/2021    Assessment & Plan           Haritha Trujillo is a 89 year old female with complex past medical history including nonischemic cardiomyopathy, history of acute on chronic heart failure with reduced ejection fraction, chest pain history, permanent atrial fibrillation on anticoagulation, hypertension, status post AV node ablation with permanent pacemaker, stage III CKD, chronic hyponatremia, dyslipidemia, depression with recent hospitalization [facility] earlier this month for chest pain and dyspnea on exertion who presented to the ER with chest pain 10/17.    Chest pain  Patient states chest pain is similar to prior pain she had during last admission. Described as tingling in her arms and feeling like rocks are on her chest.    -2 episodes over the weekend both improved with sublingual nitroglycerin administered by her son.     -Negative troponin initially however 2nd troponin very mildly positive at 0.047.  Chest x-ray unremarkable.  EKG with paced rhythm and PVCs  -On last admission echocardiogram without significant change from prior initiated on Imdur for her pain and cardiology recommend OP follow up.      > recurrent chest pain this morning about 7AM that self resolved  > underwent lexiscan today which was negative for inducible ischemia  > long conversation with patient and her son at bedside today. They were given decision to discharge with outpatient follow up however they tell me about her hx of previous pain from abnormal pacer leads, her current pain and their concern that something bigger is actually going on despite negative stress test. Would prefer cardiology to evaluate while here in the hospital to decide if there is a need for further work up.  >continue PTA metoprolol 50 and 75mg  >continue daily aspirin and PTA statin    Nonischemic cardiomyopathy   HFrEF,  no in acute exacerbation  Essential HTN  Recent admission for acute on chronic heart failure with reduced ejection fraction earlier this month.  Discharge weight 38 pounds.    -Patient appears euvolemic.  She has been checking her weight daily.  It has been stable.      >Continue PTA meds including amlodipine 5mg, lasix 20mg, lisinopril, metoprolol and imdur       Hyponatremia   Chronic.  Levels run from 127-133  - up to 131 today, monitor with periodic labs     Hyperlipidemia  Continue prior to admission Lipitor 40 mg nightly       Permanent atrial fibrillation (H)   Cardiac pacemaker in situ  - v paced with PVC on tele  - continue PTA metoprolol and coumadin per pharmacy to dose      Chronic kidney disease  - baseline Cr about 1.0  - on admission stable at 1.0  - monitor with daily labs      Covid status.  Patient has had both vaccination injections.  Covid negative last admission.  No Covid symptoms.  Covid swab pending.  Low suspicion  DVT Prophylaxis: She is on Coumadin,  Code Status: DNR / DNI elvis with patient in detail with daughter present.  Patient had previously been full code now wishes to be DNR/DNI    Disposition: 1-2 days pending further work up by cardiology       The patient's care was discussed with the patient, her son and bedside nurse    Shelly Blanton,   Hospitalist Service  [facility]  Securely message with the Vocera Web Console (learn more here)  Text page via "[employer] Paging/Directory      Clinically Significant Risk Factors Present on Admission         # Hyponatremia: Na = 131 mmol/L (Ref range: 133 - 144 mmol/L) on admission, will monitor as appropriate     # Coagulation Defect: home medication list includes an anticoagulant medication       ______________________________________________________________________    Interval History   Patient initially assessed in the morning in bed she described sensation of pain again that had resolved. She denies dizziness. No  cough. No major HUERTA. She was pleasant. No LE edema. Evaluated after exam and she was eating.    Data reviewed today: I reviewed all medications, new labs and imaging results over the last 24 hours. I personally reviewed EKG with v paced and PVCs,    Physical Exam   Vital Signs: Temp: 97.3  F (36.3  C) Temp src: Oral BP: 124/69 Pulse: 76   Resp: 16 SpO2: 94 % O2 Device: None (Room air)    Weight: 138 lbs 1.6 oz     Constitutional: Awake, alert, cooperative, no apparent distress  Respiratory: Clear to auscultation bilaterally, no crackles or wheezing no pain on palpation  Cardiovascular: Regular rate and rhythm, normal S1 and S2, soft systolic murmur  GI: Normal bowel sounds, soft, non-distended, non-tender  Skin/Integumen: No rashes, no cyanosis, no edema  MSK: mild arthritic joint swelling in hands    Data   Recent Labs   Lab 10/18/21  0622 10/18/21  0420 10/18/21  0103 10/17/21  2215 10/17/21  1821 10/17/21  1821 10/15/21  1335   WBC  --  4.9  --   --   --  6.7  --    HGB  --  12.3  --   --   --  13.2  --    MCV  --  90  --   --   --  92  --    PLT  --  182  --   --   --  217  --    INR 2.56*  --   --   --   --  2.77*  --    *  --   --   --   --  127* 129*   POTASSIUM 4.2  --   --   --   --  4.3 4.0   CHLORIDE 97  --   --   --   --  95 95   CO2 26  --   --   --   --  23 26   BUN 15  --   --   --   --  20 20   CR 0.93  --   --   --   --  1.00 1.14*   ANIONGAP 8  --   --   --   --  9 8   PATRICIA 7.8*  --   --   --   --  8.4* 8.1*   GLC 94  --   --   --   --  111* 126*   ALBUMIN  --   --   --   --   --  3.0*  --    PROTTOTAL  --   --   --   --   --  6.3*  --    BILITOTAL  --   --   --   --   --  1.0  --    ALKPHOS  --   --   --   --   --  70  --    ALT  --   --   --   --   --  39  --    AST  --   --   --   --   --  32  --    TROPONIN  --  <0.015 0.047* <0.015   < > <0.015  --     < > = values in this interval not displayed.     Recent Results (from the past 24 hour(s))   XR Chest Port 1 View    Narrative    EXAM: XR  CHEST PORT 1 VIEW  LOCATION: [facility]  DATE/TIME: 10/17/2021 6:53 PM    INDICATION: Chest pain.  COMPARISON: None.      Impression    IMPRESSION: Triple lead cardiac device left chest wall, with lead tips projected over the RA, RV, and coronary sinus. Pulmonary vascularity is within normal limits. Small bilateral pleural effusions. Aortic calcification. No pneumothorax.   NM Lexiscan stress test (nuc card)   Result Value    Target     Baseline Systolic     Baseline Diastolic BP 68    Last Stress Systolic     Last Stress Diastolic BP 63    Baseline HR 74    Max Perdicted HR  71    Max Perdicted HR  57    Rate Pressure Product 8,520.0    Narrative       The nuclear stress test is negative for inducible myocardial ischemia   or infarction.     There were frequent PVCs and couplets both at rest and during stress.    Nondiagnostic ECG secondary to paced rhythm.     Left ventricular function is low normal EF 50% at rest.  EF 40% with   stress, however, this likely is related to technical issues with gated   imaging in the setting of frequent PVCs rather than a true reduction in EF   with stress.     A prior study was conducted on 7/27/2018.  This study has no change   when compared with the prior study.

## 2021-10-18 NOTE — PROGRESS NOTES
Lexiscan Stress: Pt tolerated well. VSS. Pt denied chest pain or pressure prior to injection. After injection pt c/o chest pressure, weak LE, slight SOB and a HA.  50mg of Aminophylline given.  After Aminophylline pt back to baseline.      0950 Pt transferred back to Rm 245-1 per w/c. .

## 2021-10-19 LAB
ANION GAP SERPL CALCULATED.3IONS-SCNC: 7 MMOL/L (ref 3–14)
BASOPHILS # BLD AUTO: 0 10E3/UL (ref 0–0.2)
BASOPHILS NFR BLD AUTO: 0 %
BUN SERPL-MCNC: 18 MG/DL (ref 7–30)
CALCIUM SERPL-MCNC: 7.7 MG/DL (ref 8.5–10.1)
CHLORIDE BLD-SCNC: 96 MMOL/L (ref 94–109)
CO2 SERPL-SCNC: 27 MMOL/L (ref 20–32)
CREAT SERPL-MCNC: 1.11 MG/DL (ref 0.52–1.04)
EOSINOPHIL # BLD AUTO: 0.1 10E3/UL (ref 0–0.7)
EOSINOPHIL NFR BLD AUTO: 2 %
ERYTHROCYTE [DISTWIDTH] IN BLOOD BY AUTOMATED COUNT: 14.2 % (ref 10–15)
GFR SERPL CREATININE-BSD FRML MDRD: 42 ML/MIN/1.73M2
GLUCOSE BLD-MCNC: 93 MG/DL (ref 70–99)
HCT VFR BLD AUTO: 39.2 % (ref 35–47)
HGB BLD-MCNC: 13.3 G/DL (ref 11.7–15.7)
IMM GRANULOCYTES # BLD: 0 10E3/UL
IMM GRANULOCYTES NFR BLD: 1 %
INR PPP: 1.94 (ref 0.85–1.15)
INR PPP: 2.13 (ref 0.85–1.15)
INR PPP: 2.46 (ref 0.85–1.15)
LYMPHOCYTES # BLD AUTO: 0.5 10E3/UL (ref 0.8–5.3)
LYMPHOCYTES NFR BLD AUTO: 9 %
MCH RBC QN AUTO: 30.8 PG (ref 26.5–33)
MCHC RBC AUTO-ENTMCNC: 33.9 G/DL (ref 31.5–36.5)
MCV RBC AUTO: 91 FL (ref 78–100)
MONOCYTES # BLD AUTO: 0.6 10E3/UL (ref 0–1.3)
MONOCYTES NFR BLD AUTO: 11 %
NEUTROPHILS # BLD AUTO: 4.5 10E3/UL (ref 1.6–8.3)
NEUTROPHILS NFR BLD AUTO: 77 %
NRBC # BLD AUTO: 0 10E3/UL
NRBC BLD AUTO-RTO: 0 /100
PLATELET # BLD AUTO: 186 10E3/UL (ref 150–450)
POTASSIUM BLD-SCNC: 4.4 MMOL/L (ref 3.4–5.3)
RBC # BLD AUTO: 4.32 10E6/UL (ref 3.8–5.2)
SODIUM SERPL-SCNC: 130 MMOL/L (ref 133–144)
WBC # BLD AUTO: 5.8 10E3/UL (ref 4–11)

## 2021-10-19 PROCEDURE — 250N000009 HC RX 250: Performed by: INTERNAL MEDICINE

## 2021-10-19 PROCEDURE — 93458 L HRT ARTERY/VENTRICLE ANGIO: CPT | Mod: 26 | Performed by: INTERNAL MEDICINE

## 2021-10-19 PROCEDURE — 250N000013 HC RX MED GY IP 250 OP 250 PS 637: Performed by: INTERNAL MEDICINE

## 2021-10-19 PROCEDURE — 250N000011 HC RX IP 250 OP 636: Performed by: INTERNAL MEDICINE

## 2021-10-19 PROCEDURE — 85610 PROTHROMBIN TIME: CPT | Performed by: INTERNAL MEDICINE

## 2021-10-19 PROCEDURE — 85025 COMPLETE CBC W/AUTO DIFF WBC: CPT | Performed by: STUDENT IN AN ORGANIZED HEALTH CARE EDUCATION/TRAINING PROGRAM

## 2021-10-19 PROCEDURE — 272N000001 HC OR GENERAL SUPPLY STERILE: Performed by: INTERNAL MEDICINE

## 2021-10-19 PROCEDURE — 99152 MOD SED SAME PHYS/QHP 5/>YRS: CPT | Performed by: INTERNAL MEDICINE

## 2021-10-19 PROCEDURE — 36415 COLL VENOUS BLD VENIPUNCTURE: CPT | Performed by: STUDENT IN AN ORGANIZED HEALTH CARE EDUCATION/TRAINING PROGRAM

## 2021-10-19 PROCEDURE — C1887 CATHETER, GUIDING: HCPCS | Performed by: INTERNAL MEDICINE

## 2021-10-19 PROCEDURE — C1894 INTRO/SHEATH, NON-LASER: HCPCS | Performed by: INTERNAL MEDICINE

## 2021-10-19 PROCEDURE — 250N000013 HC RX MED GY IP 250 OP 250 PS 637: Performed by: STUDENT IN AN ORGANIZED HEALTH CARE EDUCATION/TRAINING PROGRAM

## 2021-10-19 PROCEDURE — 99214 OFFICE O/P EST MOD 30 MIN: CPT | Mod: 25 | Performed by: INTERNAL MEDICINE

## 2021-10-19 PROCEDURE — 93458 L HRT ARTERY/VENTRICLE ANGIO: CPT | Performed by: INTERNAL MEDICINE

## 2021-10-19 PROCEDURE — 258N000003 HC RX IP 258 OP 636: Performed by: INTERNAL MEDICINE

## 2021-10-19 PROCEDURE — 93571 IV DOP VEL&/PRESS C FLO 1ST: CPT | Performed by: INTERNAL MEDICINE

## 2021-10-19 PROCEDURE — 93571 IV DOP VEL&/PRESS C FLO 1ST: CPT | Mod: 26 | Performed by: INTERNAL MEDICINE

## 2021-10-19 PROCEDURE — C1769 GUIDE WIRE: HCPCS | Performed by: INTERNAL MEDICINE

## 2021-10-19 PROCEDURE — 99225 PR SUBSEQUENT OBSERVATION CARE,LEVEL II: CPT | Performed by: STUDENT IN AN ORGANIZED HEALTH CARE EDUCATION/TRAINING PROGRAM

## 2021-10-19 PROCEDURE — 36415 COLL VENOUS BLD VENIPUNCTURE: CPT | Performed by: INTERNAL MEDICINE

## 2021-10-19 PROCEDURE — G0378 HOSPITAL OBSERVATION PER HR: HCPCS

## 2021-10-19 PROCEDURE — 80048 BASIC METABOLIC PNL TOTAL CA: CPT | Performed by: STUDENT IN AN ORGANIZED HEALTH CARE EDUCATION/TRAINING PROGRAM

## 2021-10-19 PROCEDURE — 99153 MOD SED SAME PHYS/QHP EA: CPT | Performed by: INTERNAL MEDICINE

## 2021-10-19 RX ORDER — IOPAMIDOL 755 MG/ML
INJECTION, SOLUTION INTRAVASCULAR
Status: DISCONTINUED | OUTPATIENT
Start: 2021-10-19 | End: 2021-10-19 | Stop reason: HOSPADM

## 2021-10-19 RX ORDER — WARFARIN SODIUM 4 MG/1
4 TABLET ORAL
Status: COMPLETED | OUTPATIENT
Start: 2021-10-19 | End: 2021-10-19

## 2021-10-19 RX ORDER — NALOXONE HYDROCHLORIDE 0.4 MG/ML
0.2 INJECTION, SOLUTION INTRAMUSCULAR; INTRAVENOUS; SUBCUTANEOUS
Status: ACTIVE | OUTPATIENT
Start: 2021-10-19 | End: 2021-10-19

## 2021-10-19 RX ORDER — ACETAMINOPHEN 325 MG/1
650 TABLET ORAL EVERY 4 HOURS PRN
Status: DISCONTINUED | OUTPATIENT
Start: 2021-10-19 | End: 2021-10-20 | Stop reason: HOSPADM

## 2021-10-19 RX ORDER — SODIUM CHLORIDE 9 MG/ML
75 INJECTION, SOLUTION INTRAVENOUS CONTINUOUS
Status: ACTIVE | OUTPATIENT
Start: 2021-10-19 | End: 2021-10-19

## 2021-10-19 RX ORDER — ASPIRIN 81 MG/1
243 TABLET, CHEWABLE ORAL ONCE
Status: COMPLETED | OUTPATIENT
Start: 2021-10-19 | End: 2021-10-19

## 2021-10-19 RX ORDER — FENTANYL CITRATE 50 UG/ML
25 INJECTION, SOLUTION INTRAMUSCULAR; INTRAVENOUS
Status: DISCONTINUED | OUTPATIENT
Start: 2021-10-19 | End: 2021-10-20 | Stop reason: HOSPADM

## 2021-10-19 RX ORDER — POTASSIUM CHLORIDE 1500 MG/1
20 TABLET, EXTENDED RELEASE ORAL
Status: DISCONTINUED | OUTPATIENT
Start: 2021-10-19 | End: 2021-10-19

## 2021-10-19 RX ORDER — VERAPAMIL HYDROCHLORIDE 2.5 MG/ML
INJECTION, SOLUTION INTRAVENOUS
Status: DISCONTINUED | OUTPATIENT
Start: 2021-10-19 | End: 2021-10-19 | Stop reason: HOSPADM

## 2021-10-19 RX ORDER — LORAZEPAM 2 MG/ML
0.5 INJECTION INTRAMUSCULAR
Status: DISCONTINUED | OUTPATIENT
Start: 2021-10-19 | End: 2021-10-19

## 2021-10-19 RX ORDER — ASPIRIN 325 MG
325 TABLET ORAL ONCE
Status: COMPLETED | OUTPATIENT
Start: 2021-10-19 | End: 2021-10-19

## 2021-10-19 RX ORDER — FENTANYL CITRATE 50 UG/ML
INJECTION, SOLUTION INTRAMUSCULAR; INTRAVENOUS
Status: DISCONTINUED | OUTPATIENT
Start: 2021-10-19 | End: 2021-10-19 | Stop reason: HOSPADM

## 2021-10-19 RX ORDER — ATROPINE SULFATE 0.1 MG/ML
0.5 INJECTION INTRAVENOUS
Status: ACTIVE | OUTPATIENT
Start: 2021-10-19 | End: 2021-10-19

## 2021-10-19 RX ORDER — OXYCODONE HYDROCHLORIDE 5 MG/1
10 TABLET ORAL EVERY 4 HOURS PRN
Status: DISCONTINUED | OUTPATIENT
Start: 2021-10-19 | End: 2021-10-20 | Stop reason: HOSPADM

## 2021-10-19 RX ORDER — HEPARIN SODIUM 1000 [USP'U]/ML
INJECTION, SOLUTION INTRAVENOUS; SUBCUTANEOUS
Status: DISCONTINUED | OUTPATIENT
Start: 2021-10-19 | End: 2021-10-19 | Stop reason: HOSPADM

## 2021-10-19 RX ORDER — NITROGLYCERIN 5 MG/ML
VIAL (ML) INTRAVENOUS
Status: DISCONTINUED | OUTPATIENT
Start: 2021-10-19 | End: 2021-10-19 | Stop reason: HOSPADM

## 2021-10-19 RX ORDER — FLUMAZENIL 0.1 MG/ML
0.2 INJECTION, SOLUTION INTRAVENOUS
Status: ACTIVE | OUTPATIENT
Start: 2021-10-19 | End: 2021-10-19

## 2021-10-19 RX ORDER — OXYCODONE HYDROCHLORIDE 5 MG/1
5 TABLET ORAL EVERY 4 HOURS PRN
Status: DISCONTINUED | OUTPATIENT
Start: 2021-10-19 | End: 2021-10-20 | Stop reason: HOSPADM

## 2021-10-19 RX ORDER — SODIUM CHLORIDE 9 MG/ML
INJECTION, SOLUTION INTRAVENOUS CONTINUOUS
Status: DISCONTINUED | OUTPATIENT
Start: 2021-10-19 | End: 2021-10-19 | Stop reason: DRUGHIGH

## 2021-10-19 RX ORDER — NALOXONE HYDROCHLORIDE 0.4 MG/ML
0.4 INJECTION, SOLUTION INTRAMUSCULAR; INTRAVENOUS; SUBCUTANEOUS
Status: ACTIVE | OUTPATIENT
Start: 2021-10-19 | End: 2021-10-19

## 2021-10-19 RX ORDER — LORAZEPAM 0.5 MG/1
0.5 TABLET ORAL
Status: DISCONTINUED | OUTPATIENT
Start: 2021-10-19 | End: 2021-10-19

## 2021-10-19 RX ORDER — LIDOCAINE 40 MG/G
CREAM TOPICAL
Status: DISCONTINUED | OUTPATIENT
Start: 2021-10-19 | End: 2021-10-20 | Stop reason: HOSPADM

## 2021-10-19 RX ADMIN — FUROSEMIDE 20 MG: 20 TABLET ORAL at 08:47

## 2021-10-19 RX ADMIN — SODIUM CHLORIDE 150 ML/HR: 9 INJECTION, SOLUTION INTRAVENOUS at 13:21

## 2021-10-19 RX ADMIN — ATORVASTATIN CALCIUM 40 MG: 40 TABLET, FILM COATED ORAL at 20:18

## 2021-10-19 RX ADMIN — AMLODIPINE BESYLATE 5 MG: 5 TABLET ORAL at 08:47

## 2021-10-19 RX ADMIN — CARBOXYMETHYLCELLULOSE SODIUM 1 DROP: 5 SOLUTION/ DROPS OPHTHALMIC at 08:46

## 2021-10-19 RX ADMIN — SODIUM CHLORIDE TAB 1 GM 1 G: 1 TAB at 08:46

## 2021-10-19 RX ADMIN — CARBOXYMETHYLCELLULOSE SODIUM 1 DROP: 5 SOLUTION/ DROPS OPHTHALMIC at 20:18

## 2021-10-19 RX ADMIN — METOPROLOL SUCCINATE 100 MG: 100 TABLET, EXTENDED RELEASE ORAL at 08:46

## 2021-10-19 RX ADMIN — LISINOPRIL 10 MG: 10 TABLET ORAL at 08:46

## 2021-10-19 RX ADMIN — ESCITALOPRAM OXALATE 10 MG: 10 TABLET ORAL at 08:46

## 2021-10-19 RX ADMIN — MULTIPLE VITAMINS W/ MINERALS TAB 1 TABLET: TAB at 08:46

## 2021-10-19 RX ADMIN — METOPROLOL SUCCINATE 50 MG: 50 TABLET, EXTENDED RELEASE ORAL at 20:13

## 2021-10-19 RX ADMIN — ISOSORBIDE MONONITRATE 30 MG: 30 TABLET, EXTENDED RELEASE ORAL at 08:46

## 2021-10-19 RX ADMIN — ASPIRIN 81 MG: 81 TABLET, COATED ORAL at 08:47

## 2021-10-19 RX ADMIN — WARFARIN SODIUM 4 MG: 4 TABLET ORAL at 18:07

## 2021-10-19 RX ADMIN — ASPIRIN 81 MG CHEWABLE TABLET 243 MG: 81 TABLET CHEWABLE at 13:20

## 2021-10-19 ASSESSMENT — MIFFLIN-ST. JEOR: SCORE: 995.07

## 2021-10-19 NOTE — PROGRESS NOTES
[facility]    Medicine Progress Note - Hospitalist Service       Date of Admission:  10/17/2021    Assessment & Plan           Haritha Trujillo is a 89 year old female with complex past medical history including nonischemic cardiomyopathy, history of acute on chronic heart failure with reduced ejection fraction, chest pain history, permanent atrial fibrillation on anticoagulation, hypertension, status post AV node ablation with permanent pacemaker, stage III CKD, chronic hyponatremia, dyslipidemia, depression with recent hospitalization [facility] earlier this month for chest pain and dyspnea on exertion who presented to the ER with chest pain 10/17.    Chest pain  Patient states chest pain is similar to prior pain she had during last admission. Described as tingling in her arms and feeling like rocks are on her chest.    -2 episodes over the weekend both improved with sublingual nitroglycerin administered by her son.     -Negative troponin initially however 2nd troponin very mildly positive at 0.047.  Chest x-ray unremarkable.  EKG with paced rhythm and PVCs  -On last admission echocardiogram without significant change from prior initiated on Imdur for her pain and cardiology recommend OP follow up.      > cardiology consulted and planning to proceed with angiogram for her symptoms. Delayed until this afternoon due to elevated INR  > Follow results of angiogram today and appreciate cardiology assistance in any management changes  >PT assessment after angiogram  >continue PTA metoprolol 50 and 75mg  >continue daily aspirin and PTA statin    Nonischemic cardiomyopathy   HFrEF, no in acute exacerbation  Essential HTN  Recent admission for acute on chronic heart failure with reduced ejection fraction earlier this month.  Discharge weight 38 pounds.    -Patient appears euvolemic.  She has been checking her weight daily.  It has been stable.      >Continue PTA meds including  amlodipine 5mg, lasix 20mg, lisinopril, metoprolol and imdur       Hyponatremia   Chronic.  Levels run from 127-133  - stable     Hyperlipidemia  Continue prior to admission Lipitor 40 mg nightly     Permanent atrial fibrillation (H)   Cardiac pacemaker in situ  - v paced with PVC on tele  - continue PTA metoprolol and coumadin per pharmacy to dose      Chronic kidney disease  - baseline Cr about 1.0  - stable, monitor  - monitor with daily labs      Covid status.  Patient has had both vaccination injections.  Covid negative last admission.  No Covid symptoms.  Covid swab pending.  Low suspicion  DVT Prophylaxis: She is on Coumadin,  Code Status: DNR / DNI elvis with patient in detail with daughter present.  Patient had previously been full code now wishes to be DNR/DNI    Disposition: tomorrow after angiogram and evaluation with therapy       The patient's care was discussed with the patient and her nurse today.    Shelly Blanton DO  Hospitalist Service  [facility]  Securely message with the Vocera Web Console (learn more here)  Text page via [employer] Paging/Directory      Clinically Significant Risk Factors Present on Admission         # Hyponatremia: Na = 130 mmol/L (Ref range: 133 - 144 mmol/L) on admission, will monitor as appropriate     # Coagulation Defect: home medication list includes an anticoagulant medication       ______________________________________________________________________    Interval History   Patient confused last night. She remains confused this morning but insightful to her confusion. Telling me she feels like she is in a movie. No further pain last 24 hours. No headache. No cough. No edema. No dysuria.    Data reviewed today: I reviewed all medications, new labs and imaging results over the last 24 hours. No new images.    Physical Exam   Vital Signs: Temp: 98.1  F (36.7  C) Temp src: Oral BP: 119/73 Pulse: 73   Resp: 16 SpO2: 100 % O2 Device: None (Room air)     Weight: 136 lbs 11.2 oz     Constitutional: Awake, disoriented, cooperative, no apparent distress  Respiratory: Clear to auscultation bilaterally, no crackles or wheezing no pain on palpation  Cardiovascular: Regular rate and rhythm, normal S1 and S2, soft systolic murmur  GI: Normal bowel sounds, soft, non-distended, non-tender  Skin/Integumen: No rashes, no cyanosis, no edema  MSK: mild arthritic joint swelling in hands    Data   Recent Labs   Lab 10/19/21  1153 10/19/21  0545 10/19/21  0544 10/18/21  2322 10/18/21  0622 10/18/21  0622 10/18/21  0420 10/18/21  0103 10/17/21  2215 10/17/21  1821 10/17/21  1821   WBC  --  5.8  --   --   --   --  4.9  --   --   --  6.7   HGB  --  13.3  --   --   --   --  12.3  --   --   --  13.2   MCV  --  91  --   --   --   --  90  --   --   --  92   PLT  --  186  --   --   --   --  182  --   --   --  217   INR 1.94*  --  2.13* 2.46*   < > 2.56*  --   --   --    < > 2.77*   NA  --   --  130*  --   --  131*  --   --   --   --  127*   POTASSIUM  --   --  4.4  --   --  4.2  --   --   --   --  4.3   CHLORIDE  --   --  96  --   --  97  --   --   --   --  95   CO2  --   --  27  --   --  26  --   --   --   --  23   BUN  --   --  18  --   --  15  --   --   --   --  20   CR  --   --  1.11*  --   --  0.93  --   --   --   --  1.00   ANIONGAP  --   --  7  --   --  8  --   --   --   --  9   PATRICIA  --   --  7.7*  --   --  7.8*  --   --   --   --  8.4*   GLC  --   --  93  --   --  94  --   --   --   --  111*   ALBUMIN  --   --   --   --   --   --   --   --   --   --  3.0*   PROTTOTAL  --   --   --   --   --   --   --   --   --   --  6.3*   BILITOTAL  --   --   --   --   --   --   --   --   --   --  1.0   ALKPHOS  --   --   --   --   --   --   --   --   --   --  70   ALT  --   --   --   --   --   --   --   --   --   --  39   AST  --   --   --   --   --   --   --   --   --   --  32   TROPONIN  --   --   --   --   --   --  <0.015 0.047* <0.015   < > <0.015    < > = values in this interval not  displayed.     No results found for this or any previous visit (from the past 24 hour(s)).

## 2021-10-19 NOTE — PROGRESS NOTES
"Cardiology Progress Note          Assessment and Plan:     Hyperlipidemia    Essential hypertension    Cardiovascular disease    Esophageal reflux    Permanent atrial fibrillation (H)    Cardiac pacemaker in situ    Chronic kidney disease    Pulmonary hypertension (H)    Mitral regurgitation    Aortic regurgitation    Tricuspid regurgitation    Pulmonary valve regurgitation    Hyponatremia    Cardiomyopathy (H)    Hx of atrioventricular node ablation    Unstable angina (H)       No recurrent chest discomfort.    Paced rhythm.  INR 2.2.  Will recheck at noon undergo angiogram if INR less than or equal to 2.0  Consent signed.  Full code status changed.                  Interval History:   doing well; no cp, sob, n/v/d, or abd pain.              Review of Systems:   As per subjective, otherwise 5 systems reviewed and negative.           Physical Exam:   Blood pressure (P) 127/78, pulse (P) 104, temperature (P) 97.9  F (36.6  C), temperature source (P) Oral, resp. rate (P) 16, height 1.626 m (5' 4\"), weight 62 kg (136 lb 11.2 oz), SpO2 (P) 97 %, not currently breastfeeding.      Vital Sign Ranges  Temperature Temp  Av.8  F (36.6  C)  Min: 97.3  F (36.3  C)  Max: 98.2  F (36.8  C)   Blood pressure Systolic (24hrs), Av , Min:103 , Max:127        Diastolic (24hrs), Av, Min:61, Max:78      Pulse Pulse  Av.8  Min: 61  Max: 104   Respirations Resp  Av.8  Min: 16  Max: 18   Pulse oximetry SpO2  Av.3 %  Min: 93 %  Max: 98 %         Intake/Output Summary (Last 24 hours) at 10/19/2021 0851  Last data filed at 10/18/2021 1844  Gross per 24 hour   Intake 240 ml   Output --   Net 240 ml       Constitutional:   NAD   Skin:   Warm and dry   Head:   Nontraumatic   Neck:   Supple, symmetrical, trachea midline, no adenopathy, thyroid symmetric, not enlarged and no tenderness, skin normal   Lungs:   normal   Cardiovascular:   Normal apical impulse, regular rate and rhythm, normal S1 and S2, no S3 or S4, and no " murmur noted    Abdomen:   Benign   Extremities and Back:   Symmetric, no curvature, spinous processes are non-tender on palpation, paraspinous muscles are non-tender on palpation, no costal vertebral tenderness   Neurological:   Grossly nonfocal            Medications:     No current outpatient medications on file.                Data:     Results for orders placed or performed during the hospital encounter of 10/17/21 (from the past 24 hour(s))   NM Lexiscan stress test (nuc card)   Result Value Ref Range    Target      Baseline Systolic      Baseline Diastolic BP 68     Last Stress Systolic      Last Stress Diastolic BP 63     Baseline HR 74 bpm    Max Perdicted HR  71 bpm    Max Perdicted HR  57 %    Rate Pressure Product 8,520.0     Narrative       The nuclear stress test is negative for inducible myocardial ischemia   or infarction.     There were frequent PVCs and couplets both at rest and during stress.    Nondiagnostic ECG secondary to paced rhythm.     Left ventricular function is low normal EF 50% at rest.  EF 40% with   stress, however, this likely is related to technical issues with gated   imaging in the setting of frequent PVCs rather than a true reduction in EF   with stress.     A prior study was conducted on 7/27/2018.  This study has no change   when compared with the prior study.     INR   Result Value Ref Range    INR 2.46 (H) 0.85 - 1.15   INR   Result Value Ref Range    INR 2.13 (H) 0.85 - 1.15   Basic metabolic panel   Result Value Ref Range    Sodium 130 (L) 133 - 144 mmol/L    Potassium 4.4 3.4 - 5.3 mmol/L    Chloride 96 94 - 109 mmol/L    Carbon Dioxide (CO2) 27 20 - 32 mmol/L    Anion Gap 7 3 - 14 mmol/L    Urea Nitrogen 18 7 - 30 mg/dL    Creatinine 1.11 (H) 0.52 - 1.04 mg/dL    Calcium 7.7 (L) 8.5 - 10.1 mg/dL    Glucose 93 70 - 99 mg/dL    GFR Estimate 42 (L) >60 mL/min/1.73m2   CBC with platelets differential    Narrative    The following orders were created for  panel order CBC with platelets differential.  Procedure                               Abnormality         Status                     ---------                               -----------         ------                     CBC with platelets and d...[[id number]]  Abnormal            Final result                 Please view results for these tests on the individual orders.   CBC with platelets and differential   Result Value Ref Range    WBC Count 5.8 4.0 - 11.0 10e3/uL    RBC Count 4.32 3.80 - 5.20 10e6/uL    Hemoglobin 13.3 11.7 - 15.7 g/dL    Hematocrit 39.2 35.0 - 47.0 %    MCV 91 78 - 100 fL    MCH 30.8 26.5 - 33.0 pg    MCHC 33.9 31.5 - 36.5 g/dL    RDW 14.2 10.0 - 15.0 %    Platelet Count 186 150 - 450 10e3/uL    % Neutrophils 77 %    % Lymphocytes 9 %    % Monocytes 11 %    % Eosinophils 2 %    % Basophils 0 %    % Immature Granulocytes 1 %    NRBCs per 100 WBC 0 <1 /100    Absolute Neutrophils 4.5 1.6 - 8.3 10e3/uL    Absolute Lymphocytes 0.5 (L) 0.8 - 5.3 10e3/uL    Absolute Monocytes 0.6 0.0 - 1.3 10e3/uL    Absolute Eosinophils 0.1 0.0 - 0.7 10e3/uL    Absolute Basophils 0.0 0.0 - 0.2 10e3/uL    Absolute Immature Granulocytes 0.0 <=0.0 10e3/uL    Absolute NRBCs 0.0 10e3/uL

## 2021-10-19 NOTE — PLAN OF CARE
VSS. Monitor remains V.Paced rhythm. Pt. States she had brief episode of chest tightness which has resolved. Pt. Sent to cath lab per order. Son here and aware of plan.

## 2021-10-19 NOTE — UTILIZATION REVIEW
Concurrent stay review; Secondary Review Determination     Under the authority of the Utilization Management Committee, the utilization review process indicated a secondary review on the above patient.  The review outcome is based on review of the medical records, discussions with staff, and applying clinical experience noted on the date of the review.          (x) Observation Status Appropriate - Concurrent stay review    RATIONALE FOR DETERMINATION   89 yo female with nonischemic cardiomyopathy, history of acute on chronic heart failure with reduced ejection fraction, chest pain history, permanent atrial fibrillation on anticoagulation, hypertension, status post AV node ablation with permanent pacemaker, stage III CKD, chronic hyponatremia, dyslipidemia, depression with recent hospitalization [facility] earlier this month for chest pain and dyspnea on exertion who presented with chest pain. Awaiting angiogram this afternoon.    If there is an intervention during the angiogram, she could be reconsidered for inpatient status.     Patient is clinically improving and there is no clear indication to change patient's status to inpatient. The severity of illness, intensity of service provided, expected LOS and risk for adverse outcome make the care appropriate for observation.    This document was produced using voice recognition software     The information on this document is developed by the utilization review team in order for the business office to ensure compliance.  This only denotes the appropriateness of proper admission status and does not reflect the quality of care rendered.         The definitions of Inpatient Status and Observation Status used in making the determination above are those provided in the CMS Coverage Manual, Chapter 1 and Chapter 6, section 70.4.      Sincerely,   Karen Gallardo MD  Utilization Review  Physician Advisor  [facility].

## 2021-10-19 NOTE — PLAN OF CARE
The patient was alert and pleasantly confused at 0000 upon assessing her. She didn't know she was in the hospital, even after explaining the situation to her, she wanted to know how I got a key to get into her place. She was thankful for my assistance and said I could go home. Staff assisted her to the restroom where she did well with ambulation. She continued to sleep well throughout the night and was less confused later in the morning. VSS, denies pain.    Problem: Adult Inpatient Plan of Care  Outcome: No Change     Problem: Adult Inpatient Plan of Care  Goal: Plan of Care Review  Outcome: Improving  Flowsheets (Taken 10/19/2021 0455)  Plan of Care Reviewed With: patient     Problem: Chest Pain  Goal: Resolution of Chest Pain Symptoms  Outcome: Improving     Problem: Adult Inpatient Plan of Care  Goal: Absence of Hospital-Acquired Illness or Injury  Intervention: Identify and Manage Fall Risk  Recent Flowsheet Documentation  Taken 10/19/2021 0000 by Velia Staton RN  Safety Promotion/Fall Prevention: bed alarm on     Problem: Ongoing Anesthesia/Sedation Effects (Cardiac Catheterization)  Goal: Anesthesia/Sedation Recovery  Intervention: Optimize Anesthesia Recovery  Recent Flowsheet Documentation  Taken 10/19/2021 0000 by Velia Staton, RN  Safety Promotion/Fall Prevention: bed alarm on     Problem: Vascular Access Protection (Cardiac Catheterization)  Goal: Absence of Vascular Access Complication  Intervention: Prevent Access Site Complications  Recent Flowsheet Documentation  Taken 10/19/2021 0000 by Velia Staton, RN  Activity Management:   activity adjusted per tolerance   activity encouraged

## 2021-10-19 NOTE — PLAN OF CARE
Vitals stable and cardiac rhythm remains V-Paced with occasional fusion beat.  Pt declines pain or discomfort.  Son at the bedside and involved with plan of care.  Pt up with assist x1 gait belt and walker.  Mepilex to coccyx area.  Angio tomorrow, consent signed and on chart.  INR will be rechecked in the am, no Coumadin dose this evening.     [Mother] : mother [Normal] : Normal [Brushing teeth] : Brushing teeth [In Pre-K] : In Pre-K [Up to date] : Up to date [Yes] : Patient goes to dentist yearly [Toothpaste] : Primary Fluoride Source: Toothpaste [No] : Not at  exposure [Carbon Monoxide Detectors] : Carbon monoxide detectors [Smoke Detectors] : Smoke detectors [de-identified] : good eater [[email or web address]] : [facility]

## 2021-10-19 NOTE — PLAN OF CARE
Pt. Returned from cath lab. Left radial TR band in place with 17cc of air. Site stable. Son at bedside.

## 2021-10-20 ENCOUNTER — APPOINTMENT (OUTPATIENT)
Dept: PHYSICAL THERAPY | Facility: CLINIC | Age: 86
End: 2021-10-20
Attending: INTERNAL MEDICINE
Payer: COMMERCIAL

## 2021-10-20 VITALS
WEIGHT: 137.8 LBS | DIASTOLIC BLOOD PRESSURE: 70 MMHG | RESPIRATION RATE: 16 BRPM | TEMPERATURE: 98.4 F | HEART RATE: 76 BPM | SYSTOLIC BLOOD PRESSURE: 126 MMHG | BODY MASS INDEX: 23.52 KG/M2 | OXYGEN SATURATION: 96 % | HEIGHT: 64 IN

## 2021-10-20 LAB
ANION GAP SERPL CALCULATED.3IONS-SCNC: 4 MMOL/L (ref 3–14)
BUN SERPL-MCNC: 14 MG/DL (ref 7–30)
CALCIUM SERPL-MCNC: 7.9 MG/DL (ref 8.5–10.1)
CATH EF ESTIMATED: 35 %
CHLORIDE BLD-SCNC: 97 MMOL/L (ref 94–109)
CO2 SERPL-SCNC: 26 MMOL/L (ref 20–32)
CREAT SERPL-MCNC: 0.82 MG/DL (ref 0.52–1.04)
GFR SERPL CREATININE-BSD FRML MDRD: 61 ML/MIN/1.73M2
GLUCOSE BLD-MCNC: 92 MG/DL (ref 70–99)
INR PPP: 1.82 (ref 0.85–1.15)
POTASSIUM BLD-SCNC: 4.4 MMOL/L (ref 3.4–5.3)
SODIUM SERPL-SCNC: 127 MMOL/L (ref 133–144)

## 2021-10-20 PROCEDURE — G0378 HOSPITAL OBSERVATION PER HR: HCPCS

## 2021-10-20 PROCEDURE — 36415 COLL VENOUS BLD VENIPUNCTURE: CPT | Performed by: INTERNAL MEDICINE

## 2021-10-20 PROCEDURE — 80048 BASIC METABOLIC PNL TOTAL CA: CPT | Performed by: STUDENT IN AN ORGANIZED HEALTH CARE EDUCATION/TRAINING PROGRAM

## 2021-10-20 PROCEDURE — 85610 PROTHROMBIN TIME: CPT | Performed by: INTERNAL MEDICINE

## 2021-10-20 PROCEDURE — 97110 THERAPEUTIC EXERCISES: CPT | Mod: GP

## 2021-10-20 PROCEDURE — 99214 OFFICE O/P EST MOD 30 MIN: CPT | Performed by: INTERNAL MEDICINE

## 2021-10-20 PROCEDURE — 36415 COLL VENOUS BLD VENIPUNCTURE: CPT | Performed by: STUDENT IN AN ORGANIZED HEALTH CARE EDUCATION/TRAINING PROGRAM

## 2021-10-20 PROCEDURE — 97161 PT EVAL LOW COMPLEX 20 MIN: CPT | Mod: GP

## 2021-10-20 PROCEDURE — 250N000013 HC RX MED GY IP 250 OP 250 PS 637: Performed by: INTERNAL MEDICINE

## 2021-10-20 PROCEDURE — 99217 PR OBSERVATION CARE DISCHARGE: CPT | Performed by: STUDENT IN AN ORGANIZED HEALTH CARE EDUCATION/TRAINING PROGRAM

## 2021-10-20 RX ORDER — ISOSORBIDE MONONITRATE 60 MG/1
60 TABLET, EXTENDED RELEASE ORAL DAILY
Status: DISCONTINUED | OUTPATIENT
Start: 2021-10-21 | End: 2021-10-20 | Stop reason: HOSPADM

## 2021-10-20 RX ORDER — WARFARIN SODIUM 5 MG/1
5 TABLET ORAL
Status: DISCONTINUED | OUTPATIENT
Start: 2021-10-20 | End: 2021-10-20 | Stop reason: HOSPADM

## 2021-10-20 RX ORDER — ISOSORBIDE MONONITRATE 60 MG/1
60 TABLET, EXTENDED RELEASE ORAL DAILY
Qty: 30 TABLET | Refills: 3 | Status: SHIPPED | OUTPATIENT
Start: 2021-10-21 | End: 2021-10-27

## 2021-10-20 RX ADMIN — ASPIRIN 81 MG: 81 TABLET, COATED ORAL at 09:03

## 2021-10-20 RX ADMIN — ESCITALOPRAM OXALATE 10 MG: 10 TABLET ORAL at 09:03

## 2021-10-20 RX ADMIN — FUROSEMIDE 20 MG: 20 TABLET ORAL at 09:03

## 2021-10-20 RX ADMIN — AMLODIPINE BESYLATE 5 MG: 5 TABLET ORAL at 09:03

## 2021-10-20 RX ADMIN — LISINOPRIL 10 MG: 10 TABLET ORAL at 09:04

## 2021-10-20 RX ADMIN — ISOSORBIDE MONONITRATE 30 MG: 30 TABLET, EXTENDED RELEASE ORAL at 09:04

## 2021-10-20 RX ADMIN — SODIUM CHLORIDE TAB 1 GM 1 G: 1 TAB at 09:03

## 2021-10-20 RX ADMIN — METOPROLOL SUCCINATE 100 MG: 100 TABLET, EXTENDED RELEASE ORAL at 09:03

## 2021-10-20 RX ADMIN — CARBOXYMETHYLCELLULOSE SODIUM 1 DROP: 5 SOLUTION/ DROPS OPHTHALMIC at 09:04

## 2021-10-20 RX ADMIN — MULTIPLE VITAMINS W/ MINERALS TAB 1 TABLET: TAB at 09:03

## 2021-10-20 ASSESSMENT — MIFFLIN-ST. JEOR: SCORE: 1000.06

## 2021-10-20 NOTE — PROGRESS NOTES
Coronary angiogram showed  of the RCA. We will increase pta Imdur from 30 mg to 60 mg once a day. She has follow up in cardiology clinic.     Galilea Sánchez PA-C   10/20/2021  Pager: [phone]

## 2021-10-20 NOTE — PROGRESS NOTES
"   10/20/21 1035   Quick Adds   Quick Adds Certification   Type of Visit Initial PT Evaluation   Living Environment   People in home alone   Current Living Arrangements condominium   Home Accessibility no concerns;other (see comments)  (elevator access)   Transportation Anticipated family or friend will provide   Self-Care   Usual Activity Tolerance good   Current Activity Tolerance good   Regular Exercise No   Equipment Currently Used at Home walker, rolling   Activity/Exercise/Self-Care Comment family assists with grocery shopping, pt's son jose assisting this past month and staying over night per pt's request. Pt does not drive. family provides daily meals.    Disability/Function   Hearing Difficulty or Deaf yes   Walking or Climbing Stairs Difficulty yes   Walking or Climbing Stairs ambulation difficulty, requires equipment   Fall history within last six months no   Change in Functional Status Since Onset of Current Illness/Injury no   General Information   Onset of Illness/Injury or Date of Surgery 10/17/21   Referring Physician Yariel Alves MD   Patient/Family Therapy Goals Statement (PT) to return home-per pt   Pertinent History of Current Problem (include personal factors and/or comorbidities that impact the POC) 89 yo female admitted under observation status with nonischemic cardiomyopathy, history of acute on chronic heart failure with reduced ejection fraction, chest pain history, permanent atrial fibrillation on anticoagulation, hypertension, status post AV node ablation with permanent pacemaker, stage III CKD, chronic hyponatremia, dyslipidemia, depression with recent hospitalization [facility] earlier this month for chest pain and dyspnea on exertion who presented with chest pain. s/p cardiac angio: \"Chronically occluded right coronary artery with large extensive left to left collaterals arising from the distal circumflex.\" (see chart for further details).    Existing " Precautions/Restrictions cardiac   General Observations resting in chair, NAD, son Philipp present at bedside   Cognition   Orientation Status (Cognition) oriented to;person;place;situation   Cognitive Status Comments no specific safety concerns noted during encounter with mobility.    Pain Assessment   Patient Currently in Pain No   Range of Motion (ROM)   ROM Comment WFL   Strength   Strength Comments able to move ext x 4 against gravity. Pt denies any feels of weakness.    Bed Mobility   Comment (Bed Mobility) IND   Transfers   Transfer Safety Comments mod IND with 4WW   Gait/Stairs (Locomotion)   [place] Level (Gait) modified independence   Assistive Device (Gait) walker, 4-wheeled   Distance in Feet (Required for LE Total Joints) 50ft   Pattern (Gait) step-through   Comment (Gait/Stairs) no LOB with vert/horiz head turning, turning 90 deg or 180deg or with sudden stopping and navigation in small space.    Balance   Balance Comments mod IND with standing balance with use of 4WW   Sensory Examination   Sensory Perception Comments pt denies numbness/tingling   Clinical Impression   Criteria for Skilled Therapeutic Intervention yes, treatment indicated   PT Diagnosis (PT) decreased activity tolerance    Influenced by the following impairments decreased endurance   Functional limitations due to impairments immobility, cardiac status   Clinical Presentation Stable/Uncomplicated   Clinical Presentation Rationale clinical judgement   Clinical Decision Making (Complexity) low complexity   Therapy Frequency (PT) One time eval and treatment only   Predicted Duration of Therapy Intervention (days/wks) 1 day   Planned Therapy Interventions (PT) gait training;home exercise program;patient/family education;progressive activity/exercise   Risk & Benefits of therapy have been explained evaluation/treatment results reviewed;care plan/treatment goals reviewed;current/potential barriers reviewed;participants voiced agreement with  care plan;participants included;patient;son   PT Discharge Planning    PT Discharge Recommendation (DC Rec) home with assist   PT Rationale for DC Rec Pt appears at her baseline with mobility with use of a 4WW. No signs of instability and pt tolerated amb 7.5 min in hallways without any adverse symptoms.    PT Brief overview of current status  d/c PT-no further needs identified.    Therapy Certification   Start of care date 10/20/21   Certification date from 10/20/21   Certification date to 10/20/21   Medical Diagnosis chest pain and dyspnea on exertion   Total Evaluation Time   Total Evaluation Time (Minutes) 10

## 2021-10-20 NOTE — PLAN OF CARE
Pt is disoriented to time and situation, forgetful, VSS and on tele 100% V paced, on RA and LS diminished, L radial site bruised, soft and C/D/I with good CMS, up with assist of 1 RW and GB, plan to return home in AM

## 2021-10-20 NOTE — CONSULTS
Care Management Initial Consult    General Information  Assessment completed with: Patient, Children,  (son Philipp)  Type of CM/SW Visit: Initial Assessment    Primary Care Provider verified and updated as needed: Yes   Readmission within the last 30 days:        Reason for Consult: discharge planning  Advance Care Planning: Advance Care Planning Reviewed: present on chart          Communication Assessment  Patient's communication style: spoken language (English or Bilingual)    Hearing Difficulty or Deaf: yes   Wear Glasses or Blind: yes    Cognitive  Cognitive/Neuro/Behavioral: WDL  Level of Consciousness: alert  Arousal Level: arouses to voice  Orientation: disoriented to, situation  Mood/Behavior: calm, cooperative  Best Language: 0 - No aphasia  Speech: clear, spontaneous    Living Environment:   People in home: alone     Current living Arrangements: condominium      Able to return to prior arrangements: yes       Family/Social Support:  Care provided by: self  Provides care for: no one  Marital Status:   Children          Description of Support System: Supportive, Involved    Support Assessment: Adequate social supports, Adequate family and caregiver support    Current Resources:   Patient receiving home care services: No     Community Resources: None  Equipment currently used at home: walker, rolling  Supplies currently used at home: None    Employment/Financial:  Employment Status: retired        Financial Concerns: No concerns identified           Lifestyle & Psychosocial Needs:  Social Determinants of Health     Tobacco Use: Medium Risk     Smoking Tobacco Use: Former Smoker     Smokeless Tobacco Use: Never Used   Alcohol Use:      Frequency of Alcohol Consumption:      Average Number of Drinks:      Frequency of Binge Drinking:    Financial Resource Strain:      Difficulty of Paying Living Expenses:    Food Insecurity:      Worried About Running Out of Food in the Last Year:      Ran Out of Food in  the Last Year:    Transportation Needs:      Lack of Transportation (Medical):      Lack of Transportation (Non-Medical):    Physical Activity:      Days of Exercise per Week:      Minutes of Exercise per Session:    Stress:      Feeling of Stress :    Social Connections:      Frequency of Communication with Friends and Family:      Frequency of Social Gatherings with Friends and Family:      Attends Quaker Services:      Active Member of Clubs or Organizations:      Attends Club or Organization Meetings:      Marital Status:    Intimate Partner Violence:      Fear of Current or Ex-Partner:      Emotionally Abused:      Physically Abused:      Sexually Abused:    Depression:      PHQ-2 Score:    Housing Stability:      Unable to Pay for Housing in the Last Year:      Number of Places Lived in the Last Year:      Unstable Housing in the Last Year:        Functional Status:  Prior to admission patient needed assistance:              Mental Health Status:          Chemical Dependency Status:                Values/Beliefs:  Spiritual, Cultural Beliefs, Quaker Practices, Values that affect care: no               Additional Information:  Per social work consult for discharge planning.  Patient was admitted on 10-17-21 with chest pain.  The tentative date of discharge is 10-20-21.  Patient is admitted under observation status.  Reviewed chart and spoke with patient, son Philipp, and Philipp's wife regarding discharge plans.  Per patient and family report, patient lives alone in a condo with an elevator.  Patient uses a rolling walker at baseline.   Patient is independent at baseline.  Patient's family assists with grocery shopping, driving, and meals. Reviewed the therapy discharge recommendations of returning home upon discharge and patient and family are in agreement.  Patient states she has been on the waiting list at [facility] for 2 years.  Encouraged her to call them to see where she is on the waiting list.  Patient's  son states that they may be interested in  Having someone stay with patient at night as he as been doing that and it is beginning to take a toll on him.   Explained that is what we call [facility] homecare which means it would be private pay.  Gave the private pay homecare list.  Also gave the senior housing guide for possible assisted living.  Also gave the Senior [facility]age Line brochure.  Patient is also interested in  Completing a POLST.  Paged patient's MD regarding this request.          Care Management Discharge Note    Discharge Date: 10/20/2021       Discharge Disposition: Home    Discharge Services:  N/A    Discharge DME:  N/A    Discharge Transportation: family or friend will provide    Private pay costs discussed: private pay homecare    PAS Confirmation Code:   N/A   Patient/family educated on Medicare website which has current facility and service quality ratings:  N/A    Education Provided on the Discharge Plan: yes   Persons Notified of Discharge Plans: patient's son and daughter-in-law  Patient/Family in Agreement with the Plan: yes    Handoff Referral Completed: No    Additional Information:  Discharge to home with no additional services.        RENAE Florence, [facility]    [phone]  [facility]

## 2021-10-20 NOTE — DISCHARGE INSTRUCTIONS
Cardiac Angiogram Discharge Instructions - Radial    After you go home:      Have an adult stay with you until tomorrow.    Drink extra fluids for 2 days.    You may resume your normal diet.    No smoking       For 24 hours - due to the sedation you received:    Relax and take it easy.    Do NOT make any important or legal decisions.    Do NOT drive or operate machines at home or at work.    Do NOT drink alcohol.    Care of Wrist Puncture Site:      For the first 24 hrs - check the puncture site every 1-2 hours while awake.    It is normal to have soreness at the puncture site and mild tingling in your hand for up to 3 days.    Remove the bandaid after 24 hours. If there is minor oozing, apply another bandaid and remove it after 12 hours.    You may shower tomorrow.  Do NOT take a bath, or use a hot tub or pool for at least 3 days. Do NOT scrub the site. Do not use lotion or powder near the puncture site.           Activity:        For 2 days:     do not use your hand or arm to support your weight (such as rising from a chair)     do not bend your wrist (such as lifting a garage door).    do not lift more than 5 pounds or exercise your arm (such as tennis, golf or bowling).    Do NOT do any heavy activity such as exercise, lifting, or straining.     Bleeding:      If you start bleeding from the site in your wrist, sit down and press firmly on/above the site for 10 minutes.     Once bleeding stops, keep arm still for 2 hours.     Call [facility] as soon as you can.       Call 911 right away if you have heavy bleeding or bleeding that does not stop.      Medicines:      If you are taking an antiplatelet medication such as Plavix, Brilinta or Effient, do not stop taking it until you talk to your cardiologist.        If you are on Metformin (Glucophage), do not restart it until you have blood tests (within 2 to 3 days after discharge).  After you have your blood drawn, you may restart the Metformin.     Take your  medications, including blood thinners, unless your provider tells you not to.      If you take Coumadin (Warfarin), have your INR checked by your provider in  3-5 days. Call your clinic to schedule this.    If you have stopped any medicines, check with your provider about when to restart them.    Follow Up Appointments:      Follow up with [facility] Nurse Practitioner at [facility] of patient preference in 7-10 days.    Call the clinic if:      You have a large or growing hard lump around the site.    The site is red, swollen, hot or tender.    Blood or fluid is draining from the site.    You have chills or a fever greater than 101 F (38 C).    Your arm feels numb, cool or changes color.    You have hives, a rash or unusual itching.    Any questions or concerns.          [facility] [place]:    [phone] [facility] (7 days a week)

## 2021-10-20 NOTE — PROGRESS NOTES
"Cardiology Progress Note          Assessment and Plan:     Hyperlipidemia    Essential hypertension    Cardiovascular disease    Esophageal reflux    Permanent atrial fibrillation (H)    Cardiac pacemaker in situ    Chronic kidney disease    Pulmonary hypertension (H)    Mitral regurgitation    Aortic regurgitation    Tricuspid regurgitation    Pulmonary valve regurgitation    Hyponatremia    Cardiomyopathy (H)    Hx of atrioventricular node ablation      Looks good today.  All questions answered.  No further angina.  OK for discharge to home.  Follow up with Kat Yo from our clinic in 1-2 weeks.                    Interval History:   doing well; no cp, sob, n/v/d, or abd pain.              Review of Systems:   As per subjective, otherwise 5 systems reviewed and negative.           Physical Exam:   Blood pressure 122/85, pulse 77, temperature 98.4  F (36.9  C), temperature source Oral, resp. rate 16, height 1.626 m (5' 4\"), weight 62.5 kg (137 lb 12.8 oz), SpO2 97 %, not currently breastfeeding.      Vital Sign Ranges  Temperature Temp  Av.8  F (36.6  C)  Min: 97.5  F (36.4  C)  Max: 98.4  F (36.9  C)   Blood pressure Systolic (24hrs), Av , Min:105 , Max:148        Diastolic (24hrs), Av, Min:66, Max:85      Pulse Pulse  Av  Min: 58  Max: 77   Respirations Resp  Av.7  Min: 16  Max: 20   Pulse oximetry SpO2  Av.9 %  Min: 93 %  Max: 100 %         Intake/Output Summary (Last 24 hours) at 10/20/2021 0848  Last data filed at 10/19/2021 1800  Gross per 24 hour   Intake 240 ml   Output --   Net 240 ml       Constitutional:   NAD   Skin:   Warm and dry   Head:   Nontraumatic   Neck:   Supple, symmetrical, trachea midline, no adenopathy, thyroid symmetric, not enlarged and no tenderness, skin normal   Lungs:   normal   Cardiovascular:   Normal apical impulse, regular rate and rhythm, normal S1 and S2, no S3 or S4, and no murmur noted    Abdomen:   Benign   Extremities and Back:   Symmetric, no " curvature, spinous processes are non-tender on palpation, paraspinous muscles are non-tender on palpation, no costal vertebral tenderness   Neurological:   Grossly nonfocal            Medications:     No current outpatient medications on file.                Data:     Results for orders placed or performed during the hospital encounter of 10/17/21 (from the past 24 hour(s))   INR   Result Value Ref Range    INR 1.94 (H) 0.85 - 1.15   Cardiac Catheterization   Result Value Ref Range    Cath EF Estimated 35 %    Narrative    1. Chronically occluded right coronary artery with large extensive left to   left collaterals arising from the distal circumflex.  2.  60% eccentric calcified stenosis of the proximal and mid circumflex   coronary artery involving the takeoff of a large first obtuse marginal.   iFR down both limbs is 1.0.  3. Minimal disease of the left anterior descending artery, left main and   ramus intermedius branch.  4. Left ventricular end-diastolic pressure of 18 mmHg. Ventriculography   demonstrates clear inferior akinesia. There is hypokinesia of the distal   anterior wall consistent with patient's paced rhythm. Overall ejection   fraction is estimated to be 30 to 40%. There is mild mitral regurgitation.   No gradient across aortic valve upon pullback.   INR   Result Value Ref Range    INR 1.82 (H) 0.85 - 1.15   Basic metabolic panel   Result Value Ref Range    Sodium 127 (L) 133 - 144 mmol/L    Potassium 4.4 3.4 - 5.3 mmol/L    Chloride 97 94 - 109 mmol/L    Carbon Dioxide (CO2) 26 20 - 32 mmol/L    Anion Gap 4 3 - 14 mmol/L    Urea Nitrogen 14 7 - 30 mg/dL    Creatinine 0.82 0.52 - 1.04 mg/dL    Calcium 7.9 (L) 8.5 - 10.1 mg/dL    Glucose 92 70 - 99 mg/dL    GFR Estimate 61 >60 mL/min/1.73m2

## 2021-10-20 NOTE — DISCHARGE SUMMARY
Pt discharged home approximately 16:00. Discharge instructions and medications reviewed with pt and her son. Son will stay with her tonight to assist with care.  AVS signed and all questions answered. Medications sent with pt and receipt signed. No changes noted.

## 2021-10-20 NOTE — PROGRESS NOTES
-Serial troponins and stress test complete. NOT completed, Trops yes, stress test no  - Seen and cleared by consultant if applicable NOT completed  - Adequate pain control on oral analgesia COMPLETED  - Vital signs normal or at patient baseline COMPLETED  - Safe disposition plan has been identified UNKNOWN

## 2021-10-20 NOTE — PLAN OF CARE
VSS. Monitor remains V.Paced rhythm. Pt. Denies pain. Left radial site stable. Plan for discharge to home today. Son at bedside.

## 2021-10-20 NOTE — DISCHARGE SUMMARY
[facility]  Hospitalist Discharge Summary      Date of Admission:  10/17/2021  Date of Discharge:  10/20/2021  Discharging Provider: Shelly Blanton, DO      Discharge Diagnoses   See below    Follow-ups Needed After Discharge   Follow-up Appointments     Follow-up and recommended labs and tests       Follow up with primary care provider, Kirit Michel, within 7 days for   hospital follow- up.  No follow up labs or test are needed.    You should follow up with cardiology as recommended by their service.               Discharge Disposition   Discharged to home  Condition at discharge: Stable    Hospital Course   Haritha Trujillo is a 89 year old female with complex past medical history including nonischemic cardiomyopathy, history of acute on chronic heart failure with reduced ejection fraction, chest pain history, permanent atrial fibrillation on anticoagulation, hypertension, status post AV node ablation with permanent pacemaker, stage III CKD, chronic hyponatremia, dyslipidemia, depression with recent hospitalization [facility] earlier this month for chest pain and dyspnea on exertion who presented to the ER with chest pain 10/17 and found to have CAD with  of her RCA. She will be medically optimized with an increase in her IMDUR and addition of daily 81mg ASA and follow up in clinic with cardiac rehab. She will have outpatient PT arranged.     Chest pain  chest pain is similar to prior pain she had during last admission. Described as tingling in her arms and feeling like rocks are on her chest.    -2 episodes over the weekend prior to admission both improved with sublingual nitroglycerin administered by her son.     In the ED, negative troponin initially however 2nd troponin very mildly positive at 0.047.  Chest x-ray unremarkable.  EKG with paced rhythm and PVCs  -On last admission echocardiogram without significant change from prior initiated on Imdur for her  pain and cardiology recommend OP follow up.  - stress test was negative however due to patient's concerning symptoms cardiology was consulted and proceeded with angiogram results as below    1. Chronically occluded right coronary artery with large extensive left to left collaterals arising from the distal circumflex.  2.  60% eccentric calcified stenosis of the proximal and mid circumflex coronary artery involving the takeoff of a large first obtuse marginal. iFR down both limbs is 1.0.  3. Minimal disease of the left anterior descending artery, left main and ramus intermedius branch.  4. Left ventricular end-diastolic pressure of 18 mmHg. Ventriculography demonstrates clear inferior akinesia. There is hypokinesia of the distal anterior wall consistent with patient's paced rhythm. Overall ejection fraction is estimated to be 30 to 40%. There is mild mitral regurgitation. No gradient across aortic valve upon pullback.     Nonischemic cardiomyopathy   HFrEF, no in acute exacerbation  Essential HTN  Recent admission for acute on chronic heart failure with reduced ejection fraction earlier this month.  Discharge weight 38 pounds.    -Patient appears euvolemic.  She has been checking her weight daily.  It has been stable.       >Continue PTA meds including amlodipine 5mg, lasix 20mg, lisinopril, metoprolol and imdur   >Increased imdur for angina relief       Hyponatremia   Chronic.  Levels run from 127-133  - stable      Hyperlipidemia  Continue prior to admission Lipitor 40 mg nightly      Permanent atrial fibrillation (H)   Cardiac pacemaker in situ  - v paced with PVC on tele  - continue PTA metoprolol and coumadin per pharmacy to dose       Chronic kidney disease  - baseline Cr about 1.0  - stable, monitor  - monitor with daily labs    Consultations This Hospital Stay   PHYSICAL THERAPY ADULT IP CONSULT  PHARMACY TO DOSE WARFARIN  CARDIOLOGY IP CONSULT  PHARMACY TO DOSE WARFARIN  CARDIAC REHAB IP CONSULT  PHARMACY IP  CONSULT  PHARMACY IP CONSULT  SMOKING CESSATION PROGRAM IP CONSULT    Code Status   Full Code    Time Spent on this Encounter   I, Shelly Blanton DO, personally saw the patient today and spent greater than 30 minutes discharging this patient.       Shelly Blanton DO  [facility]  [address], [address]  [place] [address]  Phone: [phone]  ______________________________________________________________________    Physical Exam   Vital Signs: Temp: 98.4  F (36.9  C) Temp src: Oral BP: 126/70 Pulse: 76   Resp: 16 SpO2: 96 % O2 Device: None (Room air)    Weight: 137 lbs 12.8 oz     Constitutional: Awake, disoriented, cooperative, no apparent distress  Respiratory: Clear to auscultation bilaterally, no crackles or wheezing no pain on palpation  Cardiovascular: Regular rate and rhythm, normal S1 and S2, soft systolic murmur  GI: Normal bowel sounds, soft, non-distended, non-tender  Skin/Integumen: No rashes, no cyanosis, no edema  MSK: mild arthritic joint swelling in hands       Primary Care Physician   Kirit Michel    Discharge Orders      Medication Instructions - Anticoagulants    Do NOT stop your aspirin or platelet inhibitor unless directed by your Cardiologist.  These medications help to prevent platelets in your blood from sticking together and forming a clot.  Examples of these medications are:  Ticagrelor (Brilinta), Clopidigrel (Plavix), Prasugrel (Effient)     When to call - Contact the Heart Clinic    You may experience symptoms that require follow-up before your scheduled appointment. Contact the Heart Clinic if you develop: Fever over 100.4o Fahrenheit, that lasts more than one day; Redness, heat, or pus at the puncture site; Change in color or temperature in your hand or arm.     When to call - Reasons to Call 911    If your wrist puncture site starts bleeding after discharge, sit down and apply firm pressure with your thumb against the puncture site  and fingers against the back of the wrist for 10 minutes. If the bleeding stops, continue to rest, keeping your wrist still for 2 hours. Notify your doctor as soon as possible.  IF BLEEDING DOES NOT STOP OR THERE IS A LARGER AMOUNT OF BLEEDING OR SPURTING CALL 9-1-1 immediately.DO NOT drive yourself to the hospital.     Precautions - Lifting    DO NOT lift more than 5 pounds with affected arm for 48 hours     Precautions - Household Activities    Avoid excessive bending or movement of your wrist for 72 hours.  Do not subject hand/arm to any forceful movements for 24 hours, such as supporting weight when rising from a chair or bed.     Remove the band-aid on the puncture site after 24 hours and leave open to air. If minor oozing, you may apply a band-aid and remove after 12 hours.     Precautions - Active Sports Activities    DO NOT engage in vigorous exercise using your affected arm for 3 days after discharge.  This includes golf, tennis or swimming.     Precautions - Operating yard equipment or vehicles    Do not operate a chainsaw, lawnmower, motorcycle, or all-terrain vehicle for 48 hours after the procedure.     Precautions - Elective Dental Work    NO elective dental work for 6 weeks after receiving a stent.     Comfort and Pain Management - Bruising after Surgery    Expect mild tingling of hand and tenderness at the wrist puncture site for up to 3 days. You may take Tylenol or a pain medicine recommended by your doctor.     Activity - Cardiac Rehab    You are encouraged to enroll in an Outpatient Cardiac Rehab program after discharge from the hospital.  Our Cardiac Rehab staff may visit briefly with you while you're in the hospital.  If they miss you, someone will contact you after you are home.     Return to Driving    Driving is NOT permitted for 24 hours after surgery     Return to work    You may return to work after 72 hours if you are feeling well and your job does not involve heavy lifting.     Shower /  Bathing    You may shower on the day after your procedure.  DO NOT soak of wrist with the puncture site in water for 3 days to prevent infection. DO NOT take a tub bath or wash dishes for 3 days after the procedure     Dressing Removal    Remove the band-aid on the puncture site after 24 hours and leave open to air. If minor oozing, you may apply a band-aid and remove after 12 hours     Reason for your hospital stay    You presented for chest pain and were found to have cardiac disease that is not amendable to any intervention. You discharge with close cardiology follow up, your previous home meds plus new daily aspirin     Follow-up and recommended labs and tests     Follow up with primary care provider, Kirit Michel, within 7 days for hospital follow- up.  No follow up labs or test are needed.    You should follow up with cardiology as recommended by their service.     Activity    Your activity upon discharge: activity as tolerated     Diet    Follow this diet upon discharge: Orders Placed This Encounter      Room Service      Regular Diet Adult       Significant Results and Procedures   Most Recent 3 CBC's:Recent Labs   Lab Test 10/19/21  0545 10/18/21  0420 10/17/21  1821   WBC 5.8 4.9 6.7   HGB 13.3 12.3 13.2   MCV 91 90 92    182 217     Most Recent 3 BMP's:Recent Labs   Lab Test 10/20/21  0745 10/19/21  0544 10/18/21  0622   * 130* 131*   POTASSIUM 4.4 4.4 4.2   CHLORIDE 97 96 97   CO2 26 27 26   BUN 14 18 15   CR 0.82 1.11* 0.93   ANIONGAP 4 7 8   PATRICIA 7.9* 7.7* 7.8*   GLC 92 93 94     Most Recent 2 LFT's:Recent Labs   Lab Test 10/17/21  1821 10/05/21  1405   AST 32 38   ALT 39 46   ALKPHOS 70 75   BILITOTAL 1.0 1.4*   ,   Results for orders placed or performed during the hospital encounter of 10/17/21   XR Chest Port 1 View    Narrative    EXAM: XR CHEST PORT 1 VIEW  LOCATION: [facility]  DATE/TIME: 10/17/2021 6:53 PM    INDICATION: Chest pain.  COMPARISON: None.       Impression    IMPRESSION: Triple lead cardiac device left chest wall, with lead tips projected over the RA, RV, and coronary sinus. Pulmonary vascularity is within normal limits. Small bilateral pleural effusions. Aortic calcification. No pneumothorax.   Cardiac Catheterization     Value    Cath EF Estimated 35    Narrative    1. Chronically occluded right coronary artery with large extensive left to   left collaterals arising from the distal circumflex.  2.  60% eccentric calcified stenosis of the proximal and mid circumflex   coronary artery involving the takeoff of a large first obtuse marginal.   iFR down both limbs is 1.0.  3. Minimal disease of the left anterior descending artery, left main and   ramus intermedius branch.  4. Left ventricular end-diastolic pressure of 18 mmHg. Ventriculography   demonstrates clear inferior akinesia. There is hypokinesia of the distal   anterior wall consistent with patient's paced rhythm. Overall ejection   fraction is estimated to be 30 to 40%. There is mild mitral regurgitation.   No gradient across aortic valve upon pullback.   NM Lexiscan stress test (nuc card)     Value    Target     Baseline Systolic     Baseline Diastolic BP 68    Last Stress Systolic     Last Stress Diastolic BP 63    Baseline HR 74    Max Perdicted HR  71    Max Perdicted HR  57    Rate Pressure Product 8,520.0    Narrative       The nuclear stress test is negative for inducible myocardial ischemia   or infarction.     There were frequent PVCs and couplets both at rest and during stress.    Nondiagnostic ECG secondary to paced rhythm.     Left ventricular function is low normal EF 50% at rest.  EF 40% with   stress, however, this likely is related to technical issues with gated   imaging in the setting of frequent PVCs rather than a true reduction in EF   with stress.     A prior study was conducted on 7/27/2018.  This study has no change   when compared with the prior study.            Discharge Medications   Current Discharge Medication List      START taking these medications    Details   aspirin (ASA) 81 MG EC tablet Take 1 tablet (81 mg) by mouth daily  Qty: 30 tablet, Refills: 0    Associated Diagnoses: Unstable angina (H)         CONTINUE these medications which have NOT CHANGED    Details   acetaminophen (TYLENOL) 500 MG tablet Take 500-1,000 mg by mouth every 6 hours as needed for mild pain      amLODIPine (NORVASC) 5 MG tablet Take 1 tablet (5 mg) by mouth daily  Qty: 90 tablet, Refills: 3    Associated Diagnoses: Essential hypertension      atorvastatin (LIPITOR) 40 MG tablet Take 40 mg by mouth At Bedtime       calcium carbonate-vitamin D (CALCIUM + D) 600-200 MG-UNIT TABS Take 1 tablet by mouth daily.      cycloSPORINE (RESTASIS) 0.05 % ophthalmic emulsion Place 1 drop into both eyes every 12 hours.      escitalopram (LEXAPRO) 10 MG tablet Take 10 mg by mouth daily      furosemide (LASIX) 20 MG tablet Take 1 tablet (20 mg) by mouth daily  Qty: 30 tablet, Refills: 0    Comments: Future refills by cardiology  Associated Diagnoses: Dilated cardiomyopathy (H)      isosorbide mononitrate (IMDUR) 30 MG 24 hr tablet Take 1 tablet (30 mg) by mouth daily  Qty: 30 tablet, Refills: 0    Associated Diagnoses: Chest pain, unspecified type      lisinopril (ZESTRIL) 10 MG tablet Take 1 tablet (10 mg) by mouth daily  Qty: 90 tablet, Refills: 2    Associated Diagnoses: Essential hypertension      !! metoprolol succinate ER (TOPROL-XL) 50 MG 24 hr tablet Take 100 mg by mouth every morning      !! metoprolol succinate ER (TOPROL-XL) 50 MG 24 hr tablet 100 in am and 50mg at night    Associated Diagnoses: Dilated cardiomyopathy (H)      Multiple Vitamins-Minerals (CENTRUM SILVER) per tablet Take 1 tablet by mouth daily.        Multiple Vitamins-Minerals (PRESERVISION AREDS 2 PO) Take by mouth daily      nitroGLYcerin (NITROSTAT) 0.4 MG sublingual tablet For chest pain place 1 tablet under the  tongue every 5 minutes for 3 doses. If symptoms persist 5 minutes after 1st dose call 911.  Qty: 25 tablet, Refills: 0    Associated Diagnoses: Chest pain, unspecified type      Probiotic Product (PROBIOTIC DAILY PO) Take 1 tablet by mouth daily       sodium chloride 1 GM tablet Take 1 g by mouth daily      warfarin ANTICOAGULANT (COUMADIN) 4 MG tablet Take 4 mg by mouth daily 2mg (0.5 tablet) Mon, Wed, Fri  4mg (1 tablet) Tues, Thurs. Sat, Sun      Alendronate Sodium (FOSAMAX PO) Take 70 mg by mouth once a week Sunday evenings       !! - Potential duplicate medications found. Please discuss with provider.        Allergies   Allergies   Allergen Reactions     Amiodarone Nausea     Hctz      Hydrochlorothiazide Other (See Comments)     Other reaction(s): *Unknown     Lansoprazole      diarrhea   Prevacid

## 2021-10-20 NOTE — PROGRESS NOTES
[facility]      OUTPATIENT PHYSICAL THERAPY EVALUATION  PLAN OF TREATMENT FOR OUTPATIENT REHABILITATION  (COMPLETE FOR INITIAL CLAIMS ONLY)  Patient's Last Name, First Name, M.I.  YOB: 1925  Haritha Trujillo                        Provider's Name  [facility] Medical Record No.  [id number]                               Onset Date:  10/17/21   Start of Care Date:  10/20/21      Type:     _X_PT   ___OT   ___SLP Medical Diagnosis:  chest pain and dyspnea on exertion                        PT Diagnosis:  decreased activity tolerance    Visits from SOC:  1   _________________________________________________________________________________  Plan of Treatment/Functional Goals    Planned Interventions: gait training, home exercise program, patient/family education, progressive activity/exercise     Goals: See Physical Therapy Goals on Care Plan in [facility] electronic health record.    Therapy Frequency: One time eval and treatment only  Predicted Duration of Therapy Intervention: 1 day  _________________________________________________________________________________    I CERTIFY THE NEED FOR THESE SERVICES FURNISHED UNDER        THIS PLAN OF TREATMENT AND WHILE UNDER MY CARE     (Physician co-signature of this document indicates review and certification of the therapy plan).                Certification date from: 10/20/21, Certification date to: 10/20/21    Referring Physician: Yariel Alves MD            Initial Assessment        See Physical Therapy evaluation dated 10/20/21 in Epic electronic health record.

## 2021-10-20 NOTE — PLAN OF CARE
End of Shift Note:   Patient admitted to unit for Angina. Assist x1 GB & walker. VSS. denies pain. Aggression Stop Light green. Tele: V Paced with PVCs. Angio site CDI, bandaid in place no drainage present. Denies chest pain.

## 2021-10-21 ENCOUNTER — TELEPHONE (OUTPATIENT)
Dept: CARDIOLOGY | Facility: CLINIC | Age: 86
End: 2021-10-21

## 2021-10-21 ENCOUNTER — PATIENT OUTREACH (OUTPATIENT)
Dept: CARE COORDINATION | Facility: CLINIC | Age: 86
End: 2021-10-21

## 2021-10-21 DIAGNOSIS — Z71.89 OTHER SPECIFIED COUNSELING: ICD-10-CM

## 2021-10-21 NOTE — TELEPHONE ENCOUNTER
Patient was evaluated by cardiology while inpatient for chest pain/ sp cath that demonstrated  of the RCA. Med mgmt recommended. Called patient's son Philipp and left  to discuss any post hospital d/c questions he may have, review medication changes, and confirm f/u appts. RN advised patient's son in  that patien has an apt scheduled on 10/27/21 with MERY Kat at 230 with lab work prior at 130pm. Patient's son advised in  to call clinic with any cardiac related questions or concerns prior to this scheduled mery't.             10/20/21 cardiology progress note                 Coronary angiogram showed  of the RCA. We will increase pta Imdur from 30 mg to 60 mg once a day. She has follow up in cardiology clinic.      Galilea Sánchez PA-C   10/20/2021  Pager: [phone]          Hyperlipidemia    Essential hypertension    Cardiovascular disease    Esophageal reflux    Permanent atrial fibrillation (H)    Cardiac pacemaker in situ    Chronic kidney disease    Pulmonary hypertension (H)    Mitral regurgitation    Aortic regurgitation    Tricuspid regurgitation    Pulmonary valve regurgitation    Hyponatremia    Cardiomyopathy (H)    Hx of atrioventricular node ablation      Looks good today.  All questions answered.  No further angina.  OK for discharge to home.  Follow up with Kat Yo from our clinic in 1-2 weeks.

## 2021-10-21 NOTE — PROGRESS NOTES
Clinic Care Coordination Contact  [facility]/Voicemail       Clinical Data: Care Coordinator Outreach  Outreach attempted x 1.  Left message on patient's voicemail with call back information and requested return call.  Plan: Care Coordinator will try to reach patient again in 1-2 business days.      MARIO Parikh  [phone]

## 2021-10-22 ENCOUNTER — TELEPHONE (OUTPATIENT)
Dept: CARDIOLOGY | Facility: CLINIC | Age: 86
End: 2021-10-22

## 2021-10-22 NOTE — PROGRESS NOTES
"Clinic Care Coordination Contact  [facility]: Post-Discharge Note  SITUATION                                                      Admission:    Admission Date: 10/17/21   Reason for Admission: Chest pain  Discharge:   Discharge Date: 10/20/21  Discharge Diagnosis: Nonischemic cardiomyopathy    BACKGROUND                                                      Haritha Trujillo is a 89 year old female with complex past medical history including nonischemic cardiomyopathy, history of acute on chronic heart failure with reduced ejection fraction, chest pain history, permanent atrial fibrillation on anticoagulation, hypertension, status post AV node ablation with permanent pacemaker, stage III CKD, chronic hyponatremia, dyslipidemia, depression with recent hospitalization [facility] earlier this month for chest pain and dyspnea on exertion who presented to the ER with chest pain 10/17 and found to have CAD with  of her RCA. She will be medically optimized with an increase in her IMDUR and addition of daily 81mg ASA and follow up in clinic with cardiac rehab. She will have outpatient PT arranged.    ASSESSMENT      Enrollment  Primary Care Care Coordination Status: Not a Candidate    Discharge Assessment  How are you doing now that you are home?: \" I'm doing well and my son is helping me\"  How are your symptoms? (Red Flag symptoms escalate to triage hotline per guidelines): Improved  Do you feel your condition is stable enough to be safe at home until your provider visit?: Yes  Does the patient have their discharge instructions? : Yes  Does the patient have questions regarding their discharge instructions? : No  Were you started on any new medications or were there changes to any of your previous medications? : Yes  Does the patient have all of their medications?: Yes  Do you have questions regarding any of your medications? : No  Do you have all of your needed medical supplies or equipment " (DME)?  (i.e. oxygen tank, CPAP, cane, etc.): Yes  Discharge follow-up appointment scheduled within 14 calendar days? : Yes  Discharge Follow Up Appointment Date: 10/27/21  Discharge Follow Up Appointment Scheduled with?: Specialty Care Provider    Post-op ([facility] CTA Only)  If the patient had a surgery or procedure, do they have any questions for a nurse?: No    Post-op (Clinicians Only)  Eating & Drinking: eating and drinking without complaints/concerns  PO Intake: regular diet  Bowel Function: normal  Date of last BM: 10/21/21  Urinary Status: voiding without complaint/concerns        PLAN                                                      Outpatient Plan:  Follow-up Appointments     Follow-up and recommended labs and tests       Follow up with primary care provider, Kirit Michel, within 7 days for   hospital follow- up.  No follow up labs or test are needed.    Future Appointments   Date Time Provider Department Center   10/27/2021  1:30 PM [facility]   10/27/2021  2:30 [facility]   10[facility]2021 12[facility]   12/17/2021  1:10 PM Kat Yo APRN CNP [facility]         For any urgent concerns, please contact our 24 hour nurse triage line: [phone])         Nelly Russell

## 2021-10-22 NOTE — TELEPHONE ENCOUNTER
Call from patient's son, he thinks he has the wrong dose for amlodipine. He was giving his mother 10mg daily the last two days but saw the discharge note said 5mg daily.    Reviewed with son that the medication change made at the  on 10/15/2021 was for the lexapro (from 5mg to 10mg) and not the amlodipine. He states patient is feeling well and not dizzy or lightheaded. He will make the medication change back to the amlodipine 5mg daily.

## 2021-10-27 ENCOUNTER — LAB (OUTPATIENT)
Dept: LAB | Facility: CLINIC | Age: 86
End: 2021-10-27
Payer: COMMERCIAL

## 2021-10-27 ENCOUNTER — OFFICE VISIT (OUTPATIENT)
Dept: CARDIOLOGY | Facility: CLINIC | Age: 86
End: 2021-10-27
Payer: COMMERCIAL

## 2021-10-27 VITALS
HEART RATE: 60 BPM | SYSTOLIC BLOOD PRESSURE: 105 MMHG | BODY MASS INDEX: 23.05 KG/M2 | DIASTOLIC BLOOD PRESSURE: 63 MMHG | HEIGHT: 64 IN | WEIGHT: 135 LBS

## 2021-10-27 DIAGNOSIS — I25.118 CORONARY ARTERY DISEASE OF NATIVE ARTERY OF NATIVE HEART WITH STABLE ANGINA PECTORIS (H): ICD-10-CM

## 2021-10-27 DIAGNOSIS — I10 ESSENTIAL HYPERTENSION: ICD-10-CM

## 2021-10-27 DIAGNOSIS — E87.1 HYPONATREMIA: ICD-10-CM

## 2021-10-27 DIAGNOSIS — I50.23 ACUTE ON CHRONIC SYSTOLIC CONGESTIVE HEART FAILURE (H): Primary | ICD-10-CM

## 2021-10-27 DIAGNOSIS — I50.23 ACUTE ON CHRONIC SYSTOLIC CONGESTIVE HEART FAILURE (H): ICD-10-CM

## 2021-10-27 LAB
ANION GAP SERPL CALCULATED.3IONS-SCNC: 6 MMOL/L (ref 3–14)
BUN SERPL-MCNC: 25 MG/DL (ref 7–30)
CALCIUM SERPL-MCNC: 8.8 MG/DL (ref 8.5–10.1)
CHLORIDE BLD-SCNC: 98 MMOL/L (ref 94–109)
CO2 SERPL-SCNC: 28 MMOL/L (ref 20–32)
CREAT SERPL-MCNC: 1.14 MG/DL (ref 0.52–1.04)
GFR SERPL CREATININE-BSD FRML MDRD: 41 ML/MIN/1.73M2
GLUCOSE BLD-MCNC: 106 MG/DL (ref 70–99)
NT-PROBNP SERPL-MCNC: 2895 PG/ML (ref 0–450)
POTASSIUM BLD-SCNC: 4.4 MMOL/L (ref 3.4–5.3)
SODIUM SERPL-SCNC: 132 MMOL/L (ref 133–144)

## 2021-10-27 PROCEDURE — 83880 ASSAY OF NATRIURETIC PEPTIDE: CPT

## 2021-10-27 PROCEDURE — 36415 COLL VENOUS BLD VENIPUNCTURE: CPT

## 2021-10-27 PROCEDURE — 80048 BASIC METABOLIC PNL TOTAL CA: CPT

## 2021-10-27 PROCEDURE — 99214 OFFICE O/P EST MOD 30 MIN: CPT | Performed by: NURSE PRACTITIONER

## 2021-10-27 RX ORDER — AMLODIPINE BESYLATE 5 MG/1
2.5 TABLET ORAL DAILY
Qty: 90 TABLET | Refills: 3
Start: 2021-10-27 | End: 2021-11-11

## 2021-10-27 RX ORDER — ISOSORBIDE MONONITRATE 60 MG/1
TABLET, EXTENDED RELEASE ORAL
Qty: 30 TABLET | Refills: 3
Start: 2021-10-27 | End: 2021-11-11 | Stop reason: DRUGHIGH

## 2021-10-27 ASSESSMENT — MIFFLIN-ST. JEOR: SCORE: 987.36

## 2021-10-27 NOTE — PATIENT INSTRUCTIONS
Call my nurse with any questions or concerns:  [phone]  *If you have concerns after hours, please call [phone], option 2 to speak with on call Cardiologist.    1. Medication changes from today:    Decrease amlodipine 2.5 mg daily  Increase isosorbide 90 mg daily  See me in 2 weeks      2. Lab Results:   Results for YOLY VALLEJO (MRN [id number]) as of 10/27/2021 15:02   Ref. Range 10/27/2021 13:53   Sodium Latest Ref Range: 133 - 144 mmol/L 132 (L)   Potassium Latest Ref Range: 3.4 - 5.3 mmol/L 4.4   Chloride Latest Ref Range: 94 - 109 mmol/L 98   Carbon Dioxide Latest Ref Range: 20 - 32 mmol/L 28   Urea Nitrogen Latest Ref Range: 7 - 30 mg/dL 25   Creatinine Latest Ref Range: 0.52 - 1.04 mg/dL 1.14 (H)   GFR Estimate Latest Ref Range: >60 mL/min/1.73m2 41 (L)   Calcium Latest Ref Range: 8.5 - 10.1 mg/dL 8.8   Anion Gap Latest Ref Range: 3 - 14 mmol/L 6   N-Terminal Pro Bnp Latest Ref Range: 0 - 450 pg/mL 2,895 (H)           Diagnostic  Dominance: Right    Left Main   The vessel was visualized by selective angiography and is moderate in size. There was 0% vessel disease.   Left Anterior Descending   The vessel was visualized by selective angiography. There was 20% vessel disease.   Left Circumflex   Prox Cx to Mid Cx lesion is 55% stenosed. The lesion is type B2 - medium risk and located at the bifurcation. Mcclure Classification for bifurcation: 1,1,1. The lesion is moderately calcified. Pressure wire/iFR used. Pre adenosine administration IFR: 1.   First Obtuse Marginal Branch   1st Mrg lesion is 60% stenosed. The lesion is type B2 - medium risk and located at the bifurcation. Mcclure Classification for bifurcation: 1,1,1. The lesion is moderately calcified. Pressure wire/iFR used. Pre adenosine administration IFR: 1.   Right Coronary Artery   Mid RCA to Dist RCA lesion is 100% stenosed. Culprit lesion. The lesion is chronic total occlusion.   Right Posterior Atrioventricular Artery   Collaterals   RPAV  filled by collaterals from Dist Cx.          Intervention

## 2021-10-27 NOTE — LETTER
10/27/2021    Kirit Michel MD  [address] [address]    RE: Haritha LAVERNE Ronnie       Dear Colleague,    I had the pleasure of seeing Haritha GALLOWAY Ronnie in the [facility].    HPI and Plan: #[id number]  See dictation    Orders Placed This Encounter   Procedures     Follow-Up with [facility]- [facility]ARGIE Visit       Orders Placed This Encounter   Medications     amLODIPine (NORVASC) 5 MG tablet     Sig: Take 0.5 tablets (2.5 mg) by mouth daily     Dispense:  90 tablet     Refill:  3     isosorbide mononitrate (IMDUR) 60 MG 24 hr tablet     Si mg daily     Dispense:  30 tablet     Refill:  3       Medications Discontinued During This Encounter   Medication Reason     amLODIPine (NORVASC) 5 MG tablet      isosorbide mononitrate (IMDUR) 60 MG 24 hr tablet          Encounter Diagnoses   Name Primary?     Acute on chronic systolic congestive heart failure (H) Yes     Coronary artery disease of native artery of native heart with stable angina pectoris (H)      Hyponatremia      Essential hypertension        CURRENT MEDICATIONS:  Current Outpatient Medications   Medication Sig Dispense Refill     acetaminophen (TYLENOL) 500 MG tablet Take 500-1,000 mg by mouth every 6 hours as needed for mild pain       Alendronate Sodium (FOSAMAX PO) Take 70 mg by mouth once a week  evenings       amLODIPine (NORVASC) 5 MG tablet Take 0.5 tablets (2.5 mg) by mouth daily 90 tablet 3     aspirin (ASA) 81 MG EC tablet Take 1 tablet (81 mg) by mouth daily 30 tablet 0     atorvastatin (LIPITOR) 40 MG tablet Take 40 mg by mouth At Bedtime        calcium carbonate-vitamin D (CALCIUM + D) 600-200 MG-UNIT TABS Take 1 tablet by mouth daily.       cycloSPORINE (RESTASIS) 0.05 % ophthalmic emulsion Place 1 drop into both eyes every 12 hours.       escitalopram (LEXAPRO) 10 MG tablet Take 10 mg by mouth daily       furosemide (LASIX) 20 MG tablet  Take 1 tablet (20 mg) by mouth daily 30 tablet 0     isosorbide mononitrate (IMDUR) 60 MG 24 hr tablet 90 mg daily 30 tablet 3     lisinopril (ZESTRIL) 10 MG tablet Take 1 tablet (10 mg) by mouth daily 90 tablet 2     metoprolol succinate ER (TOPROL-XL) 50 MG 24 hr tablet Take 100 mg by mouth every morning       metoprolol succinate ER (TOPROL-XL) 50 MG 24 hr tablet 100 in am and 50mg at night (Patient taking differently: Take 50 mg by mouth every evening 100 in am and 50mg at night)       Multiple Vitamins-Minerals (CENTRUM SILVER) per tablet Take 1 tablet by mouth daily.         Multiple Vitamins-Minerals (PRESERVISION AREDS 2 PO) Take by mouth daily       nitroGLYcerin (NITROSTAT) 0.4 MG sublingual tablet For chest pain place 1 tablet under the tongue every 5 minutes for 3 doses. If symptoms persist 5 minutes after 1st dose call 911. 25 tablet 0     Probiotic Product (PROBIOTIC DAILY PO) Take 1 tablet by mouth daily        sodium chloride 1 GM tablet Take 1 g by mouth daily       warfarin ANTICOAGULANT (COUMADIN) 4 MG tablet Take 4 mg by mouth daily 2mg (0.5 tablet) Mon, Wed, Fri  4mg (1 tablet) Tues, Thurs. Sat, Sun         ALLERGIES     Allergies   Allergen Reactions     Amiodarone Nausea     Hctz      Hydrochlorothiazide Other (See Comments)     Other reaction(s): *Unknown     Lansoprazole      diarrhea   Prevacid       PAST MEDICAL HISTORY:  Past Medical History:   Diagnosis Date     Aortic regurgitation 12/14/2014    mild (1+) per echo     Atrial fibrillation (H)      Cardiomyopathy (H)      CHF (congestive heart failure) (H)      Chronic kidney disease, stage 3 (H)      Coronary atherosclerosis of unspecified type of vessel, native or graft 5/16/2000    normal left coronary arteries and tight obstruction in distal RCA, too small for revascularization, medical management     Degeneration of cervical intervertebral disc     cervical spine     Essential hypertension, benign      Mitral regurgitation 12/14/2014     mod-mod/sev (2-3+) per echo     Other and unspecified hyperlipidemia      Pacemaker      Pulmonary hypertension (H) 3/16/2013    mild per echo with RVSP 31mmHg +RAP      Pulmonary valve regurgitation 12/14/2014    mod (2+) per echo     Tricuspid regurgitation 12/14/2014    mild (1+) per echo     Unspecified tinnitus     chronic       PAST SURGICAL HISTORY:  Past Surgical History:   Procedure Laterality Date     CORONARY ANGIOGRAPHY ADULT ORDER  5/16/2000     CV HEART CATHETERIZATION WITH POSSIBLE INTERVENTION N/A 10/19/2021    Procedure: Heart Catheterization with Possible Intervention;  Surgeon: Alden Skelton MD;  Location:  HEART CARDIAC CATH LAB     CV INSTANTANEOUS WAVE-FREE RATIO N/A 10/19/2021    Procedure: Instantaneous Wave-Free Ratio;  Surgeon: Alden Skelton MD;  Location:  HEART CARDIAC CATH LAB     CV LEFT HEART CATH N/A 10/19/2021    Procedure: Left Heart Cath;  Surgeon: Alden Skelton MD;  Location:  HEART CARDIAC CATH LAB     CV LEFT VENTRICULOGRAM N/A 10/19/2021    Procedure: Left Ventriculogram;  Surgeon: Alden Skelton MD;  Location:  HEART CARDIAC CATH LAB     EP PPM RMVL&REPL OF GEN W/ DUAL LEAD SYS N/A 4/14/2020    Procedure: Permanent Pacemakers  Removal and Replacement Generator  with Multi Lead System;  Surgeon: Fay Tatum MD;  Location:  HEART CARDIAC CATH LAB     HRW PACEMAKER PERMANENT  1/2015    BI- ventricular     IMPLANT PACEMAKER  11/12/2010    dual chamber Medtronic     [facility] NONSPECIFIC PROCEDURE  1999    right rotator cuff tear OR     [facility] NONSPECIFIC PROCEDURE  1983    microdiscectomy     [facility] NONSPECIFIC PROCEDURE      C-sections x 3      [facility] NONSPECIFIC PROCEDURE      appendectomy     Z NONSPECIFIC PROCEDURE      bilateral benign breast biopsy      ZZ NONSPECIFIC PROCEDURE      right knee arthroscopic OR     Z NONSPECIFIC PROCEDURE  1974    enteritis       FAMILY HISTORY:  Family History   Problem Relation Age of Onset      Hypertension Mother      Cancer Mother         pancreatic     Eye Disorder Mother         cristian     ANIA Father          53     Lipids Father      Diabetes Maternal Grandmother      ANIA Brother         open heart surgery age 53     Cancer Daughter         ovavian     Neurologic Disorder Son         MS       SOCIAL HISTORY:  Social History     Socioeconomic History     Marital status:      Spouse name: None     Number of children: None     Years of education: None     Highest education level: None   Occupational History     None   Tobacco Use     Smoking status: Former Smoker     Packs/day: 0.50     Years: 15.00     Pack years: 7.50     Types: Cigarettes     Start date:      Quit date:      Years since quittin.8     Smokeless tobacco: Never Used   Substance and Sexual Activity     Alcohol use: No     Drug use: No     Sexual activity: None   Other Topics Concern     Parent/sibling w/ CABG, MI or angioplasty before 65F 55M? Yes      Service Not Asked     Blood Transfusions Not Asked     Caffeine Concern Not Asked     Occupational Exposure Not Asked     Hobby Hazards Not Asked     Sleep Concern Not Asked     Stress Concern Not Asked     Weight Concern Not Asked     Special Diet Yes     Comment: low sodium     Back Care Not Asked     Exercise Yes     Comment: active life style     Bike Helmet Not Asked     Seat Belt Yes     Self-Exams Not Asked   Social History Narrative     None     Social Determinants of Health     Financial Resource Strain:      Difficulty of Paying Living Expenses:    Food Insecurity:      Worried About Running Out of Food in the Last Year:      Ran Out of Food in the Last Year:    Transportation Needs:      Lack of Transportation (Medical):      Lack of Transportation (Non-Medical):    Physical Activity:      Days of Exercise per Week:      Minutes of Exercise per Session:    Stress:      Feeling of Stress :    Social Connections:      Frequency of  "Communication with Friends and Family:      Frequency of Social Gatherings with Friends and Family:      Attends Sabianist Services:      Active Member of Clubs or Organizations:      Attends Club or Organization Meetings:      Marital Status:    Intimate Partner Violence:      Fear of Current or Ex-Partner:      Emotionally Abused:      Physically Abused:      Sexually Abused:        Review of Systems:  Skin:  Negative       Eyes:  Positive for glasses    ENT:  Negative      Respiratory:  Positive for dyspnea on exertion     Cardiovascular:  Negative;palpitations;lightheadedness;dizziness;syncope or near-syncope;cyanosis Positive for;fatigue;edema;chest pain 3 episodes cp since hosp, one nitro since hosp  Gastroenterology: Positive for poor appetite burping  Genitourinary:  Negative      Musculoskeletal:  Positive for   balance off  Neurologic:  Positive for memory problems tingling and heaviness in arms with chest pain episodes  Psychiatric:  Negative      Heme/Lymph/Imm:  Positive for allergies    Endocrine:  Negative        Physical Exam:  Vitals: /63   Pulse 60   Ht 1.626 m (5' 4\")   Wt 61.2 kg (135 lb)   BMI 23.17 kg/m      Constitutional:  cooperative, alert and oriented, well developed, well nourished, in no acute distress        Skin:  warm and dry to the touch, no apparent skin lesions or masses noted   pacemaker incision in the left infraclavicular area was well-healed      Head:  normocephalic, no masses or lesions        Eyes:  pupils equal and round;conjunctivae and lids unremarkable        Lymph:      ENT:  no pallor or cyanosis, dentition good        Neck:  JVP normal        Respiratory:  normal breath sounds, clear to auscultation, normal A-P diameter, normal symmetry, normal respiratory excursion, no use of accessory muscles         Cardiac: regular rhythm;normal S1 and S2;no S3 or S4;no murmurs, gallops or rubs detected                                    3+                    GI:  " abdomen soft;no HSM        Extremities and Muscular Skeletal:  no deformities, clubbing, cyanosis, erythema observed;no edema stasis pigmentation            Neurological:  no gross motor deficits        Psych:  Alert and Oriented x 3;affect appropriate, oriented to time, person and place        CC  NAVYA Allen CNP  [address]  [place] [id number]                  Thank you for allowing me to participate in the care of your patient.      Sincerely,     Kat Yo, NP, APRN CNP     [facility]  cc:   NAVYA Allen CNP  [address]  [place] [id number]

## 2021-10-27 NOTE — PROGRESS NOTES
Service Date: 10/27/2021    HISTORY OF PRESENT ILLNESS:  Ms. Trujillo is a delightful, 89-year-old woman well known to me here at the [facility].  She is an established patient of Dr. Tatum.  She lives independently in her senior apartment facility.    Unfortunately, Lori has been struggling with heart failure symptoms as well as chest pain.  At our last visit, she had just gotten out of the hospital for CHF.  She was admitted for a little over 24 hours and had a nice response to IV Lasix.  I did increase her oral Lasix to 20 mg daily.  At our visit on 10/15, I initiated earlier isosorbide at 30 mg daily.  I also ordered an outpatient Lexiscan.  Two days later, she presented with chest pain and was admitted.  She was seen in consultation by Dr. Alves and was brought to the Cath Lab.  This was completed by Dr. Skelton.  Cardiac catheterization showed a chronically occluded RCA with large, extensive left-to-left collaterals arising from the distal circumflex.  She had a 60% eccentric calcified stenosis of the proximal and mid circumflex involving the takeoff of the large first obtuse marginal.  IFR down both limbs was 1.0.  She had minimal disease in the LAD, left main and ramus. End-diastolic pressure was 18 mmHg.  EF was estimated at 30%-40% with mild mitral regurgitation.  Aggressive medical management was recommended.    Prior to the angiogram, she did undergo a nuclear stress test, which was negative for inducible ischemia or infarction.    Peg's isosorbide was increased from 30-60 mg daily.  Her chest pain has improved, but she has still had to take sublingual nitro once in the past week.  She does get occasional twinges of chest pain radiating down her left arm.  Her son states that he and his wife had to talk her through a few episodes of chest pain so she did not have to take sublingual nitro.    Last resting echocardiogram showed her EF to be 45%-50%.  She does have a CRT device.  LV lead was shut off due  to diaphragmatic stimulation in 06/2020.    She has a history of permanent atrial fibrillation with AV richard ablation.  She had a BiV upgrade 01/2015.  She reached MICHAEL 04/2020.  Her device was changed out at that time.    Currently, Lori denies chest pain, orthopnea, PND, syncope or peripheral edema.  Her left radial access site has healed nicely since her procedure.    A followup BMP completed today showed a creatinine of 1.14, GFR of 41, BUN of 25 and potassium of 4.4.  Sodium is 132.  ProBNP has trended down nicely down to 2895 (4726 two weeks earlier).    All other review of systems and past medical history are noted below.    ASSESSMENT AND PLAN:  Ms. Trujillo is a delightful, 89-year-old woman here today for followup.  1.  Coronary artery disease with  of the RCA.    She is currently on metoprolol succinate 100 mg in the morning and 50 mg in the evening, lisinopril 10 mg daily, isosorbide 60 mg daily, amlodipine 5 mg daily, aspirin 81 mg daily and atorvastatin 40 mg daily.  Last LDL was 83 in 10/2020.    I have opted to increase her isosorbide to 90 mg daily and decrease amlodipine to 2.5 mg daily.  I did reassure Lori that we will continue with medical therapy.  It would not be uncommon to get occasional chest twinges and to take an occasional sublingual nitro.  Should she have persistent pain that has increased in frequency, I would like to know.  I would like to see her back in 2 weeks to reassess how she is doing. I do not want to be too aggressive with her medications given her advanced age.    2.  Cardiomyopathy, EF of 30%-40% on angiogram.    EF on limited echo on 10/06 was 45%-50%.  The patient appears to be euvolemic on exam.  I will have her continue furosemide at 20 mg daily.  She does follow a low-sodium diet and fluid restriction.  Her serum sodium is stable at 132.  Creatinine is up slightly after her angiogram to 1.14.    3.  History of hypertension, currently normotensive.  4.  Permanent atrial  fibrillation with AV node ablation.  The patient is on warfarin.  Last INR was 1.82.  5.  CRT-P implant.  LV lead to shut off due to diaphragmatic stimulation.  The patient had a generator change completed 2020.  6.  History of anxiety, on Lexapro, now 10 mg daily.    I would like to see her back in 2 weeks.  She will contact us in the interim if there are questions or concerns.  I will plan on having her see Dr. Tatum in the spring for her annual visit.    As always, thank you for including me in her care.    Kat Yo NP        D: 10/27/2021   T: 10/27/2021   MT: derik    Name:     SEVERIANO VALLEJO  MRN:      [id number]        Account:      [id number]   :      1925           Service Date: 10/27/2021       Document: [id number]

## 2021-10-27 NOTE — PROGRESS NOTES
HPI and Plan: #[id number]  See dictation    Orders Placed This Encounter   Procedures     Follow-Up with [facility]- [facility] Visit       Orders Placed This Encounter   Medications     amLODIPine (NORVASC) 5 MG tablet     Sig: Take 0.5 tablets (2.5 mg) by mouth daily     Dispense:  90 tablet     Refill:  3     isosorbide mononitrate (IMDUR) 60 MG 24 hr tablet     Si mg daily     Dispense:  30 tablet     Refill:  3       Medications Discontinued During This Encounter   Medication Reason     amLODIPine (NORVASC) 5 MG tablet      isosorbide mononitrate (IMDUR) 60 MG 24 hr tablet          Encounter Diagnoses   Name Primary?     Acute on chronic systolic congestive heart failure (H) Yes     Coronary artery disease of native artery of native heart with stable angina pectoris (H)      Hyponatremia      Essential hypertension        CURRENT MEDICATIONS:  Current Outpatient Medications   Medication Sig Dispense Refill     acetaminophen (TYLENOL) 500 MG tablet Take 500-1,000 mg by mouth every 6 hours as needed for mild pain       Alendronate Sodium (FOSAMAX PO) Take 70 mg by mouth once a week  evenings       amLODIPine (NORVASC) 5 MG tablet Take 0.5 tablets (2.5 mg) by mouth daily 90 tablet 3     aspirin (ASA) 81 MG EC tablet Take 1 tablet (81 mg) by mouth daily 30 tablet 0     atorvastatin (LIPITOR) 40 MG tablet Take 40 mg by mouth At Bedtime        calcium carbonate-vitamin D (CALCIUM + D) 600-200 MG-UNIT TABS Take 1 tablet by mouth daily.       cycloSPORINE (RESTASIS) 0.05 % ophthalmic emulsion Place 1 drop into both eyes every 12 hours.       escitalopram (LEXAPRO) 10 MG tablet Take 10 mg by mouth daily       furosemide (LASIX) 20 MG tablet Take 1 tablet (20 mg) by mouth daily 30 tablet 0     isosorbide mononitrate (IMDUR) 60 MG 24 hr tablet 90 mg daily 30 tablet 3     lisinopril (ZESTRIL) 10 MG tablet Take 1 tablet (10 mg) by mouth daily 90 tablet 2     metoprolol succinate ER (TOPROL-XL) 50 MG 24 hr tablet Take 100  mg by mouth every morning       metoprolol succinate ER (TOPROL-XL) 50 MG 24 hr tablet 100 in am and 50mg at night (Patient taking differently: Take 50 mg by mouth every evening 100 in am and 50mg at night)       Multiple Vitamins-Minerals (CENTRUM SILVER) per tablet Take 1 tablet by mouth daily.         Multiple Vitamins-Minerals (PRESERVISION AREDS 2 PO) Take by mouth daily       nitroGLYcerin (NITROSTAT) 0.4 MG sublingual tablet For chest pain place 1 tablet under the tongue every 5 minutes for 3 doses. If symptoms persist 5 minutes after 1st dose call 911. 25 tablet 0     Probiotic Product (PROBIOTIC DAILY PO) Take 1 tablet by mouth daily        sodium chloride 1 GM tablet Take 1 g by mouth daily       warfarin ANTICOAGULANT (COUMADIN) 4 MG tablet Take 4 mg by mouth daily 2mg (0.5 tablet) Mon, Wed, Fri  4mg (1 tablet) Tues, Thurs. Sat, Sun         ALLERGIES     Allergies   Allergen Reactions     Amiodarone Nausea     Hctz      Hydrochlorothiazide Other (See Comments)     Other reaction(s): *Unknown     Lansoprazole      diarrhea   Prevacid       PAST MEDICAL HISTORY:  Past Medical History:   Diagnosis Date     Aortic regurgitation 12/14/2014    mild (1+) per echo     Atrial fibrillation (H)      Cardiomyopathy (H)      CHF (congestive heart failure) (H)      Chronic kidney disease, stage 3 (H)      Coronary atherosclerosis of unspecified type of vessel, native or graft 5/16/2000    normal left coronary arteries and tight obstruction in distal RCA, too small for revascularization, medical management     Degeneration of cervical intervertebral disc     cervical spine     Essential hypertension, benign      Mitral regurgitation 12/14/2014    mod-mod/sev (2-3+) per echo     Other and unspecified hyperlipidemia      Pacemaker      Pulmonary hypertension (H) 3/16/2013    mild per echo with RVSP 31mmHg +RAP      Pulmonary valve regurgitation 12/14/2014    mod (2+) per echo     Tricuspid regurgitation 12/14/2014    mild  (1+) per echo     Unspecified tinnitus     chronic       PAST SURGICAL HISTORY:  Past Surgical History:   Procedure Laterality Date     CORONARY ANGIOGRAPHY ADULT ORDER  2000     CV HEART CATHETERIZATION WITH POSSIBLE INTERVENTION N/A 10/19/2021    Procedure: Heart Catheterization with Possible Intervention;  Surgeon: Alden Skelton MD;  Location:  HEART CARDIAC CATH LAB     CV INSTANTANEOUS WAVE-FREE RATIO N/A 10/19/2021    Procedure: Instantaneous Wave-Free Ratio;  Surgeon: Alden Skelton MD;  Location:  HEART CARDIAC CATH LAB     CV LEFT HEART CATH N/A 10/19/2021    Procedure: Left Heart Cath;  Surgeon: Alden Skelton MD;  Location:  HEART CARDIAC CATH LAB     CV LEFT VENTRICULOGRAM N/A 10/19/2021    Procedure: Left Ventriculogram;  Surgeon: Alden Skelton MD;  Location:  HEART CARDIAC CATH LAB     EP PPM RMVL&REPL OF GEN W/ DUAL LEAD SYS N/A 2020    Procedure: Permanent Pacemakers  Removal and Replacement Generator  with Multi Lead System;  Surgeon: Fay Tatum MD;  Location:  HEART CARDIAC CATH LAB     HRW PACEMAKER PERMANENT  2015    BI- ventricular     IMPLANT PACEMAKER  2010    dual chamber Medtronic     ZZ NONSPECIFIC PROCEDURE      right rotator cuff tear OR     ZZC NONSPECIFIC PROCEDURE  1983    microdiscectomy     ZZ NONSPECIFIC PROCEDURE      C-sections x 3      ZZC NONSPECIFIC PROCEDURE      appendectomy     ZZC NONSPECIFIC PROCEDURE      bilateral benign breast biopsy      ZZC NONSPECIFIC PROCEDURE      right knee arthroscopic OR     ZZC NONSPECIFIC PROCEDURE  1974    enteritis       FAMILY HISTORY:  Family History   Problem Relation Age of Onset     Hypertension Mother      Cancer Mother         pancreatic     Eye Disorder Mother         cristian JORGE Father          53     Lipids Father      Diabetes Maternal Grandmother      FAZALABEATRIZ Brother         open heart surgery age 53     Cancer Daughter         ovavian      Neurologic Disorder Son         MS       SOCIAL HISTORY:  Social History     Socioeconomic History     Marital status:      Spouse name: None     Number of children: None     Years of education: None     Highest education level: None   Occupational History     None   Tobacco Use     Smoking status: Former Smoker     Packs/day: 0.50     Years: 15.00     Pack years: 7.50     Types: Cigarettes     Start date:      Quit date:      Years since quittin.8     Smokeless tobacco: Never Used   Substance and Sexual Activity     Alcohol use: No     Drug use: No     Sexual activity: None   Other Topics Concern     Parent/sibling w/ CABG, MI or angioplasty before 65F 55M? Yes      Service Not Asked     Blood Transfusions Not Asked     Caffeine Concern Not Asked     Occupational Exposure Not Asked     Hobby Hazards Not Asked     Sleep Concern Not Asked     Stress Concern Not Asked     Weight Concern Not Asked     Special Diet Yes     Comment: low sodium     Back Care Not Asked     Exercise Yes     Comment: active life style     Bike Helmet Not Asked     Seat Belt Yes     Self-Exams Not Asked   Social History Narrative     None     Social Determinants of Health     Financial Resource Strain:      Difficulty of Paying Living Expenses:    Food Insecurity:      Worried About Running Out of Food in the Last Year:      Ran Out of Food in the Last Year:    Transportation Needs:      Lack of Transportation (Medical):      Lack of Transportation (Non-Medical):    Physical Activity:      Days of Exercise per Week:      Minutes of Exercise per Session:    Stress:      Feeling of Stress :    Social Connections:      Frequency of Communication with Friends and Family:      Frequency of Social Gatherings with Friends and Family:      Attends [employer] Services:      Active Member of Clubs or Organizations:      Attends Club or Organization Meetings:      Marital Status:    Intimate Partner Violence:      Fear of  "Current or Ex-Partner:      Emotionally Abused:      Physically Abused:      Sexually Abused:        Review of Systems:  Skin:  Negative       Eyes:  Positive for glasses    ENT:  Negative      Respiratory:  Positive for dyspnea on exertion     Cardiovascular:  Negative;palpitations;lightheadedness;dizziness;syncope or near-syncope;cyanosis Positive for;fatigue;edema;chest pain 3 episodes cp since hosp, one nitro since hosp  Gastroenterology: Positive for poor appetite burping  Genitourinary:  Negative      Musculoskeletal:  Positive for   balance off  Neurologic:  Positive for memory problems tingling and heaviness in arms with chest pain episodes  Psychiatric:  Negative      Heme/Lymph/Imm:  Positive for allergies    Endocrine:  Negative        Physical Exam:  Vitals: /63   Pulse 60   Ht 1.626 m (5' 4\")   Wt 61.2 kg (135 lb)   BMI 23.17 kg/m      Constitutional:  cooperative, alert and oriented, well developed, well nourished, in no acute distress        Skin:  warm and dry to the touch, no apparent skin lesions or masses noted   pacemaker incision in the left infraclavicular area was well-healed      Head:  normocephalic, no masses or lesions        Eyes:  pupils equal and round;conjunctivae and lids unremarkable        Lymph:      ENT:  no pallor or cyanosis, dentition good        Neck:  JVP normal        Respiratory:  normal breath sounds, clear to auscultation, normal A-P diameter, normal symmetry, normal respiratory excursion, no use of accessory muscles         Cardiac: regular rhythm;normal S1 and S2;no S3 or S4;no murmurs, gallops or rubs detected                                    3+                    GI:  abdomen soft;no HSM        Extremities and Muscular Skeletal:  no deformities, clubbing, cyanosis, erythema observed;no edema stasis pigmentation            Neurological:  no gross motor deficits        Psych:  Alert and Oriented x 3;affect appropriate, oriented to time, person and place  "       CC  Kat Yo, APRN CNP  [address]  [place] [address]

## 2021-10-29 ENCOUNTER — ANCILLARY PROCEDURE (OUTPATIENT)
Dept: CARDIOLOGY | Facility: CLINIC | Age: 86
End: 2021-10-29
Attending: INTERNAL MEDICINE
Payer: COMMERCIAL

## 2021-10-29 DIAGNOSIS — Z95.0 CARDIAC PACEMAKER IN SITU: ICD-10-CM

## 2021-10-29 PROCEDURE — 93294 REM INTERROG EVL PM/LDLS PM: CPT | Performed by: INTERNAL MEDICINE

## 2021-10-29 PROCEDURE — 93296 REM INTERROG EVL PM/IDS: CPT | Performed by: INTERNAL MEDICINE

## 2021-11-10 LAB
MDC_IDC_EPISODE_DTM: NORMAL
MDC_IDC_EPISODE_DURATION: 6 S
MDC_IDC_EPISODE_ID: 6
MDC_IDC_EPISODE_TYPE: NORMAL
MDC_IDC_LEAD_IMPLANT_DT: NORMAL
MDC_IDC_LEAD_LOCATION: NORMAL
MDC_IDC_LEAD_LOCATION_DETAIL_1: NORMAL
MDC_IDC_LEAD_MFG: NORMAL
MDC_IDC_LEAD_MODEL: NORMAL
MDC_IDC_LEAD_POLARITY_TYPE: NORMAL
MDC_IDC_LEAD_SERIAL: NORMAL
MDC_IDC_MSMT_BATTERY_DTM: NORMAL
MDC_IDC_MSMT_BATTERY_REMAINING_LONGEVITY: 124 MO
MDC_IDC_MSMT_BATTERY_RRT_TRIGGER: 2.6
MDC_IDC_MSMT_BATTERY_STATUS: NORMAL
MDC_IDC_MSMT_BATTERY_VOLTAGE: 3.01 V
MDC_IDC_MSMT_LEADCHNL_LV_IMPEDANCE_VALUE: 361 OHM
MDC_IDC_MSMT_LEADCHNL_LV_IMPEDANCE_VALUE: 437 OHM
MDC_IDC_MSMT_LEADCHNL_LV_IMPEDANCE_VALUE: 494 OHM
MDC_IDC_MSMT_LEADCHNL_LV_IMPEDANCE_VALUE: 551 OHM
MDC_IDC_MSMT_LEADCHNL_LV_IMPEDANCE_VALUE: 741 OHM
MDC_IDC_MSMT_LEADCHNL_LV_PACING_THRESHOLD_AMPLITUDE: 5 V
MDC_IDC_MSMT_LEADCHNL_LV_PACING_THRESHOLD_PULSEWIDTH: 1 MS
MDC_IDC_MSMT_LEADCHNL_RA_IMPEDANCE_VALUE: 285 OHM
MDC_IDC_MSMT_LEADCHNL_RA_IMPEDANCE_VALUE: 323 OHM
MDC_IDC_MSMT_LEADCHNL_RV_IMPEDANCE_VALUE: 323 OHM
MDC_IDC_MSMT_LEADCHNL_RV_IMPEDANCE_VALUE: 399 OHM
MDC_IDC_MSMT_LEADCHNL_RV_PACING_THRESHOLD_AMPLITUDE: 0.62 V
MDC_IDC_MSMT_LEADCHNL_RV_PACING_THRESHOLD_PULSEWIDTH: 0.4 MS
MDC_IDC_MSMT_LEADCHNL_RV_SENSING_INTR_AMPL: 13.5 MV
MDC_IDC_MSMT_LEADCHNL_RV_SENSING_INTR_AMPL: 13.5 MV
MDC_IDC_PG_IMPLANT_DTM: NORMAL
MDC_IDC_PG_MFG: NORMAL
MDC_IDC_PG_MODEL: NORMAL
MDC_IDC_PG_SERIAL: NORMAL
MDC_IDC_PG_TYPE: NORMAL
MDC_IDC_SESS_CLINIC_NAME: NORMAL
MDC_IDC_SESS_DTM: NORMAL
MDC_IDC_SESS_TYPE: NORMAL
MDC_IDC_SET_BRADY_LOWRATE: 60 {BEATS}/MIN
MDC_IDC_SET_BRADY_MAX_SENSOR_RATE: 130 {BEATS}/MIN
MDC_IDC_SET_BRADY_MODE: NORMAL
MDC_IDC_SET_CRT_PACED_CHAMBERS: NORMAL
MDC_IDC_SET_LEADCHNL_RA_SENSING_ANODE_ELECTRODE_1: NORMAL
MDC_IDC_SET_LEADCHNL_RA_SENSING_ANODE_LOCATION_1: NORMAL
MDC_IDC_SET_LEADCHNL_RA_SENSING_CATHODE_ELECTRODE_1: NORMAL
MDC_IDC_SET_LEADCHNL_RA_SENSING_CATHODE_LOCATION_1: NORMAL
MDC_IDC_SET_LEADCHNL_RA_SENSING_POLARITY: NORMAL
MDC_IDC_SET_LEADCHNL_RA_SENSING_SENSITIVITY: 4 MV
MDC_IDC_SET_LEADCHNL_RV_PACING_AMPLITUDE: 2 V
MDC_IDC_SET_LEADCHNL_RV_PACING_ANODE_ELECTRODE_1: NORMAL
MDC_IDC_SET_LEADCHNL_RV_PACING_ANODE_LOCATION_1: NORMAL
MDC_IDC_SET_LEADCHNL_RV_PACING_CAPTURE_MODE: NORMAL
MDC_IDC_SET_LEADCHNL_RV_PACING_CATHODE_ELECTRODE_1: NORMAL
MDC_IDC_SET_LEADCHNL_RV_PACING_CATHODE_LOCATION_1: NORMAL
MDC_IDC_SET_LEADCHNL_RV_PACING_POLARITY: NORMAL
MDC_IDC_SET_LEADCHNL_RV_PACING_PULSEWIDTH: 0.4 MS
MDC_IDC_SET_LEADCHNL_RV_SENSING_ANODE_ELECTRODE_1: NORMAL
MDC_IDC_SET_LEADCHNL_RV_SENSING_ANODE_LOCATION_1: NORMAL
MDC_IDC_SET_LEADCHNL_RV_SENSING_CATHODE_ELECTRODE_1: NORMAL
MDC_IDC_SET_LEADCHNL_RV_SENSING_CATHODE_LOCATION_1: NORMAL
MDC_IDC_SET_LEADCHNL_RV_SENSING_POLARITY: NORMAL
MDC_IDC_SET_LEADCHNL_RV_SENSING_SENSITIVITY: 0.9 MV
MDC_IDC_SET_ZONE_DETECTION_INTERVAL: 350 MS
MDC_IDC_SET_ZONE_DETECTION_INTERVAL: 400 MS
MDC_IDC_SET_ZONE_TYPE: NORMAL
MDC_IDC_STAT_AT_BURDEN_PERCENT: 0 %
MDC_IDC_STAT_AT_DTM_END: NORMAL
MDC_IDC_STAT_AT_DTM_START: NORMAL
MDC_IDC_STAT_BRADY_AP_VP_PERCENT: 0 %
MDC_IDC_STAT_BRADY_AP_VS_PERCENT: 0 %
MDC_IDC_STAT_BRADY_AS_VP_PERCENT: 80.61 %
MDC_IDC_STAT_BRADY_AS_VS_PERCENT: 19.39 %
MDC_IDC_STAT_BRADY_DTM_END: NORMAL
MDC_IDC_STAT_BRADY_DTM_START: NORMAL
MDC_IDC_STAT_BRADY_RA_PERCENT_PACED: 0 %
MDC_IDC_STAT_BRADY_RV_PERCENT_PACED: 80.61 %
MDC_IDC_STAT_CRT_DTM_END: NORMAL
MDC_IDC_STAT_CRT_DTM_START: NORMAL
MDC_IDC_STAT_CRT_LV_PERCENT_PACED: 0 %
MDC_IDC_STAT_CRT_PERCENT_PACED: 0 %
MDC_IDC_STAT_EPISODE_RECENT_COUNT: 0
MDC_IDC_STAT_EPISODE_RECENT_COUNT_DTM_END: NORMAL
MDC_IDC_STAT_EPISODE_RECENT_COUNT_DTM_START: NORMAL
MDC_IDC_STAT_EPISODE_TOTAL_COUNT: 0
MDC_IDC_STAT_EPISODE_TOTAL_COUNT: 1
MDC_IDC_STAT_EPISODE_TOTAL_COUNT_DTM_END: NORMAL
MDC_IDC_STAT_EPISODE_TOTAL_COUNT_DTM_START: NORMAL
MDC_IDC_STAT_EPISODE_TYPE: NORMAL

## 2021-11-11 ENCOUNTER — OFFICE VISIT (OUTPATIENT)
Dept: CARDIOLOGY | Facility: CLINIC | Age: 86
End: 2021-11-11
Payer: COMMERCIAL

## 2021-11-11 VITALS
HEART RATE: 85 BPM | WEIGHT: 134.9 LBS | DIASTOLIC BLOOD PRESSURE: 70 MMHG | BODY MASS INDEX: 23.03 KG/M2 | SYSTOLIC BLOOD PRESSURE: 110 MMHG | HEIGHT: 64 IN | OXYGEN SATURATION: 98 %

## 2021-11-11 DIAGNOSIS — I10 ESSENTIAL HYPERTENSION: ICD-10-CM

## 2021-11-11 DIAGNOSIS — I50.23 ACUTE ON CHRONIC SYSTOLIC CONGESTIVE HEART FAILURE (H): ICD-10-CM

## 2021-11-11 DIAGNOSIS — I25.118 CORONARY ARTERY DISEASE OF NATIVE ARTERY OF NATIVE HEART WITH STABLE ANGINA PECTORIS (H): ICD-10-CM

## 2021-11-11 DIAGNOSIS — I20.0 UNSTABLE ANGINA (H): ICD-10-CM

## 2021-11-11 PROCEDURE — 99214 OFFICE O/P EST MOD 30 MIN: CPT | Performed by: NURSE PRACTITIONER

## 2021-11-11 RX ORDER — ISOSORBIDE MONONITRATE 30 MG/1
90 TABLET, EXTENDED RELEASE ORAL DAILY
Qty: 270 TABLET | Refills: 3 | Status: SHIPPED | OUTPATIENT
Start: 2021-11-11 | End: 2022-01-01

## 2021-11-11 RX ORDER — AMLODIPINE BESYLATE 2.5 MG/1
2.5 TABLET ORAL DAILY
Qty: 90 TABLET | Refills: 3 | Status: SHIPPED | OUTPATIENT
Start: 2021-11-11 | End: 2022-01-01

## 2021-11-11 ASSESSMENT — MIFFLIN-ST. JEOR: SCORE: 986.9

## 2021-11-11 NOTE — PATIENT INSTRUCTIONS
Call my nurse with any questions or concerns:  [phone]  *If you have concerns after hours, please call [phone], option 2 to speak with on call Cardiologist.

## 2021-11-11 NOTE — LETTER
11/11/2021    Kirit Michel MD  [address] [address]    RE: Haritha Trujillo       Dear Colleague,    I had the pleasure of seeing Haritha Trujillo in the [facility].  HPI:  Ms. Trujillo is a delightful, 89-year-old woman well known to me here at the [facility].  She is an established patient of Dr. Tatum.  She lives independently in her senior apartment facility.    Patient was admitted the end of October with chest pain.  She was brought to the Cath Lab which showed a chronic occlusion of the RCA with a large, extensive left to left collaterals arising from the distal circumflex.  She had a 60% eccentric calcified stenosis of the proximal and mid circumflex involving the takeoff of the first large obtuse marginal branch.  iFR down both limbs was 1.0.  She had minimal disease in the LAD, left main, and ramus.  End-diastolic pressure was 18 mmHg and EF was 30 to 40%.  She had mild MR.  Aggressive medical management was recommended.    She is here today for 2-week follow-up.  At her last visit I increased her isosorbide to 90 mg daily, and decreased her amlodipine to 2.5 mg daily.  She is feeling well since this medication change.  She has not had any episodes of chest discomfort.    Last resting echocardiogram showed her EF to be 45%-50%.  She does have a CRT device.  LV lead was shut off due to diaphragmatic stimulation in 06/2020.    She has a history of permanent atrial fibrillation with AV richard ablation.  She had a BiV upgrade 01/2015.  She reached MICHAEL 04/2020.  Her device was changed out at that time.    Currently she denies chest pain, orthopnea, PND, syncope, or peripheral edema.  She is here today with her son, Philipp       Ref. Range 10/20/2021 07:45 10/27/2021 13:53   Sodium Latest Ref Range: 133 - 144 mmol/L 127 (L) 132 (L)   Potassium Latest Ref Range: 3.4 - 5.3 mmol/L 4.4 4.4   Chloride Latest Ref  Range: 94 - 109 mmol/L 97 98   Carbon Dioxide Latest Ref Range: 20 - 32 mmol/L 26 28   Urea Nitrogen Latest Ref Range: 7 - 30 mg/dL 14 25   Creatinine Latest Ref Range: 0.52 - 1.04 mg/dL 0.82 1.14 (H)   GFR Estimate Latest Ref Range: >60 mL/min/1.73m2 61 41 (L)   Calcium Latest Ref Range: 8.5 - 10.1 mg/dL 7.9 (L) 8.8   Anion Gap Latest Ref Range: 3 - 14 mmol/L 4 6   N-Terminal Pro Bnp Latest Ref Range: 0 - 450 pg/mL  2,895 (H)       Plan:   Ms. Trujillo is a delightful, 89-year-old woman here today for followup.    1.  Coronary artery disease with  of the RCA.    She has no c/o chest pain  She is currently on metoprolol succinate 100 mg in the morning and 50 mg in the evening, lisinopril 10 mg daily, isosorbide 90 mg daily, amlodipine 2.5 mg daily, aspirin 81 mg daily and atorvastatin 40 mg daily.    Last LDL was 83 in 10/2020.     2.  Cardiomyopathy, EF of 30%-40% on angiogram.    EF on limited echo on 10/06/21 was 45%-50%.    Lori appears to be euvolemic on exam.  I will have her continue furosemide at 20 mg daily.  She does follow a low-sodium diet and fluid restriction.       3.  History of hypertension, currently normotensive.  4.  Permanent atrial fibrillation with AV node ablation.  The patient is on warfarin.    5.  CRT-P implant.    LV lead to shut off due to diaphragmatic stimulation.  The patient had a generator change completed 04/2020.    6.  History of anxiety   Lexapro now 10 mg daily    I am glad Loir is feeling well.  I am scheduled to see her next month.  I will have her keep this appointment.  She will contact me in the interim if she has questions or concerns.    Kat Yo NP, APRN CNP    No orders of the defined types were placed in this encounter.      Orders Placed This Encounter   Medications     isosorbide mononitrate (IMDUR) 30 MG 24 hr tablet     Sig: Take 3 tablets (90 mg) by mouth daily     Dispense:  270 tablet     Refill:  3     aspirin (ASA) 81 MG EC tablet     Sig: Take 1 tablet  (81 mg) by mouth daily     Dispense:  90 tablet     Refill:  3     amLODIPine (NORVASC) 2.5 MG tablet     Sig: Take 1 tablet (2.5 mg) by mouth daily     Dispense:  90 tablet     Refill:  3       Medications Discontinued During This Encounter   Medication Reason     metoprolol succinate ER (TOPROL-XL) 50 MG 24 hr tablet Medication Reconciliation Clean Up     isosorbide mononitrate (IMDUR) 60 MG 24 hr tablet Dose adjustment     aspirin (ASA) 81 MG EC tablet Reorder     amLODIPine (NORVASC) 5 MG tablet          Encounter Diagnoses   Name Primary?     Acute on chronic systolic congestive heart failure (H)      Coronary artery disease of native artery of native heart with stable angina pectoris (H)      Unstable angina (H)      Essential hypertension        CURRENT MEDICATIONS:  Current Outpatient Medications   Medication Sig Dispense Refill     acetaminophen (TYLENOL) 500 MG tablet Take 500-1,000 mg by mouth every 6 hours as needed for mild pain       Alendronate Sodium (FOSAMAX PO) Take 70 mg by mouth once a week Sunday evenings       amLODIPine (NORVASC) 2.5 MG tablet Take 1 tablet (2.5 mg) by mouth daily 90 tablet 3     aspirin (ASA) 81 MG EC tablet Take 1 tablet (81 mg) by mouth daily 90 tablet 3     atorvastatin (LIPITOR) 40 MG tablet Take 40 mg by mouth At Bedtime        calcium carbonate-vitamin D (CALCIUM + D) 600-200 MG-UNIT TABS Take 1 tablet by mouth daily.       cycloSPORINE (RESTASIS) 0.05 % ophthalmic emulsion Place 1 drop into both eyes every 12 hours.       escitalopram (LEXAPRO) 10 MG tablet Take 10 mg by mouth daily       furosemide (LASIX) 20 MG tablet Take 1 tablet (20 mg) by mouth daily 30 tablet 0     isosorbide mononitrate (IMDUR) 30 MG 24 hr tablet Take 3 tablets (90 mg) by mouth daily 270 tablet 3     lisinopril (ZESTRIL) 10 MG tablet Take 1 tablet (10 mg) by mouth daily 90 tablet 2     metoprolol succinate ER (TOPROL-XL) 50 MG 24 hr tablet 100 in am and 50mg at night (Patient taking  differently: Take 50 mg by mouth every evening 100 in am and 50mg at night)       Multiple Vitamins-Minerals (CENTRUM SILVER) per tablet Take 1 tablet by mouth daily.         Multiple Vitamins-Minerals (PRESERVISION AREDS 2 PO) Take by mouth daily       nitroGLYcerin (NITROSTAT) 0.4 MG sublingual tablet For chest pain place 1 tablet under the tongue every 5 minutes for 3 doses. If symptoms persist 5 minutes after 1st dose call 911. 25 tablet 0     Probiotic Product (PROBIOTIC DAILY PO) Take 1 tablet by mouth daily        sodium chloride 1 GM tablet Take 1 g by mouth daily       warfarin ANTICOAGULANT (COUMADIN) 4 MG tablet Take 4 mg by mouth daily 2mg (0.5 tablet) Mon, Wed, Fri  4mg (1 tablet) Tues, Thurs. Sat, Sun         ALLERGIES     Allergies   Allergen Reactions     Amiodarone Nausea     Hctz      Hydrochlorothiazide Other (See Comments)     Other reaction(s): *Unknown     Lansoprazole      diarrhea   Prevacid       PAST MEDICAL HISTORY:  Past Medical History:   Diagnosis Date     Aortic regurgitation 12/14/2014    mild (1+) per echo     Atrial fibrillation (H)      Cardiomyopathy (H)      CHF (congestive heart failure) (H)      Chronic kidney disease, stage 3 (H)      Coronary atherosclerosis of unspecified type of vessel, native or graft 5/16/2000    normal left coronary arteries and tight obstruction in distal RCA, too small for revascularization, medical management     Degeneration of cervical intervertebral disc     cervical spine     Essential hypertension, benign      Mitral regurgitation 12/14/2014    mod-mod/sev (2-3+) per echo     Other and unspecified hyperlipidemia      Pacemaker      Pulmonary hypertension (H) 3/16/2013    mild per echo with RVSP 31mmHg +RAP      Pulmonary valve regurgitation 12/14/2014    mod (2+) per echo     Tricuspid regurgitation 12/14/2014    mild (1+) per echo     Unspecified tinnitus     chronic       PAST SURGICAL HISTORY:  Past Surgical History:   Procedure Laterality  Date     CORONARY ANGIOGRAPHY ADULT ORDER  2000     CV HEART CATHETERIZATION WITH POSSIBLE INTERVENTION N/A 10/19/2021    Procedure: Heart Catheterization with Possible Intervention;  Surgeon: Alden Skelton MD;  Location:  HEART CARDIAC CATH LAB     CV INSTANTANEOUS WAVE-FREE RATIO N/A 10/19/2021    Procedure: Instantaneous Wave-Free Ratio;  Surgeon: Alden Skelton MD;  Location:  HEART CARDIAC CATH LAB     CV LEFT HEART CATH N/A 10/19/2021    Procedure: Left Heart Cath;  Surgeon: Alden Skelton MD;  Location:  HEART CARDIAC CATH LAB     CV LEFT VENTRICULOGRAM N/A 10/19/2021    Procedure: Left Ventriculogram;  Surgeon: Alden Skelton MD;  Location:  HEART CARDIAC CATH LAB     EP PPM RMVL&REPL OF GEN W/ DUAL LEAD SYS N/A 2020    Procedure: Permanent Pacemakers  Removal and Replacement Generator  with Multi Lead System;  Surgeon: Fay Tatum MD;  Location:  HEART CARDIAC CATH LAB     HRW PACEMAKER PERMANENT  2015    BI- ventricular     IMPLANT PACEMAKER  2010    dual chamber Medtronic     ZZC NONSPECIFIC PROCEDURE  1999    right rotator cuff tear OR     ZZC NONSPECIFIC PROCEDURE  1983    microdiscectomy     ZZC NONSPECIFIC PROCEDURE      C-sections x 3      ZZC NONSPECIFIC PROCEDURE      appendectomy     ZZC NONSPECIFIC PROCEDURE      bilateral benign breast biopsy      ZZC NONSPECIFIC PROCEDURE      right knee arthroscopic OR     ZZC NONSPECIFIC PROCEDURE  1974    enteritis       FAMILY HISTORY:  Family History   Problem Relation Age of Onset     Hypertension Mother      Cancer Mother         pancreatic     Eye Disorder Mother         glacoma     C.A.DAriel Father          53     Lipids Father      Diabetes Maternal Grandmother      C.A.D. Brother         open heart surgery age 53     Cancer Daughter         ovavian     Neurologic Disorder Son         MS       SOCIAL HISTORY:  Social History     Socioeconomic History     Marital status:       Spouse name: None     Number of children: None     Years of education: None     Highest education level: None   Occupational History     None   Tobacco Use     Smoking status: Former Smoker     Packs/day: 0.50     Years: 15.00     Pack years: 7.50     Types: Cigarettes     Start date:      Quit date:      Years since quittin.8     Smokeless tobacco: Never Used   Substance and Sexual Activity     Alcohol use: No     Drug use: No     Sexual activity: None   Other Topics Concern     Parent/sibling w/ CABG, MI or angioplasty before 65F 55M? Yes      Service Not Asked     Blood Transfusions Not Asked     Caffeine Concern Not Asked     Occupational Exposure Not Asked     Hobby Hazards Not Asked     Sleep Concern Not Asked     Stress Concern Not Asked     Weight Concern Not Asked     Special Diet Yes     Comment: low sodium     Back Care Not Asked     Exercise Yes     Comment: active life style     Bike Helmet Not Asked     Seat Belt Yes     Self-Exams Not Asked   Social History Narrative     None     Social Determinants of Health     Financial Resource Strain: Not on file   Food Insecurity: Not on file   Transportation Needs: Not on file   Physical Activity: Not on file   Stress: Not on file   Social Connections: Not on file   Intimate Partner Violence: Not on file   Housing Stability: Not on file       Review of Systems:  Skin:  Negative bruising Severe brusing and bleeding spots on lower legs   Eyes:  Positive for glasses    ENT:  Negative hearing loss wears hearing aides  Respiratory:  Positive for dyspnea on exertion improved   Cardiovascular:  Negative;palpitations;lightheadedness;dizziness;syncope or near-syncope;cyanosis Positive for;fatigue;edema;chest pain 3 episodes cp since hosp, one nitro since hosp, right leg edema when sitting  Gastroenterology: Positive for poor appetite burping  Genitourinary:  Negative hematuria    Musculoskeletal:  Positive for arthritis balance off  Neurologic:   "Positive for memory problems tingling and heaviness in arms with chest pain episodes  Psychiatric:  Negative anxiety treated  Heme/Lymph/Imm:  Positive for allergies    Endocrine:  Negative diabetes      Physical Exam:  Vitals: /70   Pulse 85   Ht 1.626 m (5' 4\")   Wt 61.2 kg (134 lb 14.4 oz)   SpO2 98%   BMI 23.16 kg/m      Constitutional:  cooperative, alert and oriented, well developed, well nourished, in no acute distress        Skin:  warm and dry to the touch, no apparent skin lesions or masses noted   pacemaker incision in the left infraclavicular area was well-healed      Head:  normocephalic, no masses or lesions        Eyes:  pupils equal and round;conjunctivae and lids unremarkable        Lymph:      ENT:  no pallor or cyanosis, dentition good        Neck:  JVP normal        Respiratory:  normal breath sounds, clear to auscultation, normal A-P diameter, normal symmetry, normal respiratory excursion, no use of accessory muscles         Cardiac: regular rhythm;normal S1 and S2;no S3 or S4;no murmurs, gallops or rubs detected                                                         GI:  not assessed this visit        Extremities and Muscular Skeletal:  no deformities, clubbing, cyanosis, erythema observed;no edema stasis pigmentation            Neurological:  no gross motor deficits        Psych:  Alert and Oriented x 3;affect appropriate, oriented to time, person and place          Kat Yo NP, APRN CNP   [facility]  "

## 2021-11-11 NOTE — PROGRESS NOTES
HPI:  Ms. Trujillo is a delightful, 89-year-old woman well known to me [facility].  She is an established patient of Dr. Tatum.  She lives independently in her senior apartment facility.    Patient was admitted the end of October with chest pain.  She was brought to the Cath Lab which showed a chronic occlusion of the RCA with a large, extensive left to left collaterals arising from the distal circumflex.  She had a 60% eccentric calcified stenosis of the proximal and mid circumflex involving the takeoff of the first large obtuse marginal branch.  iFR down both limbs was 1.0.  She had minimal disease in the LAD, left main, and ramus.  End-diastolic pressure was 18 mmHg and EF was 30 to 40%.  She had mild MR.  Aggressive medical management was recommended.    She is here today for 2-week follow-up.  At her last visit I increased her isosorbide to 90 mg daily, and decreased her amlodipine to 2.5 mg daily.  She is feeling well since this medication change.  She has not had any episodes of chest discomfort.    Last resting echocardiogram showed her EF to be 45%-50%.  She does have a CRT device.  LV lead was shut off due to diaphragmatic stimulation in 06/2020.    She has a history of permanent atrial fibrillation with AV richard ablation.  She had a BiV upgrade 01/2015.  She reached MICHAEL 04/2020.  Her device was changed out at that time.    Currently she denies chest pain, orthopnea, PND, syncope, or peripheral edema.  She is here today with her son, Philipp       Ref. Range 10/20/2021 07:45 10/27/2021 13:53   Sodium Latest Ref Range: 133 - 144 mmol/L 127 (L) 132 (L)   Potassium Latest Ref Range: 3.4 - 5.3 mmol/L 4.4 4.4   Chloride Latest Ref Range: 94 - 109 mmol/L 97 98   Carbon Dioxide Latest Ref Range: 20 - 32 mmol/L 26 28   Urea Nitrogen Latest Ref Range: 7 - 30 mg/dL 14 25   Creatinine Latest Ref Range: 0.52 - 1.04 mg/dL 0.82 1.14 (H)   GFR Estimate Latest Ref Range: >60 mL/min/1.73m2 61 41 (L)   Calcium  Latest Ref Range: 8.5 - 10.1 mg/dL 7.9 (L) 8.8   Anion Gap Latest Ref Range: 3 - 14 mmol/L 4 6   N-Terminal Pro Bnp Latest Ref Range: 0 - 450 pg/mL  2,895 (H)       Plan:   Ms. Trujillo is a delightful, 89-year-old woman here today for followup.    1.  Coronary artery disease with  of the RCA.    She has no c/o chest pain  She is currently on metoprolol succinate 100 mg in the morning and 50 mg in the evening, lisinopril 10 mg daily, isosorbide 90 mg daily, amlodipine 2.5 mg daily, aspirin 81 mg daily and atorvastatin 40 mg daily.    Last LDL was 83 in 10/2020.     2.  Cardiomyopathy, EF of 30%-40% on angiogram.    EF on limited echo on 10/06/21 was 45%-50%.    Lori appears to be euvolemic on exam.  I will have her continue furosemide at 20 mg daily.  She does follow a low-sodium diet and fluid restriction.       3.  History of hypertension, currently normotensive.  4.  Permanent atrial fibrillation with AV node ablation.  The patient is on warfarin.    5.  CRT-P implant.    LV lead to shut off due to diaphragmatic stimulation.  The patient had a generator change completed 04/2020.    6.  History of anxiety   Lexapro now 10 mg daily    I am glad Lori is feeling well.  I am scheduled to see her next month.  I will have her keep this appointment.  She will contact me in the interim if she has questions or concerns.    Kat Yo NP, APRN CNP    No orders of the defined types were placed in this encounter.      Orders Placed This Encounter   Medications     isosorbide mononitrate (IMDUR) 30 MG 24 hr tablet     Sig: Take 3 tablets (90 mg) by mouth daily     Dispense:  270 tablet     Refill:  3     aspirin (ASA) 81 MG EC tablet     Sig: Take 1 tablet (81 mg) by mouth daily     Dispense:  90 tablet     Refill:  3     amLODIPine (NORVASC) 2.5 MG tablet     Sig: Take 1 tablet (2.5 mg) by mouth daily     Dispense:  90 tablet     Refill:  3       Medications Discontinued During This Encounter   Medication Reason      metoprolol succinate ER (TOPROL-XL) 50 MG 24 hr tablet Medication Reconciliation Clean Up     isosorbide mononitrate (IMDUR) 60 MG 24 hr tablet Dose adjustment     aspirin (ASA) 81 MG EC tablet Reorder     amLODIPine (NORVASC) 5 MG tablet          Encounter Diagnoses   Name Primary?     Acute on chronic systolic congestive heart failure (H)      Coronary artery disease of native artery of native heart with stable angina pectoris (H)      Unstable angina (H)      Essential hypertension        CURRENT MEDICATIONS:  Current Outpatient Medications   Medication Sig Dispense Refill     acetaminophen (TYLENOL) 500 MG tablet Take 500-1,000 mg by mouth every 6 hours as needed for mild pain       Alendronate Sodium (FOSAMAX PO) Take 70 mg by mouth once a week Sunday evenings       amLODIPine (NORVASC) 2.5 MG tablet Take 1 tablet (2.5 mg) by mouth daily 90 tablet 3     aspirin (ASA) 81 MG EC tablet Take 1 tablet (81 mg) by mouth daily 90 tablet 3     atorvastatin (LIPITOR) 40 MG tablet Take 40 mg by mouth At Bedtime        calcium carbonate-vitamin D (CALCIUM + D) 600-200 MG-UNIT TABS Take 1 tablet by mouth daily.       cycloSPORINE (RESTASIS) 0.05 % ophthalmic emulsion Place 1 drop into both eyes every 12 hours.       escitalopram (LEXAPRO) 10 MG tablet Take 10 mg by mouth daily       furosemide (LASIX) 20 MG tablet Take 1 tablet (20 mg) by mouth daily 30 tablet 0     isosorbide mononitrate (IMDUR) 30 MG 24 hr tablet Take 3 tablets (90 mg) by mouth daily 270 tablet 3     lisinopril (ZESTRIL) 10 MG tablet Take 1 tablet (10 mg) by mouth daily 90 tablet 2     metoprolol succinate ER (TOPROL-XL) 50 MG 24 hr tablet 100 in am and 50mg at night (Patient taking differently: Take 50 mg by mouth every evening 100 in am and 50mg at night)       Multiple Vitamins-Minerals (CENTRUM SILVER) per tablet Take 1 tablet by mouth daily.         Multiple Vitamins-Minerals (PRESERVISION AREDS 2 PO) Take by mouth daily       nitroGLYcerin  (NITROSTAT) 0.4 MG sublingual tablet For chest pain place 1 tablet under the tongue every 5 minutes for 3 doses. If symptoms persist 5 minutes after 1st dose call 911. 25 tablet 0     Probiotic Product (PROBIOTIC DAILY PO) Take 1 tablet by mouth daily        sodium chloride 1 GM tablet Take 1 g by mouth daily       warfarin ANTICOAGULANT (COUMADIN) 4 MG tablet Take 4 mg by mouth daily 2mg (0.5 tablet) Mon, Wed, Fri  4mg (1 tablet) Tues, Thurs. Sat, Sun         ALLERGIES     Allergies   Allergen Reactions     Amiodarone Nausea     Hctz      Hydrochlorothiazide Other (See Comments)     Other reaction(s): *Unknown     Lansoprazole      diarrhea   Prevacid       PAST MEDICAL HISTORY:  Past Medical History:   Diagnosis Date     Aortic regurgitation 12/14/2014    mild (1+) per echo     Atrial fibrillation (H)      Cardiomyopathy (H)      CHF (congestive heart failure) (H)      Chronic kidney disease, stage 3 (H)      Coronary atherosclerosis of unspecified type of vessel, native or graft 5/16/2000    normal left coronary arteries and tight obstruction in distal RCA, too small for revascularization, medical management     Degeneration of cervical intervertebral disc     cervical spine     Essential hypertension, benign      Mitral regurgitation 12/14/2014    mod-mod/sev (2-3+) per echo     Other and unspecified hyperlipidemia      Pacemaker      Pulmonary hypertension (H) 3/16/2013    mild per echo with RVSP 31mmHg +RAP      Pulmonary valve regurgitation 12/14/2014    mod (2+) per echo     Tricuspid regurgitation 12/14/2014    mild (1+) per echo     Unspecified tinnitus     chronic       PAST SURGICAL HISTORY:  Past Surgical History:   Procedure Laterality Date     CORONARY ANGIOGRAPHY ADULT ORDER  5/16/2000     CV HEART CATHETERIZATION WITH POSSIBLE INTERVENTION N/A 10/19/2021    Procedure: Heart Catheterization with Possible Intervention;  Surgeon: Alden Skelton MD;  Location:  [facility]     CV  INSTANTANEOUS WAVE-FREE RATIO N/A 10/19/2021    Procedure: Instantaneous Wave-Free Ratio;  Surgeon: Alden Skelton MD;  Location:  [facility]     CV LEFT HEART CATH N/A 10/19/2021    Procedure: Left Heart Cath;  Surgeon: Alden Skelton MD;  Location:  [facility]     CV LEFT VENTRICULOGRAM N/A 10/19/2021    Procedure: Left Ventriculogram;  Surgeon: Alden Skelton MD;  Location:  [facility]     EP PPM RMVL&REPL OF GEN W/ DUAL LEAD SYS N/A 2020    Procedure: Permanent Pacemakers  Removal and Replacement Generator  with Multi Lead System;  Surgeon: Fay Tatum MD;  Location:  [facility]     HRW PACEMAKER PERMANENT  2015    BI- ventricular     IMPLANT PACEMAKER  2010    dual chamber Medtronic     ZZC NONSPECIFIC PROCEDURE  1999    right rotator cuff tear OR     ZZC NONSPECIFIC PROCEDURE  1983    microdiscectomy     ZZC NONSPECIFIC PROCEDURE      C-sections x 3      ZZC NONSPECIFIC PROCEDURE      appendectomy     ZZC NONSPECIFIC PROCEDURE      bilateral benign breast biopsy      ZZC NONSPECIFIC PROCEDURE      right knee arthroscopic OR     ZZC NONSPECIFIC PROCEDURE  1974    enteritis       FAMILY HISTORY:  Family History   Problem Relation Age of Onset     Hypertension Mother      Cancer Mother         pancreatic     Eye Disorder Mother         [place]     C.A.D. Father          53     Lipids Father      Diabetes Maternal Grandmother      C.A.D. Brother         open heart surgery age 53     Cancer Daughter         ovavian     Neurologic Disorder Son         MS       SOCIAL HISTORY:  Social History     Socioeconomic History     Marital status:      Spouse name: None     Number of children: None     Years of education: None     Highest education level: None   Occupational History     None   Tobacco Use     Smoking status: Former Smoker     Packs/day: 0.50     Years: 15.00     Pack years: 7.50     Types:  Cigarettes     Start date:      Quit date:      Years since quittin.8     Smokeless tobacco: Never Used   Substance and Sexual Activity     Alcohol use: No     Drug use: No     Sexual activity: None   Other Topics Concern     Parent/sibling w/ CABG, MI or angioplasty before 65F 55M? Yes      Service Not Asked     Blood Transfusions Not Asked     Caffeine Concern Not Asked     Occupational Exposure Not Asked     Hobby Hazards Not Asked     Sleep Concern Not Asked     Stress Concern Not Asked     Weight Concern Not Asked     Special Diet Yes     Comment: low sodium     Back Care Not Asked     Exercise Yes     Comment: active life style     Bike Helmet Not Asked     Seat Belt Yes     Self-Exams Not Asked   Social History Narrative     None     Social Determinants of Health     Financial Resource Strain: Not on file   Food Insecurity: Not on file   Transportation Needs: Not on file   Physical Activity: Not on file   Stress: Not on file   Social Connections: Not on file   Intimate Partner Violence: Not on file   Housing Stability: Not on file       Review of Systems:  Skin:  Negative bruising Severe brusing and bleeding spots on lower legs   Eyes:  Positive for glasses    ENT:  Negative hearing loss wears hearing aides  Respiratory:  Positive for dyspnea on exertion improved   Cardiovascular:  Negative;palpitations;lightheadedness;dizziness;syncope or near-syncope;cyanosis Positive for;fatigue;edema;chest pain 3 episodes cp since hosp, one nitro since hosp, right leg edema when sitting  Gastroenterology: Positive for poor appetite burping  Genitourinary:  Negative hematuria    Musculoskeletal:  Positive for arthritis balance off  Neurologic:  Positive for memory problems tingling and heaviness in arms with chest pain episodes  Psychiatric:  Negative anxiety treated  Heme/Lymph/Imm:  Positive for allergies    Endocrine:  Negative diabetes      Physical Exam:  Vitals: /70   Pulse 85   Ht 1.626  "m (5' 4\")   Wt 61.2 kg (134 lb 14.4 oz)   SpO2 98%   BMI 23.16 kg/m      Constitutional:  cooperative, alert and oriented, well developed, well nourished, in no acute distress        Skin:  warm and dry to the touch, no apparent skin lesions or masses noted   pacemaker incision in the left infraclavicular area was well-healed      Head:  normocephalic, no masses or lesions        Eyes:  pupils equal and round;conjunctivae and lids unremarkable        Lymph:      ENT:  no pallor or cyanosis, dentition good        Neck:  JVP normal        Respiratory:  normal breath sounds, clear to auscultation, normal A-P diameter, normal symmetry, normal respiratory excursion, no use of accessory muscles         Cardiac: regular rhythm;normal S1 and S2;no S3 or S4;no murmurs, gallops or rubs detected                                                         GI:  not assessed this visit        Extremities and Muscular Skeletal:  no deformities, clubbing, cyanosis, erythema observed;no edema stasis pigmentation            Neurological:  no gross motor deficits        Psych:  Alert and Oriented x 3;affect appropriate, oriented to time, person and place        CC  Kat Yo, APRN CNP  [address]  [place] [id number]              "

## 2021-12-17 ENCOUNTER — OFFICE VISIT (OUTPATIENT)
Dept: CARDIOLOGY | Facility: CLINIC | Age: 86
End: 2021-12-17
Payer: COMMERCIAL

## 2021-12-17 VITALS
HEIGHT: 64 IN | BODY MASS INDEX: 23.66 KG/M2 | SYSTOLIC BLOOD PRESSURE: 128 MMHG | OXYGEN SATURATION: 99 % | WEIGHT: 138.6 LBS | HEART RATE: 69 BPM | DIASTOLIC BLOOD PRESSURE: 73 MMHG

## 2021-12-17 DIAGNOSIS — I42.0 DILATED CARDIOMYOPATHY (H): ICD-10-CM

## 2021-12-17 DIAGNOSIS — I10 ESSENTIAL HYPERTENSION: ICD-10-CM

## 2021-12-17 DIAGNOSIS — I25.118 CORONARY ARTERY DISEASE OF NATIVE ARTERY OF NATIVE HEART WITH STABLE ANGINA PECTORIS (H): ICD-10-CM

## 2021-12-17 DIAGNOSIS — I50.23 ACUTE ON CHRONIC SYSTOLIC HEART FAILURE (H): Primary | ICD-10-CM

## 2021-12-17 PROCEDURE — 99214 OFFICE O/P EST MOD 30 MIN: CPT | Performed by: NURSE PRACTITIONER

## 2021-12-17 ASSESSMENT — MIFFLIN-ST. JEOR: SCORE: 1003.69

## 2021-12-17 NOTE — LETTER
12/17/2021       RE: Haritha Trujillo  [address]  [address]  [facility] [address]     Dear Colleague,    Thank you for referring your patient, Haritha Trujillo, to the [facility] [place] at [facility]. Please see a copy of my visit note below.    HPI and Plan: #[id number]  See dictation    Today's clinic visit entailed:  Review of external notes as documented elsewhere in note  No LOS data to display   Time spent doing chart review, history and exam, documentation and further activities per the note  Provider  Link to MDM Help Grid     The level of medical decision making during this visit was of moderate complexity.      Orders Placed This Encounter   Procedures     Basic metabolic panel     N terminal pro BNP outpatient     Follow-Up with [facility] Visit       No orders of the defined types were placed in this encounter.      There are no discontinued medications.      Encounter Diagnoses   Name Primary?     Dilated cardiomyopathy (H)      Acute on chronic systolic heart failure (H) Yes       CURRENT MEDICATIONS:  Current Outpatient Medications   Medication Sig Dispense Refill     acetaminophen (TYLENOL) 500 MG tablet Take 500-1,000 mg by mouth every 6 hours as needed for mild pain       Alendronate Sodium (FOSAMAX PO) Take 70 mg by mouth once a week Sunday evenings       amLODIPine (NORVASC) 2.5 MG tablet Take 1 tablet (2.5 mg) by mouth daily 90 tablet 3     aspirin (ASA) 81 MG EC tablet Take 1 tablet (81 mg) by mouth daily 90 tablet 3     atorvastatin (LIPITOR) 40 MG tablet Take 40 mg by mouth At Bedtime        calcium carbonate-vitamin D (CALCIUM + D) 600-200 MG-UNIT TABS Take 1 tablet by mouth daily.       cycloSPORINE (RESTASIS) 0.05 % ophthalmic emulsion Place 1 drop into both eyes every 12 hours.       escitalopram (LEXAPRO) 10 MG tablet Take 10 mg by mouth daily       furosemide (LASIX) 20 MG tablet Take 1 tablet (20  mg) by mouth daily 30 tablet 0     isosorbide mononitrate (IMDUR) 30 MG 24 hr tablet Take 3 tablets (90 mg) by mouth daily 270 tablet 3     lisinopril (ZESTRIL) 10 MG tablet Take 1 tablet (10 mg) by mouth daily 90 tablet 2     metoprolol succinate ER (TOPROL-XL) 50 MG 24 hr tablet 100 in am and 50mg at night (Patient taking differently: Take 50 mg by mouth every evening 100 in am and 50mg at night)       Multiple Vitamins-Minerals (CENTRUM SILVER) per tablet Take 1 tablet by mouth daily.         Multiple Vitamins-Minerals (PRESERVISION AREDS 2 PO) Take by mouth daily       nitroGLYcerin (NITROSTAT) 0.4 MG sublingual tablet For chest pain place 1 tablet under the tongue every 5 minutes for 3 doses. If symptoms persist 5 minutes after 1st dose call 911. 25 tablet 0     Probiotic Product (PROBIOTIC DAILY PO) Take 1 tablet by mouth daily        sodium chloride 1 GM tablet Take 1 g by mouth daily       warfarin ANTICOAGULANT (COUMADIN) 4 MG tablet Take 4 mg by mouth daily 2mg (0.5 tablet) Mon, Wed, Fri  4mg (1 tablet) Tues, Thurs. Sat, Sun         ALLERGIES     Allergies   Allergen Reactions     Amiodarone Nausea     Hctz      Hydrochlorothiazide Other (See Comments)     Other reaction(s): *Unknown     Lansoprazole      diarrhea   Prevacid       PAST MEDICAL HISTORY:  Past Medical History:   Diagnosis Date     Aortic regurgitation 12/14/2014    mild (1+) per echo     Atrial fibrillation (H)      Cardiomyopathy (H)      CHF (congestive heart failure) (H)      Chronic kidney disease, stage 3 (H)      Coronary atherosclerosis of unspecified type of vessel, native or graft 5/16/2000    normal left coronary arteries and tight obstruction in distal RCA, too small for revascularization, medical management     Degeneration of cervical intervertebral disc     cervical spine     Essential hypertension, benign      Mitral regurgitation 12/14/2014    mod-mod/sev (2-3+) per echo     Other and unspecified hyperlipidemia      Pacemaker       Pulmonary hypertension (H) 3/16/2013    mild per echo with RVSP 31mmHg +RAP      Pulmonary valve regurgitation 12/14/2014    mod (2+) per echo     Tricuspid regurgitation 12/14/2014    mild (1+) per echo     Unspecified tinnitus     chronic       PAST SURGICAL HISTORY:  Past Surgical History:   Procedure Laterality Date     CORONARY ANGIOGRAPHY ADULT ORDER  5/16/2000     CV HEART CATHETERIZATION WITH POSSIBLE INTERVENTION N/A 10/19/2021    Procedure: Heart Catheterization with Possible Intervention;  Surgeon: Alden Skelton MD;  Location:  HEART CARDIAC CATH LAB     CV INSTANTANEOUS WAVE-FREE RATIO N/A 10/19/2021    Procedure: Instantaneous Wave-Free Ratio;  Surgeon: Alden Skelton MD;  Location:  HEART CARDIAC CATH LAB     CV LEFT HEART CATH N/A 10/19/2021    Procedure: Left Heart Cath;  Surgeon: Alden Skelton MD;  Location:  HEART CARDIAC CATH LAB     CV LEFT VENTRICULOGRAM N/A 10/19/2021    Procedure: Left Ventriculogram;  Surgeon: Alden Skelton MD;  Location:  HEART CARDIAC CATH LAB     EP PPM RMVL&REPL OF GEN W/ DUAL LEAD SYS N/A 4/14/2020    Procedure: Permanent Pacemakers  Removal and Replacement Generator  with Multi Lead System;  Surgeon: Fay Tatum MD;  Location:  HEART CARDIAC CATH LAB     HRW PACEMAKER PERMANENT  1/2015    BI- ventricular     IMPLANT PACEMAKER  11/12/2010    dual chamber Medtronic     ZZ NONSPECIFIC PROCEDURE  1999    right rotator cuff tear OR     ZZC NONSPECIFIC PROCEDURE  1983    microdiscectomy     ZZ NONSPECIFIC PROCEDURE      C-sections x 3      ZZC NONSPECIFIC PROCEDURE      appendectomy     ZZC NONSPECIFIC PROCEDURE      bilateral benign breast biopsy      ZZC NONSPECIFIC PROCEDURE      right knee arthroscopic OR     ZZC NONSPECIFIC PROCEDURE  1974    enteritis       FAMILY HISTORY:  Family History   Problem Relation Age of Onset     Hypertension Mother      Cancer Mother         pancreatic     Eye Disorder Mother          cristian     ANIA Father          53     Lipids Father      Diabetes Maternal Grandmother      ANIA Brother         open heart surgery age 53     Cancer Daughter         ovavian     Neurologic Disorder Son         MS       SOCIAL HISTORY:  Social History     Socioeconomic History     Marital status:      Spouse name: None     Number of children: None     Years of education: None     Highest education level: None   Occupational History     None   Tobacco Use     Smoking status: Former Smoker     Packs/day: 0.50     Years: 15.00     Pack years: 7.50     Types: Cigarettes     Start date:      Quit date:      Years since quittin.9     Smokeless tobacco: Never Used   Substance and Sexual Activity     Alcohol use: No     Drug use: No     Sexual activity: None   Other Topics Concern     Parent/sibling w/ CABG, MI or angioplasty before 65F 55M? Yes      Service Not Asked     Blood Transfusions Not Asked     Caffeine Concern Not Asked     Occupational Exposure Not Asked     Hobby Hazards Not Asked     Sleep Concern Not Asked     Stress Concern Not Asked     Weight Concern Not Asked     Special Diet Yes     Comment: low sodium     Back Care Not Asked     Exercise Yes     Comment: active life style     Bike Helmet Not Asked     Seat Belt Yes     Self-Exams Not Asked   Social History Narrative     None     Social Determinants of Health     Financial Resource Strain: Not on file   Food Insecurity: Not on file   Transportation Needs: Not on file   Physical Activity: Not on file   Stress: Not on file   Social Connections: Not on file   Intimate Partner Violence: Not on file   Housing Stability: Not on file       Review of Systems:  Skin:  Negative bruising Severe brusing and bleeding spots on lower legs   Eyes:  Positive for glasses    ENT:  Negative hearing loss wears hearing aides  Respiratory:  Positive for dyspnea on exertion improved   Cardiovascular:   "Negative;palpitations;lightheadedness;dizziness;syncope or near-syncope;cyanosis Positive for;fatigue;edema;chest pain 3 episodes cp since hosp, one nitro since hosp, right leg edema when sitting  Gastroenterology: Positive for poor appetite burping  Genitourinary:  Negative hematuria    Musculoskeletal:  Positive for arthritis balance off  Neurologic:  Positive for memory problems tingling and heaviness in arms with chest pain episodes  Psychiatric:  Negative anxiety treated  Heme/Lymph/Imm:  Positive for allergies    Endocrine:  Negative diabetes      Physical Exam:  Vitals: /73   Pulse 69   Ht 1.626 m (5' 4\")   Wt 62.9 kg (138 lb 9.6 oz)   SpO2 99%   BMI 23.79 kg/m      Constitutional:  cooperative, alert and oriented, well developed, well nourished, in no acute distress        Skin:  warm and dry to the touch, no apparent skin lesions or masses noted   pacemaker incision in the left infraclavicular area was well-healed      Head:  normocephalic, no masses or lesions        Eyes:  pupils equal and round;conjunctivae and lids unremarkable        Lymph:      ENT:  no pallor or cyanosis, dentition good        Neck:  JVP normal        Respiratory:  normal breath sounds, clear to auscultation, normal A-P diameter, normal symmetry, normal respiratory excursion, no use of accessory muscles         Cardiac: regular rhythm;normal S1 and S2;no S3 or S4;no murmurs, gallops or rubs detected                not assessed this visit                                        GI:  not assessed this visit        Extremities and Muscular Skeletal:  no deformities, clubbing, cyanosis, erythema observed;no edema stasis pigmentation            Neurological:  no gross motor deficits        Psych:  Alert and Oriented x 3;affect appropriate, oriented to time, person and place        AGUSTO Yo, APRN CNP  [address]  [place] [address]                Service Date: 12/17/2021    HISTORY OF PRESENT ILLNESS:  Ms. " Ronnie is a delightful 89-year-old woman well known to me here at the Heart Clinic.  I have been [facility].  She is here today with her son, Philipp.  She is an established patient of Dr. Tatum.      Her past medical history includes:  1.  Permanent atrial fibrillation with AV node ablation with BiV upgrade in 01/2015.  Her device reached MICHAEL 04/2020.  The patient does not have LV lead.  This was shut off due to diaphragmatic stimulation 06/2020.  2.  Cardiomyopathy with EF of 30%-40% on recent angiogram.  EF on limited echo was 45%-50%.  3.  Hypertension, currently stable.  4.  History of anxiety, on Lexapro 10 mg daily.  5.  History of coronary artery disease with  of the RCA.  She had extensive left to left collaterals arising from the distal circumflex.  She has a 60% eccentric calcified stenosis of the proximal, mid circumflex involving the takeoff of the large first obtuse marginal.  IFR down both limbs was 1.0.  She had minimal disease in the LAD, left main and ramus.  End-diastolic pressure was 18.  6.  Short-term memory deficit.  The patient does live in a senior independent living, but has support of her family.  They do her medications in a pillbox on a weekly basis.    Haritha is here today for a 1-month followup.  I have been keeping a closer eye on her since her recent hospitalizations.  She was in the hospital 10/05/2021 through 10/07/2021 for acute on chronic heart failure with reduced ejection fraction.  She received IV furosemide and diuresed well.  Her Lasix was increased to 20 mg daily after that discharge.  She was then readmitted on 10/17 through 10/20/2021 with chest pain.  It was at this time that she underwent her coronary angiogram.    At today's visit, she has no complaints of chest pain.  I did up titrate her isosorbide over the past several months to 90 mg daily.  She is also on amlodipine 2.5 mg daily.  She has not had any discomfort now for last our 2 visits.   She denies orthopnea, PND, syncope, lightheadedness.  She does have a trace of edema in her left lower extremity.  She hit her shin and does have a slow healing wound.  I did address it for her at today's visit.  It appears to be healing nicely.    Last device interrogation showed 81% V paced.  Chronic rhythm is AFib, status post AV richard ablation.  She is on chronic warfarin therapy.  Battery longevity is 10 years.  The patient has had 2 nonsustained VT episodes noted on that device interrogation.  They lasted from 2-15 beats and heart rate ranged from 125-180 beats per minute.  The patient was asymptomatic.    All other review of systems and past medical history are noted below.    ASSESSMENT AND PLAN:    1.  Coronary artery disease as outlined above.   of the RCA with collaterals.  She has no complaints of chest pain.  The plan was to treat her medically.  She is on metoprolol succinate, lisinopril, isosorbide, amlodipine, aspirin and atorvastatin.  Her lipids are followed by Dr. Michel.  Last LDL was noted to be 83 in 10/2021.  LDL goal is less than 70.  2.  Acute on chronic systolic heart failure.  The patient does have EF on angiogram of 30%-40% on limited echo was 45%-50%.  She is euvolemic on exam.  Her weight today is up slightly to 138 pounds, up 4 pounds from her last visit.  Her weight at home has been stable.  Should her weight increase up to 140 pounds, she will then need to take an additional 20 mg of Lasix x1.  She is a class II heart failure status at this time.  She is tolerating her current medical regimen.  3.  Permanent atrial fibrillation with AV node ablation.  Again, patient had a CRT upgrade.  Unfortunately, she developed diaphragmatic stimulation and LV lead was shut off.  She is RV paced as noted above.  4.  Anxiety, on low-dose Lexapro.  The patient is tolerating the medication well.    I am so pleased that Peg is able to still live independently.  Her son, Philipp, helps with medications.   Peg also gave up driving and thought it was thought best to be safe.    I would like to see her back in about 6 weeks with a BMP and proBNP at that time.    I have encouraged her during the day to keep her wound on her left shin open to air.  I did rapid for her at today's visit.  It appears to be healing nicely.    If you have questions or concerns, please feel free to contact us.    As always, I appreciate being involved in the care of this delightful patient.      Kat Yo NP        D: 2021   T: 2021   MT: [place]    Name:     SEVERIANO VALLEJO  MRN:      [id number]        Account:      [id number]   :      1925           Service Date: 2021     Document: [id number]

## 2021-12-17 NOTE — PATIENT INSTRUCTIONS
Call my nurse with any questions or concerns:  [phone]*If you have concerns after hours, please call [phone], option 2 to speak with on call Cardiologist.        Use Coban wrap to her leg wound

## 2021-12-17 NOTE — PROGRESS NOTES
Service Date: 12/17/2021    HISTORY OF PRESENT ILLNESS:  Ms. Trujillo is a delightful 89-year-old woman well known to me here at the [facility].  [facility] have [facility].  She is here today with her son, Philipp.  She is an established patient of Dr. Tatum.      Her past medical history includes:  1.  Permanent atrial fibrillation with AV node ablation with BiV upgrade in 01/2015.  Her device reached MICHAEL 04/2020.  The patient does not have LV lead.  This was shut off due to diaphragmatic stimulation 06/2020.  2.  Cardiomyopathy with EF of 30%-40% on recent angiogram.  EF on limited echo was 45%-50%.  3.  Hypertension, currently stable.  4.  History of anxiety, on Lexapro 10 mg daily.  5.  History of coronary artery disease with  of the RCA.  She had extensive left to left collaterals arising from the distal circumflex.  She has a 60% eccentric calcified stenosis of the proximal, mid circumflex involving the takeoff of the large first obtuse marginal.  IFR down both limbs was 1.0.  She had minimal disease in the LAD, left main and ramus.  End-diastolic pressure was 18.  6.  Short-term memory deficit.  The patient does live in a senior independent living, but has support of her family.  They do her medications in a pillbox on a weekly basis.    Haritha is here today for a 1-month followup.  I have been keeping a closer eye on her since her recent hospitalizations.  She was in the hospital 10/05/2021 through 10/07/2021 for acute on chronic heart failure with reduced ejection fraction.  She received IV furosemide and diuresed well.  Her Lasix was increased to 20 mg daily after that discharge.  She was then readmitted on 10/17 through 10/20/2021 with chest pain.  It was at this time that she underwent her coronary angiogram.    At today's visit, she has no complaints of chest pain.  I did up titrate her isosorbide over the past several months to 90 mg daily.  She is also on amlodipine 2.5 mg daily.   She has not had any discomfort now for last our 2 visits.  She denies orthopnea, PND, syncope, lightheadedness.  She does have a trace of edema in her left lower extremity.  She hit her shin and does have a slow healing wound.  I did address it for her at today's visit.  It appears to be healing nicely.    Last device interrogation showed 81% V paced.  Chronic rhythm is AFib, status post AV richard ablation.  She is on chronic warfarin therapy.  Battery longevity is 10 years.  The patient has had 2 nonsustained VT episodes noted on that device interrogation.  They lasted from 2-15 beats and heart rate ranged from 125-180 beats per minute.  The patient was asymptomatic.    All other review of systems and past medical history are noted below.    ASSESSMENT AND PLAN:    1.  Coronary artery disease as outlined above.   of the RCA with collaterals.  She has no complaints of chest pain.  The plan was to treat her medically.  She is on metoprolol succinate, lisinopril, isosorbide, amlodipine, aspirin and atorvastatin.  Her lipids are followed by Dr. Michel.  Last LDL was noted to be 83 in 10/2021.  LDL goal is less than 70.  2.  Acute on chronic systolic heart failure.  The patient does have EF on angiogram of 30%-40% on limited echo was 45%-50%.  She is euvolemic on exam.  Her weight today is up slightly to 138 pounds, up 4 pounds from her last visit.  Her weight at home has been stable.  Should her weight increase up to 140 pounds, she will then need to take an additional 20 mg of Lasix x1.  She is a class II heart failure status at this time.  She is tolerating her current medical regimen.  3.  Permanent atrial fibrillation with AV node ablation.  Again, patient had a CRT upgrade.  Unfortunately, she developed diaphragmatic stimulation and LV lead was shut off.  She is RV paced as noted above.  4.  Anxiety, on low-dose Lexapro.  The patient is tolerating the medication well.    I am so pleased that Peg is able to still  live independently.  Her son, Philipp, helps with medications.  Peg also gave up driving and thought it was thought best to be safe.    I would like to see her back in about 6 weeks with a BMP and proBNP at that time.    I have encouraged her during the day to keep her wound on her left shin open to air.  I did rapid for her at today's visit.  It appears to be healing nicely.    If you have questions or concerns, please feel free to contact us.    As always, I appreciate being involved in the care of this delightful patient.    Kat Yo NP        D: 2021   T: 2021   MT: [place]    Name:     SEVERIANO VALLEJO  MRN:      [id number]        Account:      [id number]   :      1925           Service Date: 2021       Document: [id number]

## 2021-12-17 NOTE — PROGRESS NOTES
HPI and Plan: #[id number]  See dictation    Today's clinic visit entailed:  Review of external notes as documented elsewhere in note  No LOS data to display   Time spent doing chart review, history and exam, documentation and further activities per the note  Provider  Link to MDM Help Grid     The level of medical decision making during this visit was of moderate complexity.      Orders Placed This Encounter   Procedures     Basic metabolic panel     N terminal pro BNP outpatient     Follow-Up with [facility] Visit       No orders of the defined types were placed in this encounter.      There are no discontinued medications.      Encounter Diagnoses   Name Primary?     Dilated cardiomyopathy (H)      Acute on chronic systolic heart failure (H) Yes       CURRENT MEDICATIONS:  Current Outpatient Medications   Medication Sig Dispense Refill     acetaminophen (TYLENOL) 500 MG tablet Take 500-1,000 mg by mouth every 6 hours as needed for mild pain       Alendronate Sodium (FOSAMAX PO) Take 70 mg by mouth once a week Sunday evenings       amLODIPine (NORVASC) 2.5 MG tablet Take 1 tablet (2.5 mg) by mouth daily 90 tablet 3     aspirin (ASA) 81 MG EC tablet Take 1 tablet (81 mg) by mouth daily 90 tablet 3     atorvastatin (LIPITOR) 40 MG tablet Take 40 mg by mouth At Bedtime        calcium carbonate-vitamin D (CALCIUM + D) 600-200 MG-UNIT TABS Take 1 tablet by mouth daily.       cycloSPORINE (RESTASIS) 0.05 % ophthalmic emulsion Place 1 drop into both eyes every 12 hours.       escitalopram (LEXAPRO) 10 MG tablet Take 10 mg by mouth daily       furosemide (LASIX) 20 MG tablet Take 1 tablet (20 mg) by mouth daily 30 tablet 0     isosorbide mononitrate (IMDUR) 30 MG 24 hr tablet Take 3 tablets (90 mg) by mouth daily 270 tablet 3     lisinopril (ZESTRIL) 10 MG tablet Take 1 tablet (10 mg) by mouth daily 90 tablet 2     metoprolol succinate ER (TOPROL-XL) 50 MG 24 hr tablet 100 in am and 50mg at night (Patient taking  differently: Take 50 mg by mouth every evening 100 in am and 50mg at night)       Multiple Vitamins-Minerals (CENTRUM SILVER) per tablet Take 1 tablet by mouth daily.         Multiple Vitamins-Minerals (PRESERVISION AREDS 2 PO) Take by mouth daily       nitroGLYcerin (NITROSTAT) 0.4 MG sublingual tablet For chest pain place 1 tablet under the tongue every 5 minutes for 3 doses. If symptoms persist 5 minutes after 1st dose call 911. 25 tablet 0     Probiotic Product (PROBIOTIC DAILY PO) Take 1 tablet by mouth daily        sodium chloride 1 GM tablet Take 1 g by mouth daily       warfarin ANTICOAGULANT (COUMADIN) 4 MG tablet Take 4 mg by mouth daily 2mg (0.5 tablet) Mon, Wed, Fri  4mg (1 tablet) Tues, Thurs. Sat, Sun         ALLERGIES     Allergies   Allergen Reactions     Amiodarone Nausea     Hctz      Hydrochlorothiazide Other (See Comments)     Other reaction(s): *Unknown     Lansoprazole      diarrhea   Prevacid       PAST MEDICAL HISTORY:  Past Medical History:   Diagnosis Date     Aortic regurgitation 12/14/2014    mild (1+) per echo     Atrial fibrillation (H)      Cardiomyopathy (H)      CHF (congestive heart failure) (H)      Chronic kidney disease, stage 3 (H)      Coronary atherosclerosis of unspecified type of vessel, native or graft 5/16/2000    normal left coronary arteries and tight obstruction in distal RCA, too small for revascularization, medical management     Degeneration of cervical intervertebral disc     cervical spine     Essential hypertension, benign      Mitral regurgitation 12/14/2014    mod-mod/sev (2-3+) per echo     Other and unspecified hyperlipidemia      Pacemaker      Pulmonary hypertension (H) 3/16/2013    mild per echo with RVSP 31mmHg +RAP      Pulmonary valve regurgitation 12/14/2014    mod (2+) per echo     Tricuspid regurgitation 12/14/2014    mild (1+) per echo     Unspecified tinnitus     chronic       PAST SURGICAL HISTORY:  Past Surgical History:   Procedure Laterality  Date     CORONARY ANGIOGRAPHY ADULT ORDER  2000     CV HEART CATHETERIZATION WITH POSSIBLE INTERVENTION N/A 10/19/2021    Procedure: Heart Catheterization with Possible Intervention;  Surgeon: Alden Skelton MD;  Location:  HE[facility] CARDIAC CATH LAB     CV INSTANTANEOUS WAVE-FREE RATIO N/A 10/19/2021    Procedure: Instantaneous Wave-Free Ratio;  Surgeon: Alden Skelton MD;  Location:  HE[facility]ARD[facility] CATH LAB     CV LEFT HEART CATH N/A 10/19/2021    Procedure: Left Heart Cath;  Surgeon: Alden Skelton MD;  Location:  HE[facility]ARD[facility] CATH [facility]     CV LEFT VENTRICULOGRAM N/A 10/19/2021    Procedure: Left Ventriculogram;  Surgeon: Alden Skelton MD;  Location:  HEART CARDIAC CATH LAB     EP PPM RMVL&REPL OF GEN W/ DUAL LEAD SYS N/A 2020    Procedure: Permanent Pacemakers  Removal and Replacement Generator  with Multi Lead System;  Surgeon: Fay Tatum MD;  Location:  HEART CARDIAC CATH LAB     HRW PACEMAKER PERMANENT  2015    BI- ventricular     IMPLANT PACEMAKER  2010    dual chamber Medtronic     ZZC NONSPECIFIC PROCEDURE  1999    right rotator cuff tear OR     ZZC NONSPECIFIC PROCEDURE  1983    microdiscectomy     ZZC NONSPECIFIC PROCEDURE      C-sections x 3      ZZC NONSPECIFIC PROCEDURE      appendectomy     ZZC NONSPECIFIC PROCEDURE      bilateral benign breast biopsy      ZZC NONSPECIFIC PROCEDURE      right knee arthroscopic OR     ZZC NONSPECIFIC PROCEDURE  1974    enteritis       FAMILY HISTORY:  Family History   Problem Relation Age of Onset     Hypertension Mother      Cancer Mother         pancreatic     Eye Disorder Mother         glacoma     C.A.DAriel Father          53     Lipids Father      Diabetes Maternal Grandmother      C.A.D. Brother         open heart surgery age 53     Cancer Daughter         ovavian     Neurologic Disorder Son         MS       SOCIAL HISTORY:  Social History     Socioeconomic History     Marital status:       Spouse name: None     Number of children: None     Years of education: None     Highest education level: None   Occupational History     None   Tobacco Use     Smoking status: Former Smoker     Packs/day: 0.50     Years: 15.00     Pack years: 7.50     Types: Cigarettes     Start date:      Quit date:      Years since quittin.9     Smokeless tobacco: Never Used   Substance and Sexual Activity     Alcohol use: No     Drug use: No     Sexual activity: None   Other Topics Concern     Parent/sibling w/ CABG, MI or angioplasty before 65F 55M? Yes      Service Not Asked     Blood Transfusions Not Asked     Caffeine Concern Not Asked     Occupational Exposure Not Asked     Hobby Hazards Not Asked     Sleep Concern Not Asked     Stress Concern Not Asked     Weight Concern Not Asked     Special Diet Yes     Comment: low sodium     Back Care Not Asked     Exercise Yes     Comment: active life style     Bike Helmet Not Asked     Seat Belt Yes     Self-Exams Not Asked   Social History Narrative     None     Social Determinants of Health     Financial Resource Strain: Not on file   Food Insecurity: Not on file   Transportation Needs: Not on file   Physical Activity: Not on file   Stress: Not on file   Social Connections: Not on file   Intimate Partner Violence: Not on file   Housing Stability: Not on file       Review of Systems:  Skin:  Negative bruising Severe brusing and bleeding spots on lower legs   Eyes:  Positive for glasses    ENT:  Negative hearing loss wears hearing aides  Respiratory:  Positive for dyspnea on exertion improved   Cardiovascular:  Negative;palpitations;lightheadedness;dizziness;syncope or near-syncope;cyanosis Positive for;fatigue;edema;chest pain 3 episodes cp since hosp, one nitro since hosp, right leg edema when sitting  Gastroenterology: Positive for poor appetite burping  Genitourinary:  Negative hematuria    Musculoskeletal:  Positive for arthritis balance off  Neurologic:   "Positive for memory problems tingling and heaviness in arms with chest pain episodes  Psychiatric:  Negative anxiety treated  Heme/Lymph/Imm:  Positive for allergies    Endocrine:  Negative diabetes      Physical Exam:  Vitals: /73   Pulse 69   Ht 1.626 m (5' 4\")   Wt 62.9 kg (138 lb 9.6 oz)   SpO2 99%   BMI 23.79 kg/m      Constitutional:  cooperative, alert and oriented, well developed, well nourished, in no acute distress        Skin:  warm and dry to the touch, no apparent skin lesions or masses noted   pacemaker incision in the left infraclavicular area was well-healed      Head:  normocephalic, no masses or lesions        Eyes:  pupils equal and round;conjunctivae and lids unremarkable        Lymph:      ENT:  no pallor or cyanosis, dentition good        Neck:  JVP normal        Respiratory:  normal breath sounds, clear to auscultation, normal A-P diameter, normal symmetry, normal respiratory excursion, no use of accessory muscles         Cardiac: regular rhythm;normal S1 and S2;no S3 or S4;no murmurs, gallops or rubs detected                not assessed this visit                                        GI:  not assessed this visit        Extremities and Muscular Skeletal:  no deformities, clubbing, cyanosis, erythema observed;no edema stasis pigmentation            Neurological:  no gross motor deficits        Psych:  Alert and Oriented x 3;affect appropriate, oriented to time, person and place        NAVYA Coulter CNP  [address]  [place] [address]              "

## 2021-12-17 NOTE — LETTER
12/17/2021    Kirit Michel MD  [address] [address]    RE: Haritha GALLOWAY Ronnie       Dear Colleague,     I had the pleasure of seeing Haritha GALLOWAY Ronnie in the [facility].  HPI and Plan: #[id number]  See dictation    Today's clinic visit entailed:  Review of external notes as documented elsewhere in note  No LOS data to display   Time spent doing chart review, history and exam, documentation and further activities per the note  Provider  Link to MDM Help Grid     The level of medical decision making during this visit was of moderate complexity.      Orders Placed This Encounter   Procedures     Basic metabolic panel     N terminal pro BNP outpatient     [facility] with [facility] Visit       No orders of the defined types were placed in this encounter.      There are no discontinued medications.      Encounter Diagnoses   Name Primary?     Dilated cardiomyopathy (H)      Acute on chronic systolic heart failure (H) Yes       CURRENT MEDICATIONS:  Current Outpatient Medications   Medication Sig Dispense Refill     acetaminophen (TYLENOL) 500 MG tablet Take 500-1,000 mg by mouth every 6 hours as needed for mild pain       Alendronate Sodium (FOSAMAX PO) Take 70 mg by mouth once a week Sunday evenings       amLODIPine (NORVASC) 2.5 MG tablet Take 1 tablet (2.5 mg) by mouth daily 90 tablet 3     aspirin (ASA) 81 MG EC tablet Take 1 tablet (81 mg) by mouth daily 90 tablet 3     atorvastatin (LIPITOR) 40 MG tablet Take 40 mg by mouth At Bedtime        calcium carbonate-vitamin D (CALCIUM + D) 600-200 MG-UNIT TABS Take 1 tablet by mouth daily.       cycloSPORINE (RESTASIS) 0.05 % ophthalmic emulsion Place 1 drop into both eyes every 12 hours.       escitalopram (LEXAPRO) 10 MG tablet Take 10 mg by mouth daily       furosemide (LASIX) 20 MG tablet Take 1 tablet (20 mg) by mouth daily 30 tablet 0     isosorbide mononitrate (IMDUR) 30 MG 24 hr tablet Take 3  tablets (90 mg) by mouth daily 270 tablet 3     lisinopril (ZESTRIL) 10 MG tablet Take 1 tablet (10 mg) by mouth daily 90 tablet 2     metoprolol succinate ER (TOPROL-XL) 50 MG 24 hr tablet 100 in am and 50mg at night (Patient taking differently: Take 50 mg by mouth every evening 100 in am and 50mg at night)       Multiple Vitamins-Minerals (CENTRUM SILVER) per tablet Take 1 tablet by mouth daily.         Multiple Vitamins-Minerals (PRESERVISION AREDS 2 PO) Take by mouth daily       nitroGLYcerin (NITROSTAT) 0.4 MG sublingual tablet For chest pain place 1 tablet under the tongue every 5 minutes for 3 doses. If symptoms persist 5 minutes after 1st dose call 911. 25 tablet 0     Probiotic Product (PROBIOTIC DAILY PO) Take 1 tablet by mouth daily        sodium chloride 1 GM tablet Take 1 g by mouth daily       warfarin ANTICOAGULANT (COUMADIN) 4 MG tablet Take 4 mg by mouth daily 2mg (0.5 tablet) Mon, Wed, Fri  4mg (1 tablet) Tues, Thurs. Sat, Sun         ALLERGIES     Allergies   Allergen Reactions     Amiodarone Nausea     Hctz      Hydrochlorothiazide Other (See Comments)     Other reaction(s): *Unknown     Lansoprazole      diarrhea   Prevacid       PAST MEDICAL HISTORY:  Past Medical History:   Diagnosis Date     Aortic regurgitation 12/14/2014    mild (1+) per echo     Atrial fibrillation (H)      Cardiomyopathy (H)      CHF (congestive heart failure) (H)      Chronic kidney disease, stage 3 (H)      Coronary atherosclerosis of unspecified type of vessel, native or graft 5/16/2000    normal left coronary arteries and tight obstruction in distal RCA, too small for revascularization, medical management     Degeneration of cervical intervertebral disc     cervical spine     Essential hypertension, benign      Mitral regurgitation 12/14/2014    mod-mod/sev (2-3+) per echo     Other and unspecified hyperlipidemia      Pacemaker      Pulmonary hypertension (H) 3/16/2013    mild per echo with RVSP 31mmHg +RAP       Pulmonary valve regurgitation 2014    mod (2+) per echo     Tricuspid regurgitation 2014    mild (1+) per echo     Unspecified tinnitus     chronic       PAST SURGICAL HISTORY:  Past Surgical History:   Procedure Laterality Date     CORONARY ANGIOGRAPHY ADULT ORDER  2000     CV HEART CATHETERIZATION WITH POSSIBLE INTERVENTION N/A 10/19/2021    Procedure: Heart Catheterization with Possible Intervention;  Surgeon: Alden Skelton MD;  Location:  HEART CARDIAC CATH LAB     CV INSTANTANEOUS WAVE-FREE RATIO N/A 10/19/2021    Procedure: Instantaneous Wave-Free Ratio;  Surgeon: Alden Skelton MD;  Location:  HEART CARDIAC CATH LAB     CV LEFT HEART CATH N/A 10/19/2021    Procedure: Left Heart Cath;  Surgeon: Alden Skelton MD;  Location:  HEART CARDIAC CATH LAB     CV LEFT VENTRICULOGRAM N/A 10/19/2021    Procedure: Left Ventriculogram;  Surgeon: Alden Skelton MD;  Location:  HEART CARDIAC CATH LAB     EP PPM RMVL&REPL OF GEN W/ DUAL LEAD SYS N/A 2020    Procedure: Permanent Pacemakers  Removal and Replacement Generator  with Multi Lead System;  Surgeon: Fay Tatum MD;  Location:  HEART CARDIAC CATH LAB     HRW PACEMAKER PERMANENT  2015    BI- ventricular     IMPLANT PACEMAKER  2010    dual chamber Medtronic     [facility] NONSPECIFIC PROCEDURE      right rotator cuff tear OR     ZZC NONSPECIFIC PROCEDURE  1983    microdiscectomy     Z NONSPECIFIC PROCEDURE      C-sections x 3      ZZC NONSPECIFIC PROCEDURE      appendectomy     ZZC NONSPECIFIC PROCEDURE      bilateral benign breast biopsy      ZZ NONSPECIFIC PROCEDURE      right knee arthroscopic OR     ZZC NONSPECIFIC PROCEDURE  1974    enteritis       FAMILY HISTORY:  Family History   Problem Relation Age of Onset     Hypertension Mother      Cancer Mother         pancreatic     Eye Disorder Mother         cristian [email or web address] Father          53     Lipids Father      Diabetes Maternal  Grandmother      ANIA Brother         open heart surgery age 53     Cancer Daughter         ovavian     Neurologic Disorder Son         MS       SOCIAL HISTORY:  Social History     Socioeconomic History     Marital status:      Spouse name: None     Number of children: None     Years of education: None     Highest education level: None   Occupational History     None   Tobacco Use     Smoking status: Former Smoker     Packs/day: 0.50     Years: 15.00     Pack years: 7.50     Types: Cigarettes     Start date:      Quit date:      Years since quittin.9     Smokeless tobacco: Never Used   Substance and Sexual Activity     Alcohol use: No     Drug use: No     Sexual activity: None   Other Topics Concern     Parent/sibling w/ CABG, MI or angioplasty before 65F 55M? Yes      Service Not Asked     Blood Transfusions Not Asked     Caffeine Concern Not Asked     Occupational Exposure Not Asked     Hobby Hazards Not Asked     Sleep Concern Not Asked     Stress Concern Not Asked     Weight Concern Not Asked     Special Diet Yes     Comment: low sodium     Back Care Not Asked     Exercise Yes     Comment: active life style     Bike Helmet Not Asked     Seat Belt Yes     Self-Exams Not Asked   Social History Narrative     None     Social Determinants of Health     Financial Resource Strain: Not on file   Food Insecurity: Not on file   Transportation Needs: Not on file   Physical Activity: Not on file   Stress: Not on file   Social Connections: Not on file   Intimate Partner Violence: Not on file   Housing Stability: Not on file       Review of Systems:  Skin:  Negative bruising Severe brusing and bleeding spots on lower legs   Eyes:  Positive for glasses    ENT:  Negative hearing loss wears hearing aides  Respiratory:  Positive for dyspnea on exertion improved   Cardiovascular:  Negative;palpitations;lightheadedness;dizziness;syncope or near-syncope;cyanosis Positive for;fatigue;edema;chest pain 3  "episodes cp since hosp, one nitro since hosp, right leg edema when sitting  Gastroenterology: Positive for poor appetite burping  Genitourinary:  Negative hematuria    Musculoskeletal:  Positive for arthritis balance off  Neurologic:  Positive for memory problems tingling and heaviness in arms with chest pain episodes  Psychiatric:  Negative anxiety treated  Heme/Lymph/Imm:  Positive for allergies    Endocrine:  Negative diabetes      Physical Exam:  Vitals: /73   Pulse 69   Ht 1.626 m (5' 4\")   Wt 62.9 kg (138 lb 9.6 oz)   SpO2 99%   BMI 23.79 kg/m      Constitutional:  cooperative, alert and oriented, well developed, well nourished, in no acute distress        Skin:  warm and dry to the touch, no apparent skin lesions or masses noted   pacemaker incision in the left infraclavicular area was well-healed      Head:  normocephalic, no masses or lesions        Eyes:  pupils equal and round;conjunctivae and lids unremarkable        Lymph:      ENT:  no pallor or cyanosis, dentition good        Neck:  JVP normal        Respiratory:  normal breath sounds, clear to auscultation, normal A-P diameter, normal symmetry, normal respiratory excursion, no use of accessory muscles         Cardiac: regular rhythm;normal S1 and S2;no S3 or S4;no murmurs, gallops or rubs detected                not assessed this visit                                        GI:  not assessed this visit        Extremities and Muscular Skeletal:  no deformities, clubbing, cyanosis, erythema observed;no edema stasis pigmentation            Neurological:  no gross motor deficits        Psych:  Alert and Oriented x 3;affect appropriate, oriented to time, person and place        CC  NAVYA Allen CNP  [address]  [place] [address]                  Thank you for allowing me to participate in the care of your patient.      Sincerely,     Kat Yo, NP, APRN CNP     [facility]  cc:   Kat Yo, APRN CNP  [address]  [place] [address]

## 2022-01-01 ENCOUNTER — ANCILLARY PROCEDURE (OUTPATIENT)
Dept: CARDIOLOGY | Facility: CLINIC | Age: 87
End: 2022-01-01
Attending: INTERNAL MEDICINE
Payer: COMMERCIAL

## 2022-01-01 ENCOUNTER — TELEPHONE (OUTPATIENT)
Dept: CARDIOLOGY | Facility: CLINIC | Age: 87
End: 2022-01-01

## 2022-01-01 ENCOUNTER — TELEPHONE (OUTPATIENT)
Dept: CARDIOLOGY | Facility: CLINIC | Age: 87
End: 2022-01-01
Payer: COMMERCIAL

## 2022-01-01 ENCOUNTER — OFFICE VISIT (OUTPATIENT)
Dept: CARDIOLOGY | Facility: CLINIC | Age: 87
End: 2022-01-01

## 2022-01-01 ENCOUNTER — LAB (OUTPATIENT)
Dept: LAB | Facility: CLINIC | Age: 87
End: 2022-01-01
Payer: COMMERCIAL

## 2022-01-01 ENCOUNTER — OFFICE VISIT (OUTPATIENT)
Dept: CARDIOLOGY | Facility: CLINIC | Age: 87
End: 2022-01-01
Payer: COMMERCIAL

## 2022-01-01 ENCOUNTER — OFFICE VISIT (OUTPATIENT)
Dept: CARDIOLOGY | Facility: CLINIC | Age: 87
End: 2022-01-01
Attending: NURSE PRACTITIONER
Payer: COMMERCIAL

## 2022-01-01 ENCOUNTER — VIRTUAL VISIT (OUTPATIENT)
Dept: CARDIOLOGY | Facility: CLINIC | Age: 87
End: 2022-01-01

## 2022-01-01 ENCOUNTER — HEALTH MAINTENANCE LETTER (OUTPATIENT)
Age: 87
End: 2022-01-01

## 2022-01-01 ENCOUNTER — LAB (OUTPATIENT)
Dept: LAB | Facility: CLINIC | Age: 87
End: 2022-01-01

## 2022-01-01 VITALS
OXYGEN SATURATION: 99 % | BODY MASS INDEX: 22.21 KG/M2 | HEIGHT: 64 IN | WEIGHT: 130.1 LBS | SYSTOLIC BLOOD PRESSURE: 101 MMHG | HEART RATE: 83 BPM | DIASTOLIC BLOOD PRESSURE: 61 MMHG

## 2022-01-01 VITALS
DIASTOLIC BLOOD PRESSURE: 60 MMHG | SYSTOLIC BLOOD PRESSURE: 122 MMHG | OXYGEN SATURATION: 94 % | HEIGHT: 64 IN | BODY MASS INDEX: 23.27 KG/M2 | HEART RATE: 60 BPM | WEIGHT: 136.3 LBS

## 2022-01-01 VITALS
HEART RATE: 78 BPM | WEIGHT: 137.3 LBS | SYSTOLIC BLOOD PRESSURE: 107 MMHG | DIASTOLIC BLOOD PRESSURE: 68 MMHG | HEIGHT: 64 IN | BODY MASS INDEX: 23.44 KG/M2 | OXYGEN SATURATION: 97 %

## 2022-01-01 VITALS
WEIGHT: 130.8 LBS | HEART RATE: 82 BPM | DIASTOLIC BLOOD PRESSURE: 71 MMHG | BODY MASS INDEX: 22.33 KG/M2 | OXYGEN SATURATION: 99 % | HEIGHT: 64 IN | SYSTOLIC BLOOD PRESSURE: 121 MMHG

## 2022-01-01 VITALS
HEART RATE: 63 BPM | HEIGHT: 64 IN | DIASTOLIC BLOOD PRESSURE: 79 MMHG | BODY MASS INDEX: 23.29 KG/M2 | SYSTOLIC BLOOD PRESSURE: 133 MMHG | OXYGEN SATURATION: 98 % | WEIGHT: 136.4 LBS

## 2022-01-01 VITALS
SYSTOLIC BLOOD PRESSURE: 117 MMHG | HEART RATE: 74 BPM | DIASTOLIC BLOOD PRESSURE: 73 MMHG | OXYGEN SATURATION: 97 % | HEIGHT: 64 IN | BODY MASS INDEX: 23.99 KG/M2 | WEIGHT: 140.5 LBS

## 2022-01-01 DIAGNOSIS — I20.0 UNSTABLE ANGINA (H): ICD-10-CM

## 2022-01-01 DIAGNOSIS — I42.0 DILATED CARDIOMYOPATHY (H): ICD-10-CM

## 2022-01-01 DIAGNOSIS — I50.23 ACUTE ON CHRONIC SYSTOLIC HEART FAILURE (H): ICD-10-CM

## 2022-01-01 DIAGNOSIS — I49.5 SICK SINUS SYNDROME (H): ICD-10-CM

## 2022-01-01 DIAGNOSIS — I48.21 PERMANENT ATRIAL FIBRILLATION (H): ICD-10-CM

## 2022-01-01 DIAGNOSIS — I50.23 ACUTE ON CHRONIC SYSTOLIC HEART FAILURE (H): Primary | ICD-10-CM

## 2022-01-01 DIAGNOSIS — Z98.890 HX OF ATRIOVENTRICULAR NODE ABLATION: ICD-10-CM

## 2022-01-01 DIAGNOSIS — Z95.0 CARDIAC PACEMAKER IN SITU: ICD-10-CM

## 2022-01-01 DIAGNOSIS — I25.118 CORONARY ARTERY DISEASE OF NATIVE ARTERY OF NATIVE HEART WITH STABLE ANGINA PECTORIS (H): Primary | ICD-10-CM

## 2022-01-01 DIAGNOSIS — I49.5 SICK SINUS SYNDROME (H): Primary | ICD-10-CM

## 2022-01-01 DIAGNOSIS — I48.20 CHRONIC ATRIAL FIBRILLATION (H): ICD-10-CM

## 2022-01-01 DIAGNOSIS — I25.118 CORONARY ARTERY DISEASE OF NATIVE ARTERY OF NATIVE HEART WITH STABLE ANGINA PECTORIS (H): ICD-10-CM

## 2022-01-01 DIAGNOSIS — I10 ESSENTIAL HYPERTENSION: ICD-10-CM

## 2022-01-01 LAB
ANION GAP SERPL CALCULATED.3IONS-SCNC: 3 MMOL/L (ref 3–14)
ANION GAP SERPL CALCULATED.3IONS-SCNC: 4 MMOL/L (ref 3–14)
ANION GAP SERPL CALCULATED.3IONS-SCNC: 4 MMOL/L (ref 3–14)
ANION GAP SERPL CALCULATED.3IONS-SCNC: 5 MMOL/L (ref 3–14)
ANION GAP SERPL CALCULATED.3IONS-SCNC: 5 MMOL/L (ref 3–14)
ANION GAP SERPL CALCULATED.3IONS-SCNC: 6 MMOL/L (ref 3–14)
ANION GAP SERPL CALCULATED.3IONS-SCNC: 7 MMOL/L (ref 3–14)
BUN SERPL-MCNC: 23 MG/DL (ref 7–30)
BUN SERPL-MCNC: 23 MG/DL (ref 7–30)
BUN SERPL-MCNC: 25 MG/DL (ref 7–30)
BUN SERPL-MCNC: 28 MG/DL (ref 7–30)
BUN SERPL-MCNC: 30 MG/DL (ref 7–30)
BUN SERPL-MCNC: 31 MG/DL (ref 7–30)
BUN SERPL-MCNC: 40 MG/DL (ref 7–30)
CALCIUM SERPL-MCNC: 8.6 MG/DL (ref 8.5–10.1)
CALCIUM SERPL-MCNC: 8.6 MG/DL (ref 8.5–10.1)
CALCIUM SERPL-MCNC: 8.7 MG/DL (ref 8.5–10.1)
CALCIUM SERPL-MCNC: 8.7 MG/DL (ref 8.5–10.1)
CALCIUM SERPL-MCNC: 9 MG/DL (ref 8.5–10.1)
CALCIUM SERPL-MCNC: 9.1 MG/DL (ref 8.5–10.1)
CALCIUM SERPL-MCNC: 9.2 MG/DL (ref 8.5–10.1)
CHLORIDE BLD-SCNC: 100 MMOL/L (ref 94–109)
CHLORIDE BLD-SCNC: 101 MMOL/L (ref 94–109)
CHLORIDE BLD-SCNC: 95 MMOL/L (ref 94–109)
CHLORIDE BLD-SCNC: 96 MMOL/L (ref 94–109)
CHLORIDE BLD-SCNC: 97 MMOL/L (ref 94–109)
CHLORIDE BLD-SCNC: 98 MMOL/L (ref 94–109)
CHLORIDE BLD-SCNC: 98 MMOL/L (ref 94–109)
CO2 SERPL-SCNC: 30 MMOL/L (ref 20–32)
CO2 SERPL-SCNC: 31 MMOL/L (ref 20–32)
CO2 SERPL-SCNC: 31 MMOL/L (ref 20–32)
CO2 SERPL-SCNC: 32 MMOL/L (ref 20–32)
CREAT SERPL-MCNC: 1.11 MG/DL (ref 0.52–1.04)
CREAT SERPL-MCNC: 1.13 MG/DL (ref 0.52–1.04)
CREAT SERPL-MCNC: 1.22 MG/DL (ref 0.52–1.04)
CREAT SERPL-MCNC: 1.26 MG/DL (ref 0.52–1.04)
CREAT SERPL-MCNC: 1.28 MG/DL (ref 0.52–1.04)
CREAT SERPL-MCNC: 1.32 MG/DL (ref 0.52–1.04)
CREAT SERPL-MCNC: 1.38 MG/DL (ref 0.52–1.04)
ERYTHROCYTE [DISTWIDTH] IN BLOOD BY AUTOMATED COUNT: 14.7 % (ref 10–15)
GFR SERPL CREATININE-BSD FRML MDRD: 35 ML/MIN/1.73M2
GFR SERPL CREATININE-BSD FRML MDRD: 37 ML/MIN/1.73M2
GFR SERPL CREATININE-BSD FRML MDRD: 38 ML/MIN/1.73M2
GFR SERPL CREATININE-BSD FRML MDRD: 39 ML/MIN/1.73M2
GFR SERPL CREATININE-BSD FRML MDRD: 40 ML/MIN/1.73M2
GFR SERPL CREATININE-BSD FRML MDRD: 44 ML/MIN/1.73M2
GFR SERPL CREATININE-BSD FRML MDRD: 45 ML/MIN/1.73M2
GLUCOSE BLD-MCNC: 100 MG/DL (ref 70–99)
GLUCOSE BLD-MCNC: 101 MG/DL (ref 70–99)
GLUCOSE BLD-MCNC: 101 MG/DL (ref 70–99)
GLUCOSE BLD-MCNC: 103 MG/DL (ref 70–99)
GLUCOSE BLD-MCNC: 103 MG/DL (ref 70–99)
GLUCOSE BLD-MCNC: 116 MG/DL (ref 70–99)
GLUCOSE BLD-MCNC: 149 MG/DL (ref 70–99)
HCT VFR BLD AUTO: 42.4 % (ref 35–47)
HGB BLD-MCNC: 13.5 G/DL (ref 11.7–15.7)
INR PPP: 1.45 (ref 0.85–1.15)
INR PPP: 4.42 (ref 0.85–1.15)
MCH RBC QN AUTO: 31.3 PG (ref 26.5–33)
MCHC RBC AUTO-ENTMCNC: 31.8 G/DL (ref 31.5–36.5)
MCV RBC AUTO: 98 FL (ref 78–100)
MDC_IDC_LEAD_IMPLANT_DT: NORMAL
MDC_IDC_LEAD_LOCATION: NORMAL
MDC_IDC_LEAD_LOCATION_DETAIL_1: NORMAL
MDC_IDC_LEAD_MFG: NORMAL
MDC_IDC_LEAD_MODEL: NORMAL
MDC_IDC_LEAD_POLARITY_TYPE: NORMAL
MDC_IDC_LEAD_SERIAL: NORMAL
MDC_IDC_MSMT_BATTERY_DTM: NORMAL
MDC_IDC_MSMT_BATTERY_REMAINING_LONGEVITY: 111 MO
MDC_IDC_MSMT_BATTERY_REMAINING_LONGEVITY: 116 MO
MDC_IDC_MSMT_BATTERY_REMAINING_LONGEVITY: 120 MO
MDC_IDC_MSMT_BATTERY_RRT_TRIGGER: 2.6
MDC_IDC_MSMT_BATTERY_STATUS: NORMAL
MDC_IDC_MSMT_BATTERY_VOLTAGE: 3 V
MDC_IDC_MSMT_BATTERY_VOLTAGE: 3 V
MDC_IDC_MSMT_BATTERY_VOLTAGE: 3.01 V
MDC_IDC_MSMT_LEADCHNL_LV_IMPEDANCE_VALUE: 361 OHM
MDC_IDC_MSMT_LEADCHNL_LV_IMPEDANCE_VALUE: 361 OHM
MDC_IDC_MSMT_LEADCHNL_LV_IMPEDANCE_VALUE: 418 OHM
MDC_IDC_MSMT_LEADCHNL_LV_IMPEDANCE_VALUE: 418 OHM
MDC_IDC_MSMT_LEADCHNL_LV_IMPEDANCE_VALUE: 456 OHM
MDC_IDC_MSMT_LEADCHNL_LV_IMPEDANCE_VALUE: 475 OHM
MDC_IDC_MSMT_LEADCHNL_LV_IMPEDANCE_VALUE: 475 OHM
MDC_IDC_MSMT_LEADCHNL_LV_IMPEDANCE_VALUE: 494 OHM
MDC_IDC_MSMT_LEADCHNL_LV_IMPEDANCE_VALUE: 532 OHM
MDC_IDC_MSMT_LEADCHNL_LV_IMPEDANCE_VALUE: 532 OHM
MDC_IDC_MSMT_LEADCHNL_LV_IMPEDANCE_VALUE: 551 OHM
MDC_IDC_MSMT_LEADCHNL_LV_IMPEDANCE_VALUE: 608 OHM
MDC_IDC_MSMT_LEADCHNL_LV_IMPEDANCE_VALUE: 722 OHM
MDC_IDC_MSMT_LEADCHNL_LV_IMPEDANCE_VALUE: 722 OHM
MDC_IDC_MSMT_LEADCHNL_LV_IMPEDANCE_VALUE: 798 OHM
MDC_IDC_MSMT_LEADCHNL_LV_PACING_THRESHOLD_AMPLITUDE: 5 V
MDC_IDC_MSMT_LEADCHNL_LV_PACING_THRESHOLD_PULSEWIDTH: 1 MS
MDC_IDC_MSMT_LEADCHNL_RA_IMPEDANCE_VALUE: 247 OHM
MDC_IDC_MSMT_LEADCHNL_RA_IMPEDANCE_VALUE: 304 OHM
MDC_IDC_MSMT_LEADCHNL_RV_IMPEDANCE_VALUE: 304 OHM
MDC_IDC_MSMT_LEADCHNL_RV_IMPEDANCE_VALUE: 323 OHM
MDC_IDC_MSMT_LEADCHNL_RV_IMPEDANCE_VALUE: 323 OHM
MDC_IDC_MSMT_LEADCHNL_RV_IMPEDANCE_VALUE: 399 OHM
MDC_IDC_MSMT_LEADCHNL_RV_IMPEDANCE_VALUE: 418 OHM
MDC_IDC_MSMT_LEADCHNL_RV_IMPEDANCE_VALUE: 418 OHM
MDC_IDC_MSMT_LEADCHNL_RV_PACING_THRESHOLD_AMPLITUDE: 0.75 V
MDC_IDC_MSMT_LEADCHNL_RV_PACING_THRESHOLD_AMPLITUDE: 0.75 V
MDC_IDC_MSMT_LEADCHNL_RV_PACING_THRESHOLD_AMPLITUDE: 0.88 V
MDC_IDC_MSMT_LEADCHNL_RV_PACING_THRESHOLD_PULSEWIDTH: 0.4 MS
MDC_IDC_MSMT_LEADCHNL_RV_SENSING_INTR_AMPL: 13.5 MV
MDC_IDC_PG_IMPLANT_DTM: NORMAL
MDC_IDC_PG_MFG: NORMAL
MDC_IDC_PG_MODEL: NORMAL
MDC_IDC_PG_SERIAL: NORMAL
MDC_IDC_PG_TYPE: NORMAL
MDC_IDC_SESS_CLINIC_NAME: NORMAL
MDC_IDC_SESS_DTM: NORMAL
MDC_IDC_SESS_TYPE: NORMAL
MDC_IDC_SET_BRADY_LOWRATE: 60 {BEATS}/MIN
MDC_IDC_SET_BRADY_MAX_SENSOR_RATE: 130 {BEATS}/MIN
MDC_IDC_SET_BRADY_MODE: NORMAL
MDC_IDC_SET_CRT_PACED_CHAMBERS: NORMAL
MDC_IDC_SET_LEADCHNL_RA_SENSING_ANODE_ELECTRODE_1: NORMAL
MDC_IDC_SET_LEADCHNL_RA_SENSING_ANODE_LOCATION_1: NORMAL
MDC_IDC_SET_LEADCHNL_RA_SENSING_CATHODE_ELECTRODE_1: NORMAL
MDC_IDC_SET_LEADCHNL_RA_SENSING_CATHODE_LOCATION_1: NORMAL
MDC_IDC_SET_LEADCHNL_RA_SENSING_POLARITY: NORMAL
MDC_IDC_SET_LEADCHNL_RA_SENSING_SENSITIVITY: 4 MV
MDC_IDC_SET_LEADCHNL_RV_PACING_AMPLITUDE: 2 V
MDC_IDC_SET_LEADCHNL_RV_PACING_ANODE_ELECTRODE_1: NORMAL
MDC_IDC_SET_LEADCHNL_RV_PACING_ANODE_LOCATION_1: NORMAL
MDC_IDC_SET_LEADCHNL_RV_PACING_CAPTURE_MODE: NORMAL
MDC_IDC_SET_LEADCHNL_RV_PACING_CATHODE_ELECTRODE_1: NORMAL
MDC_IDC_SET_LEADCHNL_RV_PACING_CATHODE_LOCATION_1: NORMAL
MDC_IDC_SET_LEADCHNL_RV_PACING_POLARITY: NORMAL
MDC_IDC_SET_LEADCHNL_RV_PACING_PULSEWIDTH: 0.4 MS
MDC_IDC_SET_LEADCHNL_RV_SENSING_ANODE_ELECTRODE_1: NORMAL
MDC_IDC_SET_LEADCHNL_RV_SENSING_ANODE_LOCATION_1: NORMAL
MDC_IDC_SET_LEADCHNL_RV_SENSING_CATHODE_ELECTRODE_1: NORMAL
MDC_IDC_SET_LEADCHNL_RV_SENSING_CATHODE_LOCATION_1: NORMAL
MDC_IDC_SET_LEADCHNL_RV_SENSING_POLARITY: NORMAL
MDC_IDC_SET_LEADCHNL_RV_SENSING_SENSITIVITY: 0.9 MV
MDC_IDC_SET_ZONE_DETECTION_INTERVAL: 350 MS
MDC_IDC_SET_ZONE_DETECTION_INTERVAL: 400 MS
MDC_IDC_SET_ZONE_TYPE: NORMAL
MDC_IDC_STAT_AT_BURDEN_PERCENT: 0 %
MDC_IDC_STAT_AT_DTM_END: NORMAL
MDC_IDC_STAT_AT_DTM_START: NORMAL
MDC_IDC_STAT_BRADY_AP_VP_PERCENT: 0 %
MDC_IDC_STAT_BRADY_AP_VS_PERCENT: 0 %
MDC_IDC_STAT_BRADY_AS_VP_PERCENT: 69.33 %
MDC_IDC_STAT_BRADY_AS_VP_PERCENT: 73.11 %
MDC_IDC_STAT_BRADY_AS_VP_PERCENT: 89.1 %
MDC_IDC_STAT_BRADY_AS_VS_PERCENT: 10.9 %
MDC_IDC_STAT_BRADY_AS_VS_PERCENT: 26.89 %
MDC_IDC_STAT_BRADY_AS_VS_PERCENT: 30.67 %
MDC_IDC_STAT_BRADY_DTM_END: NORMAL
MDC_IDC_STAT_BRADY_DTM_START: NORMAL
MDC_IDC_STAT_BRADY_RA_PERCENT_PACED: 0 %
MDC_IDC_STAT_BRADY_RV_PERCENT_PACED: 69.33 %
MDC_IDC_STAT_BRADY_RV_PERCENT_PACED: 73.11 %
MDC_IDC_STAT_BRADY_RV_PERCENT_PACED: 89.1 %
MDC_IDC_STAT_CRT_DTM_END: NORMAL
MDC_IDC_STAT_CRT_DTM_START: NORMAL
MDC_IDC_STAT_CRT_LV_PERCENT_PACED: 0 %
MDC_IDC_STAT_CRT_PERCENT_PACED: 0 %
MDC_IDC_STAT_EPISODE_RECENT_COUNT: 0
MDC_IDC_STAT_EPISODE_RECENT_COUNT_DTM_END: NORMAL
MDC_IDC_STAT_EPISODE_RECENT_COUNT_DTM_START: NORMAL
MDC_IDC_STAT_EPISODE_TOTAL_COUNT: 0
MDC_IDC_STAT_EPISODE_TOTAL_COUNT: 1
MDC_IDC_STAT_EPISODE_TOTAL_COUNT_DTM_END: NORMAL
MDC_IDC_STAT_EPISODE_TOTAL_COUNT_DTM_START: NORMAL
MDC_IDC_STAT_EPISODE_TYPE: NORMAL
NT-PROBNP SERPL-MCNC: 2722 PG/ML (ref 0–450)
NT-PROBNP SERPL-MCNC: 4829 PG/ML (ref 0–1800)
NT-PROBNP SERPL-MCNC: 5397 PG/ML (ref 0–1800)
NT-PROBNP SERPL-MCNC: 6592 PG/ML (ref 0–1800)
NT-PROBNP SERPL-MCNC: 7153 PG/ML (ref 0–1800)
PLATELET # BLD AUTO: 179 10E3/UL (ref 150–450)
POTASSIUM BLD-SCNC: 3.9 MMOL/L (ref 3.4–5.3)
POTASSIUM BLD-SCNC: 4 MMOL/L (ref 3.4–5.3)
POTASSIUM BLD-SCNC: 4.1 MMOL/L (ref 3.4–5.3)
POTASSIUM BLD-SCNC: 4.2 MMOL/L (ref 3.4–5.3)
POTASSIUM BLD-SCNC: 4.4 MMOL/L (ref 3.4–5.3)
RBC # BLD AUTO: 4.31 10E6/UL (ref 3.8–5.2)
SODIUM SERPL-SCNC: 133 MMOL/L (ref 133–144)
SODIUM SERPL-SCNC: 133 MMOL/L (ref 133–144)
SODIUM SERPL-SCNC: 134 MMOL/L (ref 133–144)
SODIUM SERPL-SCNC: 135 MMOL/L (ref 133–144)
SODIUM SERPL-SCNC: 136 MMOL/L (ref 133–144)
WBC # BLD AUTO: 6.1 10E3/UL (ref 4–11)

## 2022-01-01 PROCEDURE — 80048 BASIC METABOLIC PNL TOTAL CA: CPT | Performed by: INTERNAL MEDICINE

## 2022-01-01 PROCEDURE — 83880 ASSAY OF NATRIURETIC PEPTIDE: CPT | Performed by: NURSE PRACTITIONER

## 2022-01-01 PROCEDURE — 36415 COLL VENOUS BLD VENIPUNCTURE: CPT | Performed by: NURSE PRACTITIONER

## 2022-01-01 PROCEDURE — 99214 OFFICE O/P EST MOD 30 MIN: CPT | Performed by: NURSE PRACTITIONER

## 2022-01-01 PROCEDURE — 85610 PROTHROMBIN TIME: CPT

## 2022-01-01 PROCEDURE — 93294 REM INTERROG EVL PM/LDLS PM: CPT | Performed by: INTERNAL MEDICINE

## 2022-01-01 PROCEDURE — 80048 BASIC METABOLIC PNL TOTAL CA: CPT | Performed by: NURSE PRACTITIONER

## 2022-01-01 PROCEDURE — 85610 PROTHROMBIN TIME: CPT | Performed by: NURSE PRACTITIONER

## 2022-01-01 PROCEDURE — 80048 BASIC METABOLIC PNL TOTAL CA: CPT

## 2022-01-01 PROCEDURE — 85027 COMPLETE CBC AUTOMATED: CPT | Performed by: INTERNAL MEDICINE

## 2022-01-01 PROCEDURE — 93296 REM INTERROG EVL PM/IDS: CPT | Performed by: INTERNAL MEDICINE

## 2022-01-01 PROCEDURE — 99214 OFFICE O/P EST MOD 30 MIN: CPT | Mod: 95 | Performed by: NURSE PRACTITIONER

## 2022-01-01 PROCEDURE — 36415 COLL VENOUS BLD VENIPUNCTURE: CPT

## 2022-01-01 PROCEDURE — 99213 OFFICE O/P EST LOW 20 MIN: CPT | Mod: 25 | Performed by: NURSE PRACTITIONER

## 2022-01-01 PROCEDURE — 36415 COLL VENOUS BLD VENIPUNCTURE: CPT | Performed by: INTERNAL MEDICINE

## 2022-01-01 PROCEDURE — 93281 PM DEVICE PROGR EVAL MULTI: CPT | Performed by: INTERNAL MEDICINE

## 2022-01-01 PROCEDURE — 83880 ASSAY OF NATRIURETIC PEPTIDE: CPT | Performed by: INTERNAL MEDICINE

## 2022-01-01 RX ORDER — LISINOPRIL 10 MG/1
5 TABLET ORAL DAILY
Qty: 90 TABLET | Refills: 3
Start: 2022-01-01

## 2022-01-01 RX ORDER — FUROSEMIDE 20 MG
TABLET ORAL
Qty: 150 TABLET | Refills: 3 | Status: SHIPPED | OUTPATIENT
Start: 2022-01-01 | End: 2022-01-01

## 2022-01-01 RX ORDER — FUROSEMIDE 20 MG
40 TABLET ORAL DAILY
Qty: 150 TABLET | Refills: 3 | COMMUNITY
Start: 2022-01-01 | End: 2022-01-01

## 2022-01-01 RX ORDER — FUROSEMIDE 20 MG
TABLET ORAL
Qty: 150 TABLET | Refills: 3
Start: 2022-01-01 | End: 2022-01-01

## 2022-01-01 RX ORDER — LISINOPRIL 10 MG/1
10 TABLET ORAL DAILY
Qty: 90 TABLET | Refills: 3 | Status: SHIPPED | OUTPATIENT
Start: 2022-01-01 | End: 2022-01-01

## 2022-01-01 RX ORDER — METOPROLOL SUCCINATE 50 MG/1
50 TABLET, EXTENDED RELEASE ORAL
COMMUNITY
Start: 2022-01-01

## 2022-01-01 RX ORDER — FUROSEMIDE 20 MG
TABLET ORAL
Qty: 270 TABLET | Refills: 0 | Status: SHIPPED | OUTPATIENT
Start: 2022-01-01 | End: 2022-01-01

## 2022-01-01 RX ORDER — FUROSEMIDE 20 MG
TABLET ORAL
Qty: 270 TABLET | Refills: 1 | Status: SHIPPED | OUTPATIENT
Start: 2022-01-01

## 2022-01-01 RX ORDER — ISOSORBIDE MONONITRATE 30 MG/1
90 TABLET, EXTENDED RELEASE ORAL DAILY
Qty: 270 TABLET | Refills: 0 | Status: SHIPPED | OUTPATIENT
Start: 2022-01-01

## 2022-01-28 ENCOUNTER — OFFICE VISIT (OUTPATIENT)
Dept: CARDIOLOGY | Facility: CLINIC | Age: 87
End: 2022-01-28
Attending: NURSE PRACTITIONER
Payer: COMMERCIAL

## 2022-01-28 ENCOUNTER — LAB (OUTPATIENT)
Dept: LAB | Facility: CLINIC | Age: 87
End: 2022-01-28
Payer: COMMERCIAL

## 2022-01-28 VITALS
OXYGEN SATURATION: 97 % | HEART RATE: 69 BPM | HEIGHT: 64 IN | DIASTOLIC BLOOD PRESSURE: 66 MMHG | SYSTOLIC BLOOD PRESSURE: 138 MMHG | WEIGHT: 135.7 LBS | BODY MASS INDEX: 23.17 KG/M2

## 2022-01-28 DIAGNOSIS — I50.23 ACUTE ON CHRONIC SYSTOLIC HEART FAILURE (H): Primary | ICD-10-CM

## 2022-01-28 DIAGNOSIS — I49.5 SICK SINUS SYNDROME (H): ICD-10-CM

## 2022-01-28 DIAGNOSIS — I25.118 CORONARY ARTERY DISEASE OF NATIVE ARTERY OF NATIVE HEART WITH STABLE ANGINA PECTORIS (H): ICD-10-CM

## 2022-01-28 DIAGNOSIS — I50.23 ACUTE ON CHRONIC SYSTOLIC HEART FAILURE (H): ICD-10-CM

## 2022-01-28 LAB
ANION GAP SERPL CALCULATED.3IONS-SCNC: 4 MMOL/L (ref 3–14)
BUN SERPL-MCNC: 20 MG/DL (ref 7–30)
CALCIUM SERPL-MCNC: 9.5 MG/DL (ref 8.5–10.1)
CHLORIDE BLD-SCNC: 102 MMOL/L (ref 94–109)
CO2 SERPL-SCNC: 30 MMOL/L (ref 20–32)
CREAT SERPL-MCNC: 1.01 MG/DL (ref 0.52–1.04)
GFR SERPL CREATININE-BSD FRML MDRD: 51 ML/MIN/1.73M2
GLUCOSE BLD-MCNC: 106 MG/DL (ref 70–99)
NT-PROBNP SERPL-MCNC: 3784 PG/ML (ref 0–450)
POTASSIUM BLD-SCNC: 5.1 MMOL/L (ref 3.4–5.3)
SODIUM SERPL-SCNC: 136 MMOL/L (ref 133–144)

## 2022-01-28 PROCEDURE — 36415 COLL VENOUS BLD VENIPUNCTURE: CPT

## 2022-01-28 PROCEDURE — 99214 OFFICE O/P EST MOD 30 MIN: CPT | Performed by: NURSE PRACTITIONER

## 2022-01-28 PROCEDURE — 80048 BASIC METABOLIC PNL TOTAL CA: CPT

## 2022-01-28 PROCEDURE — 83880 ASSAY OF NATRIURETIC PEPTIDE: CPT

## 2022-01-28 ASSESSMENT — MIFFLIN-ST. JEOR: SCORE: 990.53

## 2022-01-28 NOTE — PATIENT INSTRUCTIONS
Call my nurse with any questions or concerns:  [phone]*If you have concerns after hours, please call [phone], option 2 to speak with on call Cardiologist.    1. Medication changes from today:    Look at sodium tablets. Are they scored? Can you decrease it to 1/2 tablet.      2. Lab Results:     Results for YOLY VALLEJO (MRN [id number]) as of 1/28/2022 14:23   Ref. Range 1/28/2022 12:59   Sodium Latest Ref Range: 133 - 144 mmol/L 136   Potassium Latest Ref Range: 3.4 - 5.3 mmol/L 5.1   Chloride Latest Ref Range: 94 - 109 mmol/L 102   Carbon Dioxide Latest Ref Range: 20 - 32 mmol/L 30   Urea Nitrogen Latest Ref Range: 7 - 30 mg/dL 20   Creatinine Latest Ref Range: 0.52 - 1.04 mg/dL 1.01   GFR Estimate Latest Ref Range: >60 mL/min/1.73m2 51 (L)   Calcium Latest Ref Range: 8.5 - 10.1 mg/dL 9.5   Anion Gap Latest Ref Range: 3 - 14 mmol/L 4   N-Terminal Pro Bnp Latest Ref Range: 0 - 450 pg/mL 3,784 (H)   Glucose Latest Ref Range: 70 - 99 mg/dL 106 (H)

## 2022-01-28 NOTE — LETTER
"1/28/2022    Kirit Michel MD  [address] [address]    RE: Haritha Trujillo       Dear Colleague,     I had the pleasure of seeing Haritha Trujillo in the [facility].  HPI:  Ms. Trujillo is a delightful 89-year-old woman well known to me here at the Heart Clinic.  I have been following [facility] in [facility].  She is here today with her son, Philipp.  She is an established patient of Dr. Tatum.    Her past medical history includes:  1.  Permanent atrial fibrillation with AV node ablation with BiV upgrade in 01/2015.  Her device reached MICHAEL 04/2020.  The patient does not have LV lead.  This was shut off due to diaphragmatic stimulation 06/2020.  2.  Cardiomyopathy with EF of 30%-40% on recent angiogram.  EF on limited echo was 45%-50%.  3.  Hypertension, currently stable.  4.  History of anxiety, on Lexapro 10 mg daily.  5.  History of coronary artery disease with  of the RCA.  She had extensive left to left collaterals arising from the distal circumflex.  She has a 60% eccentric calcified stenosis of the proximal, mid circumflex involving the takeoff of the large first obtuse marginal.  IFR down both limbs was 1.0.  She had minimal disease in the LAD, left main and ramus.  End-diastolic pressure was 18.  6.  Short-term memory deficit.  The patient does live in a senior independent living, but has a very supportive family. They do her medications in a pillbox on a weekly basis.     \" Peg\" is here today for 6-week follow-up.  Her affect is bright and she is feeling well.  She jokes with her son that she would like to start driving again.  Currently she denies chest pain, shortness of breath, lightheadedness, dizziness, or peripheral edema.  Her last hospitalization was 10/17 through 10/20/21.  She did undergo a coronary angiogram at that time and was initiated on isosorbide which has since been uptitrated.  She has no complaints of angina and is " feeling well.  Her angiogram results are noted above.    Her last device interrogation showed she was 81% V paced.  She has chronic A. fib and status post AV node ablation.  She is on chronic warfarin therapy.  Her INRs are managed by her PCP.    Lab work today shows her serum sodium at 136, potassium 5.1, creatinine 1.01.  I had weaned her off sodium chloride tablets, but patient was placed back on 1 g daily.  I do have her on a fluid restriction for her history of hyponatremia, and a low sodium diet due to her history of diastolic heart failure.  She is here today for follow-up.        Plan:   1.  Coronary artery disease as outlined above.     of the RCA with collaterals.  She has no complaints of chest pain.  The plan was to treat her medically.  She is on metoprolol succinate, lisinopril, isosorbide, amlodipine, aspirin and atorvastatin.  Her lipids are followed by Dr. Michel.  Last LDL was noted to be 83 in 10/2021.  LDL goal is less than 70.    2.  Acute on chronic systolic heart failure.    The patient does have EF on angiogram of 30%-40% on limited echo was 45%-50%.  She is euvolemic on exam.  Her weight is down 3 lbs to 135 lbs.   She is a class II heart failure status at this time.    I have asked that she decrease her sodium tablet to either half tablet daily or 1 every other day.  We will reassess her BMP at her next visit.    3.  Permanent atrial fibrillation with AV node ablation. Patient had a CRT upgrade.  Unfortunately, she developed diaphragmatic stimulation and LV lead was shut off.  She is RV paced as noted above.    4.  Anxiety, on low-dose Lexapro.  The patient is tolerating the medication well.     I am happy Christine is feeling so well today.  I will see her back in the clinic again in 6 to 8 weeks, or sooner if she has questions or concerns.  She will continue to follow her low-sodium diet and try to increase her activity and social interactions as much as possible.    Kat Yo NP,  APRN CNP    Today's clinic visit entailed:  Review of the result(s) of each unique test - labs  No LOS data to display   Time spent doing chart review, history and exam, documentation and further activities per the note  Provider  Link to MDM Help Grid     The level of medical decision making during this visit was of moderate complexity.      Orders Placed This Encounter   Procedures     Basic metabolic panel     Follow-Up with Cardiology [facility]- [facility]IE       No orders of the defined types were placed in this encounter.      There are no discontinued medications.      Encounter Diagnoses   Name Primary?     Acute on chronic systolic heart failure (H) Yes     Coronary artery disease of native artery of native heart with stable angina pectoris (H)      Sick sinus syndrome (H)        CURRENT MEDICATIONS:  Current Outpatient Medications   Medication Sig Dispense Refill     acetaminophen (TYLENOL) 500 MG tablet Take 500-1,000 mg by mouth every 6 hours as needed for mild pain       Alendronate Sodium (FOSAMAX PO) Take 70 mg by mouth once a week Sunday evenings       amLODIPine (NORVASC) 2.5 MG tablet Take 1 tablet (2.5 mg) by mouth daily 90 tablet 3     aspirin (ASA) 81 MG EC tablet Take 1 tablet (81 mg) by mouth daily 90 tablet 3     atorvastatin (LIPITOR) 40 MG tablet Take 40 mg by mouth At Bedtime        calcium carbonate-vitamin D (CALCIUM + D) 600-200 MG-UNIT TABS Take 1 tablet by mouth daily.       cycloSPORINE (RESTASIS) 0.05 % ophthalmic emulsion Place 1 drop into both eyes every 12 hours.       escitalopram (LEXAPRO) 10 MG tablet Take 10 mg by mouth daily       furosemide (LASIX) 20 MG tablet Take 1 tablet (20 mg) by mouth daily 30 tablet 0     isosorbide mononitrate (IMDUR) 30 MG 24 hr tablet Take 3 tablets (90 mg) by mouth daily 270 tablet 3     lisinopril (ZESTRIL) 10 MG tablet Take 1 tablet (10 mg) by mouth daily 90 tablet 2     metoprolol succinate ER (TOPROL-XL) 50 MG 24 hr tablet 100 in am and 50mg at night  (Patient taking differently: Take 50 mg by mouth every evening 100 in am and 50mg at night)       Multiple Vitamins-Minerals (CENTRUM SILVER) per tablet Take 1 tablet by mouth daily.         Multiple Vitamins-Minerals (PRESERVISION AREDS 2 PO) Take by mouth daily       nitroGLYcerin (NITROSTAT) 0.4 MG sublingual tablet For chest pain place 1 tablet under the tongue every 5 minutes for 3 doses. If symptoms persist 5 minutes after 1st dose call 911. 25 tablet 0     Probiotic Product (PROBIOTIC DAILY PO) Take 1 tablet by mouth daily        sodium chloride 1 GM tablet Take 1 g by mouth daily       warfarin ANTICOAGULANT (COUMADIN) 4 MG tablet Take 4 mg by mouth daily 2mg (0.5 tablet) Mon, Wed, Fri  4mg (1 tablet) Tues, Thurs. Sat, Sun         ALLERGIES     Allergies   Allergen Reactions     Amiodarone Nausea     Hctz      Hydrochlorothiazide Other (See Comments)     Other reaction(s): *Unknown     Lansoprazole      diarrhea   Prevacid       PAST MEDICAL HISTORY:  Past Medical History:   Diagnosis Date     Aortic regurgitation 12/14/2014    mild (1+) per echo     Atrial fibrillation (H)      Cardiomyopathy (H)      CHF (congestive heart failure) (H)      Chronic kidney disease, stage 3 (H)      Coronary atherosclerosis of unspecified type of vessel, native or graft 5/16/2000    normal left coronary arteries and tight obstruction in distal RCA, too small for revascularization, medical management     Degeneration of cervical intervertebral disc     cervical spine     Essential hypertension, benign      Mitral regurgitation 12/14/2014    mod-mod/sev (2-3+) per echo     Other and unspecified hyperlipidemia      Pacemaker      Pulmonary hypertension (H) 3/16/2013    mild per echo with RVSP 31mmHg +RAP      Pulmonary valve regurgitation 12/14/2014    mod (2+) per echo     Tricuspid regurgitation 12/14/2014    mild (1+) per echo     Unspecified tinnitus     chronic       PAST SURGICAL HISTORY:  Past Surgical History:    Procedure Laterality Date     CORONARY ANGIOGRAPHY ADULT ORDER  2000     CV HEART CATHETERIZATION WITH POSSIBLE INTERVENTION N/A 10/19/2021    Procedure: Heart Catheterization with Possible Intervention;  Surgeon: Alden Skelton MD;  Location:  [facility]     CV INSTANTANEOUS WAVE-FREE RATIO N/A 10/19/2021    Procedure: Instantaneous Wave-Free Ratio;  Surgeon: Alden Skelton MD;  Location:  [facility]     CV LEFT HEART CATH N/A 10/19/2021    Procedure: Left Heart Cath;  Surgeon: Alden Skelton MD;  Location:  [facility]     CV LEFT VENTRICULOGRAM N/A 10/19/2021    Procedure: Left Ventriculogram;  Surgeon: Alden Skelton MD;  Location:  [facility]     EP PPM RMVL&REPL OF GEN W/ DUAL LEAD SYS N/A 2020    Procedure: Permanent Pacemakers  Removal and Replacement Generator  with Multi Lead System;  Surgeon: Fay Tatum MD;  Location:  [facility]ATH [facility]     HRW PACEMAKER PERMANENT  2015    BI- ventricular     IMPLANT PACEMAKER  2010    dual chamber Medtronic     ZZC NONSPECIFIC PROCEDURE  1999    right rotator cuff tear OR     ZZC NONSPECIFIC PROCEDURE  1983    microdiscectomy     ZZC NONSPECIFIC PROCEDURE      C-sections x 3      ZZC NONSPECIFIC PROCEDURE      appendectomy     ZZC NONSPECIFIC PROCEDURE      bilateral benign breast biopsy      ZZC NONSPECIFIC PROCEDURE      right knee arthroscopic OR     ZZC NONSPECIFIC PROCEDURE  1974    enteritis       FAMILY HISTORY:  Family History   Problem Relation Age of Onset     Hypertension Mother      Cancer Mother         pancreatic     Eye Disorder Mother         glacoma     C.A.DAriel Father          53     Lipids Father      Diabetes Maternal Grandmother      C.A.D. Brother         open heart surgery age 53     Cancer Daughter         ovavian     Neurologic Disorder Son         MS       SOCIAL HISTORY:  Social History     Socioeconomic History      Marital status:      Spouse name: None     Number of children: None     Years of education: None     Highest education level: None   Occupational History     None   Tobacco Use     Smoking status: Former Smoker     Packs/day: 0.50     Years: 15.00     Pack years: 7.50     Types: Cigarettes     Start date:      Quit date:      Years since quittin.0     Smokeless tobacco: Never Used   Substance and Sexual Activity     Alcohol use: No     Drug use: No     Sexual activity: None   Other Topics Concern     Parent/sibling w/ CABG, MI or angioplasty before 65F 55M? Yes      Service Not Asked     Blood Transfusions Not Asked     Caffeine Concern Not Asked     Occupational Exposure Not Asked     Hobby Hazards Not Asked     Sleep Concern Not Asked     Stress Concern Not Asked     Weight Concern Not Asked     Special Diet Yes     Comment: low sodium     Back Care Not Asked     Exercise Yes     Comment: active life style     Bike Helmet Not Asked     Seat Belt Yes     Self-Exams Not Asked   Social History Narrative     None     Social Determinants of Health     Financial Resource Strain: Not on file   Food Insecurity: Not on file   Transportation Needs: Not on file   Physical Activity: Not on file   Stress: Not on file   Social Connections: Not on file   Intimate Partner Violence: Not on file   Housing Stability: Not on file       Review of Systems:  Skin:  Negative bruising Severe brusing and bleeding spots on lower legs   Eyes:  Positive for glasses    ENT:  Negative hearing loss wears hearing aides  Respiratory:  Negative   improved   Cardiovascular:  Negative;palpitations;lightheadedness;dizziness;syncope or near-syncope;cyanosis   3 episodes cp since hosp, one nitro since hosp, right leg edema when sitting  Gastroenterology: Negative   burping  Genitourinary:  Negative hematuria    Musculoskeletal:  Positive for arthritis balance off  Neurologic:  Positive for memory problems tingling and  "heaviness in arms with chest pain episodes  Psychiatric:  Negative anxiety treated  Heme/Lymph/Imm:  Positive for allergies    Endocrine:  Negative diabetes      Physical Exam:  Vitals: /66   Pulse 69   Ht 1.626 m (5' 4\")   Wt 61.6 kg (135 lb 11.2 oz)   SpO2 97%   BMI 23.29 kg/m      Constitutional:  cooperative, alert and oriented, well developed, well nourished, in no acute distress        Skin:  warm and dry to the touch, no apparent skin lesions or masses noted   pacemaker incision in the left infraclavicular area was well-healed      Head:  normocephalic, no masses or lesions        Eyes:  pupils equal and round;conjunctivae and lids unremarkable        Lymph:      ENT:  no pallor or cyanosis, dentition good        Neck:  JVP normal        Respiratory:  normal breath sounds, clear to auscultation, normal A-P diameter, normal symmetry, normal respiratory excursion, no use of accessory muscles         Cardiac: regular rhythm;normal S1 and S2;no S3 or S4;no murmurs, gallops or rubs detected                not assessed this visit                                        GI:  not assessed this visit        Extremities and Muscular Skeletal:  no deformities, clubbing, cyanosis, erythema observed;no edema stasis pigmentation            Neurological:  no gross motor deficits        Psych:  Alert and Oriented x 3;affect appropriate, oriented to time, person and place          Kat Yo NP, APRN [facility]  "

## 2022-01-28 NOTE — PROGRESS NOTES
"HPI:  Ms. Trujillo is a delightful 89-year-old woman well known to [facility].  [facility] have been following [facility].  She is here today with her son, Philipp.  She is an established patient of Dr. Tatum.    Her past medical history includes:  1.  Permanent atrial fibrillation with AV node ablation with BiV upgrade in 01/2015.  Her device reached MICHAEL 04/2020.  The patient does not have LV lead.  This was shut off due to diaphragmatic stimulation 06/2020.  2.  Cardiomyopathy with EF of 30%-40% on recent angiogram.  EF on limited echo was 45%-50%.  3.  Hypertension, currently stable.  4.  History of anxiety, on Lexapro 10 mg daily.  5.  History of coronary artery disease with  of the RCA.  She had extensive left to left collaterals arising from the distal circumflex.  She has a 60% eccentric calcified stenosis of the proximal, mid circumflex involving the takeoff of the large first obtuse marginal.  IFR down both limbs was 1.0.  She had minimal disease in the LAD, left main and ramus.  End-diastolic pressure was 18.  6.  Short-term memory deficit.  The patient does live in a senior independent living, but has a very supportive family. They do her medications in a pillbox on a weekly basis.     \" Peg\" is here today for 6-week follow-up.  Her affect is bright and she is feeling well.  She jokes with her son that she would like to start driving again.  Currently she denies chest pain, shortness of breath, lightheadedness, dizziness, or peripheral edema.  Her last hospitalization was 10/17 through 10/20/21.  She did undergo a coronary angiogram at that time and was initiated on isosorbide which has since been uptitrated.  She has no complaints of angina and is feeling well.  Her angiogram results are noted above.    Her last device interrogation showed she was 81% V paced.  She has chronic A. fib and status post AV node ablation.  She is on chronic warfarin therapy.  Her INRs are managed by her " PCP.    Lab work today shows her serum sodium at 136, potassium 5.1, creatinine 1.01.  I had weaned her off sodium chloride tablets, but patient was placed back on 1 g daily.  I do have her on a fluid restriction for her history of hyponatremia, and a low sodium diet due to her history of diastolic heart failure.  She is here today for follow-up.        Plan:   1.  Coronary artery disease as outlined above.     of the RCA with collaterals.  She has no complaints of chest pain.  The plan was to treat her medically.  She is on metoprolol succinate, lisinopril, isosorbide, amlodipine, aspirin and atorvastatin.  Her lipids are followed by Dr. Michel.  Last LDL was noted to be 83 in 10/2021.  LDL goal is less than 70.    2.  Acute on chronic systolic heart failure.    The patient does have EF on angiogram of 30%-40% on limited echo was 45%-50%.  She is euvolemic on exam.  Her weight is down 3 lbs to 135 lbs.   She is a class II heart failure status at this time.    I have asked that she decrease her sodium tablet to either half tablet daily or 1 every other day.  We will reassess her BMP at her next visit.    3.  Permanent atrial fibrillation with AV node ablation. Patient had a CRT upgrade.  Unfortunately, she developed diaphragmatic stimulation and LV lead was shut off.  She is RV paced as noted above.    4.  Anxiety, on low-dose Lexapro.  The patient is tolerating the medication well.     I am happy Christine is feeling so well today.  I will see her back in the clinic again in 6 to 8 weeks, or sooner if she has questions or concerns.  She will continue to follow her low-sodium diet and try to increase her activity and social interactions as much as possible.    Kat Yo, NP, APRN CNP    Today's clinic visit entailed:  Review of the result(s) of each unique test - labs  No LOS data to display   Time spent doing chart review, history and exam, documentation and further activities per the note  Provider  Link to  [facility] Help Grid     The level of medical decision making during this visit was of moderate complexity.      Orders Placed This Encounter   Procedures     Basic metabolic panel     Follow-Up with [facility]- [facility]       No orders of the defined types were placed in this encounter.      There are no discontinued medications.      Encounter Diagnoses   Name Primary?     Acute on chronic systolic heart failure (H) Yes     Coronary artery disease of native artery of native heart with stable angina pectoris (H)      Sick sinus syndrome (H)        CURRENT MEDICATIONS:  Current Outpatient Medications   Medication Sig Dispense Refill     acetaminophen (TYLENOL) 500 MG tablet Take 500-1,000 mg by mouth every 6 hours as needed for mild pain       Alendronate Sodium (FOSAMAX PO) Take 70 mg by mouth once a week Sunday evenings       amLODIPine (NORVASC) 2.5 MG tablet Take 1 tablet (2.5 mg) by mouth daily 90 tablet 3     aspirin (ASA) 81 MG EC tablet Take 1 tablet (81 mg) by mouth daily 90 tablet 3     atorvastatin (LIPITOR) 40 MG tablet Take 40 mg by mouth At Bedtime        calcium carbonate-vitamin D (CALCIUM + D) 600-200 MG-UNIT TABS Take 1 tablet by mouth daily.       cycloSPORINE (RESTASIS) 0.05 % ophthalmic emulsion Place 1 drop into both eyes every 12 hours.       escitalopram (LEXAPRO) 10 MG tablet Take 10 mg by mouth daily       furosemide (LASIX) 20 MG tablet Take 1 tablet (20 mg) by mouth daily 30 tablet 0     isosorbide mononitrate (IMDUR) 30 MG 24 hr tablet Take 3 tablets (90 mg) by mouth daily 270 tablet 3     lisinopril (ZESTRIL) 10 MG tablet Take 1 tablet (10 mg) by mouth daily 90 tablet 2     metoprolol succinate ER (TOPROL-XL) 50 MG 24 hr tablet 100 in am and 50mg at night (Patient taking differently: Take 50 mg by mouth every evening 100 in am and 50mg at night)       Multiple Vitamins-Minerals (CENTRUM SILVER) per tablet Take 1 tablet by mouth daily.         Multiple Vitamins-Minerals (PRESERVISION AREDS 2  PO) Take by mouth daily       nitroGLYcerin (NITROSTAT) 0.4 MG sublingual tablet For chest pain place 1 tablet under the tongue every 5 minutes for 3 doses. If symptoms persist 5 minutes after 1st dose call 911. 25 tablet 0     Probiotic Product (PROBIOTIC DAILY PO) Take 1 tablet by mouth daily        sodium chloride 1 GM tablet Take 1 g by mouth daily       warfarin ANTICOAGULANT (COUMADIN) 4 MG tablet Take 4 mg by mouth daily 2mg (0.5 tablet) Mon, Wed, Fri  4mg (1 tablet) Tues, Thurs. Sat, Sun         ALLERGIES     Allergies   Allergen Reactions     Amiodarone Nausea     Hctz      Hydrochlorothiazide Other (See Comments)     Other reaction(s): *Unknown     Lansoprazole      diarrhea   Prevacid       PAST MEDICAL HISTORY:  Past Medical History:   Diagnosis Date     Aortic regurgitation 12/14/2014    mild (1+) per echo     Atrial fibrillation (H)      Cardiomyopathy (H)      CHF (congestive heart failure) (H)      Chronic kidney disease, stage 3 (H)      Coronary atherosclerosis of unspecified type of vessel, native or graft 5/16/2000    normal left coronary arteries and tight obstruction in distal RCA, too small for revascularization, medical management     Degeneration of cervical intervertebral disc     cervical spine     Essential hypertension, benign      Mitral regurgitation 12/14/2014    mod-mod/sev (2-3+) per echo     Other and unspecified hyperlipidemia      Pacemaker      Pulmonary hypertension (H) 3/16/2013    mild per echo with RVSP 31mmHg +RAP      Pulmonary valve regurgitation 12/14/2014    mod (2+) per echo     Tricuspid regurgitation 12/14/2014    mild (1+) per echo     Unspecified tinnitus     chronic       PAST SURGICAL HISTORY:  Past Surgical History:   Procedure Laterality Date     CORONARY ANGIOGRAPHY ADULT ORDER  5/16/2000     CV HEART CATHETERIZATION WITH POSSIBLE INTERVENTION N/A 10/19/2021    Procedure: Heart Catheterization with Possible Intervention;  Surgeon: Alden Skelton MD;   Location:  [facility]     CV INSTANTANEOUS WAVE-FREE RATIO N/A 10/19/2021    Procedure: Instantaneous Wave-Free Ratio;  Surgeon: Alden Skelton MD;  Location:  [facility]     CV LEFT HEART CATH N/A 10/19/2021    Procedure: Left Heart Cath;  Surgeon: Alden Skelton MD;  Location:  [facility]     CV LEFT VENTRICULOGRAM N/A 10/19/2021    Procedure: Left Ventriculogram;  Surgeon: Alden Skelton MD;  Location:  [facility]     EP PPM RMVL&REPL OF GEN W/ DUAL LEAD SYS N/A 2020    Procedure: Permanent Pacemakers  Removal and Replacement Generator  with Multi Lead System;  Surgeon: Fay Tatum MD;  Location:  [facility]     HRW PACEMAKER PERMANENT  2015    BI- ventricular     IMPLANT PACEMAKER  2010    dual chamber Medtronic     ZZC NONSPECIFIC PROCEDURE  1999    right rotator cuff tear OR     ZZC NONSPECIFIC PROCEDURE  1983    microdiscectomy     ZC NONSPECIFIC PROCEDURE      C-sections x 3      ZZC NONSPECIFIC PROCEDURE      appendectomy     ZZC NONSPECIFIC PROCEDURE      bilateral benign breast biopsy      ZZC NONSPECIFIC PROCEDURE      right knee arthroscopic OR     ZZC NONSPECIFIC PROCEDURE  1974    enteritis       FAMILY HISTORY:  Family History   Problem Relation Age of Onset     Hypertension Mother      Cancer Mother         pancreatic     Eye Disorder Mother         glacoma     LARON.A.GIDEON Father          53     Lipids Father      Diabetes Maternal Grandmother      C.A.D. Brother         open heart surgery age 53     Cancer Daughter         ovavian     Neurologic Disorder Son         MS       SOCIAL HISTORY:  Social History     Socioeconomic History     Marital status:      Spouse name: None     Number of children: None     Years of education: None     Highest education level: None   Occupational History     None   Tobacco Use     Smoking status: Former Smoker     Packs/day: 0.50     Years:  "15.00     Pack years: 7.50     Types: Cigarettes     Start date:      Quit date:      Years since quittin.0     Smokeless tobacco: Never Used   Substance and Sexual Activity     Alcohol use: No     Drug use: No     Sexual activity: None   Other Topics Concern     Parent/sibling w/ CABG, MI or angioplasty before 65F 55M? Yes      Service Not Asked     Blood Transfusions Not Asked     Caffeine Concern Not Asked     Occupational Exposure Not Asked     Hobby Hazards Not Asked     Sleep Concern Not Asked     Stress Concern Not Asked     Weight Concern Not Asked     Special Diet Yes     Comment: low sodium     Back Care Not Asked     Exercise Yes     Comment: active life style     Bike Helmet Not Asked     Seat Belt Yes     Self-Exams Not Asked   Social History Narrative     None     Social Determinants of Health     Financial Resource Strain: Not on file   Food Insecurity: Not on file   Transportation Needs: Not on file   Physical Activity: Not on file   Stress: Not on file   Social Connections: Not on file   Intimate Partner Violence: Not on file   Housing Stability: Not on file       Review of Systems:  Skin:  Negative bruising Severe brusing and bleeding spots on lower legs   Eyes:  Positive for glasses    ENT:  Negative hearing loss wears hearing aides  Respiratory:  Negative   improved   Cardiovascular:  Negative;palpitations;lightheadedness;dizziness;syncope or near-syncope;cyanosis   3 episodes cp since hosp, one nitro since hosp, right leg edema when sitting  Gastroenterology: Negative   burping  Genitourinary:  Negative hematuria    Musculoskeletal:  Positive for arthritis balance off  Neurologic:  Positive for memory problems tingling and heaviness in arms with chest pain episodes  Psychiatric:  Negative anxiety treated  Heme/Lymph/Imm:  Positive for allergies    Endocrine:  Negative diabetes      Physical Exam:  Vitals: /66   Pulse 69   Ht 1.626 m (5' 4\")   Wt 61.6 kg (135 lb 11.2 " oz)   SpO2 97%   BMI 23.29 kg/m      Constitutional:  cooperative, alert and oriented, well developed, well nourished, in no acute distress        Skin:  warm and dry to the touch, no apparent skin lesions or masses noted   pacemaker incision in the left infraclavicular area was well-healed      Head:  normocephalic, no masses or lesions        Eyes:  pupils equal and round;conjunctivae and lids unremarkable        Lymph:      ENT:  no pallor or cyanosis, dentition good        Neck:  JVP normal        Respiratory:  normal breath sounds, clear to auscultation, normal A-P diameter, normal symmetry, normal respiratory excursion, no use of accessory muscles         Cardiac: regular rhythm;normal S1 and S2;no S3 or S4;no murmurs, gallops or rubs detected                not assessed this visit                                        GI:  not assessed this visit        Extremities and Muscular Skeletal:  no deformities, clubbing, cyanosis, erythema observed;no edema stasis pigmentation            Neurological:  no gross motor deficits        Psych:  Alert and Oriented x 3;affect appropriate, oriented to time, person and place        AGUSTO Yo, APRN CNP  [address]  [place] [id number]

## 2022-02-01 ENCOUNTER — ANCILLARY PROCEDURE (OUTPATIENT)
Dept: CARDIOLOGY | Facility: CLINIC | Age: 87
End: 2022-02-01
Attending: INTERNAL MEDICINE
Payer: COMMERCIAL

## 2022-02-01 DIAGNOSIS — Z95.0 CARDIAC PACEMAKER IN SITU: ICD-10-CM

## 2022-02-01 PROCEDURE — 93296 REM INTERROG EVL PM/IDS: CPT | Performed by: INTERNAL MEDICINE

## 2022-02-01 PROCEDURE — 93294 REM INTERROG EVL PM/LDLS PM: CPT | Performed by: INTERNAL MEDICINE

## 2022-02-02 LAB
MDC_IDC_LEAD_IMPLANT_DT: NORMAL
MDC_IDC_LEAD_LOCATION: NORMAL
MDC_IDC_LEAD_LOCATION_DETAIL_1: NORMAL
MDC_IDC_LEAD_MFG: NORMAL
MDC_IDC_LEAD_MODEL: NORMAL
MDC_IDC_LEAD_POLARITY_TYPE: NORMAL
MDC_IDC_LEAD_SERIAL: NORMAL
MDC_IDC_MSMT_BATTERY_DTM: NORMAL
MDC_IDC_MSMT_BATTERY_REMAINING_LONGEVITY: 121 MO
MDC_IDC_MSMT_BATTERY_RRT_TRIGGER: 2.6
MDC_IDC_MSMT_BATTERY_STATUS: NORMAL
MDC_IDC_MSMT_BATTERY_VOLTAGE: 3.01 V
MDC_IDC_MSMT_LEADCHNL_LV_IMPEDANCE_VALUE: 380 OHM
MDC_IDC_MSMT_LEADCHNL_LV_IMPEDANCE_VALUE: 494 OHM
MDC_IDC_MSMT_LEADCHNL_LV_IMPEDANCE_VALUE: 513 OHM
MDC_IDC_MSMT_LEADCHNL_LV_IMPEDANCE_VALUE: 627 OHM
MDC_IDC_MSMT_LEADCHNL_LV_IMPEDANCE_VALUE: 798 OHM
MDC_IDC_MSMT_LEADCHNL_LV_PACING_THRESHOLD_AMPLITUDE: 5 V
MDC_IDC_MSMT_LEADCHNL_LV_PACING_THRESHOLD_PULSEWIDTH: 1 MS
MDC_IDC_MSMT_LEADCHNL_RA_IMPEDANCE_VALUE: 285 OHM
MDC_IDC_MSMT_LEADCHNL_RA_IMPEDANCE_VALUE: 323 OHM
MDC_IDC_MSMT_LEADCHNL_RV_IMPEDANCE_VALUE: 304 OHM
MDC_IDC_MSMT_LEADCHNL_RV_IMPEDANCE_VALUE: 399 OHM
MDC_IDC_MSMT_LEADCHNL_RV_PACING_THRESHOLD_AMPLITUDE: 0.75 V
MDC_IDC_MSMT_LEADCHNL_RV_PACING_THRESHOLD_PULSEWIDTH: 0.4 MS
MDC_IDC_MSMT_LEADCHNL_RV_SENSING_INTR_AMPL: 13.5 MV
MDC_IDC_MSMT_LEADCHNL_RV_SENSING_INTR_AMPL: 13.5 MV
MDC_IDC_PG_IMPLANT_DTM: NORMAL
MDC_IDC_PG_MFG: NORMAL
MDC_IDC_PG_MODEL: NORMAL
MDC_IDC_PG_SERIAL: NORMAL
MDC_IDC_PG_TYPE: NORMAL
MDC_IDC_SESS_CLINIC_NAME: NORMAL
MDC_IDC_SESS_DTM: NORMAL
MDC_IDC_SESS_TYPE: NORMAL
MDC_IDC_SET_BRADY_LOWRATE: 60 {BEATS}/MIN
MDC_IDC_SET_BRADY_MAX_SENSOR_RATE: 130 {BEATS}/MIN
MDC_IDC_SET_BRADY_MODE: NORMAL
MDC_IDC_SET_CRT_PACED_CHAMBERS: NORMAL
MDC_IDC_SET_LEADCHNL_RA_SENSING_ANODE_ELECTRODE_1: NORMAL
MDC_IDC_SET_LEADCHNL_RA_SENSING_ANODE_LOCATION_1: NORMAL
MDC_IDC_SET_LEADCHNL_RA_SENSING_CATHODE_ELECTRODE_1: NORMAL
MDC_IDC_SET_LEADCHNL_RA_SENSING_CATHODE_LOCATION_1: NORMAL
MDC_IDC_SET_LEADCHNL_RA_SENSING_POLARITY: NORMAL
MDC_IDC_SET_LEADCHNL_RA_SENSING_SENSITIVITY: 4 MV
MDC_IDC_SET_LEADCHNL_RV_PACING_AMPLITUDE: 2 V
MDC_IDC_SET_LEADCHNL_RV_PACING_ANODE_ELECTRODE_1: NORMAL
MDC_IDC_SET_LEADCHNL_RV_PACING_ANODE_LOCATION_1: NORMAL
MDC_IDC_SET_LEADCHNL_RV_PACING_CAPTURE_MODE: NORMAL
MDC_IDC_SET_LEADCHNL_RV_PACING_CATHODE_ELECTRODE_1: NORMAL
MDC_IDC_SET_LEADCHNL_RV_PACING_CATHODE_LOCATION_1: NORMAL
MDC_IDC_SET_LEADCHNL_RV_PACING_POLARITY: NORMAL
MDC_IDC_SET_LEADCHNL_RV_PACING_PULSEWIDTH: 0.4 MS
MDC_IDC_SET_LEADCHNL_RV_SENSING_ANODE_ELECTRODE_1: NORMAL
MDC_IDC_SET_LEADCHNL_RV_SENSING_ANODE_LOCATION_1: NORMAL
MDC_IDC_SET_LEADCHNL_RV_SENSING_CATHODE_ELECTRODE_1: NORMAL
MDC_IDC_SET_LEADCHNL_RV_SENSING_CATHODE_LOCATION_1: NORMAL
MDC_IDC_SET_LEADCHNL_RV_SENSING_POLARITY: NORMAL
MDC_IDC_SET_LEADCHNL_RV_SENSING_SENSITIVITY: 0.9 MV
MDC_IDC_SET_ZONE_DETECTION_INTERVAL: 350 MS
MDC_IDC_SET_ZONE_DETECTION_INTERVAL: 400 MS
MDC_IDC_SET_ZONE_TYPE: NORMAL
MDC_IDC_STAT_AT_BURDEN_PERCENT: 0 %
MDC_IDC_STAT_AT_DTM_END: NORMAL
MDC_IDC_STAT_AT_DTM_START: NORMAL
MDC_IDC_STAT_BRADY_AP_VP_PERCENT: 0 %
MDC_IDC_STAT_BRADY_AP_VS_PERCENT: 0 %
MDC_IDC_STAT_BRADY_AS_VP_PERCENT: 89.31 %
MDC_IDC_STAT_BRADY_AS_VS_PERCENT: 10.69 %
MDC_IDC_STAT_BRADY_DTM_END: NORMAL
MDC_IDC_STAT_BRADY_DTM_START: NORMAL
MDC_IDC_STAT_BRADY_RA_PERCENT_PACED: 0 %
MDC_IDC_STAT_BRADY_RV_PERCENT_PACED: 89.31 %
MDC_IDC_STAT_CRT_DTM_END: NORMAL
MDC_IDC_STAT_CRT_DTM_START: NORMAL
MDC_IDC_STAT_CRT_LV_PERCENT_PACED: 0 %
MDC_IDC_STAT_CRT_PERCENT_PACED: 0 %
MDC_IDC_STAT_EPISODE_RECENT_COUNT: 0
MDC_IDC_STAT_EPISODE_RECENT_COUNT_DTM_END: NORMAL
MDC_IDC_STAT_EPISODE_RECENT_COUNT_DTM_START: NORMAL
MDC_IDC_STAT_EPISODE_TOTAL_COUNT: 0
MDC_IDC_STAT_EPISODE_TOTAL_COUNT: 1
MDC_IDC_STAT_EPISODE_TOTAL_COUNT_DTM_END: NORMAL
MDC_IDC_STAT_EPISODE_TOTAL_COUNT_DTM_START: NORMAL
MDC_IDC_STAT_EPISODE_TYPE: NORMAL

## 2022-03-07 NOTE — TELEPHONE ENCOUNTER
Pt son Philipp called and said that pt will run out of furosemide and can not  until 3/19. Pt for some reason has  Been taking 20 mg in the AM and 10 mg in the PM, but is suppose to only be taking 20 mg daily.  Pt is scheduled to see Kat on Friday, so this will be addressed at that time. Pt will also have BMP done prior to OV.  Pt son will to thru pt med box and take out the 1/2 tabs and them pt will have enough until she see's Kat. If pt continues on the 20 mg the pt may need to pay out of pocket to get through until 3/19.  Pt and son state understanding. Zulma

## 2022-03-11 NOTE — PROGRESS NOTES
HPI:  Ms. Trujillo is a delightful 89-year-old woman well known to [facility].  [facility] have been following [facility].  She is here today with her son, Philipp.  She is an established patient of Dr. Tatum.      Her past medical history includes:  1.  Permanent atrial fibrillation with AV node ablation with BiV upgrade in 01/2015.  Her device reached MICHAEL 04/2020.  The patient does not have LV lead.  This was shut off due to diaphragmatic stimulation 06/2020.  2.  Cardiomyopathy with EF of 30%-40% on recent angiogram.  EF on limited echo was 45%-50%.  3.  Hypertension, currently stable.  4.  History of anxiety, on Lexapro 10 mg daily.  5.  History of coronary artery disease with  of the RCA.  She had extensive left to left collaterals arising from the distal circumflex.  She has a 60% eccentric calcified stenosis of the proximal, mid circumflex involving the takeoff of the large first obtuse marginal.  IFR down both limbs was 1.0.  She had minimal disease in the LAD, left main and ramus.  End-diastolic pressure was 18.  6.  Short-term memory deficit.  The patient does live in a senior independent living, but has a very supportive family. They do her medications in a pillbox on a weekly basis.    At today's visit Yoly is feeling well.  She denies chest pain, shortness of breath, lightheadedness, dizziness, or peripheral edema.  Her son-in-law continues to make her low-sodium meals and ships them for [place].  Her son and grandson take turns filling her pillbox on a regular basis.      Results for YOLY TRUJILLO (MRN [id number]) as of 3/17/2022 12:33   Ref. Range 3/11/2022 12:41   Sodium Latest Ref Range: 133 - 144 mmol/L 135   Potassium Latest Ref Range: 3.4 - 5.3 mmol/L 4.2   Chloride Latest Ref Range: 94 - 109 mmol/L 100   Carbon Dioxide Latest Ref Range: 20 - 32 mmol/L 32   Urea Nitrogen Latest Ref Range: 7 - 30 mg/dL 28   Creatinine Latest Ref Range: 0.52 - 1.04 mg/dL 1.13 (H)   GFR Estimate  Latest Ref Range: >60 mL/min/1.73m2 44 (L)   Calcium Latest Ref Range: 8.5 - 10.1 mg/dL 9.0   Anion Gap Latest Ref Range: 3 - 14 mmol/L 3   Glucose Latest Ref Range: 70 - 99 mg/dL 101 (H)     Medtronic Percepta CRT-P Remote PPM Device Check  : 89%, chronic afib s/p AVNA taking warfarin  Mode: VVIR 60/130  Presenting Rhythm:   Heart Rate: adequate rates per histograms; OptiVol fluid has remained below threshold  Sensing: stable   Pacing Threshold: stable   Impedance: stable   Battery Status: 10.1 years remaining       Plan:   1.  Coronary artery disease as outlined above.     of the RCA with collaterals.  She has no complaints of chest pain.  The plan was to treat her medically.    She is on metoprolol succinate, lisinopril, isosorbide, amlodipine, aspirin and atorvastatin.  Her lipids are followed by Dr. Michel.  Last LDL was noted to be 83 in 10/2021.  LDL goal is less than 70.     2.  Acute on chronic systolic heart failure.    The patient does have EF on angiogram of 30%-40% on limited echo was 45%-50%.  She is euvolemic on exam.  Her weight is down 3 lbs to 135 lbs.   She is a class I-II heart failure status at this time.     I am pleased to see her sodium chloride tablet has been stopped.  Her serum sodium is normal.    In reviewing her Lasix it appears that she has been getting 40 mg daily instead of 20 mg daily.  We discussed this and opted to decrease her furosemide to 20 mg daily except 40 mg on Monday and Wednesday.    I am hopeful her weight will stay stable.  If she develops shortness of breath we will then go back to 40 mg daily.  Labs will be rechecked at her next visit.      3.  Permanent atrial fibrillation with AV node ablation. Patient had a CRT upgrade.  Unfortunately, she developed diaphragmatic stimulation and LV lead was shut off.  She is RV paced as noted above.     4.  Anxiety, on low-dose Lexapro.  The patient is tolerating the medication well.    Thank you for including me in her  care.  I will see her back with her device check next month.    Kat Yo, NP, APRN CNP      Today's clinic visit entailed:  Review of the result(s) of each unique test - labs and pacer check  Ordering of each unique test  Prescription drug management  I spent a total of 30 minutes on the day of the visit.   Time spent doing chart review, history and exam, documentation and further activities per the note  Provider  Link to MDM Help Grid     The level of medical decision making during this visit was of moderate complexity.      Orders Placed This Encounter   Procedures     Basic metabolic panel     N terminal pro BNP outpatient     Follow-Up with Cardiology [facility]- MARGIE       Orders Placed This Encounter   Medications     furosemide (LASIX) 20 MG tablet     Si mg daily except Monday and Wednesday 40 mg daily. Or as directed     Dispense:  150 tablet     Refill:  3       Medications Discontinued During This Encounter   Medication Reason     furosemide (LASIX) 20 MG tablet          Encounter Diagnoses   Name Primary?     Acute on chronic systolic heart failure (H)      Dilated cardiomyopathy (H)        CURRENT MEDICATIONS:  Current Outpatient Medications   Medication Sig Dispense Refill     acetaminophen (TYLENOL) 500 MG tablet Take 500-1,000 mg by mouth every 6 hours as needed for mild pain       Alendronate Sodium (FOSAMAX PO) Take 70 mg by mouth once a week  evenings       amLODIPine (NORVASC) 2.5 MG tablet Take 1 tablet (2.5 mg) by mouth daily 90 tablet 3     aspirin (ASA) 81 MG EC tablet Take 1 tablet (81 mg) by mouth daily 90 tablet 3     atorvastatin (LIPITOR) 40 MG tablet Take 40 mg by mouth At Bedtime        calcium carbonate-vitamin D (CALCIUM + D) 600-200 MG-UNIT TABS Take 1 tablet by mouth daily.       cycloSPORINE (RESTASIS) 0.05 % ophthalmic emulsion Place 1 drop into both eyes every 12 hours.       escitalopram (LEXAPRO) 10 MG tablet Take 5 mg by mouth daily        furosemide (LASIX) 20 MG  tablet 20 mg daily except Monday and Wednesday 40 mg daily. Or as directed 150 tablet 3     isosorbide mononitrate (IMDUR) 30 MG 24 hr tablet Take 3 tablets (90 mg) by mouth daily 270 tablet 3     lisinopril (ZESTRIL) 10 MG tablet Take 1 tablet (10 mg) by mouth daily 90 tablet 3     metoprolol succinate ER (TOPROL-XL) 50 MG 24 hr tablet 100 in am and 50mg at night (Patient taking differently: Take 50 mg by mouth every evening 100 in am and 50mg at night)       Multiple Vitamins-Minerals (CENTRUM SILVER) per tablet Take 1 tablet by mouth daily.         Multiple Vitamins-Minerals (PRESERVISION AREDS 2 PO) Take by mouth daily       nitroGLYcerin (NITROSTAT) 0.4 MG sublingual tablet For chest pain place 1 tablet under the tongue every 5 minutes for 3 doses. If symptoms persist 5 minutes after 1st dose call 911. 25 tablet 0     Probiotic Product (PROBIOTIC DAILY PO) Take 1 tablet by mouth daily        sodium chloride 1 GM tablet Take 1 g by mouth daily       warfarin ANTICOAGULANT (COUMADIN) 4 MG tablet Take 4 mg by mouth daily 2mg (0.5 tablet) Mon, Wed, Fri  4mg (1 tablet) Tues, Thurs. Sat, Sun         ALLERGIES     Allergies   Allergen Reactions     Amiodarone Nausea     Hctz      Hydrochlorothiazide Other (See Comments)     Other reaction(s): *Unknown     Lansoprazole      diarrhea   Prevacid       PAST MEDICAL HISTORY:  Past Medical History:   Diagnosis Date     Aortic regurgitation 12/14/2014    mild (1+) per echo     Atrial fibrillation (H)      Cardiomyopathy (H)      CHF (congestive heart failure) (H)      Chronic kidney disease, stage 3 (H)      Coronary atherosclerosis of unspecified type of vessel, native or graft 5/16/2000    normal left coronary arteries and tight obstruction in distal RCA, too small for revascularization, medical management     Degeneration of cervical intervertebral disc     cervical spine     Essential hypertension, benign      Mitral regurgitation 12/14/2014    mod-mod/sev (2-3+) per  echo     Other and unspecified hyperlipidemia      Pacemaker      Pulmonary hypertension (H) 3/16/2013    mild per echo with RVSP 31mmHg +RAP      Pulmonary valve regurgitation 12/14/2014    mod (2+) per echo     Tricuspid regurgitation 12/14/2014    mild (1+) per echo     Unspecified tinnitus     chronic       PAST SURGICAL HISTORY:  Past Surgical History:   Procedure Laterality Date     CORONARY ANGIOGRAPHY ADULT ORDER  5/16/2000     CV HEART CATHETERIZATION WITH POSSIBLE INTERVENTION N/A 10/19/2021    Procedure: Heart Catheterization with Possible Intervention;  Surgeon: Alden Skelton MD;  Location:  [facility]     CV INSTANTANEOUS WAVE-FREE RATIO N/A 10/19/2021    Procedure: Instantaneous Wave-Free Ratio;  Surgeon: Alden Skelton MD;  Location:  [facility]     CV LEFT HEART CATH N/A 10/19/2021    Procedure: Left Heart Cath;  Surgeon: Alden Skelton MD;  Location:  [facility]     CV LEFT VENTRICULOGRAM N/A 10/19/2021    Procedure: Left Ventriculogram;  Surgeon: Alden Skelton MD;  Location:  [facility]     EP PPM RMVL&REPL OF GEN W/ DUAL LEAD SYS N/A 4/14/2020    Procedure: Permanent Pacemakers  Removal and Replacement Generator  with Multi Lead System;  Surgeon: Fay Tatum MD;  Location:  [facility]     HRW PACEMAKER PERMANENT  1/2015    BI- ventricular     IMPLANT PACEMAKER  11/12/2010    dual chamber Medtronic     Z NONSPECIFIC PROCEDURE  1999    right rotator cuff tear OR     ZZC NONSPECIFIC PROCEDURE  1983    microdiscectomy     Z NONSPECIFIC PROCEDURE      C-sections x 3      ZZC NONSPECIFIC PROCEDURE      appendectomy     ZZC NONSPECIFIC PROCEDURE      bilateral benign breast biopsy      ZZC NONSPECIFIC PROCEDURE      right knee arthroscopic OR     ZZC NONSPECIFIC PROCEDURE  1974    enteritis       FAMILY HISTORY:  Family History   Problem Relation Age of Onset     Hypertension Mother       Cancer Mother         pancreatic     Eye Disorder Mother         cristian     ANIA Father          53     Lipids Father      Diabetes Maternal Grandmother      ANIA Brother         open heart surgery age 53     Cancer Daughter         ovavian     Neurologic Disorder Son         MS       SOCIAL HISTORY:  Social History     Socioeconomic History     Marital status:      Spouse name: None     Number of children: None     Years of education: None     Highest education level: None   Occupational History     None   Tobacco Use     Smoking status: Former Smoker     Packs/day: 0.50     Years: 15.00     Pack years: 7.50     Types: Cigarettes     Start date:      Quit date:      Years since quittin.2     Smokeless tobacco: Never Used   Substance and Sexual Activity     Alcohol use: No     Drug use: No     Sexual activity: None   Other Topics Concern     Parent/sibling w/ CABG, MI or angioplasty before 65F 55M? Yes      Service Not Asked     Blood Transfusions Not Asked     Caffeine Concern Not Asked     Occupational Exposure Not Asked     Hobby Hazards Not Asked     Sleep Concern Not Asked     Stress Concern Not Asked     Weight Concern Not Asked     Special Diet Yes     Comment: low sodium     Back Care Not Asked     Exercise Yes     Comment: active life style     Bike Helmet Not Asked     Seat Belt Yes     Self-Exams Not Asked   Social History Narrative     None     Social Determinants of Health     Financial Resource Strain: Not on file   Food Insecurity: Not on file   Transportation Needs: Not on file   Physical Activity: Not on file   Stress: Not on file   Social Connections: Not on file   Intimate Partner Violence: Not on file   Housing Stability: Not on file       Review of Systems:  Skin:  Negative       Eyes:  Positive for glasses    ENT:  Positive for hearing loss wears hearing aides  Respiratory:  Negative       Cardiovascular:    fatigue;Positive for    Gastroenterology: Negative  "  burping  Genitourinary:  Negative      Musculoskeletal:  Positive for joint stiffness balance off  Neurologic:  Positive for memory problems    Psychiatric:  Positive for anxiety treated  Heme/Lymph/Imm:  Negative      Endocrine:  Negative        Physical Exam:  Vitals: /60   Pulse 60   Ht 1.626 m (5' 4\")   Wt 61.8 kg (136 lb 4.8 oz)   SpO2 94%   BMI 23.40 kg/m      Constitutional:  cooperative, alert and oriented, well developed, well nourished, in no acute distress        Skin:  warm and dry to the touch, no apparent skin lesions or masses noted   pacemaker incision in the left infraclavicular area was well-healed      Head:  normocephalic, no masses or lesions        Eyes:  pupils equal and round;conjunctivae and lids unremarkable        Lymph:      ENT:  no pallor or cyanosis, dentition good        Neck:  JVP normal        Respiratory:  normal breath sounds, clear to auscultation, normal A-P diameter, normal symmetry, normal respiratory excursion, no use of accessory muscles         Cardiac: regular rhythm;normal S1 and S2;no S3 or S4;no murmurs, gallops or rubs detected                not assessed this visit                                        GI:  not assessed this visit        Extremities and Muscular Skeletal:  no deformities, clubbing, cyanosis, erythema observed;no edema stasis pigmentation            Neurological:  no gross motor deficits        Psych:  Alert and Oriented x 3;affect appropriate, oriented to time, person and place        AGUSTO Yo, APRN CNP  [address]  [place] [id number]              "

## 2022-03-11 NOTE — PATIENT INSTRUCTIONS
Call my nurse with any questions or concerns:  [phone]  *If you have concerns after hours, please call [phone], option 2 to speak with on call Cardiologist.    1. Medication changes from today:    Furosemide 20 mg daily EXCEPT 40 mg Monday and Wednesday.        2. Lab Results:     Results for YOLY VALLEJO (MRN [id number]) as of 3/11/2022 14:05   Ref. Range 3/11/2022 12:41   Sodium Latest Ref Range: 133 - 144 mmol/L 135   Potassium Latest Ref Range: 3.4 - 5.3 mmol/L 4.2   Chloride Latest Ref Range: 94 - 109 mmol/L 100   Carbon Dioxide Latest Ref Range: 20 - 32 mmol/L 32   Urea Nitrogen Latest Ref Range: 7 - 30 mg/dL 28   Creatinine Latest Ref Range: 0.52 - 1.04 mg/dL 1.13 (H)   GFR Estimate Latest Ref Range: >60 mL/min/1.73m2 44 (L)   Calcium Latest Ref Range: 8.5 - 10.1 mg/dL 9.0   Anion Gap Latest Ref Range: 3 - 14 mmol/L 3   Glucose Latest Ref Range: 70 - 99 mg/dL 101 (H)

## 2022-03-11 NOTE — LETTER
3/11/2022    Kirit Michel MD  [address] [address]    RE: Haritha Trujillo       Dear Colleague,     I had the pleasure of seeing Haritha Trujillo in the [facility].  HPI:  Ms. Trujillo is a delightful 89-year-old woman well known to me here at the Heart Clinic.  I have been following [facility].  She is here today with her son, Philipp.  She is an established patient of Dr. Tatum.      Her past medical history includes:  1.  Permanent atrial fibrillation with AV node ablation with BiV upgrade in 01/2015.  Her device reached MICHAEL 04/2020.  The patient does not have LV lead.  This was shut off due to diaphragmatic stimulation 06/2020.  2.  Cardiomyopathy with EF of 30%-40% on recent angiogram.  EF on limited echo was 45%-50%.  3.  Hypertension, currently stable.  4.  History of anxiety, on Lexapro 10 mg daily.  5.  History of coronary artery disease with  of the RCA.  She had extensive left to left collaterals arising from the distal circumflex.  She has a 60% eccentric calcified stenosis of the proximal, mid circumflex involving the takeoff of the large first obtuse marginal.  IFR down both limbs was 1.0.  She had minimal disease in the LAD, left main and ramus.  End-diastolic pressure was 18.  6.  Short-term memory deficit.  The patient does live in a senior independent living, but has a very supportive family. They do her medications in a pillbox on a weekly basis.    At today's visit Lori is feeling well.  She denies chest pain, shortness of breath, lightheadedness, dizziness, or peripheral edema.  Her son-in-law continues to make her low-sodium meals and ships them for [place].  Her son and grandson take turns filling her pillbox on a regular basis.      Results for LORI TRUJILLO (MRN [id number]) as of 3/17/2022 12:33   Ref. Range 3/11/2022 12:41   Sodium Latest Ref Range: 133 - 144 mmol/L 135   Potassium Latest Ref Range: 3.4 -  5.3 mmol/L 4.2   Chloride Latest Ref Range: 94 - 109 mmol/L 100   Carbon Dioxide Latest Ref Range: 20 - 32 mmol/L 32   Urea Nitrogen Latest Ref Range: 7 - 30 mg/dL 28   Creatinine Latest Ref Range: 0.52 - 1.04 mg/dL 1.13 (H)   GFR Estimate Latest Ref Range: >60 mL/min/1.73m2 44 (L)   Calcium Latest Ref Range: 8.5 - 10.1 mg/dL 9.0   Anion Gap Latest Ref Range: 3 - 14 mmol/L 3   Glucose Latest Ref Range: 70 - 99 mg/dL 101 (H)     Medtronic Percepta CRT-P Remote PPM Device Check  : 89%, chronic afib s/p AVNA taking warfarin  Mode: VVIR 60/130  Presenting Rhythm:   Heart Rate: adequate rates per histograms; OptiVol fluid has remained below threshold  Sensing: stable   Pacing Threshold: stable   Impedance: stable   Battery Status: 10.1 years remaining       Plan:   1.  Coronary artery disease as outlined above.     of the RCA with collaterals.  She has no complaints of chest pain.  The plan was to treat her medically.    She is on metoprolol succinate, lisinopril, isosorbide, amlodipine, aspirin and atorvastatin.  Her lipids are followed by Dr. Michel.  Last LDL was noted to be 83 in 10/2021.  LDL goal is less than 70.     2.  Acute on chronic systolic heart failure.    The patient does have EF on angiogram of 30%-40% on limited echo was 45%-50%.  She is euvolemic on exam.  Her weight is down 3 lbs to 135 lbs.   She is a class I-II heart failure status at this time.     I am pleased to see her sodium chloride tablet has been stopped.  Her serum sodium is normal.    In reviewing her Lasix it appears that she has been getting 40 mg daily instead of 20 mg daily.  We discussed this and opted to decrease her furosemide to 20 mg daily except 40 mg on Monday and Wednesday.    I am hopeful her weight will stay stable.  If she develops shortness of breath we will then go back to 40 mg daily.  Labs will be rechecked at her next visit.      3.  Permanent atrial fibrillation with AV node ablation. Patient had a CRT upgrade.   Unfortunately, she developed diaphragmatic stimulation and LV lead was shut off.  She is RV paced as noted above.     4.  Anxiety, on low-dose Lexapro.  The patient is tolerating the medication well.    Thank you for including me in her care.  I will see her back with her device check next month.    Kat Yo, NP, APRN CNP      Today's clinic visit entailed:  Review of the result(s) of each unique test - labs and pacer check  Ordering of each unique test  Prescription drug management  I spent a total of 30 minutes on the day of the visit.   Time spent doing chart review, history and exam, documentation and further activities per the note  Provider  Link to MDM Help Grid     The level of medical decision making during this visit was of moderate complexity.      Orders Placed This Encounter   Procedures     Basic metabolic panel     N terminal pro BNP outpatient     Follow-Up with Cardiology Core- MARGIE       Orders Placed This Encounter   Medications     furosemide (LASIX) 20 MG tablet     Si mg daily except Monday and Wednesday 40 mg daily. Or as directed     Dispense:  150 tablet     Refill:  3       Medications Discontinued During This Encounter   Medication Reason     furosemide (LASIX) 20 MG tablet          Encounter Diagnoses   Name Primary?     Acute on chronic systolic heart failure (H)      Dilated cardiomyopathy (H)        CURRENT MEDICATIONS:  Current Outpatient Medications   Medication Sig Dispense Refill     acetaminophen (TYLENOL) 500 MG tablet Take 500-1,000 mg by mouth every 6 hours as needed for mild pain       Alendronate Sodium (FOSAMAX PO) Take 70 mg by mouth once a week  evenings       amLODIPine (NORVASC) 2.5 MG tablet Take 1 tablet (2.5 mg) by mouth daily 90 tablet 3     aspirin (ASA) 81 MG EC tablet Take 1 tablet (81 mg) by mouth daily 90 tablet 3     atorvastatin (LIPITOR) 40 MG tablet Take 40 mg by mouth At Bedtime        calcium carbonate-vitamin D (CALCIUM + D) 600-200  MG-UNIT TABS Take 1 tablet by mouth daily.       cycloSPORINE (RESTASIS) 0.05 % ophthalmic emulsion Place 1 drop into both eyes every 12 hours.       escitalopram (LEXAPRO) 10 MG tablet Take 5 mg by mouth daily        furosemide (LASIX) 20 MG tablet 20 mg daily except Monday and Wednesday 40 mg daily. Or as directed 150 tablet 3     isosorbide mononitrate (IMDUR) 30 MG 24 hr tablet Take 3 tablets (90 mg) by mouth daily 270 tablet 3     lisinopril (ZESTRIL) 10 MG tablet Take 1 tablet (10 mg) by mouth daily 90 tablet 3     metoprolol succinate ER (TOPROL-XL) 50 MG 24 hr tablet 100 in am and 50mg at night (Patient taking differently: Take 50 mg by mouth every evening 100 in am and 50mg at night)       Multiple Vitamins-Minerals (CENTRUM SILVER) per tablet Take 1 tablet by mouth daily.         Multiple Vitamins-Minerals (PRESERVISION AREDS 2 PO) Take by mouth daily       nitroGLYcerin (NITROSTAT) 0.4 MG sublingual tablet For chest pain place 1 tablet under the tongue every 5 minutes for 3 doses. If symptoms persist 5 minutes after 1st dose call 911. 25 tablet 0     Probiotic Product (PROBIOTIC DAILY PO) Take 1 tablet by mouth daily        sodium chloride 1 GM tablet Take 1 g by mouth daily       warfarin ANTICOAGULANT (COUMADIN) 4 MG tablet Take 4 mg by mouth daily 2mg (0.5 tablet) Mon, Wed, Fri  4mg (1 tablet) Tues, Thurs. Sat, Sun         ALLERGIES     Allergies   Allergen Reactions     Amiodarone Nausea     Hctz      Hydrochlorothiazide Other (See Comments)     Other reaction(s): *Unknown     Lansoprazole      diarrhea   Prevacid       PAST MEDICAL HISTORY:  Past Medical History:   Diagnosis Date     Aortic regurgitation 12/14/2014    mild (1+) per echo     Atrial fibrillation (H)      Cardiomyopathy (H)      CHF (congestive heart failure) (H)      Chronic kidney disease, stage 3 (H)      Coronary atherosclerosis of unspecified type of vessel, native or graft 5/16/2000    normal left coronary arteries and tight  obstruction in distal RCA, too small for revascularization, medical management     Degeneration of cervical intervertebral disc     cervical spine     Essential hypertension, benign      Mitral regurgitation 12/14/2014    mod-mod/sev (2-3+) per echo     Other and unspecified hyperlipidemia      Pacemaker      Pulmonary hypertension (H) 3/16/2013    mild per echo with RVSP 31mmHg +RAP      Pulmonary valve regurgitation 12/14/2014    mod (2+) per echo     Tricuspid regurgitation 12/14/2014    mild (1+) per echo     Unspecified tinnitus     chronic       PAST SURGICAL HISTORY:  Past Surgical History:   Procedure Laterality Date     CORONARY ANGIOGRAPHY ADULT ORDER  5/16/2000     CV HEART CATHETERIZATION WITH POSSIBLE INTERVENTION N/A 10/19/2021    Procedure: Heart Catheterization with Possible Intervention;  Surgeon: Alden Skelton MD;  Location:  HEART CARDIAC CATH LAB     CV INSTANTANEOUS WAVE-FREE RATIO N/A 10/19/2021    Procedure: Instantaneous Wave-Free Ratio;  Surgeon: Alden Skelton MD;  Location:  HEART CARDIAC CATH LAB     CV LEFT HEART CATH N/A 10/19/2021    Procedure: Left Heart Cath;  Surgeon: Alden Skelton MD;  Location:  HEART CARDIAC CATH LAB     CV LEFT VENTRICULOGRAM N/A 10/19/2021    Procedure: Left Ventriculogram;  Surgeon: Alden Skelton MD;  Location:  HEART CARDIAC CATH LAB     EP PPM RMVL&REPL OF GEN W/ DUAL LEAD SYS N/A 4/14/2020    Procedure: Permanent Pacemakers  Removal and Replacement Generator  with Multi Lead System;  Surgeon: Fay Tatum MD;  Location:  HEART CARDIAC CATH LAB     HRW PACEMAKER PERMANENT  1/2015    BI- ventricular     IMPLANT PACEMAKER  11/12/2010    dual chamber Medtronic     ZZC NONSPECIFIC PROCEDURE  1999    right rotator cuff tear OR     ZZC NONSPECIFIC PROCEDURE  1983    microdiscectomy     ZZC NONSPECIFIC PROCEDURE      C-sections x 3      ZZC NONSPECIFIC PROCEDURE      appendectomy     ZZC NONSPECIFIC PROCEDURE       bilateral benign breast biopsy      ZZC NONSPECIFIC PROCEDURE      right knee arthroscopic OR     ZZC NONSPECIFIC PROCEDURE  1974    enteritis       FAMILY HISTORY:  Family History   Problem Relation Age of Onset     Hypertension Mother      Cancer Mother         pancreatic     Eye Disorder Mother         cristian     LISA. Father          53     Lipids Father      Diabetes Maternal Grandmother      ANIA Brother         open heart surgery age 53     Cancer Daughter         ovavian     Neurologic Disorder Son         MS       SOCIAL HISTORY:  Social History     Socioeconomic History     Marital status:      Spouse name: None     Number of children: None     Years of education: None     Highest education level: None   Occupational History     None   Tobacco Use     Smoking status: Former Smoker     Packs/day: 0.50     Years: 15.00     Pack years: 7.50     Types: Cigarettes     Start date:      Quit date:      Years since quittin.2     Smokeless tobacco: Never Used   Substance and Sexual Activity     Alcohol use: No     Drug use: No     Sexual activity: None   Other Topics Concern     Parent/sibling w/ CABG, MI or angioplasty before 65F 55M? Yes      Service Not Asked     Blood Transfusions Not Asked     Caffeine Concern Not Asked     Occupational Exposure Not Asked     Hobby Hazards Not Asked     Sleep Concern Not Asked     Stress Concern Not Asked     Weight Concern Not Asked     Special Diet Yes     Comment: low sodium     Back Care Not Asked     Exercise Yes     Comment: active life style     Bike Helmet Not Asked     Seat Belt Yes     Self-Exams Not Asked   Social History Narrative     None     Social Determinants of Health     Financial Resource Strain: Not on file   Food Insecurity: Not on file   Transportation Needs: Not on file   Physical Activity: Not on file   Stress: Not on file   Social Connections: Not on file   Intimate Partner Violence: Not on file   Housing  "Stability: Not on file       Review of Systems:  Skin:  Negative       Eyes:  Positive for glasses    ENT:  Positive for hearing loss wears hearing aides  Respiratory:  Negative       Cardiovascular:    fatigue;Positive for    Gastroenterology: Negative   burping  Genitourinary:  Negative      Musculoskeletal:  Positive for joint stiffness balance off  Neurologic:  Positive for memory problems    Psychiatric:  Positive for anxiety treated  Heme/Lymph/Imm:  Negative      Endocrine:  Negative        Physical Exam:  Vitals: /60   Pulse 60   Ht 1.626 m (5' 4\")   Wt 61.8 kg (136 lb 4.8 oz)   SpO2 94%   BMI 23.40 kg/m      Constitutional:  cooperative, alert and oriented, well developed, well nourished, in no acute distress        Skin:  warm and dry to the touch, no apparent skin lesions or masses noted   pacemaker incision in the left infraclavicular area was well-healed      Head:  normocephalic, no masses or lesions        Eyes:  pupils equal and round;conjunctivae and lids unremarkable        Lymph:      ENT:  no pallor or cyanosis, dentition good        Neck:  JVP normal        Respiratory:  normal breath sounds, clear to auscultation, normal A-P diameter, normal symmetry, normal respiratory excursion, no use of accessory muscles         Cardiac: regular rhythm;normal S1 and S2;no S3 or S4;no murmurs, gallops or rubs detected                not assessed this visit                                        GI:  not assessed this visit        Extremities and Muscular Skeletal:  no deformities, clubbing, cyanosis, erythema observed;no edema stasis pigmentation            Neurological:  no gross motor deficits        Psych:  Alert and Oriented x 3;affect appropriate, oriented to time, person and place        Kat Yo NP, APRN CNP   [facility]    "

## 2022-04-15 NOTE — LETTER
4/15/2022    Kirit Michel MD  [address] [address]    RE: Haritha GALLOWAY Ronnie       Dear Colleague,     I had the pleasure of seeing Haritha GALLOWAY Ronnie in the [facility].  HPI and Plan: #[id number]  See dictation    Today's clinic visit entailed:  Review of the result(s) of each unique test - labs  Ordering of each unique test  No LOS data to display   Time spent doing chart review, history and exam, documentation and further activities per the note  Provider  Link to MDM Help Grid     The level of medical decision making during this visit was of moderate complexity.      Orders Placed This Encounter   Procedures     Basic metabolic panel     Follow-Up with Cardiology Core- MARGIE       No orders of the defined types were placed in this encounter.      There are no discontinued medications.      Encounter Diagnoses   Name Primary?     Acute on chronic systolic heart failure (H) Yes     Coronary artery disease of native artery of native heart with stable angina pectoris (H)      Dilated cardiomyopathy (H)        CURRENT MEDICATIONS:  Current Outpatient Medications   Medication Sig Dispense Refill     acetaminophen (TYLENOL) 500 MG tablet Take 500-1,000 mg by mouth every 6 hours as needed for mild pain       Alendronate Sodium (FOSAMAX PO) Take 70 mg by mouth once a week Sunday evenings       amLODIPine (NORVASC) 2.5 MG tablet Take 1 tablet (2.5 mg) by mouth daily 90 tablet 3     aspirin (ASA) 81 MG EC tablet Take 1 tablet (81 mg) by mouth daily 90 tablet 3     atorvastatin (LIPITOR) 40 MG tablet Take 40 mg by mouth At Bedtime        calcium carbonate-vitamin D 600-200 MG-UNIT TABS Take 1 tablet by mouth daily.       cycloSPORINE (RESTASIS) 0.05 % ophthalmic emulsion Place 1 drop into both eyes every 12 hours.       escitalopram (LEXAPRO) 10 MG tablet Take 5 mg by mouth daily        furosemide (LASIX) 20 MG tablet 20 mg daily except Monday and Wednesday 40  mg daily. Or as directed 150 tablet 3     isosorbide mononitrate (IMDUR) 30 MG 24 hr tablet Take 3 tablets (90 mg) by mouth daily 270 tablet 3     lisinopril (ZESTRIL) 10 MG tablet Take 1 tablet (10 mg) by mouth daily 90 tablet 3     metoprolol succinate ER (TOPROL-XL) 50 MG 24 hr tablet 100 in am and 50mg at night (Patient taking differently: Take 50 mg by mouth every evening 100 in am and 50mg at night)       Multiple Vitamins-Minerals (CENTRUM SILVER) per tablet Take 1 tablet by mouth daily       Multiple Vitamins-Minerals (PRESERVISION AREDS 2 PO) Take by mouth daily       nitroGLYcerin (NITROSTAT) 0.4 MG sublingual tablet For chest pain place 1 tablet under the tongue every 5 minutes for 3 doses. If symptoms persist 5 minutes after 1st dose call 911. 25 tablet 0     sodium chloride 1 GM tablet Take 1 g by mouth daily       warfarin ANTICOAGULANT (COUMADIN) 4 MG tablet Take 4 mg by mouth daily 2mg (0.5 tablet) Mon, Wed, Fri  4mg (1 tablet) Tues, Thurs. Sat, Sun       Probiotic Product (PROBIOTIC DAILY PO) Take 1 tablet by mouth daily  (Patient not taking: Reported on 4/15/2022)         ALLERGIES     Allergies   Allergen Reactions     Amiodarone Nausea     Hctz      Hydrochlorothiazide Other (See Comments)     Other reaction(s): *Unknown     Lansoprazole      diarrhea   Prevacid       PAST MEDICAL HISTORY:  Past Medical History:   Diagnosis Date     Aortic regurgitation 12/14/2014    mild (1+) per echo     Atrial fibrillation (H)      Cardiomyopathy (H)      CHF (congestive heart failure) (H)      Chronic kidney disease, stage 3 (H)      Coronary atherosclerosis of unspecified type of vessel, native or graft 5/16/2000    normal left coronary arteries and tight obstruction in distal RCA, too small for revascularization, medical management     Degeneration of cervical intervertebral disc     cervical spine     Essential hypertension, benign      Mitral regurgitation 12/14/2014    mod-mod/sev (2-3+) per echo      Other and unspecified hyperlipidemia      Pacemaker      Pulmonary hypertension (H) 3/16/2013    mild per echo with RVSP 31mmHg +RAP      Pulmonary valve regurgitation 12/14/2014    mod (2+) per echo     Tricuspid regurgitation 12/14/2014    mild (1+) per echo     Unspecified tinnitus     chronic       PAST SURGICAL HISTORY:  Past Surgical History:   Procedure Laterality Date     CORONARY ANGIOGRAPHY ADULT ORDER  5/16/2000     CV HEART CATHETERIZATION WITH POSSIBLE INTERVENTION N/A 10/19/2021    Procedure: Heart Catheterization with Possible Intervention;  Surgeon: Alden Skelton MD;  Location:  HEART CARDIAC CATH LAB     CV INSTANTANEOUS WAVE-FREE RATIO N/A 10/19/2021    Procedure: Instantaneous Wave-Free Ratio;  Surgeon: Alden Skelton MD;  Location:  HEART CARDIAC CATH LAB     CV LEFT HEART CATH N/A 10/19/2021    Procedure: Left Heart Cath;  Surgeon: Alden Skelton MD;  Location:  HEART CARDIAC CATH LAB     CV LEFT VENTRICULOGRAM N/A 10/19/2021    Procedure: Left Ventriculogram;  Surgeon: Alden Skelton MD;  Location:  HEART CARDIAC CATH LAB     EP PPM RMVL&REPL OF GEN W/ DUAL LEAD SYS N/A 4/14/2020    Procedure: Permanent Pacemakers  Removal and Replacement Generator  with Multi Lead System;  Surgeon: Fay Tatum MD;  Location:  HEART CARDIAC CATH LAB     HRW PACEMAKER PERMANENT  1/2015    BI- ventricular     IMPLANT PACEMAKER  11/12/2010    dual chamber Medtronic     ZZ NONSPECIFIC PROCEDURE  1999    right rotator cuff tear OR     ZZC NONSPECIFIC PROCEDURE  1983    microdiscectomy     ZZ NONSPECIFIC PROCEDURE      C-sections x 3      ZZC NONSPECIFIC PROCEDURE      appendectomy     ZZC NONSPECIFIC PROCEDURE      bilateral benign breast biopsy      ZZC NONSPECIFIC PROCEDURE      right knee arthroscopic OR     ZZC NONSPECIFIC PROCEDURE  1974    enteritis       FAMILY HISTORY:  Family History   Problem Relation Age of Onset     Hypertension Mother      Cancer  "Mother         pancreatic     Eye Disorder Mother         cristian     ANIA Father          53     Lipids Father      Diabetes Maternal Grandmother      ANIA Brother         open heart surgery age 53     Cancer Daughter         ovavian     Neurologic Disorder Son         MS       SOCIAL HISTORY:  Social History     Socioeconomic History     Marital status:      Spouse name: None     Number of children: None     Years of education: None     Highest education level: None   Tobacco Use     Smoking status: Former Smoker     Packs/day: 0.50     Years: 15.00     Pack years: 7.50     Types: Cigarettes     Start date:      Quit date:      Years since quittin.3     Smokeless tobacco: Never Used   Substance and Sexual Activity     Alcohol use: No     Drug use: No   Other Topics Concern     Parent/sibling w/ CABG, MI or angioplasty before 65F 55M? Yes     Special Diet Yes     Comment: low sodium     Exercise Yes     Comment: active life style     Seat Belt Yes       Review of Systems:  Skin:  Positive for bruising better   Eyes:  Positive for glasses    ENT:  Positive for hearing loss    Respiratory:  Positive for shortness of breath;dyspnea on exertion     Cardiovascular:    fatigue;dizziness;lightheadedness;Positive for    Gastroenterology: Negative      Genitourinary:  Negative      Musculoskeletal:  Positive for joint stiffness    Neurologic:  Positive for memory problems    Psychiatric:  Positive for anxiety    Heme/Lymph/Imm:  Positive for allergies    Endocrine:  Negative        Physical Exam:  Vitals: /79 (BP Location: Left arm, Cuff Size: Adult Small)   Pulse 63   Ht 1.626 m (5' 4\")   Wt 61.9 kg (136 lb 6.4 oz)   SpO2 98%   BMI 23.41 kg/m      Constitutional:  cooperative, alert and oriented, well developed, well nourished, in no acute distress        Skin:  warm and dry to the touch, no apparent skin lesions or masses noted   pacemaker incision in the left infraclavicular area " was well-healed      Head:  normocephalic, no masses or lesions        Eyes:  pupils equal and round;conjunctivae and lids unremarkable        Lymph:      ENT:  no pallor or cyanosis, dentition good        Neck:  JVP normal        Respiratory:  normal breath sounds, clear to auscultation, normal A-P diameter, normal symmetry, normal respiratory excursion, no use of accessory muscles         Cardiac: regular rhythm;normal S1 and S2;no S3 or S4;no murmurs, gallops or rubs detected                not assessed this visit                                        GI:  not assessed this visit        Extremities and Muscular Skeletal:  no deformities, clubbing, cyanosis, erythema observed;no edema              Neurological:  no gross motor deficits        Psych:  Alert and Oriented x 3;affect appropriate, oriented to time, person and place        CC  NAVYA Allen CNP  [address]  [place]  [place] [address]                  Thank you for allowing me to participate in the care of your patient.      Sincerely,   Kat Yo, ERIK, APRN CNP   [facility]      cc:   NAVYA Allen CNP  [address]  [place]  [place] [address]         I have reviewed and confirmed nurses' notes for patient's medications, allergies, medical history, and surgical history.

## 2022-04-15 NOTE — PROGRESS NOTES
Service Date: 04/15/2022    HISTORY OF PRESENT ILLNESS:  Ms. Trujillo is a delightful 89-year-old woman well known to me [facility] at [facility].  She is accompanied to today's visit with her son, Philipp.  She is an established patient of Dr. Tatum.  I have been watching her every 4-6 weeks due to her history of acute on chronic diastolic and systolic heart failure.    Her past medical history includes:  1.  Atrial fibrillation with AV node ablation and BiV upgrade in 01/2015.  Her device reached MICHAEL 04/2020.  The patient does not have an LV lead.  It was shut off due to diaphragmatic stimulation in 06/2020.  2.  Cardiomyopathy with EF of 30-40% on most recent angiogram.  EF on limited echo was 45-50%.  3.  Hypertension, stable.  4.  History of hyponatremia, stable.  5.  History of anxiety, on low-dose Lexapro.  6.  History of coronary artery disease with  of the RCA.  She had extensive left to left collaterals arising from the distal circumflex.  She has a 60% eccentric calcified stenosis of both the proximal, mid circumflex involving the takeoff of the large first obtuse marginal branch.  IFR down both limbs was 1.0.  She had minimal disease in the LAD, left main and ramus.  End-diastolic pressure was 18 mmHg.  7.  Short-term memory deficit.  The patient does live independently in a senior living facility.  Her family is very supportive.  They make her low-sodium meals and also prepare her pill boxes.    Currently, Peg is feeling well.  Denies chest pain, shortness of breath, lightheadedness or dizziness.  Her son states that she occasionally will get short of breath if she exerts herself.    Lab work was completed today and reviewed with the patient.  This showed a slight bump of her creatinine to 1.26, BUN of 30 and potassium of 4.  Her proBNP is down to 2722 (3784).    At our last visit, there was some confusion about her diuretic therapy.  I ended up changing her Lasix to 40 mg on Monday and Wednesday and 20  mg all other days.  The patient is tolerating this regimen and weight is stable.  Her serum sodium today was 136.      ALL OTHER REVIEW OF SYSTEMS AND PAST MEDICAL HISTORY:  Noted below.    ASSESSMENT AND PLAN:  Ms. Vallejo is a delightful 89-year-old woman.  1.  Coronary artery disease with  of the RCA with collaterals.  No complaints of chest pain.  She has done quite well on her current medical regimen of metoprolol succinate, lisinopril, isosorbide, amlodipine, aspirin and atorvastatin.  Lipids are followed by Dr. Michel.  Last LDL was in January and noted to be 83.  2.  Acute on chronic systolic heart failure.  EF on angiogram 30-40%.  On limited echo, it was 40-50%.  She continues to be euvolemic on exam.  Her weight is stable.  I am also pleased that her hyponatremia is normal.  She was previously on sodium chloride 1 g daily.  I believe this has been stopped.  It is back on her medication list.  I will discuss this with Philipp at our next visit.  3.  Atrial fibrillation with AV node ablation and CRT upgrade.  Unfortunately, developed diaphragmatic stimulation with the LV lead and it needed to be shut off.  The patient is RV paced.  The patient's device check was completed today and showed 89% V paced.  Intrinsic was AFib.  Battery longevity is 10 years.  Pocket is well healed.  No ventricular arrhythmias were noted.    As always, thank you for including me in the care of this delightful patient.  I will see her back in 6-7 weeks with a BMP.  I have been watching Peg closely.  She had multiple hospitalizations last year for chest pain and some for acute systolic heart failure.  She has been quite stable since October.  We will continue to follow closely.  I will plan on having her follow up later in the fall with an echocardiogram and visit with Dr. Tatum.      Kat Yo NP        D: 04/15/2022   T: 04/15/2022   MT: laquita    Name:     SEVERIANO VALLEJO  MRN:      [id number]        Account:       [id number]   :      1925           Service Date: 04/15/2022       Document: [id number]

## 2022-04-15 NOTE — PATIENT INSTRUCTIONS
Call my nurse with any questions or concerns:  [phone]  *If you have concerns after hours, please call [phone], option 2 to speak with on call Cardiologist.       No changes    Component      Latest Ref Rng & Units 1/28/2022 4/15/2022   Sodium      133 - 144 mmol/L 136 136   Potassium      3.4 - 5.3 mmol/L 5.1 4.0   Chloride      94 - 109 mmol/L 102 101   Carbon Dioxide      20 - 32 mmol/L 30 31   Anion Gap      3 - 14 mmol/L 4 4   Urea Nitrogen      7 - 30 mg/dL 20 30   Creatinine      0.52 - 1.04 mg/dL 1.01 1.26 (H)   Calcium      8.5 - 10.1 mg/dL 9.5 8.7   Glucose      70 - 99 mg/dL 106 (H) 100 (H)   GFR Estimate      >60 mL/min/1.73m2 51 (L) 39 (L)   N-Terminal Pro Bnp      0 - 450 pg/mL 3,784 (H) 2,722 (H)

## 2022-04-15 NOTE — LETTER
4/15/2022     RE: Haritha Trjuillo  [address]  [address]  [facility] [address]     Dear Colleague,    Thank you for referring your patient, Haritha Trujillo, to the [facility] at [facility]. Please see a copy of my visit note below.    HPI and Plan: #[id number]  See dictation    Today's clinic visit entailed:  Review of the result(s) of each unique test - labs  Ordering of each unique test  No LOS data to display   Time spent doing chart review, history and exam, documentation and further activities per the note  Provider  Link to MDM Help Grid     The level of medical decision making during this visit was of moderate complexity.      Orders Placed This Encounter   Procedures     Basic metabolic panel     Follow-Up with Cardiology Core- MARGIE       No orders of the defined types were placed in this encounter.      There are no discontinued medications.      Encounter Diagnoses   Name Primary?     Acute on chronic systolic heart failure (H) Yes     Coronary artery disease of native artery of native heart with stable angina pectoris (H)      Dilated cardiomyopathy (H)        CURRENT MEDICATIONS:  Current Outpatient Medications   Medication Sig Dispense Refill     acetaminophen (TYLENOL) 500 MG tablet Take 500-1,000 mg by mouth every 6 hours as needed for mild pain       Alendronate Sodium (FOSAMAX PO) Take 70 mg by mouth once a week Sunday evenings       amLODIPine (NORVASC) 2.5 MG tablet Take 1 tablet (2.5 mg) by mouth daily 90 tablet 3     aspirin (ASA) 81 MG EC tablet Take 1 tablet (81 mg) by mouth daily 90 tablet 3     atorvastatin (LIPITOR) 40 MG tablet Take 40 mg by mouth At Bedtime        calcium carbonate-vitamin D 600-200 MG-UNIT TABS Take 1 tablet by mouth daily.       cycloSPORINE (RESTASIS) 0.05 % ophthalmic emulsion Place 1 drop into both eyes every 12 hours.       escitalopram (LEXAPRO) 10 MG tablet Take 5 mg by mouth  daily        furosemide (LASIX) 20 MG tablet 20 mg daily except Monday and Wednesday 40 mg daily. Or as directed 150 tablet 3     isosorbide mononitrate (IMDUR) 30 MG 24 hr tablet Take 3 tablets (90 mg) by mouth daily 270 tablet 3     lisinopril (ZESTRIL) 10 MG tablet Take 1 tablet (10 mg) by mouth daily 90 tablet 3     metoprolol succinate ER (TOPROL-XL) 50 MG 24 hr tablet 100 in am and 50mg at night (Patient taking differently: Take 50 mg by mouth every evening 100 in am and 50mg at night)       Multiple Vitamins-Minerals (CENTRUM SILVER) per tablet Take 1 tablet by mouth daily       Multiple Vitamins-Minerals (PRESERVISION AREDS 2 PO) Take by mouth daily       nitroGLYcerin (NITROSTAT) 0.4 MG sublingual tablet For chest pain place 1 tablet under the tongue every 5 minutes for 3 doses. If symptoms persist 5 minutes after 1st dose call 911. 25 tablet 0     sodium chloride 1 GM tablet Take 1 g by mouth daily       warfarin ANTICOAGULANT (COUMADIN) 4 MG tablet Take 4 mg by mouth daily 2mg (0.5 tablet) Mon, Wed, Fri  4mg (1 tablet) Tues, Thurs. Sat, Sun       Probiotic Product (PROBIOTIC DAILY PO) Take 1 tablet by mouth daily  (Patient not taking: Reported on 4/15/2022)         ALLERGIES     Allergies   Allergen Reactions     Amiodarone Nausea     Hctz      Hydrochlorothiazide Other (See Comments)     Other reaction(s): *Unknown     Lansoprazole      diarrhea   Prevacid       PAST MEDICAL HISTORY:  Past Medical History:   Diagnosis Date     Aortic regurgitation 12/14/2014    mild (1+) per echo     Atrial fibrillation (H)      Cardiomyopathy (H)      CHF (congestive heart failure) (H)      Chronic kidney disease, stage 3 (H)      Coronary atherosclerosis of unspecified type of vessel, native or graft 5/16/2000    normal left coronary arteries and tight obstruction in distal RCA, too small for revascularization, medical management     Degeneration of cervical intervertebral disc     cervical spine     Essential  hypertension, benign      Mitral regurgitation 12/14/2014    mod-mod/sev (2-3+) per echo     Other and unspecified hyperlipidemia      Pacemaker      Pulmonary hypertension (H) 3/16/2013    mild per echo with RVSP 31mmHg +RAP      Pulmonary valve regurgitation 12/14/2014    mod (2+) per echo     Tricuspid regurgitation 12/14/2014    mild (1+) per echo     Unspecified tinnitus     chronic       PAST SURGICAL HISTORY:  Past Surgical History:   Procedure Laterality Date     CORONARY ANGIOGRAPHY ADULT ORDER  5/16/2000     CV HEART CATHETERIZATION WITH POSSIBLE INTERVENTION N/A 10/19/2021    Procedure: Heart Catheterization with Possible Intervention;  Surgeon: Alden Skelton MD;  Location:  HEART [facility]ARD[facility] CATH LAB     CV INSTANTANEOUS WAVE-FREE RATIO N/A 10/19/2021    Procedure: Instantaneous Wave-Free Ratio;  Surgeon: Alden Skelton MD;  Location:  HEART [facility]ARD[facility] CATH LAB     CV LEFT HEART CATH N/A 10/19/2021    Procedure: Left Heart Cath;  Surgeon: Alden Skelton MD;  Location:  HEART [facility]ARD[facility] CATH LAB     CV LEFT VENTRICULOGRAM N/A 10/19/2021    Procedure: Left Ventriculogram;  Surgeon: Alden Skelton MD;  Location:  HEART [facility]ARD[facility] CATH LAB     EP PPM RMVL&REPL OF GEN W/ DUAL LEAD SYS N/A 4/14/2020    Procedure: Permanent Pacemakers  Removal and Replacement Generator  with Multi Lead System;  Surgeon: Fay Tatum MD;  Location:  HEART CARDI[facility] CATH LAB     HRW PACEMAKER PERMANENT  1/2015    BI- ventricular     IMPLANT PACEMAKER  11/12/2010    dual chamber Medtronic     ZZ NONSPECIFIC PROCEDURE  1999    right rotator cuff tear OR     ZZC NONSPECIFIC PROCEDURE  1983    microdiscectomy     ZZC NONSPECIFIC PROCEDURE      C-sections x 3      ZZC NONSPECIFIC PROCEDURE      appendectomy     ZZC NONSPECIFIC PROCEDURE      bilateral benign breast biopsy      ZZC NONSPECIFIC PROCEDURE      right knee arthroscopic OR     ZZC NONSPECIFIC PROCEDURE  1974    enteritis       FAMILY  "HISTORY:  Family History   Problem Relation Age of Onset     Hypertension Mother      Cancer Mother         pancreatic     Eye Disorder Mother         cristian     ANIA Father          53     Lipids Father      Diabetes Maternal Grandmother      ANIA Brother         open heart surgery age 53     Cancer Daughter         ovavian     Neurologic Disorder Son         MS       SOCIAL HISTORY:  Social History     Socioeconomic History     Marital status:      Spouse name: None     Number of children: None     Years of education: None     Highest education level: None   Tobacco Use     Smoking status: Former Smoker     Packs/day: 0.50     Years: 15.00     Pack years: 7.50     Types: Cigarettes     Start date:      Quit date:      Years since quittin.3     Smokeless tobacco: Never Used   Substance and Sexual Activity     Alcohol use: No     Drug use: No   Other Topics Concern     Parent/sibling w/ CABG, MI or angioplasty before 65F 55M? Yes     Special Diet Yes     Comment: low sodium     Exercise Yes     Comment: active life style     Seat Belt Yes       Review of Systems:  Skin:  Positive for bruising better   Eyes:  Positive for glasses    ENT:  Positive for hearing loss    Respiratory:  Positive for shortness of breath;dyspnea on exertion     Cardiovascular:    fatigue;dizziness;lightheadedness;Positive for    Gastroenterology: Negative      Genitourinary:  Negative      Musculoskeletal:  Positive for joint stiffness    Neurologic:  Positive for memory problems    Psychiatric:  Positive for anxiety    Heme/Lymph/Imm:  Positive for allergies    Endocrine:  Negative        Physical Exam:  Vitals: /79 (BP Location: Left arm, Cuff Size: Adult Small)   Pulse 63   Ht 1.626 m (5' 4\")   Wt 61.9 kg (136 lb 6.4 oz)   SpO2 98%   BMI 23.41 kg/m      Constitutional:  cooperative, alert and oriented, well developed, well nourished, in no acute distress        Skin:  warm and dry to the touch, no " apparent skin lesions or masses noted   pacemaker incision in the left infraclavicular area was well-healed      Head:  normocephalic, no masses or lesions        Eyes:  pupils equal and round;conjunctivae and lids unremarkable        Lymph:      ENT:  no pallor or cyanosis, dentition good        Neck:  JVP normal        Respiratory:  normal breath sounds, clear to auscultation, normal A-P diameter, normal symmetry, normal respiratory excursion, no use of accessory muscles         Cardiac: regular rhythm;normal S1 and S2;no S3 or S4;no murmurs, gallops or rubs detected                not assessed this visit                                        GI:  not assessed this visit        Extremities and Muscular Skeletal:  no deformities, clubbing, cyanosis, erythema observed;no edema              Neurological:  no gross motor deficits        Psych:  Alert and Oriented x 3;affect appropriate, oriented to time, person and place      CC  Kat Yo, APRN CNP  [address]  [place] [address]        Service Date: 04/15/2022       RE: Haritha Trujillo  [address]  [address]  [facility] [address]       HISTORY OF PRESENT ILLNESS:  Ms. Trujillo is a delightful 89-year-old woman well known to me here at the [facility].  She is accompanied to today's visit with her son, Philipp.  She is an established patient of Dr. Tatum.  I have been watching her every 4-6 weeks due to her history of acute on chronic diastolic and systolic heart failure.    Her past medical history includes:  1.  Atrial fibrillation with AV node ablation and BiV upgrade in 01/2015.  Her device reached MICHAEL 04/2020.  The patient does not have an LV lead.  It was shut off due to diaphragmatic stimulation in 06/2020.  2.  Cardiomyopathy with EF of 30-40% on most recent angiogram.  EF on limited echo was 45-50%.  3.  Hypertension, stable.  4.  History of hyponatremia, stable.  5.  History of anxiety, on low-dose Lexapro.  6.  History of  coronary artery disease with  of the RCA.  She had extensive left to left collaterals arising from the distal circumflex.  She has a 60% eccentric calcified stenosis of both the proximal, mid circumflex involving the takeoff of the large first obtuse marginal branch.  IFR down both limbs was 1.0.  She had minimal disease in the LAD, left main and ramus.  End-diastolic pressure was 18 mmHg.  7.  Short-term memory deficit.  The patient does live independently in a senior living facility.  Her family is very supportive.  They make her low-sodium meals and also prepare her pill boxes.    Currently, Peg is feeling well.  Denies chest pain, shortness of breath, lightheadedness or dizziness.  Her son states that she occasionally will get short of breath if she exerts herself.    Lab work was completed today and reviewed with the patient.  This showed a slight bump of her creatinine to 1.26, BUN of 30 and potassium of 4.  Her proBNP is down to 2722 (3784).    At our last visit, there was some confusion about her diuretic therapy.  I ended up changing her Lasix to 40 mg on Monday and Wednesday and 20 mg all other days.  The patient is tolerating this regimen and weight is stable.  Her serum sodium today was 136.      ALL OTHER REVIEW OF SYSTEMS AND PAST MEDICAL HISTORY:  Noted below.    ASSESSMENT AND PLAN:  Ms. Trujillo is a delightful 89-year-old woman.  1.  Coronary artery disease with  of the RCA with collaterals.  No complaints of chest pain.  She has done quite well on her current medical regimen of metoprolol succinate, lisinopril, isosorbide, amlodipine, aspirin and atorvastatin.  Lipids are followed by Dr. Michel.  Last LDL was in January and noted to be 83.  2.  Acute on chronic systolic heart failure.  EF on angiogram 30-40%.  On limited echo, it was 40-50%.  She continues to be euvolemic on exam.  Her weight is stable.  I am also pleased that her hyponatremia is normal.  She was previously on sodium chloride  1 g daily.  I believe this has been stopped.  It is back on her medication list.  I will discuss this with Philipp at our next visit.  3.  Atrial fibrillation with AV node ablation and CRT upgrade.  Unfortunately, developed diaphragmatic stimulation with the LV lead and it needed to be shut off.  The patient is RV paced.  The patient's device check was completed today and showed 89% V paced.  Intrinsic was AFib.  Battery longevity is 10 years.  Pocket is well healed.  No ventricular arrhythmias were noted.    As always, thank you for including me in the care of this delightful patient.  I will see her back in 6-7 weeks with a BMP.  I have been watching Peg closely.  She had multiple hospitalizations last year for chest pain and some for acute systolic heart failure.  She has been quite stable since October.  We will continue to follow closely.  I will plan on having her follow up later in the fall with an echocardiogram and visit with Dr. Tatum.      Kat Yo NP    D: 04/15/2022   T: 04/15/2022   MT: laquita  Name:     SEVERIANO VALLEJO  MRN:      [id number]        Account:      [id number]   :      1925           Service Date: 04/15/2022   Document: [id number]

## 2022-04-15 NOTE — PROGRESS NOTES
HPI and Plan: #[id number]  See dictation    Today's clinic visit entailed:  Review of the result(s) of each unique test - labs  Ordering of each unique test  No LOS data to display   Time spent doing chart review, history and exam, documentation and further activities per the note  Provider  Link to [facility] Help Grid     The level of medical decision making during this visit was of moderate complexity.      Orders Placed This Encounter   Procedures     Basic metabolic panel     Follow-Up with Cardiology Core- MARGIE       No orders of the defined types were placed in this encounter.      There are no discontinued medications.      Encounter Diagnoses   Name Primary?     Acute on chronic systolic heart failure (H) Yes     Coronary artery disease of native artery of native heart with stable angina pectoris (H)      Dilated cardiomyopathy (H)        CURRENT MEDICATIONS:  Current Outpatient Medications   Medication Sig Dispense Refill     acetaminophen (TYLENOL) 500 MG tablet Take 500-1,000 mg by mouth every 6 hours as needed for mild pain       Alendronate Sodium (FOSAMAX PO) Take 70 mg by mouth once a week Sunday evenings       amLODIPine (NORVASC) 2.5 MG tablet Take 1 tablet (2.5 mg) by mouth daily 90 tablet 3     aspirin (ASA) 81 MG EC tablet Take 1 tablet (81 mg) by mouth daily 90 tablet 3     atorvastatin (LIPITOR) 40 MG tablet Take 40 mg by mouth At Bedtime        calcium carbonate-vitamin D 600-200 MG-UNIT TABS Take 1 tablet by mouth daily.       cycloSPORINE (RESTASIS) 0.05 % ophthalmic emulsion Place 1 drop into both eyes every 12 hours.       escitalopram (LEXAPRO) 10 MG tablet Take 5 mg by mouth daily        furosemide (LASIX) 20 MG tablet 20 mg daily except Monday and Wednesday 40 mg daily. Or as directed 150 tablet 3     isosorbide mononitrate (IMDUR) 30 MG 24 hr tablet Take 3 tablets (90 mg) by mouth daily 270 tablet 3     lisinopril (ZESTRIL) 10 MG tablet Take 1 tablet (10 mg) by mouth daily 90 tablet 3      metoprolol succinate ER (TOPROL-XL) 50 MG 24 hr tablet 100 in am and 50mg at night (Patient taking differently: Take 50 mg by mouth every evening 100 in am and 50mg at night)       Multiple Vitamins-Minerals (CENTRUM SILVER) per tablet Take 1 tablet by mouth daily       Multiple Vitamins-Minerals (PRESERVISION AREDS 2 PO) Take by mouth daily       nitroGLYcerin (NITROSTAT) 0.4 MG sublingual tablet For chest pain place 1 tablet under the tongue every 5 minutes for 3 doses. If symptoms persist 5 minutes after 1st dose call 911. 25 tablet 0     sodium chloride 1 GM tablet Take 1 g by mouth daily       warfarin ANTICOAGULANT (COUMADIN) 4 MG tablet Take 4 mg by mouth daily 2mg (0.5 tablet) Mon, Wed, Fri  4mg (1 tablet) Tues, Thurs. Sat, Sun       Probiotic Product (PROBIOTIC DAILY PO) Take 1 tablet by mouth daily  (Patient not taking: Reported on 4/15/2022)         ALLERGIES     Allergies   Allergen Reactions     Amiodarone Nausea     Hctz      Hydrochlorothiazide Other (See Comments)     Other reaction(s): *Unknown     Lansoprazole      diarrhea   Prevacid       PAST MEDICAL HISTORY:  Past Medical History:   Diagnosis Date     Aortic regurgitation 12/14/2014    mild (1+) per echo     Atrial fibrillation (H)      Cardiomyopathy (H)      CHF (congestive heart failure) (H)      Chronic kidney disease, stage 3 (H)      Coronary atherosclerosis of unspecified type of vessel, native or graft 5/16/2000    normal left coronary arteries and tight obstruction in distal RCA, too small for revascularization, medical management     Degeneration of cervical intervertebral disc     cervical spine     Essential hypertension, benign      Mitral regurgitation 12/14/2014    mod-mod/sev (2-3+) per echo     Other and unspecified hyperlipidemia      Pacemaker      Pulmonary hypertension (H) 3/16/2013    mild per echo with RVSP 31mmHg +RAP      Pulmonary valve regurgitation 12/14/2014    mod (2+) per echo     Tricuspid regurgitation  2014    mild (1+) per echo     Unspecified tinnitus     chronic       PAST SURGICAL HISTORY:  Past Surgical History:   Procedure Laterality Date     CORONARY ANGIOGRAPHY ADULT ORDER  2000     CV HEART CATHETERIZATION WITH POSSIBLE INTERVENTION N/A 10/19/2021    Procedure: Heart Catheterization with Possible Intervention;  Surgeon: Alden Skelton MD;  Location:  HEART CARDIAC CATH LAB     CV INSTANTANEOUS WAVE-FREE RATIO N/A 10/19/2021    Procedure: Instantaneous Wave-Free Ratio;  Surgeon: Alden Skelton MD;  Location:  HEART CARDIAC CATH LAB     CV LEFT HEART CATH N/A 10/19/2021    Procedure: Left Heart Cath;  Surgeon: Alden Skelton MD;  Location:  HEART CARDIAC CATH LAB     CV LEFT VENTRICULOGRAM N/A 10/19/2021    Procedure: Left Ventriculogram;  Surgeon: Alden Skelton MD;  Location:  HEART CARDIAC CATH LAB     EP PPM RMVL&REPL OF GEN W/ DUAL LEAD SYS N/A 2020    Procedure: Permanent Pacemakers  Removal and Replacement Generator  with Multi Lead System;  Surgeon: Fay Tatum MD;  Location:  HEART CARDIAC CATH LAB     HRW PACEMAKER PERMANENT  2015    BI- ventricular     IMPLANT PACEMAKER  2010    dual chamber Medtronic     ZZ NONSPECIFIC PROCEDURE      right rotator cuff tear OR     ZZC NONSPECIFIC PROCEDURE  1983    microdiscectomy     ZZC NONSPECIFIC PROCEDURE      C-sections x 3      ZZC NONSPECIFIC PROCEDURE      appendectomy     ZZC NONSPECIFIC PROCEDURE      bilateral benign breast biopsy      ZZC NONSPECIFIC PROCEDURE      right knee arthroscopic OR     ZZC NONSPECIFIC PROCEDURE  1974    enteritis       FAMILY HISTORY:  Family History   Problem Relation Age of Onset     Hypertension Mother      Cancer Mother         pancreatic     Eye Disorder Mother         glacoma     FAZALABEATRIZ Father          53     Lipids Father      Diabetes Maternal Grandmother      C.A.WARREN. Brother         open heart surgery age 53     Cancer Daughter       "   ovavian     Neurologic Disorder Son         MS       SOCIAL HISTORY:  Social History     Socioeconomic History     Marital status:      Spouse name: None     Number of children: None     Years of education: None     Highest education level: None   Tobacco Use     Smoking status: Former Smoker     Packs/day: 0.50     Years: 15.00     Pack years: 7.50     Types: Cigarettes     Start date:      Quit date:      Years since quittin.3     Smokeless tobacco: Never Used   Substance and Sexual Activity     Alcohol use: No     Drug use: No   Other Topics Concern     Parent/sibling w/ CABG, MI or angioplasty before 65F 55M? Yes     Special Diet Yes     Comment: low sodium     Exercise Yes     Comment: active life style     Seat Belt Yes       Review of Systems:  Skin:  Positive for bruising better   Eyes:  Positive for glasses    ENT:  Positive for hearing loss    Respiratory:  Positive for shortness of breath;dyspnea on exertion     Cardiovascular:    fatigue;dizziness;lightheadedness;Positive for    Gastroenterology: Negative      Genitourinary:  Negative      Musculoskeletal:  Positive for joint stiffness    Neurologic:  Positive for memory problems    Psychiatric:  Positive for anxiety    Heme/Lymph/Imm:  Positive for allergies    Endocrine:  Negative        Physical Exam:  Vitals: /79 (BP Location: Left arm, Cuff Size: Adult Small)   Pulse 63   Ht 1.626 m (5' 4\")   Wt 61.9 kg (136 lb 6.4 oz)   SpO2 98%   BMI 23.41 kg/m      Constitutional:  cooperative, alert and oriented, well developed, well nourished, in no acute distress        Skin:  warm and dry to the touch, no apparent skin lesions or masses noted   pacemaker incision in the left infraclavicular area was well-healed      Head:  normocephalic, no masses or lesions        Eyes:  pupils equal and round;conjunctivae and lids unremarkable        Lymph:      ENT:  no pallor or cyanosis, dentition good        Neck:  JVP normal    "     Respiratory:  normal breath sounds, clear to auscultation, normal A-P diameter, normal symmetry, normal respiratory excursion, no use of accessory muscles         Cardiac: regular rhythm;normal S1 and S2;no S3 or S4;no murmurs, gallops or rubs detected                not assessed this visit                                        GI:  not assessed this visit        Extremities and Muscular Skeletal:  no deformities, clubbing, cyanosis, erythema observed;no edema              Neurological:  no gross motor deficits        Psych:  Alert and Oriented x 3;affect appropriate, oriented to time, person and place        CC  Kat Yo, APRN CNP  [address]  [place]  [place] [id number]

## 2022-04-22 NOTE — ED PROVIDER NOTES
"  History     Chief Complaint:  Chest Pain     The history is provided by the patient and a relative.      Haritha Trujillo is a 89 year old female with history of atrial fibrillation on Coumadin, hypertension, CAD, CKD, FIADH, atrial tachycardia, status post pacemaker placement, who went to Dr. Michel, her primary care doctor, in clinic for chest pain. She has had chest pain intermittently like a \"rock on [her] chest.\" The primary care doctor did a 12 lead EKG which showed some changes in the lateral leads so he sent her to the emergency room. Her chest pain has been intermittent for two weeks and is present at rest and with exertion. Nothing seems to trigger the pain. She additionally mentions some bilateral arm tingling which has been ongoing for longer than years but seems to be associated with the chest pain. Last night, she had an approximately 30 minute episode of chest pain and arm tingling which resolved on its own. She does feel short of breath with the chest pain as well. Her son notes that she has appeared increasingly fatigued when walking short distances, which is abnormal for her. Peg denies cough, fever, changes to dietary or fluid intake, abdominal pain, back pain, weight gain.  He has noticed some bilateral pedal edema.  Her cardiologist increased her Lasix from 10 mg a day to 20 mg a day over this past weekend.  She does have a Medtronic pacemaker. Here in the ED, she is not experiencing any pain. Peg is vaccinated against COVID-19.    Review of Systems   Constitutional: Negative for fever and unexpected weight change.   Respiratory: Positive for shortness of breath. Negative for cough.    Cardiovascular: Positive for chest pain.   Gastrointestinal: Negative for abdominal pain.   Musculoskeletal: Negative for back pain.   Neurological: Positive for weakness.        Tingling +   All other systems reviewed and are " "negative.      Allergies:  Amiodarone  Hydrochlorothiazide  Lansoprazole    Medications:  Fosamax  Amlodipine  Lipitor   Lexapro  Lasix  Lisinopril  Metoprolol  Nitroglycerin  Warfarin     Past Medical History:     Aortic regurgitation  Atrial fibrillation  Cardiomyopathy  CHF  CKD stage III  Coronary atherosclerosis  Cervical disc degeneration  Mitral regurgitation  Hypertension  Hyperlipidemia  Pulmonary valve regurgitation  Tricuspid regurgitation  GERD  Diverticulitis  Pancreatitis  SIAHD  Osteoporosis  Skin cancer  CAD  Atrial tachycardia    Past Surgical History:    AV node ablation  Coronary angiogram  Pacemaker replacement x2  Pacemaker insertion  Right rotator cuff repair  Microdiscectomy   section x3  Appendectomy  Breast biopsy  Enteritis procedure  Right knee arthroscopy  Colonoscopy     Family History:    Mother: hypertension, pancreatic cancer, glaucoma  Father: CAD, hyperlipidemia  Brother: CAD  Daughter: ovarian cancer  Son: MS    Social History:  Presents with her son.  Presents via car.    Physical Exam     Patient Vitals for the past 24 hrs:   BP Temp Temp src Pulse Resp SpO2 Height Weight   10/05/21 2100 127/80 -- -- 87 27 96 % -- --   10/05/21 2000 (!) 140/103 -- -- 90 14 97 % -- --   10/05/21 1945 (!) 132/99 -- -- 84 28 -- -- --   10/05/21 1357 130/79 97.9  F (36.6  C) Temporal 94 18 99 % 1.626 m (5' 4\") 64.4 kg (142 lb)       Physical Exam  Constitutional:  Patient is oriented to person, place, and time. They appear well-developed and well-nourished.    HENT:   Mouth/Throat:   Oropharynx is clear and moist.   Eyes:    Conjunctivae normal and EOM are normal. Pupils are equal, round, and reactive to light.   Neck:    Normal range of motion.   Cardiovascular: Tachycardic with irregular rhythm.  Exam reveals no gallop and no friction rub.  No murmur heard.  Pulmonary/Chest:  Effort normal and breath sounds normal. Patient has no wheezes. Patient has no rales. Mildly winded.  Abdominal: "   Soft. Bowel sounds are normal. Patient exhibits no mass. There is no tenderness. There is no rebound and no guarding.   Musculoskeletal:  Normal range of motion. Bilateral pedal edema.  Neurological:   Patient is alert and oriented to person, place, and time. Patient is generally weak. No cranial nerve deficit or sensory deficit. GCS 15  Skin:   Skin is warm and dry. No rash noted. No erythema.   Psychiatric:   Patient has a normal mood    Emergency Department Course     ECG  ECG obtained at 1407, ECG read at 1800  Ventricular-paced rhythm with frequent premature ventricular complexes  Abnormal ECG   No significant change as compared to prior, dated 6/24/2021.  Rate 95 bpm. MO interval * ms. QRS duration 176 ms. QT/QTc 426/535 ms. P-R-T axes * -68 106.     Imaging:  XR Chest 2 Views    (Results Pending)   Chest x-ray is consistent with pulmonary edema.  Report per myself    Laboratory:  Labs Ordered and Resulted from Time of ED Arrival Up to the Time of Departure from the ED   COMPREHENSIVE METABOLIC PANEL - Abnormal; Notable for the following components:       Result Value    Sodium 129 (*)     Creatinine 1.24 (*)     Glucose 127 (*)     Protein Total 6.4 (*)     Albumin 3.3 (*)     Bilirubin Total 1.4 (*)     GFR Estimate 37 (*)     All other components within normal limits   CBC WITH PLATELETS AND DIFFERENTIAL - Abnormal; Notable for the following components:    Absolute Lymphocytes 0.7 (*)     All other components within normal limits   INR - Abnormal; Notable for the following components:    INR 2.81 (*)     All other components within normal limits   NT PROBNP INPATIENT - Abnormal; Notable for the following components:    N terminal Pro BNP Inpatient 5,247 (*)     All other components within normal limits   TROPONIN I - Normal   EXTRA BLUE TOP TUBE   EXTRA RED TOP TUBE   EXTRA GREEN TOP (LITHIUM HEPARIN) TUBE   EXTRA PURPLE TOP TUBE   EXTRA BLOOD BANK PURPLE TOP TUBE   COVID-19 VIRUS (CORONAVIRUS) BY PCR    PERIPHERAL IV CATHETER   CARDIAC CONTINUOUS MONITORING   PULSE OXIMETRY NURSING   EXTRA TUBE    Narrative:     The following orders were created for panel order [place] Draw.  Procedure                               Abnormality         Status                     ---------                               -----------         ------                     Extra Blue Top Tube[[id number]]                              Final result               Extra Red Top Tube[[id number]]                               Final result               Extra Green Top (Lithium...[[id number]]                      Final result               Extra Purple Top Tube[[id number]]                            Final result               Extra Blood Bank Purple ...[[id number]]                      Final result                 Please view results for these tests on the individual orders.   CBC WITH PLATELETS & DIFFERENTIAL    Narrative:     The following orders were created for panel order CBC with platelets + differential.  Procedure                               Abnormality         Status                     ---------                               -----------         ------                     CBC with platelets and d...[[id number]]  Abnormal            Final result                 Please view results for these tests on the individual orders.      Covid PCR: Pending      Emergency Department Course:    Reviewed:  I reviewed nursing notes, vitals, past medical history and Care Everywhere    Assessments:  1755 I obtained history and examined the patient as noted above.   2100 I rechecked the patient and explained findings.     Consults:  2120 admitted to Dr. Almeida of hospitalist service.    Interventions:  Lasix 20 mg IV      Disposition:  The patient was admitted to the hospital under the care of Dr. Almeida.     Impression & Plan     Medical Decision Making:  Lori Trujillo is a 89-year-old female presenting to the emergency room with intermittent chest pressure,  shortness of breath with exertion over the past week.  She was sent here by her primary care doctor.  She is anticoagulated and is therapeutic.  Her EKG shows paced rhythm with PVCs.  Chest x-ray is consistent with congestive heart failure as is her BN P.  Her cardiac enzyme is normal.  She does have pedal edema which is consistent with fluid overload.  No concerning signs for DVT.  She has chronic hyponatremia due to SIADH.  She is hemodynamically normal.  At this time I will admit her for diuresis and further cardiac evaluation.      Diagnosis:    ICD-10-CM    1. Hyponatremia  E87.1    2. Chronic kidney disease, unspecified CKD stage  N18.9    3. Congestive heart failure, unspecified HF chronicity, unspecified heart failure type (H)  I50.9    4. Pedal edema  R60.0          Scribe Disclosure:  I, Rocio Hoff, am serving as a scribe at 5:54 PM on 10/5/2021 to document services personally performed by Ne Abbasi MD based on my observations and the provider's statements to me.      Ne Abbasi MD  10/05/21 1438     No

## 2022-06-03 NOTE — PROGRESS NOTES
Service Date: 06/03/2022    HISTORY OF PRESENT ILLNESS:  Lori is a delightful 89-year-old woman well known to me here at the clinic.  She is here today with her son, Philipp.  She is an established patient of Dr. Tatum.  He sees her on an as-needed basis.  I have been following her closely for acute on chronic diastolic and systolic heart failure.    PAST MEDICAL HISTORY:  Includes:  1.  AFib with AV node ablation and BiV upgrade 01/2015.  Her device reached MICHAEL 04/2020. The patient does not have an LV lead.  It was shut off due to diaphragmatic stimulation in 06/2020.  2.  Cardiomyopathy.  EF on last angiogram was 30%-40%.  EF on last echo was 45%-50%.  3.  Hypertension, stable.  4.  Hyponatremia, stable.  5.  History of anxiety, on low-dose Lexapro.  The patient is reporting very vivid dreams.  She is on 5 mg daily.  6.  History of coronary artery disease with  of the RCA.  She had extensive left-to-left collaterals arising from the distal circumflex.  She also had a 60% eccentric calcified stenosis of both the proximal and mid circumflex involving the takeoff of the large first obtuse marginal branch.  IFR down both limbs was 1.  She had minimal LAD disease, left main disease and ramus.  End-diastolic pressure was 18.  7.  Short-term memory deficit.  Lives independently in a senior living facility.  Her family is very supportive and helps her with making low-sodium foods as well as preparing her medications.    Lori states that she has noted she has had increased shortness of breath this past week.  Her son is concerned that she sleeps a little bit more frequently.  She denies chest pain, orthopnea, PND, syncope.  She has little lower extremity edema with support stockings on today.  She has had some issues with back pain, but does not recall when her son brings it up.  He states that she often will fall asleep in her chair and is kind of out of proportion with her body, and he is wondering if it is more of a  muscular discomfort.    Lab work today showed a slight bump in her creatinine, but stable at 1.32.  Her proBNP, however, was up to 6592 (2722 one month ago).  GFR is down slightly, but stable at 37.    Last device interrogation showed 89% V paced.  Intrinsic was AFib with complete heart block achieved by AV richard ablation.  She has no junctional escape at VVI of 30.  Battery longevity is 10 years.  All other review of systems and past medical history are noted below.    ASSESSMENT AND PLAN:  Ms. Trujillo is a delightful 89-year-old woman well known to me here at the [facility].  1.  AFib with AV richard ablation.  She has a CRT device, but LV lead was shut off due to diaphragmatic stimulation.  She is 100% VVI paced.  Battery longevity is 10 years.  Pocket is well healed.    2.  Acute on chronic systolic and diastolic heart failure.  EF on her angiogram completed in 10/2021 was 30%-40%.  She also had mild mitral regurgitation.  The patient again had a chronic occluded RCA with large extensive left-to-left collaterals arising from the distal circumflex.  She had a 60% eccentric calcified stenosis of the proximal mid circumflex involving the takeoff of a large first obtuse marginal branch.  IFR was 1.  She currently denies angina.  She is on amlodipine 2.5 mg daily, baby aspirin 81 mg daily, atorvastatin 40 mg daily, lisinopril 10 mg daily, isosorbide 90 mg daily, and metoprolol 100 mg in the morning and 50 mg in the evening.  CHADS-VASc score of 6 (hypertension, heart failure, age and gender).  The patient is on warfarin therapy.  Last INR per patient's son was 2.8.  This is managed by Dr. Michel.  It is uncertain if we ever had a discussion about changing this to Eliquis.  This could be considered in the future.  I will discuss it with Peg.    3.  Acute on chronic systolic heart failure.  Weight is up slightly today 4 pounds to 140 pounds.  She has mild lower extremity edema in her ankles.  Lungs are clear.  I have  opted to increase her Lasix for 2 days, taking 40 mg.  She will then increase the weekly dose by doing 20 mg every day except Monday, Wednesday and Friday 40 mg.  I would like to do a BMP and proBNP when I see her back next month.  If she has concerns in the interim, I have asked that they contact us.  I have also asked that they talk to Dr. Michel about the vivid dreams she is having and whether or not it could be correlated with her Lexapro.    Kat Yo NP        D: 2022   T: 2022   MT: maral    Name:     SEVERIANO VALLEJO  MRN:      [id number]        Account:      [id number]   :      1925           Service Date: 2022       Document: [id number]

## 2022-06-03 NOTE — PROGRESS NOTES
HPI and Plan: #[id number]  See dictation    Today's clinic visit entailed:  Review of the result(s) of each unique test - labs  Ordering of each unique test  Prescription drug management  No LOS data to display   Time spent doing chart review, history and exam, documentation and further activities per the note  Provider  Link to MDM Help Grid     The level of medical decision making during this visit was of moderate complexity.      Orders Placed This Encounter   Procedures     N terminal pro BNP outpatient     Basic metabolic panel     N terminal pro BNP outpatient     Follow-Up with Cardiology MARGIE       Orders Placed This Encounter   Medications     furosemide (LASIX) 20 MG tablet     Si mg daily except Monday, Wednesday and Friday 40 mg daily. Or as directed     Dispense:  150 tablet     Refill:  3       Medications Discontinued During This Encounter   Medication Reason     furosemide (LASIX) 20 MG tablet          Encounter Diagnoses   Name Primary?     Acute on chronic systolic heart failure (H)      Dilated cardiomyopathy (H)        CURRENT MEDICATIONS:  Current Outpatient Medications   Medication Sig Dispense Refill     acetaminophen (TYLENOL) 500 MG tablet Take 500-1,000 mg by mouth every 6 hours as needed for mild pain       Alendronate Sodium (FOSAMAX PO) Take 70 mg by mouth once a week  evenings       amLODIPine (NORVASC) 2.5 MG tablet Take 1 tablet (2.5 mg) by mouth daily 90 tablet 3     aspirin (ASA) 81 MG EC tablet Take 1 tablet (81 mg) by mouth daily 90 tablet 3     atorvastatin (LIPITOR) 40 MG tablet Take 40 mg by mouth At Bedtime        calcium carbonate-vitamin D 600-200 MG-UNIT TABS Take 1 tablet by mouth daily.       cycloSPORINE (RESTASIS) 0.05 % ophthalmic emulsion Place 1 drop into both eyes every 12 hours.       escitalopram (LEXAPRO) 10 MG tablet Take 5 mg by mouth daily        furosemide (LASIX) 20 MG tablet 20 mg daily except Monday, Wednesday and Friday 40 mg daily. Or as  directed 150 tablet 3     isosorbide mononitrate (IMDUR) 30 MG 24 hr tablet Take 3 tablets (90 mg) by mouth daily 270 tablet 3     lisinopril (ZESTRIL) 10 MG tablet Take 1 tablet (10 mg) by mouth daily 90 tablet 3     metoprolol succinate ER (TOPROL-XL) 50 MG 24 hr tablet 100 in am and 50mg at night (Patient taking differently: Take 50 mg by mouth every evening 100 in am and 50mg at night)       Multiple Vitamins-Minerals (CENTRUM SILVER) per tablet Take 1 tablet by mouth daily       Multiple Vitamins-Minerals (PRESERVISION AREDS 2 PO) Take by mouth daily       nitroGLYcerin (NITROSTAT) 0.4 MG sublingual tablet For chest pain place 1 tablet under the tongue every 5 minutes for 3 doses. If symptoms persist 5 minutes after 1st dose call 911. 25 tablet 0     Probiotic Product (PROBIOTIC DAILY PO) Take 1 tablet by mouth daily       sodium chloride 1 GM tablet Take 1 g by mouth daily       warfarin ANTICOAGULANT (COUMADIN) 4 MG tablet Take 4 mg by mouth daily 2mg (0.5 tablet) Mon, Wed, Fri  4mg (1 tablet) Tues, Thurs. Sat, Sun         ALLERGIES     Allergies   Allergen Reactions     Amiodarone Nausea     Hctz      Hydrochlorothiazide Other (See Comments)     Other reaction(s): *Unknown     Lansoprazole      diarrhea   Prevacid       PAST MEDICAL HISTORY:  Past Medical History:   Diagnosis Date     Aortic regurgitation 12/14/2014    mild (1+) per echo     Atrial fibrillation (H)      Cardiomyopathy (H)      CHF (congestive heart failure) (H)      Chronic kidney disease, stage 3 (H)      Coronary atherosclerosis of unspecified type of vessel, native or graft 5/16/2000    normal left coronary arteries and tight obstruction in distal RCA, too small for revascularization, medical management     Degeneration of cervical intervertebral disc     cervical spine     Essential hypertension, benign      Mitral regurgitation 12/14/2014    mod-mod/sev (2-3+) per echo     Other and unspecified hyperlipidemia      Pacemaker       Pulmonary hypertension (H) 3/16/2013    mild per echo with RVSP 31mmHg +RAP      Pulmonary valve regurgitation 12/14/2014    mod (2+) per echo     Tricuspid regurgitation 12/14/2014    mild (1+) per echo     Unspecified tinnitus     chronic       PAST SURGICAL HISTORY:  Past Surgical History:   Procedure Laterality Date     CORONARY ANGIOGRAPHY ADULT ORDER  5/16/2000     CV HEART CATHETERIZATION WITH POSSIBLE INTERVENTION N/A 10/19/2021    Procedure: Heart Catheterization with Possible Intervention;  Surgeon: Alden Skelton MD;  Location:  HEART CARDIAC CATH LAB     CV INSTANTANEOUS WAVE-FREE RATIO N/A 10/19/2021    Procedure: Instantaneous Wave-Free Ratio;  Surgeon: Alden Skelton MD;  Location:  HEART CARDIAC CATH LAB     CV LEFT HEART CATH N/A 10/19/2021    Procedure: Left Heart Cath;  Surgeon: Alden Skelton MD;  Location:  HEART CARDIAC CATH LAB     CV LEFT VENTRICULOGRAM N/A 10/19/2021    Procedure: Left Ventriculogram;  Surgeon: Alden Skelton MD;  Location:  HEART CARDIAC CATH LAB     EP PPM RMVL&REPL OF GEN W/ DUAL LEAD SYS N/A 4/14/2020    Procedure: Permanent Pacemakers  Removal and Replacement Generator  with Multi Lead System;  Surgeon: Fay Tatum MD;  Location:  HEART CARDIAC CATH LAB     HRW PACEMAKER PERMANENT  1/2015    BI- ventricular     IMPLANT PACEMAKER  11/12/2010    dual chamber Medtronic     ZZ NONSPECIFIC PROCEDURE  1999    right rotator cuff tear OR     ZZC NONSPECIFIC PROCEDURE  1983    microdiscectomy     ZZ NONSPECIFIC PROCEDURE      C-sections x 3      ZZC NONSPECIFIC PROCEDURE      appendectomy     ZZC NONSPECIFIC PROCEDURE      bilateral benign breast biopsy      ZZC NONSPECIFIC PROCEDURE      right knee arthroscopic OR     ZZC NONSPECIFIC PROCEDURE  1974    enteritis       FAMILY HISTORY:  Family History   Problem Relation Age of Onset     Hypertension Mother      Cancer Mother         pancreatic     Eye Disorder Mother          "cristian     ANIA Father          53     Lipids Father      Diabetes Maternal Grandmother      ANIA Brother         open heart surgery age 53     Cancer Daughter         ovavian     Neurologic Disorder Son         MS       SOCIAL HISTORY:  Social History     Socioeconomic History     Marital status:      Spouse name: None     Number of children: None     Years of education: None     Highest education level: None   Tobacco Use     Smoking status: Former Smoker     Packs/day: 0.50     Years: 15.00     Pack years: 7.50     Types: Cigarettes     Start date:      Quit date:      Years since quittin.4     Smokeless tobacco: Never Used   Substance and Sexual Activity     Alcohol use: No     Drug use: No   Other Topics Concern     Parent/sibling w/ CABG, MI or angioplasty before 65F 55M? Yes     Special Diet Yes     Comment: low sodium     Exercise Yes     Comment: active life style     Seat Belt Yes       Review of Systems:  Skin:  Positive for bruising better   Eyes:  Positive for glasses    ENT:  Positive for hearing loss wears hearing aides  Respiratory:  Positive for shortness of breath;dyspnea on exertion improved   Cardiovascular:    fatigue;dizziness;lightheadedness;Positive for 3 episodes cp since hosp, one nitro since hosp, right leg edema when sitting  Gastroenterology: Negative   burping  Genitourinary:  Negative hematuria    Musculoskeletal:  Positive for joint stiffness balance off  Neurologic:  Positive for memory problems tingling and heaviness in arms with chest pain episodes  Psychiatric:  Positive for anxiety treated  Heme/Lymph/Imm:  Positive for allergies    Endocrine:  Negative        Physical Exam:  Vitals: /73   Pulse 74   Ht 1.626 m (5' 4\")   Wt 63.7 kg (140 lb 8 oz)   SpO2 97%   BMI 24.12 kg/m      Constitutional:  cooperative, alert and oriented, well developed, well nourished, in no acute distress        Skin:  warm and dry to the touch, no apparent skin " lesions or masses noted   pacemaker incision in the left infraclavicular area was well-healed      Head:  normocephalic, no masses or lesions        Eyes:  pupils equal and round;conjunctivae and lids unremarkable        Lymph:      ENT:  no pallor or cyanosis, dentition good        Neck:  JVP normal        Respiratory:  normal breath sounds, clear to auscultation, normal A-P diameter, normal symmetry, normal respiratory excursion, no use of accessory muscles         Cardiac: regular rhythm;normal S1 and S2;no S3 or S4;no murmurs, gallops or rubs detected                not assessed this visit                                        GI:  not assessed this visit        Extremities and Muscular Skeletal:  no deformities, clubbing, cyanosis, erythema observed   bilateral LE edema;trace          Neurological:  no gross motor deficits        Psych:  Alert and Oriented x 3;affect appropriate, oriented to time, person and place        AGUSTO Yo, APRN CNP  [address]  [place] [address]

## 2022-06-03 NOTE — LETTER
6/3/2022    Kirit Michel MD  [address] [address]    RE: Haritha GALLOWAY Ronnie       Dear Colleague,     I had the pleasure of seeing Haritha GALLOWAY Ronnie in the [facility].  HPI and Plan: #[id number]  See dictation    Today's clinic visit entailed:  Review of the result(s) of each unique test - labs  Ordering of each unique test  Prescription drug management  No LOS data to display   Time spent doing chart review, history and exam, documentation and further activities per the note  Provider  Link to [employer] Help Grid     The level of medical decision making during this visit was of moderate complexity.      Orders Placed This Encounter   Procedures     N terminal pro BNP outpatient     Basic metabolic panel     N terminal pro BNP outpatient     Follow-Up with Cardiology MARGIE       Orders Placed This Encounter   Medications     furosemide (LASIX) 20 MG tablet     Si mg daily except Monday, Wednesday and Friday 40 mg daily. Or as directed     Dispense:  150 tablet     Refill:  3       Medications Discontinued During This Encounter   Medication Reason     furosemide (LASIX) 20 MG tablet          Encounter Diagnoses   Name Primary?     Acute on chronic systolic heart failure (H)      Dilated cardiomyopathy (H)        CURRENT MEDICATIONS:  Current Outpatient Medications   Medication Sig Dispense Refill     acetaminophen (TYLENOL) 500 MG tablet Take 500-1,000 mg by mouth every 6 hours as needed for mild pain       Alendronate Sodium (FOSAMAX PO) Take 70 mg by mouth once a week  evenings       amLODIPine (NORVASC) 2.5 MG tablet Take 1 tablet (2.5 mg) by mouth daily 90 tablet 3     aspirin (ASA) 81 MG EC tablet Take 1 tablet (81 mg) by mouth daily 90 tablet 3     atorvastatin (LIPITOR) 40 MG tablet Take 40 mg by mouth At Bedtime        calcium carbonate-vitamin D 600-200 MG-UNIT TABS Take 1 tablet by mouth daily.       cycloSPORINE (RESTASIS) 0.05 %  ophthalmic emulsion Place 1 drop into both eyes every 12 hours.       escitalopram (LEXAPRO) 10 MG tablet Take 5 mg by mouth daily        furosemide (LASIX) 20 MG tablet 20 mg daily except Monday, Wednesday and Friday 40 mg daily. Or as directed 150 tablet 3     isosorbide mononitrate (IMDUR) 30 MG 24 hr tablet Take 3 tablets (90 mg) by mouth daily 270 tablet 3     lisinopril (ZESTRIL) 10 MG tablet Take 1 tablet (10 mg) by mouth daily 90 tablet 3     metoprolol succinate ER (TOPROL-XL) 50 MG 24 hr tablet 100 in am and 50mg at night (Patient taking differently: Take 50 mg by mouth every evening 100 in am and 50mg at night)       Multiple Vitamins-Minerals (CENTRUM SILVER) per tablet Take 1 tablet by mouth daily       Multiple Vitamins-Minerals (PRESERVISION AREDS 2 PO) Take by mouth daily       nitroGLYcerin (NITROSTAT) 0.4 MG sublingual tablet For chest pain place 1 tablet under the tongue every 5 minutes for 3 doses. If symptoms persist 5 minutes after 1st dose call 911. 25 tablet 0     Probiotic Product (PROBIOTIC DAILY PO) Take 1 tablet by mouth daily       sodium chloride 1 GM tablet Take 1 g by mouth daily       warfarin ANTICOAGULANT (COUMADIN) 4 MG tablet Take 4 mg by mouth daily 2mg (0.5 tablet) Mon, Wed, Fri  4mg (1 tablet) Tues, Thurs. Sat, Sun         ALLERGIES     Allergies   Allergen Reactions     Amiodarone Nausea     Hctz      Hydrochlorothiazide Other (See Comments)     Other reaction(s): *Unknown     Lansoprazole      diarrhea   Prevacid       PAST MEDICAL HISTORY:  Past Medical History:   Diagnosis Date     Aortic regurgitation 12/14/2014    mild (1+) per echo     Atrial fibrillation (H)      Cardiomyopathy (H)      CHF (congestive heart failure) (H)      Chronic kidney disease, stage 3 (H)      Coronary atherosclerosis of unspecified type of vessel, native or graft 5/16/2000    normal left coronary arteries and tight obstruction in distal RCA, too small for revascularization, medical management      Degeneration of cervical intervertebral disc     cervical spine     Essential hypertension, benign      Mitral regurgitation 12/14/2014    mod-mod/sev (2-3+) per echo     Other and unspecified hyperlipidemia      Pacemaker      Pulmonary hypertension (H) 3/16/2013    mild per echo with RVSP 31mmHg +RAP      Pulmonary valve regurgitation 12/14/2014    mod (2+) per echo     Tricuspid regurgitation 12/14/2014    mild (1+) per echo     Unspecified tinnitus     chronic       PAST SURGICAL HISTORY:  Past Surgical History:   Procedure Laterality Date     CORONARY ANGIOGRAPHY ADULT ORDER  5/16/2000     CV HEART CATHETERIZATION WITH POSSIBLE INTERVENTION N/A 10/19/2021    Procedure: Heart Catheterization with Possible Intervention;  Surgeon: Alden Skelton MD;  Location:  HEART CARDIAC CATH LAB     CV INSTANTANEOUS WAVE-FREE RATIO N/A 10/19/2021    Procedure: Instantaneous Wave-Free Ratio;  Surgeon: Alden Skelton MD;  Location:  HEART CARDIAC CATH LAB     CV LEFT HEART CATH N/A 10/19/2021    Procedure: Left Heart Cath;  Surgeon: Alden Skelton MD;  Location:  HEART CARDIAC CATH LAB     CV LEFT VENTRICULOGRAM N/A 10/19/2021    Procedure: Left Ventriculogram;  Surgeon: Alden Skelton MD;  Location:  HEART CARDIAC CATH LAB     EP PPM RMVL&REPL OF GEN W/ DUAL LEAD SYS N/A 4/14/2020    Procedure: Permanent Pacemakers  Removal and Replacement Generator  with Multi Lead System;  Surgeon: Fay Tatum MD;  Location:  HEART CARDIAC CATH LAB     HRW PACEMAKER PERMANENT  1/2015    BI- ventricular     IMPLANT PACEMAKER  11/12/2010    dual chamber Medtronic     [facility] NONSPECIFIC PROCEDURE  1999    right rotator cuff tear OR     ZZ NONSPECIFIC PROCEDURE  1983    microdiscectomy     ZZ NONSPECIFIC PROCEDURE      C-sections x 3      ZZC NONSPECIFIC PROCEDURE      appendectomy     ZZC NONSPECIFIC PROCEDURE      bilateral benign breast biopsy      Z NONSPECIFIC PROCEDURE      right knee  "arthroscopic OR     ZZC NONSPECIFIC PROCEDURE  1974    enteritis       FAMILY HISTORY:  Family History   Problem Relation Age of Onset     Hypertension Mother      Cancer Mother         pancreatic     Eye Disorder Mother         cristian     LARON.AMISAEL. Father          53     Lipids Father      Diabetes Maternal Grandmother      FAZALABEATRIZ Brother         open heart surgery age 53     Cancer Daughter         ovavian     Neurologic Disorder Son         MS       SOCIAL HISTORY:  Social History     Socioeconomic History     Marital status:      Spouse name: None     Number of children: None     Years of education: None     Highest education level: None   Tobacco Use     Smoking status: Former Smoker     Packs/day: 0.50     Years: 15.00     Pack years: 7.50     Types: Cigarettes     Start date:      Quit date:      Years since quittin.4     Smokeless tobacco: Never Used   Substance and Sexual Activity     Alcohol use: No     Drug use: No   Other Topics Concern     Parent/sibling w/ CABG, MI or angioplasty before 65F 55M? Yes     Special Diet Yes     Comment: low sodium     Exercise Yes     Comment: active life style     Seat Belt Yes       Review of Systems:  Skin:  Positive for bruising better   Eyes:  Positive for glasses    ENT:  Positive for hearing loss wears hearing aides  Respiratory:  Positive for shortness of breath;dyspnea on exertion improved   Cardiovascular:    fatigue;dizziness;lightheadedness;Positive for 3 episodes cp since hosp, one nitro since hosp, right leg edema when sitting  Gastroenterology: Negative   burping  Genitourinary:  Negative hematuria    Musculoskeletal:  Positive for joint stiffness balance off  Neurologic:  Positive for memory problems tingling and heaviness in arms with chest pain episodes  Psychiatric:  Positive for anxiety treated  Heme/Lymph/Imm:  Positive for allergies    Endocrine:  Negative        Physical Exam:  Vitals: /73   Pulse 74   Ht 1.626 m (5' 4\") "   Wt 63.7 kg (140 lb 8 oz)   SpO2 97%   BMI 24.12 kg/m      Constitutional:  cooperative, alert and oriented, well developed, well nourished, in no acute distress        Skin:  warm and dry to the touch, no apparent skin lesions or masses noted   pacemaker incision in the left infraclavicular area was well-healed      Head:  normocephalic, no masses or lesions        Eyes:  pupils equal and round;conjunctivae and lids unremarkable        Lymph:      ENT:  no pallor or cyanosis, dentition good        Neck:  JVP normal        Respiratory:  normal breath sounds, clear to auscultation, normal A-P diameter, normal symmetry, normal respiratory excursion, no use of accessory muscles         Cardiac: regular rhythm;normal S1 and S2;no S3 or S4;no murmurs, gallops or rubs detected                not assessed this visit                                        GI:  not assessed this visit        Extremities and Muscular Skeletal:  no deformities, clubbing, cyanosis, erythema observed   bilateral LE edema;trace          Neurological:  no gross motor deficits        Psych:  Alert and Oriented x 3;affect appropriate, oriented to time, person and place        CC  Kat Yo, APRN CNP  [address]  [place] [address]                Service Date: 06/03/2022    HISTORY OF PRESENT ILLNESS:  Lori is a delightful 89-year-old woman well known to me here at the clinic.  She is here today with her son, Philipp.  She is an established patient of Dr. Tatum.  He sees her on an as-needed basis.  I have been following her closely for acute on chronic diastolic and systolic heart failure.    PAST MEDICAL HISTORY:  Includes:  1.  AFib with AV node ablation and BiV upgrade 01/2015.  Her device reached MICHAEL 04/2020. The patient does not have an LV lead.  It was shut off due to diaphragmatic stimulation in 06/2020.  2.  Cardiomyopathy.  EF on last angiogram was 30%-40%.  EF on last echo was 45%-50%.  3.  Hypertension, stable.  4.   Hyponatremia, stable.  5.  History of anxiety, on low-dose Lexapro.  The patient is reporting very vivid dreams.  She is on 5 mg daily.  6.  History of coronary artery disease with  of the RCA.  She had extensive left-to-left collaterals arising from the distal circumflex.  She also had a 60% eccentric calcified stenosis of both the proximal and mid circumflex involving the takeoff of the large first obtuse marginal branch.  IFR down both limbs was 1.  She had minimal LAD disease, left main disease and ramus.  End-diastolic pressure was 18.  7.  Short-term memory deficit.  Lives independently in a senior living facility.  Her family is very supportive and helps her with making low-sodium foods as well as preparing her medications.    Peg states that she has noted she has had increased shortness of breath this past week.  Her son is concerned that she sleeps a little bit more frequently.  She denies chest pain, orthopnea, PND, syncope.  She has little lower extremity edema with support stockings on today.  She has had some issues with back pain, but does not recall when her son brings it up.  He states that she often will fall asleep in her chair and is kind of out of proportion with her body, and he is wondering if it is more of a muscular discomfort.    Lab work today showed a slight bump in her creatinine, but stable at 1.32.  Her proBNP, however, was up to 6592 (2722 one month ago).  GFR is down slightly, but stable at 37.    Last device interrogation showed 89% V paced.  Intrinsic was AFib with complete heart block achieved by AV richard ablation.  She has no junctional escape at VVI of 30.  Battery longevity is 10 years.  All other review of systems and past medical history are noted below.    ASSESSMENT AND PLAN:  Ms. Trujillo is a delightful 89-year-old woman well known to me here at the Heart Clinic.  1.  AFib with AV richard ablation.  She has a CRT device, but LV lead was shut off due to diaphragmatic  stimulation.  She is 100% VVI paced.  Battery longevity is 10 years.  Pocket is well healed.    2.  Acute on chronic systolic and diastolic heart failure.  EF on her angiogram completed in 10/2021 was 30%-40%.  She also had mild mitral regurgitation.  The patient again had a chronic occluded RCA with large extensive left-to-left collaterals arising from the distal circumflex.  She had a 60% eccentric calcified stenosis of the proximal mid circumflex involving the takeoff of a large first obtuse marginal branch.  IFR was 1.  She currently denies angina.  She is on amlodipine 2.5 mg daily, baby aspirin 81 mg daily, atorvastatin 40 mg daily, lisinopril 10 mg daily, isosorbide 90 mg daily, and metoprolol 100 mg in the morning and 50 mg in the evening.  CHADS-VASc score of 6 (hypertension, heart failure, age and gender).  The patient is on warfarin therapy.  Last INR per patient's son was 2.8.  This is managed by Dr. Michel.  It is uncertain if we ever had a discussion about changing this to Eliquis.  This could be considered in the future.  I will discuss it with Peg.    3.  Acute on chronic systolic heart failure.  Weight is up slightly today 4 pounds to 140 pounds.  She has mild lower extremity edema in her ankles.  Lungs are clear.  I have opted to increase her Lasix for 2 days, taking 40 mg.  She will then increase the weekly dose by doing 20 mg every day except Monday, Wednesday and Friday 40 mg.  I would like to do a BMP and proBNP when I see her back next month.  If she has concerns in the interim, I have asked that they contact us.  I have also asked that they talk to Dr. Michel about the vivid dreams she is having and whether or not it could be correlated with her Lexapro.    Kat Yo NP        D: 2022   T: 2022   MT: maral    Name:     SEVERIANO VALLEJO  MRN:      [id number]        Account:      [id number]   :      1925           Service Date: 2022       Document:  [id number]    Thank you for allowing me to participate in the care of your patient.      Sincerely,     Kat Yo NP, APRN CNP     [facility]  cc:   NAVYA Allen CNP  [address]  [place]  [place] [address]

## 2022-06-03 NOTE — PATIENT INSTRUCTIONS
Call my nurse with any questions or concerns:  [phone]  *If you have concerns after hours, please call [phone], option 2 to speak with on call Cardiologist.    1. Medication changes from today:    Take 40 mg of lasix x 2 days then increase Lasix 40 mg Monday, Wednesday, and Friday  Discuss vivid dream with     2. Lab Results:       Latest Reference Range & Units 04/15/22 10:54 06/03/22 12:01   Sodium 133 - 144 mmol/L 136 134   Potassium 3.4 - 5.3 mmol/L 4.0 4.4   Chloride 94 - 109 mmol/L 101 98   Carbon Dioxide 20 - 32 mmol/L 31 30   Urea Nitrogen 7 - 30 mg/dL 30 23   Creatinine 0.52 - 1.04 mg/dL 1.26 (H) 1.32 (H)   GFR Estimate >60 mL/min/1.73m2 39 (L) [1] 37 (L) [2]   Calcium 8.5 - 10.1 mg/dL 8.7 8.6   Anion Gap 3 - 14 mmol/L 4 6   N-Terminal Pro Bnp 0 - 1,800 pg/mL 2,722 (H) [3] 6,592 (H) [4]   (

## 2022-06-13 NOTE — TELEPHONE ENCOUNTER
6/13/22 Pts son Philipp whipple stated his mom is more SOB with less energy and LE swelling and bruising. Will route to CORE team  Kenzie 230 pm

## 2022-06-13 NOTE — TELEPHONE ENCOUNTER
[facility] CORE [facility]    -Called son Philipp to assess Peg. Left VM requesting return call.     Abby Johnson RN on 6/13/2022 at 4:06 PM

## 2022-06-14 NOTE — TELEPHONE ENCOUNTER
[facility]    I spoke with Philipp to review Dr. Tatum' recommendations  1. Peg to take furosemide 40 mg daily until reassessment later this week, need to balance fluid removal with kidney amy    Was able to find a CORE opening on Kat Yo CNP's [place] schedule 6/16 and Philipp was agreeable to assisting Peg with video visit that day--arranged. Diuretic plan and timing of next BMP to be discussed at that visit. FYI to Kat BHARDWAJ.    I asked Philipp to please call us in the interim if Peg's symptoms worsening.    Future Appointments   Date Time Provider Department Center   6/16/2022 10:45 AM Kat Yo APRN CNP [facility]   7/8/2022 12:15 PM [facility] LAB [facility]   7/8/2022  1:10 PM Kat Yo APRN CNP [facility]   7/20/2022 12:00 AM MAURICIO TECH[address] [place] [address] PSA CLIN     Jennifer Perdomo RN BSN   12:34 PM 06/14/22

## 2022-06-14 NOTE — TELEPHONE ENCOUNTER
[facility]    Left another VM for Lori's son Philipp, requesting call back to nurse line.    Jennifer Perdomo RN BSN   8:54 AM 06/14/22

## 2022-06-14 NOTE — TELEPHONE ENCOUNTER
She can try furosemide 40 mg daily.    Unfortunately, her creatinine has gradually risen, not sure where it is currently.    Please keep in touch with her the next couple of days, not sure whether she will respond to the increase in diuretic if her kidney function keeps deteriorating.  If she ends up coming in to the clinic to see an MARGIE, we should definitely draw a BMP.    DI

## 2022-06-14 NOTE — TELEPHONE ENCOUNTER
[facility]  HF follow up phone assessment    Background: 6/3 CORE Clinic follow-up with Kat Yo CNP for HFrEF, CAD, AF, Peg's weight was up 4 lbs to 140 and 'mild lower extremity edema in her ankles' with clear lungs. Lasix was increased slightly from 20 mg daily, to 40 mg Mon/Wed/Friday with 20 mg all other days     Her son called today with concerns:  Breathing: 'the number one issue' more dyspneic with exertion, needing to use walker consistently now and will bend over at 90 degree angle to catch breath, he considered taking her to ED yesterday given degree of dyspnea while walking from eye clinic but patient declined and was able to recover.   Swelling: both legs are more swollen and now discolored purplish/red; no heat/pain and she is on warfarin; son noticed her face was 'puffy' yesterday  Energy: fatigues more quickly, and sleeping more  Urine output: patient told son she's just squirting little amounts until yesterday where she felt her bladder emptied    Recent weights:    6/14 138 lbs    Relevant cardiac meds:  Amlodipine 2.5 mg daily  Furosemide 40 mg Mon/Wed/Friday, and 20 mg other days  Imdur 90 mg daily  Lisinopril 10 mg daily  Toprol xl 100 mg in AM, 50 mg in PM  Warfarin as directed          Plan:  Discussed with son option of urgent MARGIE visit versus medication change and he said they'd prefer if we could adjust medications first and would consider MARGIE visit later this week if no improvement.  Will ask Dr. Tatum to review in Kat absence.        Future Appointments   Date Time Provider Department Center   7/8/2022 12:15 PM [facility] LAB [facility]   7/8/2022  1:10 PM [facility]   7/20/2022 12:00 AM [facility]ICIO TECH[facility]       Jennifer Perdomo RN BSN   11:57 AM 06/14/22

## 2022-06-16 NOTE — PROGRESS NOTES
"The patient has been notified of following:     \"This video visit will be conducted via a call between you and your physician/provider. We have found that certain health care needs can be provided without the need for an in-person physical exam.  This service lets us provide the care you need with a video conversation.  If a prescription is necessary we can send it directly to your pharmacy.  If lab work is needed we can place an order for that and you can then stop by our lab to have the test done at a later time.    Video visits are billed at different rates depending on your insurance coverage.  Please reach out to your insurance provider with any questions.    If during the course of the call the physician/provider feels a video visit is not appropriate, you will not be charged for this service.\"    Patient has given verbal consent for Video visit? Yes    How would you like to obtain your AVS? Carlos    Patient would like the video invitation sent by: Text to cell phone: [phone]    Will anyone else be joining your video visit? No      Blood pressure: Will try and get a reading before video visit today   Pulse: NA  Weight: 136 # this morning      Video-Visit Details    Type of service:  Video Visit    Video Start Time: 10:57 AM  Video End Time: 11:25 am    Originating Location (pt. Location): Home    Distant Location (provider location):  [facility]     Platform [facility] Video [facility]: [facility]      PHYSICIAN NOTE:  This visit was completed via video due to COVID-19 precautions.  The patient provided consent for a video visit.      I had the pleasure evaluating Peg Ronnie for worsening edema and sob.      The patient is a sweet 89 yo female with the following medical issues:  1. Atrial fibrillation with AV node ablation and BiV upgrade January 2015.  She does not have a functional LV lead.  It was shut off due to diaphragmatic stimulation June 2020.  Her device " reached MICHAEL April 2020 with successful generator change.  2. Cardiomyopathy EF 30 to 40%, last echo 45 to 50%.  Patient does suffer from acute on chronic systolic heart failure  3. Hypertension  4. Hyponatremia  5. History of anxiety on low-dose Lexapro pt has reported vivid dreams since starting selective serotonin reuptake inhibitor  6. History of coronary artery disease with  of the RCA.  She had extensive left-to-left collaterals arising from the distal circumflex.  She also had a 60% eccentric calcified stenosis of both the proximal and mid circumflex involving the takeoff of the large first obtuse marginal branch.  IFR down both limbs was 1.  She had minimal LAD disease, left main disease and ramus.  End-diastolic pressure was 18.  7.  Short-term memory deficit.  Lives independently in a senior living facility.  Her family is very supportive and helps her with making low-sodium foods as well as preparing her medications.    I was asked to have a video visit with Peg today r/t increased sob and weakness. My CORE RN, Jennifer, spoke with  and he recommended increasing her lasix to 40 mg every day. She is down 3 lbs since then. Legs have some bruising (chronic), but edema better. Some weakness noted with ambulation and needing to lean on walker.   She denies chest pain, orthopnea, PND, or syncope.    PHYSICAL EXAMINATION:  General:  no apparent distress, normal body habitus, sitting upright.  ENT/Mouth:  no nasal discharge.  Normal head shape, no apparent injury or laceration.  Eyes:  normal conjunctivae.  No observed jaundice.  Neck:  no apparent neck swelling.   Chest/Lungs:  no breathing difficulty while speaking.  No audible wheezing.  No cough during conversation.  Cardiovascular:  reported HR is regular.  No obviously elevated jugular venous pressure.  No reported edema in LE.   Abdomen:  no apparent abdominal distention.   Extremities: slight edema, bruising noted  Skin:  no xanthelasma or apparent  rash.  Neurologic:  normal arm motion, no tremor.    Psychiatric:  alert and oriented x3, calm demeanor.  The rest of the comprehensive physical examination is deferred due to public health emergency video visit restrictions.         DIAGNOSTIC STUDIES:  Component      Latest Ref Rng & Units 6/16/2022   Sodium      133 - 144 mmol/L 133   Potassium      3.4 - 5.3 mmol/L 4.1   Chloride      94 - 109 mmol/L 98   Carbon Dioxide      20 - 32 mmol/L 31   Anion Gap      3 - 14 mmol/L 4   Urea Nitrogen      7 - 30 mg/dL 25   Creatinine      0.52 - 1.04 mg/dL 1.22 (H)   Calcium      8.5 - 10.1 mg/dL 8.6   Glucose      70 - 99 mg/dL 116 (H)   GFR Estimate      >60 mL/min/1.73m2 40 (L)   WBC      4.0 - 11.0 10e3/uL 6.1   RBC Count      3.80 - 5.20 10e6/uL 4.31   Hemoglobin      11.7 - 15.7 g/dL 13.5   Hematocrit      35.0 - 47.0 % 42.4   MCV      78 - 100 fL 98   MCH      26.5 - 33.0 pg 31.3   MCHC      31.5 - 36.5 g/dL 31.8   RDW      10.0 - 15.0 % 14.7   Platelet Count      150 - 450 10e3/uL 179   N-Terminal Pro Bnp      0 - 1,800 pg/mL 4,829 (H)       IMPRESSION:  1. Acute on chronic systolic and diastolic heart failure.  EF on her angiogram completed in 10/2021 was 30%-40%.  She also had mild mitral regurgitation. She is on amlodipine 2.5 mg daily, baby aspirin 81 mg daily, atorvastatin 40 mg daily, lisinopril 10 mg daily, isosorbide 90 mg daily, and metoprolol 100 mg in the morning and 50 mg in the evening.    2. CHADS-VASc score of 6 (hypertension, heart failure, age and gender).  The patient is on warfarin therapy. Consider Eliquis in the future  3. Afib with AV node ablation and pacer   4. Hyponatremia-stable  5. Anxiety  6. CAD-no c/o chest pain. On GDMT    RECOMMENDATIONS:  1. Labs look good. Called results to patient and left message for her son, Philipp.  2. I will have my nurse call her next week. IF she's sob or weight up then I will see her next week.  3. Consider changing warfarin to Eliquis  4. Call with any  concerns    Kat Peterson NP, APRN CNP

## 2022-06-16 NOTE — PATIENT INSTRUCTIONS
Call my nurse with any questions or concerns:  [phone]  *If you have concerns after hours, please call [phone], option 2 to speak with on call Cardiologist.    1. Medication changes from today:    Lab work today  No changes  My nurse will call next week. Call with any concerns        2. Lab Results:      Latest Reference Range & Units 06/16/22 12:54   Sodium 133 - 144 mmol/L 133   Potassium 3.4 - 5.3 mmol/L 4.1   Chloride 94 - 109 mmol/L 98   Carbon Dioxide 20 - 32 mmol/L 31   Urea Nitrogen 7 - 30 mg/dL 25   Creatinine 0.52 - 1.04 mg/dL 1.22 (H)   GFR Estimate >60 mL/min/1.73m2 40 (L) [1]   Calcium 8.5 - 10.1 mg/dL 8.6   Anion Gap 3 - 14 mmol/L 4   N-Terminal Pro Bnp 0 - 1,800 pg/mL 4,829 (H) [2]   Glucose 70 - 99 mg/dL 116 (H)   WBC 4.0 - 11.0 10e3/uL 6.1   Hemoglobin 11.7 - 15.7 g/dL 13.5   Hematocrit 35.0 - 47.0 % 42.4   Platelet Count 150 - 450 10e3/uL 179   RBC Count 3.80 - 5.20 10e6/uL 4.31   MCV 78 - 100 fL 98   MCH 26.5 - 33.0 pg 31.3   MCHC 31.5 - 36.5 g/dL 31.8   RDW 10.0 - 15.0 % 14.7

## 2022-06-16 NOTE — LETTER
"6/16/2022    Kirit Michel MD  [address] [address]    RE: Haritha Trujillo       Dear Colleague,     I had the pleasure of seeing Haritha Trujillo in the [facility].  The patient has been notified of following:     \"This video visit will be conducted via a call between you and your physician/provider. We have found that certain health care needs can be provided without the need for an in-person physical exam.  This service lets us provide the care you need with a video conversation.  If a prescription is necessary we can send it directly to your pharmacy.  If lab work is needed we can place an order for that and you can then stop by our lab to have the test done at a later time.    Video visits are billed at different rates depending on your insurance coverage.  Please reach out to your insurance provider with any questions.    If during the course of the call the physician/provider feels a video visit is not appropriate, you will not be charged for this service.\"    Patient has given verbal consent for Video visit? Yes    How would you like to obtain your AVS? [place]    Patient would like the video invitation sent by: Text to cell phone: [phone]    Will anyone else be joining your video visit? No      Blood pressure: Will try and get a reading before video visit today   Pulse: NA  Weight: 136 # this morning      Video-Visit Details    Type of service:  Video Visit    Video Start Time: 10:57 AM  Video End Time: 11:25 am    Originating Location (pt. Location): Home    Distant Location (provider location):  [facility]     Platform used for Video Visit: [employer]      PHYSICIAN NOTE:  This visit was completed via video due to COVID-19 precautions.  The patient provided consent for a video visit.      I had the pleasure evaluating Lori Trujillo for worsening edema and sob.      The patient is a sweet 89 yo " female with the following medical issues:  1. Atrial fibrillation with AV node ablation and BiV upgrade January 2015.  She does not have a functional LV lead.  It was shut off due to diaphragmatic stimulation June 2020.  Her device reached MICHAEL April 2020 with successful generator change.  2. Cardiomyopathy EF 30 to 40%, last echo 45 to 50%.  Patient does suffer from acute on chronic systolic heart failure  3. Hypertension  4. Hyponatremia  5. History of anxiety on low-dose Lexapro pt has reported vivid dreams since starting selective serotonin reuptake inhibitor  6. History of coronary artery disease with  of the RCA.  She had extensive left-to-left collaterals arising from the distal circumflex.  She also had a 60% eccentric calcified stenosis of both the proximal and mid circumflex involving the takeoff of the large first obtuse marginal branch.  IFR down both limbs was 1.  She had minimal LAD disease, left main disease and ramus.  End-diastolic pressure was 18.  7.  Short-term memory deficit.  Lives independently in a senior living facility.  Her family is very supportive and helps her with making low-sodium foods as well as preparing her medications.    I was asked to have a video visit with Peg today r/t increased sob and weakness. My CORE RN, Jennifer, spoke with  and he recommended increasing her lasix to 40 mg every day. She is down 3 lbs since then. Legs have some bruising (chronic), but edema better. Some weakness noted with ambulation and needing to lean on walker.   She denies chest pain, orthopnea, PND, or syncope.    PHYSICAL EXAMINATION:  General:  no apparent distress, normal body habitus, sitting upright.  ENT/Mouth:  no nasal discharge.  Normal head shape, no apparent injury or laceration.  Eyes:  normal conjunctivae.  No observed jaundice.  Neck:  no apparent neck swelling.   Chest/Lungs:  no breathing difficulty while speaking.  No audible wheezing.  No cough during  conversation.  Cardiovascular:  reported HR is regular.  No obviously elevated jugular venous pressure.  No reported edema in LE.   Abdomen:  no apparent abdominal distention.   Extremities: slight edema, bruising noted  Skin:  no xanthelasma or apparent rash.  Neurologic:  normal arm motion, no tremor.    Psychiatric:  alert and oriented x3, calm demeanor.  The rest of the comprehensive physical examination is deferred due to public health emergency video visit restrictions.         DIAGNOSTIC STUDIES:  Component      Latest Ref Rng & Units 6/16/2022   Sodium      133 - 144 mmol/L 133   Potassium      3.4 - 5.3 mmol/L 4.1   Chloride      94 - 109 mmol/L 98   Carbon Dioxide      20 - 32 mmol/L 31   Anion Gap      3 - 14 mmol/L 4   Urea Nitrogen      7 - 30 mg/dL 25   Creatinine      0.52 - 1.04 mg/dL 1.22 (H)   Calcium      8.5 - 10.1 mg/dL 8.6   Glucose      70 - 99 mg/dL 116 (H)   GFR Estimate      >60 mL/min/1.73m2 40 (L)   WBC      4.0 - 11.0 10e3/uL 6.1   RBC Count      3.80 - 5.20 10e6/uL 4.31   Hemoglobin      11.7 - 15.7 g/dL 13.5   Hematocrit      35.0 - 47.0 % 42.4   MCV      78 - 100 fL 98   MCH      26.5 - 33.0 pg 31.3   MCHC      31.5 - 36.5 g/dL 31.8   RDW      10.0 - 15.0 % 14.7   Platelet Count      150 - 450 10e3/uL 179   N-Terminal Pro Bnp      0 - 1,800 pg/mL 4,829 (H)       IMPRESSION:  1. Acute on chronic systolic and diastolic heart failure.  EF on her angiogram completed in 10/2021 was 30%-40%.  She also had mild mitral regurgitation. She is on amlodipine 2.5 mg daily, baby aspirin 81 mg daily, atorvastatin 40 mg daily, lisinopril 10 mg daily, isosorbide 90 mg daily, and metoprolol 100 mg in the morning and 50 mg in the evening.    2. CHADS-VASc score of 6 (hypertension, heart failure, age and gender).  The patient is on warfarin therapy. Consider Eliquis in the future  3. Afib with AV node ablation and pacer   4. Hyponatremia-stable  5. Anxiety  6. CAD-no c/o chest pain. On  GDMT    RECOMMENDATIONS:  1. Labs look good. Called results to patient and left message for her son, Philipp.  2. I will have my nurse call her next week. IF she's sob or weight up then I will see her next week.  3. Consider changing warfarin to Eliquis  4. Call with any concerns    Kat Peterson NP, APRN CNP    Thank you for allowing me to participate in the care of your patient.      Sincerely,   Kat Yo NP, APRN CNP   [facility]  cc:   Fay Tatum MD  [address]  [place] [address]

## 2022-06-20 NOTE — TELEPHONE ENCOUNTER
Spoke to patient in follow up to OV with Kat Yo, MARGIE on 6/16.  Patient reports feeling much better with increased dose of lasix.  Was able to do two loads of laundry yesterday.  Denies feeling SOB. No  CLARICE, orthopnea or cough.  Weight today 138.  Patient reports weight is usually between 136-138 pounds.  Will update Kat Yo, MARGIE regarding above.  BAKARI Burks

## 2022-07-05 NOTE — TELEPHONE ENCOUNTER
Reviewed change in condition with Kat Peterson, MARGIE.  Received verbal orders to increase diuretic for the next three days.   Verbal order:  ---7/5 take extra 40mg of lasix  ---7/6 take extra 20mg of lasix  ---7/7 take extra 20mg of lasix  ---7/8 take normal dose of 40mg of lasix.  To be evaluated at OV with Kat  ---use ace wraps to bilateral legs for CLARICE  Spoke to son Philipp regarding medication changes per Kat Peterson, MARGIE.  He provided verbal understanding regarding above.  He will relay the message to his sister who is staying with patient at this time to make changes to her diuretic medications.  BAKARI Burks

## 2022-07-05 NOTE — TELEPHONE ENCOUNTER
Received call from son Phliipp with update on patients condition.  Obtained verbal consent from patient to speak to him as there is no consent to communicate.  Son reports patient has had noticeable decline in patients condition.  CLARICE has been much worse.  Noted pitting edema to knees.  They have attempted compression sock which are very difficult to get on and off and have caused issues with ripping her skin.  Suggested that they try ace wraps to help with the swelling.  Also he mobility has really slowed down she gets tired very easily.  The newest symptoms occurred yesterday with her meal.  Son notes she was eating lunch and between bites she started to cough and coughed up large amount of thick clear phlegm and he quantified this as being at least 1/2 cup of phlegm.  Patient has follow up with Kat on 7/8.  Son Philipp was wondering if her diuretic could be increased to 60mg po every day until she is seen.  Will review above with Kat to see if she is ok with this.  Currently daughter from [place] is staying with patient and she is being closely monitored.  BAKARI Burks

## 2022-07-08 NOTE — PROGRESS NOTES
Service Date: 07/08/2022    HISTORY OF PRESENT ILLNESS:  Ms. Trujillo is a delightful, 89-year-old woman well known to me here at [facility].  Her son, Philipp, typically comes to her appointments with us, but today I am joined by Philipp's wife and her daughter.  Her past medical history includes:  1.  Atrial fibrillation with AV node ablation and BiV upgrade 01/2015.  She does not have a functional LV lead.  It was shut off due to diaphragmatic stimulation 06/2020.  Her device reached MICHAEL 04/2020, and she had a successful generator change at that time.  2.  Cardiomyopathy, EF 30%-40%.  However, last echo improved to 45%-50%.  She does suffer from acute on chronic systolic heart failure.  3.  Hypertension.  4.  Hyponatremia has been stable.  5.  History of anxiety, on low-dose Lexapro.  The patient has had vivid dreams on a higher dose since starting selective serotonin reuptake inhibitors.  6.  History of coronary artery disease with  of the RCA.  She had extensive left-to-left collaterals arising from the distal circumflex.  She also had a 60% eccentric calcified stenosis of both the proximal and mid circumflex involving the takeoff of the large first obtuse marginal branch.  Her iFR down both limbs was 1.  She had minimal LAD disease, left main and ramus disease. End-diastolic pressure was 18.  7.  Short-term memory deficit.  She does live independently in a senior living facility.  Her family has been very supportive.  Her daughter has currently been staying with her for the past week.  They also prepare her low-sodium meals as well as prepare her medications for her.    We have been having to up titrate her diuretic lately.  Her Lasix was increased by Dr. Tatum to 40 mg daily.  Earlier this week, I increased it to 40 mg in the morning and 20 mg in the afternoon.  She was having some issues with PND and increased phlegm production, especially at night.  Since I have increased her Lasix, this has resolved.  Her  weight is back to baseline at 137 pounds.  She was at 140 pounds at her last visit.  She currently denies chest pain, orthopnea, PND, syncope or peripheral edema.  Her daughter states that she had some right lower extremity edema a few days ago, which has since resolved.    BMP today shows a creatinine of 1.1, GFR of 45, potassium of 3.9.    Last device interrogation shows 89% V paced.  Intrinsic rhythm is AFib with complete heart block achieved by AV richard ablation.  She has no junctional escape at VVI of 30.  She is on chronic warfarin therapy.    During our discussion today, her daughter in law wanted to discuss hospice.  They are looking for some home services to help Peg and the family out.  Lori is anxious about living alone, but does not want to leave her home.  They are hoping perhaps hospice can offer some services to assist her.    All other review of systems and past medical history are noted below.    ASSESSMENT AND PLAN:  Ms. Trujillo is a delightful, 89-year-old woman well known to me [facility] at [facility].  She is an established patient of Dr. Tatum.  I have had the pleasure of caring for her for many years.  1.  Acute on chronic systolic heart failure.  Echo shows her EF to be 45%-50%.  She is on amlodipine 2.5 mg daily, atorvastatin 40 mg daily, aspirin 10 mg daily, isosorbide 90 mg daily, metoprolol  mg in morning and 50 mg in the evening.  Her Lasix is currently 40 mg in the morning and 20 mg in the afternoon.  I have asked that she continue on this regimen.  She appears to be euvolemic on exam.  Her lungs are clear bilaterally.  JVP is 8-10.  She has no hepatosplenomegaly and no lower extremity edema.  2.  CHADS-VASc score of 6 (hypertension, heart failure, age and gender).  She is on warfarin therapy.  One consideration could be to change to Eliquis in the future, especially to help prevent frequent INRs.  I did not discuss it at today's visit, but this can be discussed in the future.  3.   AFib with AV node ablation.  4.  Anxiety, on low-dose Lexapro.  7.  Coronary artery disease. No complaints of chest pain on guideline medical therapy.    They will contact Dr. Michel to see if he would like to consult hospice; otherwise, I would be happy to consult hospice.  We discussed palliative care; however, to my understanding, hospice has additional services that may be beneficial to Peg.  She is not looking to go into a facility.  She is looking for somebody to assist with care.  We discussed her stopping her cardiac medications, which I would not recommend.  She does have a history of coronary artery disease and acute on chronic systolic heart failure, and stopping her heart medications would not provide her comfort at all.  Her daughter and daughter in law agree.    I asked Peg to please call me if she has any questions or concerns.  She does want to have services, but I also want to make sure she is comfortable with having the hospice consult.    If there are questions or concerns, please feel free to contact us.  Thank you for including me in her care.  I will see her back in 1 month.    Kat Yo NP        D: 2022   T: 2022   MT: derik    Name:     SEVERIANO VALLEJO  MRN:      [id number]        Account:      [id number]   :      1925           Service Date: 2022       Document: [id number]

## 2022-07-08 NOTE — PATIENT INSTRUCTIONS
Call my nurse with any questions or concerns:  [phone]  *If you have concerns after hours, please call [phone], option 2 to speak with on call Cardiologist.    1. Medication changes from today:    Continue lasix 40 mg am and 20 mg afternoon  Eat your potassium in your diet    Let me know if I should order hospice         2. Lab Results:       Latest Reference Range & Units 06/16/22 12:54 07/08/22 12:13   Sodium 133 - 144 mmol/L 133 134   Potassium 3.4 - 5.3 mmol/L 4.1 3.9   Chloride 94 - 109 mmol/L 98 95   Carbon Dioxide 20 - 32 mmol/L 31 32   Urea Nitrogen 7 - 30 mg/dL 25 23   Creatinine 0.52 - 1.04 mg/dL 1.22 (H) 1.11 (H)   GFR Estimate >60 mL/min/1.73m2 40 (L) 45 (L)   Calcium 8.5 - 10.1 mg/dL 8.6 9.1   Anion Gap 3 - 14 mmol/L 4 7   N-Terminal Pro Bnp 0 - 1,800 pg/mL 4,829 (H) 5,397 (H)

## 2022-07-08 NOTE — LETTER
2022    Kirit Michel MD  [address] [address]    RE: Haritha Maravillahuan       Dear Colleague,     I had the pleasure of seeing Haritha Maravillahuan in the [facility].  HPI and Plan: #[id number]  See dictation    Today's clinic visit entailed:  Review of the result(s) of each unique test - labs and wt chart  Ordering of each unique test  Prescription drug management  No LOS data to display   Time spent doing chart review, history and exam, documentation and further activities per the note  Provider  Link to [employer] Help Grid     The level of medical decision making during this visit was of moderate complexity.      Orders Placed This Encounter   Procedures     Follow-Up with Cardiology MARGIE       Orders Placed This Encounter   Medications     furosemide (LASIX) 20 MG tablet     Si mg am and 20 mg afternoon     Dispense:  150 tablet     Refill:  3       Medications Discontinued During This Encounter   Medication Reason     furosemide (LASIX) 20 MG tablet Reorder         Encounter Diagnoses   Name Primary?     Acute on chronic systolic heart failure (H)      Dilated cardiomyopathy (H)        CURRENT MEDICATIONS:  Current Outpatient Medications   Medication Sig Dispense Refill     acetaminophen (TYLENOL) 500 MG tablet Take 500-1,000 mg by mouth every 6 hours as needed for mild pain       Alendronate Sodium (FOSAMAX PO) Take 70 mg by mouth once a week  evenings       amLODIPine (NORVASC) 2.5 MG tablet Take 1 tablet (2.5 mg) by mouth daily 90 tablet 3     aspirin (ASA) 81 MG EC tablet Take 1 tablet (81 mg) by mouth daily 90 tablet 3     atorvastatin (LIPITOR) 40 MG tablet Take 40 mg by mouth At Bedtime        calcium carbonate-vitamin D 600-200 MG-UNIT TABS Take 1 tablet by mouth daily.       cycloSPORINE (RESTASIS) 0.05 % ophthalmic emulsion Place 1 drop into both eyes every 12 hours.       escitalopram (LEXAPRO) 10 MG tablet Take 5 mg by mouth  daily        furosemide (LASIX) 20 MG tablet 40 mg am and 20 mg afternoon 150 tablet 3     isosorbide mononitrate (IMDUR) 30 MG 24 hr tablet Take 3 tablets (90 mg) by mouth daily 270 tablet 3     lisinopril (ZESTRIL) 10 MG tablet Take 1 tablet (10 mg) by mouth daily 90 tablet 3     metoprolol succinate ER (TOPROL-XL) 50 MG 24 hr tablet 100 in am and 50mg at night (Patient taking differently: Take 50 mg by mouth every evening 100 in am and 50mg at night)       Multiple Vitamins-Minerals (CENTRUM SILVER) per tablet Take 1 tablet by mouth daily       Multiple Vitamins-Minerals (PRESERVISION AREDS 2 PO) Take by mouth daily       nitroGLYcerin (NITROSTAT) 0.4 MG sublingual tablet For chest pain place 1 tablet under the tongue every 5 minutes for 3 doses. If symptoms persist 5 minutes after 1st dose call 911. 25 tablet 0     Probiotic Product (PROBIOTIC DAILY PO) Take 1 tablet by mouth daily       sodium chloride 1 GM tablet Take 1 g by mouth daily       warfarin ANTICOAGULANT (COUMADIN) 4 MG tablet Take 4 mg by mouth daily 2mg (0.5 tablet) Mon, Wed, Fri  4mg (1 tablet) Tues, Thurs. Sat, Sun         ALLERGIES     Allergies   Allergen Reactions     Amiodarone Nausea     Hctz      Hydrochlorothiazide Other (See Comments)     Other reaction(s): *Unknown     Lansoprazole      diarrhea   Prevacid       PAST MEDICAL HISTORY:  Past Medical History:   Diagnosis Date     Aortic regurgitation 12/14/2014    mild (1+) per echo     Atrial fibrillation (H)      Cardiomyopathy (H)      CHF (congestive heart failure) (H)      Chronic kidney disease, stage 3 (H)      Coronary atherosclerosis of unspecified type of vessel, native or graft 5/16/2000    normal left coronary arteries and tight obstruction in distal RCA, too small for revascularization, medical management     Degeneration of cervical intervertebral disc     cervical spine     Essential hypertension, benign      Mitral regurgitation 12/14/2014    mod-mod/sev (2-3+) per echo      Other and unspecified hyperlipidemia      Pacemaker      Pulmonary hypertension (H) 3/16/2013    mild per echo with RVSP 31mmHg +RAP      Pulmonary valve regurgitation 12/14/2014    mod (2+) per echo     Tricuspid regurgitation 12/14/2014    mild (1+) per echo     Unspecified tinnitus     chronic       PAST SURGICAL HISTORY:  Past Surgical History:   Procedure Laterality Date     CORONARY ANGIOGRAPHY ADULT ORDER  5/16/2000     CV HEART CATHETERIZATION WITH POSSIBLE INTERVENTION N/A 10/19/2021    Procedure: Heart Catheterization with Possible Intervention;  Surgeon: Alden Skelton MD;  Location:  [facility]ARD[facility]ATH LAB     CV INSTANTANEOUS WAVE-FREE RATIO N/A 10/19/2021    Procedure: Instantaneous Wave-Free Ratio;  Surgeon: Alden Skelton MD;  Location:  HEART [facility]ARD[facility] CATH LAB     CV LEFT HEART CATH N/A 10/19/2021    Procedure: Left Heart Cath;  Surgeon: Alden Skelton MD;  Location:  [facility]ATH [facility]     CV LEFT VENTRICULOGRAM N/A 10/19/2021    Procedure: Left Ventriculogram;  Surgeon: Alden Skelton MD;  Location:  HEART [facility]ARD[facility] CATH LAB     EP PPM RMVL&REPL OF GEN W/ DUAL LEAD SYS N/A 4/14/2020    Procedure: Permanent Pacemakers  Removal and Replacement Generator  with Multi Lead System;  Surgeon: Fay Tatum MD;  Location:  HEART CARD[facility] CATH LAB     HRW PACEMAKER PERMANENT  1/2015    BI- ventricular     IMPLANT PACEMAKER  11/12/2010    dual chamber Medtronic     ZZ NONSPECIFIC PROCEDURE  1999    right rotator cuff tear OR     ZZC NONSPECIFIC PROCEDURE  1983    microdiscectomy     ZZ NONSPECIFIC PROCEDURE      C-sections x 3      ZZC NONSPECIFIC PROCEDURE      appendectomy     ZZC NONSPECIFIC PROCEDURE      bilateral benign breast biopsy      ZZC NONSPECIFIC PROCEDURE      right knee arthroscopic OR     ZZC NONSPECIFIC PROCEDURE  1974    enteritis       FAMILY HISTORY:  Family History   Problem Relation Age of Onset     Hypertension Mother      Cancer  "Mother         pancreatic     Eye Disorder Mother         cristian     LISA. Father          53     Lipids Father      Diabetes Maternal Grandmother      ANIA Brother         open heart surgery age 53     Cancer Daughter         ovavian     Neurologic Disorder Son         MS       SOCIAL HISTORY:  Social History     Socioeconomic History     Marital status:      Spouse name: None     Number of children: None     Years of education: None     Highest education level: None   Tobacco Use     Smoking status: Former Smoker     Packs/day: 0.50     Years: 15.00     Pack years: 7.50     Types: Cigarettes     Start date:      Quit date:      Years since quittin.5     Smokeless tobacco: Never Used   Substance and Sexual Activity     Alcohol use: No     Drug use: No   Other Topics Concern     Parent/sibling w/ CABG, MI or angioplasty before 65F 55M? Yes     Special Diet Yes     Comment: low sodium     Exercise Yes     Comment: active life style     Seat Belt Yes       Review of Systems:  Skin:          Eyes:         ENT:         Respiratory:  Positive for dyspnea on exertion     Cardiovascular:  chest pain;palpitations;Negative;lightheadedness;dizziness heaviness;Positive for;edema heaviness: couple of times in the last 1-2 weeks  Gastroenterology:        Genitourinary:         Musculoskeletal:         Neurologic:         Psychiatric:         Heme/Lymph/Imm:         Endocrine:  Negative        Physical Exam:  Vitals: /68 (BP Location: Left arm, Patient Position: Sitting)   Pulse 78   Ht 1.626 m (5' 4\")   Wt 62.3 kg (137 lb 4.8 oz)   SpO2 97%   BMI 23.57 kg/m      Constitutional:  cooperative, alert and oriented, well developed, well nourished, in no acute distress        Skin:  warm and dry to the touch, no apparent skin lesions or masses noted   pacemaker incision in the left infraclavicular area was well-healed      Head:  normocephalic, no masses or lesions        Eyes:  pupils equal and " round;conjunctivae and lids unremarkable        Lymph:      ENT:  no pallor or cyanosis, dentition good        Neck:  JVP normal        Respiratory:  normal breath sounds, clear to auscultation, normal A-P diameter, normal symmetry, normal respiratory excursion, no use of accessory muscles         Cardiac: no murmurs, gallops or rubs detected irregularly irregular rhythm              not assessed this visit                                        GI:  abdomen soft;no HSM        Extremities and Muscular Skeletal:  no deformities, clubbing, cyanosis, erythema observed;no edema stasis pigmentation            Neurological:  no gross motor deficits        Psych:  Alert and Oriented x 3;affect appropriate, oriented to time, person and place        CC  Kat Peterson, APRN CNP  [address]  [place] [address]    Thank you for allowing me to participate in the care of your patient.      Sincerely,     Kat Peterson, NP, APRN CNP     [facility]

## 2022-07-08 NOTE — PROGRESS NOTES
HPI and Plan: #[id number]  See dictation    Today's clinic visit entailed:  Review of the result(s) of each unique test - labs and wt chart  Ordering of each unique test  Prescription drug management  No LOS data to display   Time spent doing chart review, history and exam, documentation and further activities per the note  Provider  Link to MDM Help Grid     The level of medical decision making during this visit was of moderate complexity.      Orders Placed This Encounter   Procedures     Follow-Up with Cardiology MARGIE       Orders Placed This Encounter   Medications     furosemide (LASIX) 20 MG tablet     Si mg am and 20 mg afternoon     Dispense:  150 tablet     Refill:  3       Medications Discontinued During This Encounter   Medication Reason     furosemide (LASIX) 20 MG tablet Reorder         Encounter Diagnoses   Name Primary?     Acute on chronic systolic heart failure (H)      Dilated cardiomyopathy (H)        CURRENT MEDICATIONS:  Current Outpatient Medications   Medication Sig Dispense Refill     acetaminophen (TYLENOL) 500 MG tablet Take 500-1,000 mg by mouth every 6 hours as needed for mild pain       Alendronate Sodium (FOSAMAX PO) Take 70 mg by mouth once a week  evenings       amLODIPine (NORVASC) 2.5 MG tablet Take 1 tablet (2.5 mg) by mouth daily 90 tablet 3     aspirin (ASA) 81 MG EC tablet Take 1 tablet (81 mg) by mouth daily 90 tablet 3     atorvastatin (LIPITOR) 40 MG tablet Take 40 mg by mouth At Bedtime        calcium carbonate-vitamin D 600-200 MG-UNIT TABS Take 1 tablet by mouth daily.       cycloSPORINE (RESTASIS) 0.05 % ophthalmic emulsion Place 1 drop into both eyes every 12 hours.       escitalopram (LEXAPRO) 10 MG tablet Take 5 mg by mouth daily        furosemide (LASIX) 20 MG tablet 40 mg am and 20 mg afternoon 150 tablet 3     isosorbide mononitrate (IMDUR) 30 MG 24 hr tablet Take 3 tablets (90 mg) by mouth daily 270 tablet 3     lisinopril (ZESTRIL) 10 MG tablet Take 1  tablet (10 mg) by mouth daily 90 tablet 3     metoprolol succinate ER (TOPROL-XL) 50 MG 24 hr tablet 100 in am and 50mg at night (Patient taking differently: Take 50 mg by mouth every evening 100 in am and 50mg at night)       Multiple Vitamins-Minerals (CENTRUM SILVER) per tablet Take 1 tablet by mouth daily       Multiple Vitamins-Minerals (PRESERVISION AREDS 2 PO) Take by mouth daily       nitroGLYcerin (NITROSTAT) 0.4 MG sublingual tablet For chest pain place 1 tablet under the tongue every 5 minutes for 3 doses. If symptoms persist 5 minutes after 1st dose call 911. 25 tablet 0     Probiotic Product (PROBIOTIC DAILY PO) Take 1 tablet by mouth daily       sodium chloride 1 GM tablet Take 1 g by mouth daily       warfarin ANTICOAGULANT (COUMADIN) 4 MG tablet Take 4 mg by mouth daily 2mg (0.5 tablet) Mon, Wed, Fri  4mg (1 tablet) Tues, Thurs. Sat, Sun         ALLERGIES     Allergies   Allergen Reactions     Amiodarone Nausea     Hctz      Hydrochlorothiazide Other (See Comments)     Other reaction(s): *Unknown     Lansoprazole      diarrhea   Prevacid       PAST MEDICAL HISTORY:  Past Medical History:   Diagnosis Date     Aortic regurgitation 12/14/2014    mild (1+) per echo     Atrial fibrillation (H)      Cardiomyopathy (H)      CHF (congestive heart failure) (H)      Chronic kidney disease, stage 3 (H)      Coronary atherosclerosis of unspecified type of vessel, native or graft 5/16/2000    normal left coronary arteries and tight obstruction in distal RCA, too small for revascularization, medical management     Degeneration of cervical intervertebral disc     cervical spine     Essential hypertension, benign      Mitral regurgitation 12/14/2014    mod-mod/sev (2-3+) per echo     Other and unspecified hyperlipidemia      Pacemaker      Pulmonary hypertension (H) 3/16/2013    mild per echo with RVSP 31mmHg +RAP      Pulmonary valve regurgitation 12/14/2014    mod (2+) per echo     Tricuspid regurgitation  2014    mild (1+) per echo     Unspecified tinnitus     chronic       PAST SURGICAL HISTORY:  Past Surgical History:   Procedure Laterality Date     CORONARY ANGIOGRAPHY ADULT ORDER  2000     CV HEART CATHETERIZATION WITH POSSIBLE INTERVENTION N/A 10/19/2021    Procedure: Heart Catheterization with Possible Intervention;  Surgeon: Alden Skelton MD;  Location:  HEART CARDIAC CATH LAB     CV INSTANTANEOUS WAVE-FREE RATIO N/A 10/19/2021    Procedure: Instantaneous Wave-Free Ratio;  Surgeon: Alden Skelton MD;  Location:  HEART CARDI[facility] CATH LAB     CV LEFT HEART CATH N/A 10/19/2021    Procedure: Left Heart Cath;  Surgeon: Alden Skelton MD;  Location:  HEART CARDIAC CATH LAB     CV LEFT VENTRICULOGRAM N/A 10/19/2021    Procedure: Left Ventriculogram;  Surgeon: Alden Skelton MD;  Location:  HEART CARDIAC CATH LAB     EP PPM RMVL&REPL OF GEN W/ DUAL LEAD SYS N/A 2020    Procedure: Permanent Pacemakers  Removal and Replacement Generator  with Multi Lead System;  Surgeon: Fay Tatum MD;  Location:  HEART CARDIAC CATH LAB     HRW PACEMAKER PERMANENT  2015    BI- ventricular     IMPLANT PACEMAKER  2010    dual chamber Medtronic     ZZ NONSPECIFIC PROCEDURE      right rotator cuff tear OR     ZZC NONSPECIFIC PROCEDURE  1983    microdiscectomy     ZZC NONSPECIFIC PROCEDURE      C-sections x 3      ZZC NONSPECIFIC PROCEDURE      appendectomy     ZZC NONSPECIFIC PROCEDURE      bilateral benign breast biopsy      ZZC NONSPECIFIC PROCEDURE      right knee arthroscopic OR     ZZC NONSPECIFIC PROCEDURE  1974    enteritis       FAMILY HISTORY:  Family History   Problem Relation Age of Onset     Hypertension Mother      Cancer Mother         pancreatic     Eye Disorder Mother         glacoma     FAZALABEATRIZ Father          53     Lipids Father      Diabetes Maternal Grandmother      C.A.WARREN. Brother         open heart surgery age 53     Cancer Daughter       "   ovavian     Neurologic Disorder Son         MS       SOCIAL HISTORY:  Social History     Socioeconomic History     Marital status:      Spouse name: None     Number of children: None     Years of education: None     Highest education level: None   Tobacco Use     Smoking status: Former Smoker     Packs/day: 0.50     Years: 15.00     Pack years: 7.50     Types: Cigarettes     Start date:      Quit date:      Years since quittin.5     Smokeless tobacco: Never Used   Substance and Sexual Activity     Alcohol use: No     Drug use: No   Other Topics Concern     Parent/sibling w/ CABG, MI or angioplasty before 65F 55M? Yes     Special Diet Yes     Comment: low sodium     Exercise Yes     Comment: active life style     Seat Belt Yes       Review of Systems:  Skin:          Eyes:         ENT:         Respiratory:  Positive for dyspnea on exertion     Cardiovascular:  chest pain;palpitations;Negative;lightheadedness;dizziness heaviness;Positive for;edema heaviness: couple of times in the last 1-2 weeks  Gastroenterology:        Genitourinary:         Musculoskeletal:         Neurologic:         Psychiatric:         Heme/Lymph/Imm:         Endocrine:  Negative        Physical Exam:  Vitals: /68 (BP Location: Left arm, Patient Position: Sitting)   Pulse 78   Ht 1.626 m (5' 4\")   Wt 62.3 kg (137 lb 4.8 oz)   SpO2 97%   BMI 23.57 kg/m      Constitutional:  cooperative, alert and oriented, well developed, well nourished, in no acute distress        Skin:  warm and dry to the touch, no apparent skin lesions or masses noted   pacemaker incision in the left infraclavicular area was well-healed      Head:  normocephalic, no masses or lesions        Eyes:  pupils equal and round;conjunctivae and lids unremarkable        Lymph:      ENT:  no pallor or cyanosis, dentition good        Neck:  JVP normal        Respiratory:  normal breath sounds, clear to auscultation, normal A-P diameter, normal symmetry, " normal respiratory excursion, no use of accessory muscles         Cardiac: no murmurs, gallops or rubs detected irregularly irregular rhythm              not assessed this visit                                        GI:  abdomen soft;no HSM        Extremities and Muscular Skeletal:  no deformities, clubbing, cyanosis, erythema observed;no edema stasis pigmentation            Neurological:  no gross motor deficits        Psych:  Alert and Oriented x 3;affect appropriate, oriented to time, person and place        AGUSTO Peterson, NAVYA CNP  [address]  [place] [id number]

## 2022-08-12 NOTE — LETTER
8/12/2022    Kirit Michel MD  [address] [address]    RE: Haritha GALLOWAY Ronnie       Dear Colleague,     I had the pleasure of seeing Haritha Maravillahuan in the [facility].  HPI and Plan: #[id number]  See dictation    Today's clinic visit entailed:  Review of the result(s) of each unique test - labs and pacer check  Ordering of each unique test  No LOS data to display   Time spent doing chart review, history and exam, documentation and further activities per the note  Provider  Link to [employer] Help Grid     The level of medical decision making during this visit was of moderate complexity.      Orders Placed This Encounter   Procedures     Basic metabolic panel     INR     Follow-Up with Cardiology MARGIE       No orders of the defined types were placed in this encounter.      There are no discontinued medications.      Encounter Diagnoses   Name Primary?     Acute on chronic systolic heart failure (H)      Permanent atrial fibrillation (H) Yes       CURRENT MEDICATIONS:  Current Outpatient Medications   Medication Sig Dispense Refill     Alendronate Sodium (FOSAMAX PO) Take 70 mg by mouth once a week Sunday evenings       amLODIPine (NORVASC) 2.5 MG tablet Take 1 tablet (2.5 mg) by mouth daily 90 tablet 3     aspirin (ASA) 81 MG EC tablet Take 1 tablet (81 mg) by mouth daily 90 tablet 3     atorvastatin (LIPITOR) 40 MG tablet Take 40 mg by mouth At Bedtime        calcium carbonate-vitamin D 600-200 MG-UNIT TABS Take 1 tablet by mouth daily.       cycloSPORINE (RESTASIS) 0.05 % ophthalmic emulsion Place 1 drop into both eyes every 12 hours.       escitalopram (LEXAPRO) 10 MG tablet Take 5 mg by mouth daily        furosemide (LASIX) 20 MG tablet 40 mg am and 20 mg afternoon 270 tablet 0     isosorbide mononitrate (IMDUR) 30 MG 24 hr tablet Take 3 tablets (90 mg) by mouth daily 270 tablet 3     lisinopril (ZESTRIL) 10 MG tablet Take 1 tablet (10 mg) by mouth  daily 90 tablet 3     metoprolol succinate ER (TOPROL-XL) 50 MG 24 hr tablet 100 in am and 50mg at night (Patient taking differently: Take 50 mg by mouth every evening 100 in am and 50mg at night)       Multiple Vitamins-Minerals (CENTRUM SILVER) per tablet Take 1 tablet by mouth daily       Multiple Vitamins-Minerals (PRESERVISION AREDS 2 PO) Take by mouth daily       nitroGLYcerin (NITROSTAT) 0.4 MG sublingual tablet For chest pain place 1 tablet under the tongue every 5 minutes for 3 doses. If symptoms persist 5 minutes after 1st dose call 911. 25 tablet 0     Probiotic Product (PROBIOTIC DAILY PO) Take 1 tablet by mouth daily       sodium chloride 1 GM tablet Take 1 g by mouth daily       warfarin ANTICOAGULANT (COUMADIN) 4 MG tablet Take 4 mg by mouth daily 2mg (0.5 tablet) Mon, Wed, Fri  4mg (1 tablet) Tues, Thurs. Sat, Sun       acetaminophen (TYLENOL) 500 MG tablet Take 500-1,000 mg by mouth every 6 hours as needed for mild pain (Patient not taking: Reported on 8/12/2022)         ALLERGIES     Allergies   Allergen Reactions     Amiodarone Nausea     Hctz      Hydrochlorothiazide Other (See Comments)     Other reaction(s): *Unknown     Lansoprazole      diarrhea   Prevacid       PAST MEDICAL HISTORY:  Past Medical History:   Diagnosis Date     Aortic regurgitation 12/14/2014    mild (1+) per echo     Atrial fibrillation (H)      Cardiomyopathy (H)      CHF (congestive heart failure) (H)      Chronic kidney disease, stage 3 (H)      Coronary atherosclerosis of unspecified type of vessel, native or graft 5/16/2000    normal left coronary arteries and tight obstruction in distal RCA, too small for revascularization, medical management     Degeneration of cervical intervertebral disc     cervical spine     Essential hypertension, benign      Mitral regurgitation 12/14/2014    mod-mod/sev (2-3+) per echo     Other and unspecified hyperlipidemia      Pacemaker      Pulmonary hypertension (H) 3/16/2013    mild per  echo with RVSP 31mmHg +RAP      Pulmonary valve regurgitation 2014    mod (2+) per echo     Tricuspid regurgitation 2014    mild (1+) per echo     Unspecified tinnitus     chronic       PAST SURGICAL HISTORY:  Past Surgical History:   Procedure Laterality Date     CORONARY ANGIOGRAPHY ADULT ORDER  2000     CV HEART CATHETERIZATION WITH POSSIBLE INTERVENTION N/A 10/19/2021    Procedure: Heart Catheterization with Possible Intervention;  Surgeon: Alden Skelton MD;  Location:  HEART CARDIAC CATH LAB     CV INSTANTANEOUS WAVE-FREE RATIO N/A 10/19/2021    Procedure: Instantaneous Wave-Free Ratio;  Surgeon: Alden Skelton MD;  Location:  HEART CARDIAC CATH LAB     CV LEFT HEART CATH N/A 10/19/2021    Procedure: Left Heart Cath;  Surgeon: Alden Skelton MD;  Location:  HEART CARDIAC CATH LAB     CV LEFT VENTRICULOGRAM N/A 10/19/2021    Procedure: Left Ventriculogram;  Surgeon: Alden Skelton MD;  Location:  HEART CARDIAC CATH LAB     EP PPM RMVL&REPL OF GEN W/ DUAL LEAD SYS N/A 2020    Procedure: Permanent Pacemakers  Removal and Replacement Generator  with Multi Lead System;  Surgeon: Fay Tatum MD;  Location:  HEART CARDIAC CATH LAB     HRW PACEMAKER PERMANENT  2015    BI- ventricular     IMPLANT PACEMAKER  2010    dual chamber Medtronic     Z NONSPECIFIC PROCEDURE  1999    right rotator cuff tear OR     ZZC NONSPECIFIC PROCEDURE  1983    microdiscectomy     Z NONSPECIFIC PROCEDURE      C-sections x 3      ZZC NONSPECIFIC PROCEDURE      appendectomy     ZZC NONSPECIFIC PROCEDURE      bilateral benign breast biopsy      ZZ NONSPECIFIC PROCEDURE      right knee arthroscopic OR     ZZC NONSPECIFIC PROCEDURE  1974    enteritis       FAMILY HISTORY:  Family History   Problem Relation Age of Onset     Hypertension Mother      Cancer Mother         pancreatic     Eye Disorder Mother         cristian JORGE Father          53      "Lipids Father      Diabetes Maternal Grandmother      ANIA Brother         open heart surgery age 53     Cancer Daughter         ovavian     Neurologic Disorder Son         MS       SOCIAL HISTORY:  Social History     Socioeconomic History     Marital status:      Spouse name: None     Number of children: None     Years of education: None     Highest education level: None   Tobacco Use     Smoking status: Former Smoker     Packs/day: 0.50     Years: 15.00     Pack years: 7.50     Types: Cigarettes     Start date:      Quit date:      Years since quittin.6     Smokeless tobacco: Never Used   Substance and Sexual Activity     Alcohol use: No     Drug use: No   Other Topics Concern     Parent/sibling w/ CABG, MI or angioplasty before 65F 55M? Yes     Special Diet Yes     Comment: low sodium     Exercise Yes     Comment: active life style     Seat Belt Yes       Review of Systems:  Skin:  Positive for bruising     Eyes:  Positive for glasses    ENT:  Positive for hearing loss    Respiratory:  Positive for dyspnea on exertion     Cardiovascular:  Negative;chest pain;lightheadedness;dizziness;edema      Gastroenterology: not assessed      Genitourinary:  not assessed      Musculoskeletal:         Neurologic:  Positive for memory problems    Psychiatric:  not assessed      Heme/Lymph/Imm:  Negative      Endocrine:  Negative        Physical Exam:  Vitals: /71   Pulse 82   Ht 1.626 m (5' 4\")   Wt 59.3 kg (130 lb 12.8 oz)   SpO2 99%   BMI 22.45 kg/m      Constitutional:  cooperative, alert and oriented, well developed, well nourished, in no acute distress   slightly forgetful    Skin:  warm and dry to the touch, no apparent skin lesions or masses noted   pacemaker incision in the left infraclavicular area was well-healed      Head:  normocephalic, no masses or lesions        Eyes:  pupils equal and round;conjunctivae and lids unremarkable        Lymph:      ENT:  no pallor or cyanosis, " dentition good        Neck:  JVP normal        Respiratory:  normal breath sounds, clear to auscultation, normal A-P diameter, normal symmetry, normal respiratory excursion, no use of accessory muscles         Cardiac: no murmurs, gallops or rubs detected irregularly irregular rhythm              not assessed this visit                                        GI:  abdomen soft;no HSM        Extremities and Muscular Skeletal:  no deformities, clubbing, cyanosis, erythema observed;no edema stasis pigmentation            Neurological:  no gross motor deficits        Psych:  Alert and Oriented x 3;affect appropriate, oriented to time, person and place        CC  Kat Peterson, APRN CNP  [address]  [place] [id number]                Service Date: 08/12/2022    HISTORY OF PRESENT ILLNESS:  Ms. Trujillo is a delightful 89-year-old woman well known to me here at the [facility].  She is an established patient of Dr. Tatum.  She is here today with her son, Philipp, and his wife.    Her past medical history includes:  1.  AFib with AV node ablation and BiV upgrade 01/2015.  She does not have a functional LV lead.  This was shut off due to diaphragmatic stimulation 06/2020.  Her device reached MICHAEL 04/2020 and had successful generator change at that time.  2.  Cardiomyopathy, EF 30%-40%; however, last echo showed an EF of 45%-50%.  She does suffer from HFpEF symptoms.  3.  Hypertension.  4.  Hyponatremia.  5.  Anxiety, on low-dose Lexapro.  6.  History of coronary artery disease with  of the RCA.  She had extensive left to left collaterals arising from the distal circumflex.  She has a 60% eccentric calcified stenosis of both proximal and mid circumflex involving the takeoff of the large obtuse marginal branch.  IFR down both limbs was 1.  She has minimal LAD, left main and ramus disease.  End-diastolic pressure was 18 mmHg.  7.  Short-term memory deficit.  The patient lives independently in a senior living  facility.  She has a large family network and they are very supportive.  8.  Chronic anticoagulation in the form of warfarin.  Last INR was in June at 3.4.    At her last visit, we had to up titrate her Lasix to 40 mg in the morning, 20 mg in the afternoon.  She is euvolemic on exam.  She denies chest pain, shortness of breath, lightheadedness, dizziness or lower extremity edema.  Weight is down 7 pounds from her July appointment.  Her son states that she has been sleeping more.  She went to bed Sunday evening and did not wake up to 2:00 p.m. the next day.  Peg feels her appetite has been good.  Her family prepares her meals.  They are all low-sodium.    Last device interrogation was 07/19/2022, 73% V paced.  The patient has chronic AFib, status post AV richard ablation.  Lower rate set at 60 beats per minute.  Battery longevity is 9 years.    Lab work completed last included a BMP in July showed a creatinine of 1.11.  ProBNP was up to 5397.    All other review of systems and past medical history are noted below.    ASSESSMENT AND PLAN:  Ms. Trujillo is a delightful 89-year-old woman here today for followup.  1.  Acute on chronic systolic heart failure.  EF 45%-50%.  Currently stable on her regimen including amlodipine 2.5 mg daily, atorvastatin 40 mg daily, isosorbide 90 mg daily, metoprolol  mg in the morning and 50 mg in the evening, furosemide 40 mg in the morning, 20 mg in the afternoon, lisinopril 10 mg daily.  The patient is euvolemic on exam.  Weight is down 7 pounds.  Appetite is okay.  I do notice a functional difference and PEG.  She is in a wheelchair.  Although she is very feisty and still is engaging, her memory is slightly delayed today.  She was denied hospice care due to her functional class.  However, she was having a great day that day.  If she continues to deteriorate, I would recommend having Hospice come back for another evaluation.  We could also consider Palliative Care.  3.  Chronic  anticoagulation, on warfarin therapy.  CHADS-VASc score is 6.  We have discussed changing to Eliquis, but cost is an issue.  I have ordered an INR.  They just have not had a chance to make it to Dr. Michel' office.  I will forward the results with Dr. Michel as well as letting her family know.  5.  Anxiety, on low-dose Lexapro.  6.  Known coronary artery disease.  No complaints of chest pain on guideline medical therapy.    I will see Peg back in 2 months.  We would be happy to see her sooner if there are questions or concerns.  If her creatinine is elevated today, I discussed continuing her diuretics and she is doing well and euvolemic.  I am reluctant to treat the number and not the patient.  She is nearing the end of life.  I do want her to have comfort care.  Peg and her family both agree with this.  I most likely will decrease my lab draws in the future.    Also, if there is never any issues with increased falling, we may need to discuss risks versus benefit of anticoagulation.    Thank you for including me in her care.  If you have questions or concerns, please feel free to contact us.    Kat Yo NP        D: 2022   T: 2022   MT:     Name:     SEVERIANO VALLEJO  MRN:      [id number]        Account:      [id number]   :      1925           Service Date: 2022       Document: [id number]      Thank you for allowing me to participate in the care of your patient.      Sincerely,     Kat Peterson NP, NAVYA CNP     [facility]  cc:   NAVYA Shipley CNP  [address]  [place] [id number]

## 2022-08-12 NOTE — PROGRESS NOTES
HPI and Plan: #[id number]  See dictation    Today's clinic visit entailed:  Review of the result(s) of each unique test - labs and pacer check  Ordering of each unique test  No LOS data to display   Time spent doing chart review, history and exam, documentation and further activities per the note  Provider  Link to [employer] Help Grid     The level of medical decision making during this visit was of moderate complexity.      Orders Placed This Encounter   Procedures     Basic metabolic panel     INR     Follow-Up with Cardiology MARGIE       No orders of the defined types were placed in this encounter.      There are no discontinued medications.      Encounter Diagnoses   Name Primary?     Acute on chronic systolic heart failure (H)      Permanent atrial fibrillation (H) Yes       CURRENT MEDICATIONS:  Current Outpatient Medications   Medication Sig Dispense Refill     Alendronate Sodium (FOSAMAX PO) Take 70 mg by mouth once a week Sunday evenings       amLODIPine (NORVASC) 2.5 MG tablet Take 1 tablet (2.5 mg) by mouth daily 90 tablet 3     aspirin (ASA) 81 MG EC tablet Take 1 tablet (81 mg) by mouth daily 90 tablet 3     atorvastatin (LIPITOR) 40 MG tablet Take 40 mg by mouth At Bedtime        calcium carbonate-vitamin D 600-200 MG-UNIT TABS Take 1 tablet by mouth daily.       cycloSPORINE (RESTASIS) 0.05 % ophthalmic emulsion Place 1 drop into both eyes every 12 hours.       escitalopram (LEXAPRO) 10 MG tablet Take 5 mg by mouth daily        furosemide (LASIX) 20 MG tablet 40 mg am and 20 mg afternoon 270 tablet 0     isosorbide mononitrate (IMDUR) 30 MG 24 hr tablet Take 3 tablets (90 mg) by mouth daily 270 tablet 3     lisinopril (ZESTRIL) 10 MG tablet Take 1 tablet (10 mg) by mouth daily 90 tablet 3     metoprolol succinate ER (TOPROL-XL) 50 MG 24 hr tablet 100 in am and 50mg at night (Patient taking differently: Take 50 mg by mouth every evening 100 in am and 50mg at night)       Multiple Vitamins-Minerals (CENTRUM  SILVER) per tablet Take 1 tablet by mouth daily       Multiple Vitamins-Minerals (PRESERVISION AREDS 2 PO) Take by mouth daily       nitroGLYcerin (NITROSTAT) 0.4 MG sublingual tablet For chest pain place 1 tablet under the tongue every 5 minutes for 3 doses. If symptoms persist 5 minutes after 1st dose call 911. 25 tablet 0     Probiotic Product (PROBIOTIC DAILY PO) Take 1 tablet by mouth daily       sodium chloride 1 GM tablet Take 1 g by mouth daily       warfarin ANTICOAGULANT (COUMADIN) 4 MG tablet Take 4 mg by mouth daily 2mg (0.5 tablet) Mon, Wed, Fri  4mg (1 tablet) Tues, Thurs. Sat, Sun       acetaminophen (TYLENOL) 500 MG tablet Take 500-1,000 mg by mouth every 6 hours as needed for mild pain (Patient not taking: Reported on 8/12/2022)         ALLERGIES     Allergies   Allergen Reactions     Amiodarone Nausea     Hctz      Hydrochlorothiazide Other (See Comments)     Other reaction(s): *Unknown     Lansoprazole      diarrhea   Prevacid       PAST MEDICAL HISTORY:  Past Medical History:   Diagnosis Date     Aortic regurgitation 12/14/2014    mild (1+) per echo     Atrial fibrillation (H)      Cardiomyopathy (H)      CHF (congestive heart failure) (H)      Chronic kidney disease, stage 3 (H)      Coronary atherosclerosis of unspecified type of vessel, native or graft 5/16/2000    normal left coronary arteries and tight obstruction in distal RCA, too small for revascularization, medical management     Degeneration of cervical intervertebral disc     cervical spine     Essential hypertension, benign      Mitral regurgitation 12/14/2014    mod-mod/sev (2-3+) per echo     Other and unspecified hyperlipidemia      Pacemaker      Pulmonary hypertension (H) 3/16/2013    mild per echo with RVSP 31mmHg +RAP      Pulmonary valve regurgitation 12/14/2014    mod (2+) per echo     Tricuspid regurgitation 12/14/2014    mild (1+) per echo     Unspecified tinnitus     chronic       PAST SURGICAL HISTORY:  Past Surgical  History:   Procedure Laterality Date     CORONARY ANGIOGRAPHY ADULT ORDER  2000     CV HEART CATHETERIZATION WITH POSSIBLE INTERVENTION N/A 10/19/2021    Procedure: Heart Catheterization with Possible Intervention;  Surgeon: Alden Skelton MD;  Location:  HEART CARDIAC CATH LAB     CV INSTANTANEOUS WAVE-FREE RATIO N/A 10/19/2021    Procedure: Instantaneous Wave-Free Ratio;  Surgeon: Alden Skelton MD;  Location:  HEART CARDIAC CATH LAB     CV LEFT HEART CATH N/A 10/19/2021    Procedure: Left Heart Cath;  Surgeon: Alden Skelton MD;  Location:  HEART CARDIAC CATH LAB     CV LEFT VENTRICULOGRAM N/A 10/19/2021    Procedure: Left Ventriculogram;  Surgeon: Alden Skelton MD;  Location:  HEART CARDIAC CATH LAB     EP PPM RMVL&REPL OF GEN W/ DUAL LEAD SYS N/A 2020    Procedure: Permanent Pacemakers  Removal and Replacement Generator  with Multi Lead System;  Surgeon: Fay Tatum MD;  Location:  HEART CARDIAC CATH LAB     HRW PACEMAKER PERMANENT  2015    BI- ventricular     IMPLANT PACEMAKER  2010    dual chamber Medtronic     ZZC NONSPECIFIC PROCEDURE  1999    right rotator cuff tear OR     ZZC NONSPECIFIC PROCEDURE  1983    microdiscectomy     ZZC NONSPECIFIC PROCEDURE      C-sections x 3      ZZC NONSPECIFIC PROCEDURE      appendectomy     ZZC NONSPECIFIC PROCEDURE      bilateral benign breast biopsy      ZZC NONSPECIFIC PROCEDURE      right knee arthroscopic OR     ZZC NONSPECIFIC PROCEDURE  1974    enteritis       FAMILY HISTORY:  Family History   Problem Relation Age of Onset     Hypertension Mother      Cancer Mother         pancreatic     Eye Disorder Mother         glacoma     C.ABEATRIZ Father          53     Lipids Father      Diabetes Maternal Grandmother      C.A.D. Brother         open heart surgery age 53     Cancer Daughter         ovavian     Neurologic Disorder Son         MS       SOCIAL HISTORY:  Social History     Socioeconomic History  "    Marital status:      Spouse name: None     Number of children: None     Years of education: None     Highest education level: None   Tobacco Use     Smoking status: Former Smoker     Packs/day: 0.50     Years: 15.00     Pack years: 7.50     Types: Cigarettes     Start date:      Quit date:      Years since quittin.6     Smokeless tobacco: Never Used   Substance and Sexual Activity     Alcohol use: No     Drug use: No   Other Topics Concern     Parent/sibling w/ CABG, MI or angioplasty before 65F 55M? Yes     Special Diet Yes     Comment: low sodium     Exercise Yes     Comment: active life style     Seat Belt Yes       Review of Systems:  Skin:  Positive for bruising     Eyes:  Positive for glasses    ENT:  Positive for hearing loss    Respiratory:  Positive for dyspnea on exertion     Cardiovascular:  Negative;chest pain;lightheadedness;dizziness;edema      Gastroenterology: not assessed      Genitourinary:  not assessed      Musculoskeletal:         Neurologic:  Positive for memory problems    Psychiatric:  not assessed      Heme/Lymph/Imm:  Negative      Endocrine:  Negative        Physical Exam:  Vitals: /71   Pulse 82   Ht 1.626 m (5' 4\")   Wt 59.3 kg (130 lb 12.8 oz)   SpO2 99%   BMI 22.45 kg/m      Constitutional:  cooperative, alert and oriented, well developed, well nourished, in no acute distress   slightly forgetful    Skin:  warm and dry to the touch, no apparent skin lesions or masses noted   pacemaker incision in the left infraclavicular area was well-healed      Head:  normocephalic, no masses or lesions        Eyes:  pupils equal and round;conjunctivae and lids unremarkable        Lymph:      ENT:  no pallor or cyanosis, dentition good        Neck:  JVP normal        Respiratory:  normal breath sounds, clear to auscultation, normal A-P diameter, normal symmetry, normal respiratory excursion, no use of accessory muscles         Cardiac: no murmurs, gallops or rubs " detected irregularly irregular rhythm              not assessed this visit                                        GI:  abdomen soft;no HSM        Extremities and Muscular Skeletal:  no deformities, clubbing, cyanosis, erythema observed;no edema stasis pigmentation            Neurological:  no gross motor deficits        Psych:  Alert and Oriented x 3;affect appropriate, oriented to time, person and place        AGUSTO Peterson, NAVYA CNP  [address]  [place] [address]

## 2022-08-12 NOTE — PROGRESS NOTES
Service Date: 08/12/2022    HISTORY OF PRESENT ILLNESS:  Ms. Trujillo is a delightful 89-year-old woman well known to me [facility] at [facility].  She is an established patient of Dr. Tatum.  She is here today with her son, Philipp, and his wife.    Her past medical history includes:  1.  AFib with AV node ablation and BiV upgrade 01/2015.  She does not have a functional LV lead.  This was shut off due to diaphragmatic stimulation 06/2020.  Her device reached MICHAEL 04/2020 and had successful generator change at that time.  2.  Cardiomyopathy, EF 30%-40%; however, last echo showed an EF of 45%-50%.  She does suffer from HFpEF symptoms.  3.  Hypertension.  4.  Hyponatremia.  5.  Anxiety, on low-dose Lexapro.  6.  History of coronary artery disease with  of the RCA.  She had extensive left to left collaterals arising from the distal circumflex.  She has a 60% eccentric calcified stenosis of both proximal and mid circumflex involving the takeoff of the large obtuse marginal branch.  IFR down both limbs was 1.  She has minimal LAD, left main and ramus disease.  End-diastolic pressure was 18 mmHg.  7.  Short-term memory deficit.  The patient lives independently in a senior living facility.  She has a large family network and they are very supportive.  8.  Chronic anticoagulation in the form of warfarin.  Last INR was in June at 3.4.    At her last visit, we had to up titrate her Lasix to 40 mg in the morning, 20 mg in the afternoon.  She is euvolemic on exam.  She denies chest pain, shortness of breath, lightheadedness, dizziness or lower extremity edema.  Weight is down 7 pounds from her July appointment.  Her son states that she has been sleeping more.  She went to bed Sunday evening and did not wake up to 2:00 p.m. the next day.  Peg feels her appetite has been good.  Her family prepares her meals.  They are all low-sodium.    Last device interrogation was 07/19/2022, 73% V paced.  The patient has chronic AFib, status  post AV richard ablation.  Lower rate set at 60 beats per minute.  Battery longevity is 9 years.    Lab work completed last included a BMP in July showed a creatinine of 1.11.  ProBNP was up to 5397.    All other review of systems and past medical history are noted below.    ASSESSMENT AND PLAN:  Ms. Trujillo is a delightful 89-year-old woman here today for followup.  1.  Acute on chronic systolic heart failure.  EF 45%-50%.  Currently stable on her regimen including amlodipine 2.5 mg daily, atorvastatin 40 mg daily, isosorbide 90 mg daily, metoprolol  mg in the morning and 50 mg in the evening, furosemide 40 mg in the morning, 20 mg in the afternoon, lisinopril 10 mg daily.  The patient is euvolemic on exam.  Weight is down 7 pounds.  Appetite is okay.  I do notice a functional difference and PEG.  She is in a wheelchair.  Although she is very feisty and still is engaging, her memory is slightly delayed today.  She was denied hospice care due to her functional class.  However, she was having a great day that day.  If she continues to deteriorate, I would recommend having Hospice come back for another evaluation.  We could also consider Palliative Care.  3.  Chronic anticoagulation, on warfarin therapy.  CHADS-VASc score is 6.  We have discussed changing to Eliquis, but cost is an issue.  I have ordered an INR.  They just have not had a chance to make it to Dr. Michel' office.  I will forward the results with Dr. Michel as well as letting her family know.  5.  Anxiety, on low-dose Lexapro.  6.  Known coronary artery disease.  No complaints of chest pain on guideline medical therapy.    I will see Peg back in 2 months.  We would be happy to see her sooner if there are questions or concerns.  If her creatinine is elevated today, I discussed continuing her diuretics and she is doing well and euvolemic.  I am reluctant to treat the number and not the patient.  She is nearing the end of life.  I do want her to have  comfort care.  Peg and her family both agree with this.  I most likely will decrease my lab draws in the future.    Also, if there is never any issues with increased falling, we may need to discuss risks versus benefit of anticoagulation.    Thank you for including me in her care.  If you have questions or concerns, please feel free to contact us.    Kat Peterson NP        D: 2022   T: 2022   MT: ILAN    Name:     SEVERIANO VALLEJO  MRN:      [id number]        Account:      [id number]   :      1925           Service Date: 2022       Document: [id number]

## 2022-08-12 NOTE — PATIENT INSTRUCTIONS
Call my nurse with any questions or concerns:  [phone]  *If you have concerns after hours, please call [phone], option 2 to speak with on call Cardiologist.    No changes  Lab work today (BMP and INR)

## 2022-08-15 NOTE — TELEPHONE ENCOUNTER
Received call from son Philipp (on consent to communicate)) reviewing warfarin dosing he received today from [facility].  Discussed with son that we do not manage the medications.  Instructed him to follow the direction of the INR team.  He indicated he needs to make another lab draw for next week.  Son understood the process and will follow instructions as given earlier today.  BAKARI Burks

## 2022-10-06 NOTE — PROGRESS NOTES
Service Date: 10/06/2022    HISTORY OF PRESENT ILLNESS:  Ms. Trujillo is a delightful 89-year-old woman well known to me [facility] at [facility].  I have been following her on a regular basis in [facility].  She is an established patient of Dr. Tatum.  She is here today with her son, Philipp, and her daughter-in-law.    Her past medical history includes:  1.  AFib with AV node ablation and BiV upgrade 01/2015.  She does not have a functional LV lead.  This was shut off due to diaphragmatic stimulation in 06/2020.  Her device reached MICHAEL in 04/2020 and had a successful generator change at that time.  2.  Cardiomyopathy, EF of 30% to 40% in the past; however, last echo showed EF of 45%-50%.  She does suffer from HFpEF symptoms.  3.  Hypertension.  4.  Hyponatremia.  5.  Anxiety.  6.  History of coronary artery disease with a  of the RCA.  She has extensive left-to-left collaterals arising from the distal circumflex.  She has a 60% eccentric calcified stenosis of both proximal and mid circumflex involving the takeoff of the large obtuse marginal artery.  IFR down both limbs was 1.  She has minimal LAD, left main and ramus disease, and diastolic pressure is 18.  7.  Short-term memory deficit.  Lives independently in a senior living facility.  She has a large extended family and they are very supportive and helping with her care.  They have done a hospice consult in the past, but they were declined due to Peg being high functioning.  8.  Chronic anticoagulation with warfarin.  9.  Recent mechanical fall on a rug in her bathroom with a fractured humerus.  The patient wears a sling.  Was followed by [facility] and was told that the injury is healing.    Earlier this summer, we increased her Lasix to 40 in the morning, 20 in the afternoon.  Her shortness of breath has been under control.  She does get dyspnea with exertion, which is not new for her.  Her weight has trended down by 10 pounds over the last 4 months.   Currently denies chest pain, orthopnea, PND, syncope, or peripheral edema.  Her family prepares her meals and are low-sodium.  When she had her mechanical fall, she was noted to be hypotensive when she saw Dr. Michel.  He did stop her 2.5 mg dose of amlodipine.  Her pressure today is soft at 101/61.    Device interrogation showed 73% A paced with chronic AFib with AV richard ablation.  She has 9 years of battery longevity.    Lab work completed last on 08/12 showed a creatinine of 1.38, BUN of 40, GFR of 35.  She is here today for followup.  All other review of systems, past medical history and physical exam are noted below.    ASSESSMENT AND PLAN:  Ms. Trujillo is a delightful 89-year-old woman here today for followup in the [facility].  1.  Acute on chronic systolic heart failure.  EF 45%-50%.  Amlodipine has been stopped.  She is on isosorbide 90 mg daily, metoprolol  mg in the morning and 50 mg in the evening, furosemide 40 mg in the morning, 20 mg in the afternoon.  I have asked that we decrease her lisinopril from 10 to 5 mg daily.    Peg is in a wheelchair at today's visit.  I have seen her health decline over the last 6 months.  She has become a little bit more forgetful and also tired.  She sleeps mostly throughout the day.  Her daughter-in-law would like to contact hospice again for another evaluation, especially after this recent fall.  I do think it is reasonable.    2.  Chronic anticoagulation for AFib, on warfarin therapy.  CHADS-VASc score is 6.  I did draw an INR and will send to Dr. Michel' office.  Last INR was 1.9.    3.  Anxiety, on low-dose Lexapro.    4.  Coronary artery disease.  No complaints of chest pain on guideline medical therapy including aspirin, isosorbide, lisinopril, metoprolol.    BMP, proBNP and INR were drawn today.  I will contact the family with the results.  I would like to see her back in 4-6 weeks.  I would be happy to see her sooner if there are questions or  concerns.  I have opted to keep her slightly on the dry side to help prevent hospitalization and any issues with pulmonary edema.  She definitely has tolerated the higher doses of Lasix.    Kat Peterson NP, APRN CNP          D: 10/06/2022   T: 10/06/2022   MT: maral    Name:     SEVERIANO VALLEJO  MRN:      [id number]        Account:      [id number]   :      1925           Service Date: 10/06/2022       Document: [id number]

## 2022-10-06 NOTE — PATIENT INSTRUCTIONS
Call my nurse with any questions or concerns:  [phone]  *If you have concerns after hours, please call [phone], option 2 to speak with on call Cardiologist.    1. Medication changes from today:    Decrease lisinopril 1/2 tablet (5 mg)      2. Lab Results:       Latest Reference Range & Units 07/08/22 12:13 08/12/22 15:09   Sodium 133 - 144 mmol/L 134 133   Potassium 3.4 - 5.3 mmol/L 3.9 4.1   Chloride 94 - 109 mmol/L 95 96   Carbon Dioxide 20 - 32 mmol/L 32 32   Urea Nitrogen 7 - 30 mg/dL 23 40 (H)   Creatinine 0.52 - 1.04 mg/dL 1.11 (H) 1.38 (H)   GFR Estimate >60 mL/min/1.73m2 45 (L) 35 (L)   Calcium 8.5 - 10.1 mg/dL 9.1 9.2   Anion Gap 3 - 14 mmol/L 7 5   N-Terminal Pro Bnp 0 - 1,800 pg/mL 5,397 (H)

## 2022-10-06 NOTE — LETTER
10/6/2022    Kirit Michel MD  [address] [address]    RE: Haritha GALLOWAY Ronnie       Dear Colleague,     I had the pleasure of seeing Haritha GALLOWAY Ronnie in the [facility].  HPI and Plan: #[id number]  See dictation    Today's clinic visit entailed:  Review of the result(s) of each unique test - labs and follow up  Ordering of each unique test  Prescription drug management  No LOS data to display   Time spent doing chart review, history and exam, documentation and further activities per the note  Provider  Link to [employer] Help Grid     The level of medical decision making during this visit was of moderate complexity.      Orders Placed This Encounter   Procedures     Basic metabolic panel     N terminal pro BNP outpatient     INR     Follow-Up with Cardiology Core- MARGIE       Orders Placed This Encounter   Medications     metoprolol succinate ER (TOPROL XL) 50 MG 24 hr tablet     Si in am and 50mg at night     lisinopril (ZESTRIL) 10 MG tablet     Sig: Take 0.5 tablets (5 mg) by mouth daily     Dispense:  90 tablet     Refill:  3       Medications Discontinued During This Encounter   Medication Reason     amLODIPine (NORVASC) 2.5 MG tablet Discontinued by another Health Care Provider     metoprolol succinate ER (TOPROL-XL) 50 MG 24 hr tablet      lisinopril (ZESTRIL) 10 MG tablet          Encounter Diagnoses   Name Primary?     Acute on chronic systolic heart failure (H)      Dilated cardiomyopathy (H)      Essential hypertension      Permanent atrial fibrillation (H) Yes       CURRENT MEDICATIONS:  Current Outpatient Medications   Medication Sig Dispense Refill     Alendronate Sodium (FOSAMAX PO) Take 70 mg by mouth once a week  evenings       aspirin (ASA) 81 MG EC tablet Take 1 tablet (81 mg) by mouth daily 90 tablet 3     atorvastatin (LIPITOR) 40 MG tablet Take 20 mg by mouth At Bedtime       calcium carbonate-vitamin D 600-200 MG-UNIT TABS  Take 1 tablet by mouth daily.       cycloSPORINE (RESTASIS) 0.05 % ophthalmic emulsion Place 1 drop into both eyes every 12 hours.       escitalopram (LEXAPRO) 10 MG tablet Take 5 mg by mouth daily        furosemide (LASIX) 20 MG tablet 40 mg am and 20 mg afternoon 270 tablet 0     isosorbide mononitrate (IMDUR) 30 MG 24 hr tablet Take 3 tablets (90 mg) by mouth daily 270 tablet 3     lisinopril (ZESTRIL) 10 MG tablet Take 0.5 tablets (5 mg) by mouth daily 90 tablet 3     metoprolol succinate ER (TOPROL XL) 50 MG 24 hr tablet 100 in am and 50mg at night       Multiple Vitamins-Minerals (CENTRUM SILVER) per tablet Take 1 tablet by mouth daily       Multiple Vitamins-Minerals (PRESERVISION AREDS 2 PO) Take by mouth daily       nitroGLYcerin (NITROSTAT) 0.4 MG sublingual tablet For chest pain place 1 tablet under the tongue every 5 minutes for 3 doses. If symptoms persist 5 minutes after 1st dose call 911. 25 tablet 0     Probiotic Product (PROBIOTIC DAILY PO) Take 1 tablet by mouth daily       sodium chloride 1 GM tablet Take 1 g by mouth daily       warfarin ANTICOAGULANT (COUMADIN) 4 MG tablet Take 2 mg by mouth daily 2mg (0.5 tablet) Mon, Wed, Fri 4mg (1 tablet) Tues, Thurs. Sat, Sun        acetaminophen (TYLENOL) 500 MG tablet Take 500-1,000 mg by mouth every 6 hours as needed for mild pain (Patient not taking: No sig reported)         ALLERGIES     Allergies   Allergen Reactions     Amiodarone Nausea     Hctz      Hydrochlorothiazide Other (See Comments)     Other reaction(s): *Unknown     Lansoprazole      diarrhea   Prevacid       PAST MEDICAL HISTORY:  Past Medical History:   Diagnosis Date     Aortic regurgitation 12/14/2014    mild (1+) per echo     Atrial fibrillation (H)      Cardiomyopathy (H)      CHF (congestive heart failure) (H)      Chronic kidney disease, stage 3 (H)      Coronary atherosclerosis of unspecified type of vessel, native or graft 5/16/2000    normal left coronary arteries and tight  obstruction in distal RCA, too small for revascularization, medical management     Degeneration of cervical intervertebral disc     cervical spine     Essential hypertension, benign      Mitral regurgitation 12/14/2014    mod-mod/sev (2-3+) per echo     Other and unspecified hyperlipidemia      Pacemaker      Pulmonary hypertension (H) 3/16/2013    mild per echo with RVSP 31mmHg +RAP      Pulmonary valve regurgitation 12/14/2014    mod (2+) per echo     Tricuspid regurgitation 12/14/2014    mild (1+) per echo     Unspecified tinnitus     chronic       PAST SURGICAL HISTORY:  Past Surgical History:   Procedure Laterality Date     CORONARY ANGIOGRAPHY ADULT ORDER  5/16/2000     CV HEART CATHETERIZATION WITH POSSIBLE INTERVENTION N/A 10/19/2021    Procedure: Heart Catheterization with Possible Intervention;  Surgeon: Alden Skelton MD;  Location:  [facility]     CV INSTANTANEOUS WAVE-FREE RATIO N/A 10/19/2021    Procedure: Instantaneous Wave-Free Ratio;  Surgeon: Alden Skelton MD;  Location:  [facility]     CV LEFT HEART CATH N/A 10/19/2021    Procedure: Left Heart Cath;  Surgeon: Alden Skelton MD;  Location:  [facility]ATH [facility]     CV LEFT VENTRICULOGRAM N/A 10/19/2021    Procedure: Left Ventriculogram;  Surgeon: Alden Skelton MD;  Location:  [facility]     EP PPM RMVL&REPL OF GEN W/ DUAL LEAD SYS N/A 4/14/2020    Procedure: Permanent Pacemakers  Removal and Replacement Generator  with Multi Lead System;  Surgeon: Fay Tatum MD;  Location:  [facility]     HRW PACEMAKER PERMANENT  1/2015    BI- ventricular     IMPLANT PACEMAKER  11/12/2010    dual chamber Medtronic     ZZC NONSPECIFIC PROCEDURE  1999    right rotator cuff tear OR     ZZC NONSPECIFIC PROCEDURE  1983    microdiscectomy     ZZC NONSPECIFIC PROCEDURE      C-sections x 3      ZZC NONSPECIFIC PROCEDURE      appendectomy     ZZC NONSPECIFIC PROCEDURE    "   bilateral benign breast biopsy      ZZC NONSPECIFIC PROCEDURE      right knee arthroscopic OR     ZZC NONSPECIFIC PROCEDURE  1974    enteritis       FAMILY HISTORY:  Family History   Problem Relation Age of Onset     Hypertension Mother      Cancer Mother         pancreatic     Eye Disorder Mother         cristian     ANIA Father          53     Lipids Father      Diabetes Maternal Grandmother      ANIA Brother         open heart surgery age 53     Cancer Daughter         ovavian     Neurologic Disorder Son         MS       SOCIAL HISTORY:  Social History     Socioeconomic History     Marital status:      Spouse name: None     Number of children: None     Years of education: None     Highest education level: None   Tobacco Use     Smoking status: Former Smoker     Packs/day: 0.50     Years: 15.00     Pack years: 7.50     Types: Cigarettes     Start date:      Quit date:      Years since quittin.7     Smokeless tobacco: Never Used   Substance and Sexual Activity     Alcohol use: No     Drug use: No   Other Topics Concern     Parent/sibling w/ CABG, MI or angioplasty before 65F 55M? Yes     Special Diet Yes     Comment: low sodium     Exercise Yes     Comment: active life style     Seat Belt Yes       Review of Systems:  Skin:          Eyes:         ENT:         Respiratory:          Cardiovascular:         Gastroenterology:        Genitourinary:         Musculoskeletal:         Neurologic:         Psychiatric:         Heme/Lymph/Imm:         Endocrine:           Physical Exam:  Vitals: /61   Pulse 83   Ht 1.626 m (5' 4\")   Wt 59 kg (130 lb 1.6 oz)   SpO2 99%   BMI 22.33 kg/m      Constitutional:  cooperative, alert and oriented, well developed, well nourished, in no acute distress   slightly forgetful    Skin:  warm and dry to the touch, no apparent skin lesions or masses noted   pacemaker incision in the left infraclavicular area was well-healed      Head:  normocephalic, no " masses or lesions        Eyes:  pupils equal and round;conjunctivae and lids unremarkable        Lymph:      ENT:  no pallor or cyanosis, dentition good        Neck:  JVP normal        Respiratory:  normal breath sounds, clear to auscultation, normal A-P diameter, normal symmetry, normal respiratory excursion, no use of accessory muscles         Cardiac: no murmurs, gallops or rubs detected irregularly irregular rhythm              not assessed this visit                                        GI:  abdomen soft        Extremities and Muscular Skeletal:  no deformities, clubbing, cyanosis, erythema observed;no edema stasis pigmentation            Neurological:  no gross motor deficits   in a wheelchair    Psych:  Alert and Oriented x 3;affect appropriate, oriented to time, person and place        CC  Kat Peterson, APRN CNP  [address]  [place] [id number]                Service Date: 10/06/2022    HISTORY OF PRESENT ILLNESS:  Ms. Trujillo is a delightful 89-year-old woman well known to me here at the [facility].  I have been following her on a regular basis in [facility].  She is an established patient of Dr. Tatum.  She is here today with her son, Philipp, and her daughter-in-law.    Her past medical history includes:  1.  AFib with AV node ablation and BiV upgrade 01/2015.  She does not have a functional LV lead.  This was shut off due to diaphragmatic stimulation in 06/2020.  Her device reached MICHAEL in 04/2020 and had a successful generator change at that time.  2.  Cardiomyopathy, EF of 30% to 40% in the past; however, last echo showed EF of 45%-50%.  She does suffer from HFpEF symptoms.  3.  Hypertension.  4.  Hyponatremia.  5.  Anxiety.  6.  History of coronary artery disease with a  of the RCA.  She has extensive left-to-left collaterals arising from the distal circumflex.  She has a 60% eccentric calcified stenosis of both proximal and mid circumflex involving the takeoff of the large  obtuse marginal artery.  IFR down both limbs was 1.  She has minimal LAD, left main and ramus disease, and diastolic pressure is 18.  7.  Short-term memory deficit.  Lives independently in a senior living facility.  She has a large extended family and they are very supportive and helping with her care.  They have done a hospice consult in the past, but they were declined due to Peg being high functioning.  8.  Chronic anticoagulation with warfarin.  9.  Recent mechanical fall on a rug in her bathroom with a fractured humerus.  The patient wears a sling.  Was followed by [facility] and was told that the injury is healing.    Earlier this summer, we increased her Lasix to 40 in the morning, 20 in the afternoon.  Her shortness of breath has been under control.  She does get dyspnea with exertion, which is not new for her.  Her weight has trended down by 10 pounds over the last 4 months.  Currently denies chest pain, orthopnea, PND, syncope, or peripheral edema.  Her family prepares her meals and are low-sodium.  When she had her mechanical fall, she was noted to be hypotensive when she saw Dr. Michel.  He did stop her 2.5 mg dose of amlodipine.  Her pressure today is soft at 101/61.    Device interrogation showed 73% A paced with chronic AFib with AV richard ablation.  She has 9 years of battery longevity.    Lab work completed last on 08/12 showed a creatinine of 1.38, BUN of 40, GFR of 35.  She is here today for followup.  All other review of systems, past medical history and physical exam are noted below.    ASSESSMENT AND PLAN:  Ms. Trujillo is a delightful 89-year-old woman here today for [facility]up in [facility].  1.  Acute on chronic systolic heart failure.  EF 45%-50%.  Amlodipine has been stopped.  She is on isosorbide 90 mg daily, metoprolol  mg in the morning and 50 mg in the evening, furosemide 40 mg in the morning, 20 mg in the afternoon.  I have asked that we decrease her lisinopril from 10 to 5 mg  daily.    Peg is in a wheelchair at today's visit.  I have seen her health decline over the last 6 months.  She has become a little bit more forgetful and also tired.  She sleeps mostly throughout the day.  Her daughter-in-law would like to contact hospice again for another evaluation, especially after this recent fall.  I do think it is reasonable.    2.  Chronic anticoagulation for AFib, on warfarin therapy.  CHADS-VASc score is 6.  I did draw an INR and will send to Dr. Michel' office.  Last INR was 1.9.    3.  Anxiety, on low-dose Lexapro.    4.  Coronary artery disease.  No complaints of chest pain on guideline medical therapy including aspirin, isosorbide, lisinopril, metoprolol.    BMP, proBNP and INR were drawn today.  I will contact the family with the results.  I would like to see her back in 4-6 weeks.  I would be happy to see her sooner if there are questions or concerns.  I have opted to keep her slightly on the dry side to help prevent hospitalization and any issues with pulmonary edema.  She definitely has tolerated the higher doses of Lasix.    Kat Yo NP        D: 10/06/2022   T: 10/06/2022   MT: maral    Name:     SEVERIANO VALLEJO  MRN:      [id number]        Account:      [id number]   :      1925           Service Date: 10/06/2022       Document: [id number]    Thank you for allowing me to participate in the care of your patient.      Sincerely,     Kat Peterson NP, APRN CNP     [facility]  cc:   NAVYA Shipley CNP  [address]  [place] [id number]

## 2022-10-06 NOTE — PROGRESS NOTES
HPI and Plan: #[id number]  See dictation    Today's clinic visit entailed:  Review of the result(s) of each unique test - labs and follow up  Ordering of each unique test  Prescription drug management  No LOS data to display   Time spent doing chart review, history and exam, documentation and further activities per the note  Provider  Link to MDM Help Grid     The level of medical decision making during this visit was of moderate complexity.      Orders Placed This Encounter   Procedures     Basic metabolic panel     N terminal pro BNP outpatient     INR     Follow-Up with Cardiology [facility]- MARGIE       Orders Placed This Encounter   Medications     metoprolol succinate ER (TOPROL XL) 50 MG 24 hr tablet     Si in am and 50mg at night     lisinopril (ZESTRIL) 10 MG tablet     Sig: Take 0.5 tablets (5 mg) by mouth daily     Dispense:  90 tablet     Refill:  3       Medications Discontinued During This Encounter   Medication Reason     amLODIPine (NORVASC) 2.5 MG tablet Discontinued by another Health Care Provider     metoprolol succinate ER (TOPROL-XL) 50 MG 24 hr tablet      lisinopril (ZESTRIL) 10 MG tablet          Encounter Diagnoses   Name Primary?     Acute on chronic systolic heart failure (H)      Dilated cardiomyopathy (H)      Essential hypertension      Permanent atrial fibrillation (H) Yes       CURRENT MEDICATIONS:  Current Outpatient Medications   Medication Sig Dispense Refill     Alendronate Sodium (FOSAMAX PO) Take 70 mg by mouth once a week  evenings       aspirin (ASA) 81 MG EC tablet Take 1 tablet (81 mg) by mouth daily 90 tablet 3     atorvastatin (LIPITOR) 40 MG tablet Take 20 mg by mouth At Bedtime       calcium carbonate-vitamin D 600-200 MG-UNIT TABS Take 1 tablet by mouth daily.       cycloSPORINE (RESTASIS) 0.05 % ophthalmic emulsion Place 1 drop into both eyes every 12 hours.       escitalopram (LEXAPRO) 10 MG tablet Take 5 mg by mouth daily        furosemide (LASIX) 20 MG tablet 40  mg am and 20 mg afternoon 270 tablet 0     isosorbide mononitrate (IMDUR) 30 MG 24 hr tablet Take 3 tablets (90 mg) by mouth daily 270 tablet 3     lisinopril (ZESTRIL) 10 MG tablet Take 0.5 tablets (5 mg) by mouth daily 90 tablet 3     metoprolol succinate ER (TOPROL XL) 50 MG 24 hr tablet 100 in am and 50mg at night       Multiple Vitamins-Minerals (CENTRUM SILVER) per tablet Take 1 tablet by mouth daily       Multiple Vitamins-Minerals (PRESERVISION AREDS 2 PO) Take by mouth daily       nitroGLYcerin (NITROSTAT) 0.4 MG sublingual tablet For chest pain place 1 tablet under the tongue every 5 minutes for 3 doses. If symptoms persist 5 minutes after 1st dose call 911. 25 tablet 0     Probiotic Product (PROBIOTIC DAILY PO) Take 1 tablet by mouth daily       sodium chloride 1 GM tablet Take 1 g by mouth daily       warfarin ANTICOAGULANT (COUMADIN) 4 MG tablet Take 2 mg by mouth daily 2mg (0.5 tablet) Mon, Wed, Fri 4mg (1 tablet) Tues, Thurs. Sat, Sun        acetaminophen (TYLENOL) 500 MG tablet Take 500-1,000 mg by mouth every 6 hours as needed for mild pain (Patient not taking: No sig reported)         ALLERGIES     Allergies   Allergen Reactions     Amiodarone Nausea     Hctz      Hydrochlorothiazide Other (See Comments)     Other reaction(s): *Unknown     Lansoprazole      diarrhea   Prevacid       PAST MEDICAL HISTORY:  Past Medical History:   Diagnosis Date     Aortic regurgitation 12/14/2014    mild (1+) per echo     Atrial fibrillation (H)      Cardiomyopathy (H)      CHF (congestive heart failure) (H)      Chronic kidney disease, stage 3 (H)      Coronary atherosclerosis of unspecified type of vessel, native or graft 5/16/2000    normal left coronary arteries and tight obstruction in distal RCA, too small for revascularization, medical management     Degeneration of cervical intervertebral disc     cervical spine     Essential hypertension, benign      Mitral regurgitation 12/14/2014    mod-mod/sev (2-3+) per  echo     Other and unspecified hyperlipidemia      Pacemaker      Pulmonary hypertension (H) 3/16/2013    mild per echo with RVSP 31mmHg +RAP      Pulmonary valve regurgitation 12/14/2014    mod (2+) per echo     Tricuspid regurgitation 12/14/2014    mild (1+) per echo     Unspecified tinnitus     chronic       PAST SURGICAL HISTORY:  Past Surgical History:   Procedure Laterality Date     CORONARY ANGIOGRAPHY ADULT ORDER  5/16/2000     CV HEART CATHETERIZATION WITH POSSIBLE INTERVENTION N/A 10/19/2021    Procedure: Heart Catheterization with Possible Intervention;  Surgeon: Alden Skelton MD;  Location:  [facility]ATH [facility]     CV INSTANTANEOUS WAVE-FREE RATIO N/A 10/19/2021    Procedure: Instantaneous Wave-Free Ratio;  Surgeon: Alden Skelton MD;  Location:  [facility]     CV LEFT HEART CATH N/A 10/19/2021    Procedure: Left Heart Cath;  Surgeon: Alden Skelton MD;  Location:  [facility]     CV LEFT VENTRICULOGRAM N/A 10/19/2021    Procedure: Left Ventriculogram;  Surgeon: Alden Skelton MD;  Location:  [facility]     EP PPM RMVL&REPL OF GEN W/ DUAL LEAD SYS N/A 4/14/2020    Procedure: Permanent Pacemakers  Removal and Replacement Generator  with Multi Lead System;  Surgeon: Fay Tatum MD;  Location:  HE[facility] CATH LAB     HRW PACEMAKER PERMANENT  1/2015    BI- ventricular     IMPLANT PACEMAKER  11/12/2010    dual chamber Medtronic     Z NONSPECIFIC PROCEDURE  1999    right rotator cuff tear OR     ZZC NONSPECIFIC PROCEDURE  1983    microdiscectomy     Z NONSPECIFIC PROCEDURE      C-sections x 3      ZZC NONSPECIFIC PROCEDURE      appendectomy     ZZC NONSPECIFIC PROCEDURE      bilateral benign breast biopsy      ZZC NONSPECIFIC PROCEDURE      right knee arthroscopic OR     ZZC NONSPECIFIC PROCEDURE  1974    enteritis       FAMILY HISTORY:  Family History   Problem Relation Age of Onset     Hypertension Mother       "Cancer Mother         pancreatic     Eye Disorder Mother         cristian     LISA. Father          53     Lipids Father      Diabetes Maternal Grandmother      ANIA Brother         open heart surgery age 53     Cancer Daughter         ovavian     Neurologic Disorder Son         MS       SOCIAL HISTORY:  Social History     Socioeconomic History     Marital status:      Spouse name: None     Number of children: None     Years of education: None     Highest education level: None   Tobacco Use     Smoking status: Former Smoker     Packs/day: 0.50     Years: 15.00     Pack years: 7.50     Types: Cigarettes     Start date:      Quit date:      Years since quittin.7     Smokeless tobacco: Never Used   Substance and Sexual Activity     Alcohol use: No     Drug use: No   Other Topics Concern     Parent/sibling w/ CABG, MI or angioplasty before 65F 55M? Yes     Special Diet Yes     Comment: low sodium     Exercise Yes     Comment: active life style     Seat Belt Yes       Review of Systems:  Skin:          Eyes:         ENT:         Respiratory:          Cardiovascular:         Gastroenterology:        Genitourinary:         Musculoskeletal:         Neurologic:         Psychiatric:         Heme/Lymph/Imm:         Endocrine:           Physical Exam:  Vitals: /61   Pulse 83   Ht 1.626 m (5' 4\")   Wt 59 kg (130 lb 1.6 oz)   SpO2 99%   BMI 22.33 kg/m      Constitutional:  cooperative, alert and oriented, well developed, well nourished, in no acute distress   slightly forgetful    Skin:  warm and dry to the touch, no apparent skin lesions or masses noted   pacemaker incision in the left infraclavicular area was well-healed      Head:  normocephalic, no masses or lesions        Eyes:  pupils equal and round;conjunctivae and lids unremarkable        Lymph:      ENT:  no pallor or cyanosis, dentition good        Neck:  JVP normal        Respiratory:  normal breath sounds, clear to auscultation, " normal A-P diameter, normal symmetry, normal respiratory excursion, no use of accessory muscles         Cardiac: no murmurs, gallops or rubs detected irregularly irregular rhythm              not assessed this visit                                        GI:  abdomen soft        Extremities and Muscular Skeletal:  no deformities, clubbing, cyanosis, erythema observed;no edema stasis pigmentation            Neurological:  no gross motor deficits   in a wheelchair    Psych:  Alert and Oriented x 3;affect appropriate, oriented to time, person and place        CC  NAVYA Shipley CNP  [id number]  [place] [id number]

## 2022-11-07 NOTE — TELEPHONE ENCOUNTER
Pt is scheduled for a [facility] appt on 11/07/22    Pt with a history of CHF.  Medication list reviewed and pt is not currently on Entresto, Jardiance, Farxiga and Ivokana.    Please initiate coverage check for the above medications.  Abby Gong CMA (AAMA)  11/07/22 3:03pm

## 2023-01-01 ENCOUNTER — ANCILLARY PROCEDURE (OUTPATIENT)
Dept: CARDIOLOGY | Facility: CLINIC | Age: 88
End: 2023-01-01
Attending: INTERNAL MEDICINE
Payer: MEDICARE

## 2023-01-01 ENCOUNTER — VIRTUAL VISIT (OUTPATIENT)
Dept: CARDIOLOGY | Facility: CLINIC | Age: 88
End: 2023-01-01
Payer: COMMERCIAL

## 2023-01-01 DIAGNOSIS — Z95.0 CARDIAC PACEMAKER IN SITU: ICD-10-CM

## 2023-01-01 DIAGNOSIS — I50.23 ACUTE ON CHRONIC SYSTOLIC HEART FAILURE (H): ICD-10-CM

## 2023-01-01 DIAGNOSIS — I49.5 SICK SINUS SYNDROME (H): ICD-10-CM

## 2023-01-01 DIAGNOSIS — Z95.0 CARDIAC PACEMAKER IN SITU: Primary | ICD-10-CM

## 2023-01-01 LAB
MDC_IDC_EPISODE_DTM: NORMAL
MDC_IDC_EPISODE_DURATION: 0 S
MDC_IDC_EPISODE_DURATION: 12 S
MDC_IDC_EPISODE_DURATION: 4 S
MDC_IDC_EPISODE_DURATION: 5 S
MDC_IDC_EPISODE_DURATION: 5 S
MDC_IDC_EPISODE_DURATION: 8 S
MDC_IDC_EPISODE_ID: 10
MDC_IDC_EPISODE_ID: 11
MDC_IDC_EPISODE_ID: 2
MDC_IDC_EPISODE_ID: 7
MDC_IDC_EPISODE_ID: 8
MDC_IDC_EPISODE_ID: 9
MDC_IDC_EPISODE_TYPE: NORMAL
MDC_IDC_LEAD_IMPLANT_DT: NORMAL
MDC_IDC_LEAD_LOCATION: NORMAL
MDC_IDC_LEAD_LOCATION_DETAIL_1: NORMAL
MDC_IDC_LEAD_MFG: NORMAL
MDC_IDC_LEAD_MODEL: NORMAL
MDC_IDC_LEAD_POLARITY_TYPE: NORMAL
MDC_IDC_LEAD_SERIAL: NORMAL
MDC_IDC_MSMT_BATTERY_DTM: NORMAL
MDC_IDC_MSMT_BATTERY_REMAINING_LONGEVITY: 108 MO
MDC_IDC_MSMT_BATTERY_RRT_TRIGGER: 2.6
MDC_IDC_MSMT_BATTERY_STATUS: NORMAL
MDC_IDC_MSMT_BATTERY_VOLTAGE: 2.99 V
MDC_IDC_MSMT_LEADCHNL_LV_IMPEDANCE_VALUE: 342 OHM
MDC_IDC_MSMT_LEADCHNL_LV_IMPEDANCE_VALUE: 437 OHM
MDC_IDC_MSMT_LEADCHNL_LV_IMPEDANCE_VALUE: 456 OHM
MDC_IDC_MSMT_LEADCHNL_LV_IMPEDANCE_VALUE: 551 OHM
MDC_IDC_MSMT_LEADCHNL_LV_IMPEDANCE_VALUE: 684 OHM
MDC_IDC_MSMT_LEADCHNL_LV_PACING_THRESHOLD_AMPLITUDE: 5 V
MDC_IDC_MSMT_LEADCHNL_LV_PACING_THRESHOLD_PULSEWIDTH: 1 MS
MDC_IDC_MSMT_LEADCHNL_RA_IMPEDANCE_VALUE: 247 OHM
MDC_IDC_MSMT_LEADCHNL_RA_IMPEDANCE_VALUE: 304 OHM
MDC_IDC_MSMT_LEADCHNL_RV_IMPEDANCE_VALUE: 304 OHM
MDC_IDC_MSMT_LEADCHNL_RV_IMPEDANCE_VALUE: 399 OHM
MDC_IDC_MSMT_LEADCHNL_RV_PACING_THRESHOLD_AMPLITUDE: 0.75 V
MDC_IDC_MSMT_LEADCHNL_RV_PACING_THRESHOLD_PULSEWIDTH: 0.4 MS
MDC_IDC_MSMT_LEADCHNL_RV_SENSING_INTR_AMPL: 13.5 MV
MDC_IDC_MSMT_LEADCHNL_RV_SENSING_INTR_AMPL: 13.5 MV
MDC_IDC_PG_IMPLANT_DTM: NORMAL
MDC_IDC_PG_MFG: NORMAL
MDC_IDC_PG_MODEL: NORMAL
MDC_IDC_PG_SERIAL: NORMAL
MDC_IDC_PG_TYPE: NORMAL
MDC_IDC_SESS_CLINIC_NAME: NORMAL
MDC_IDC_SESS_DTM: NORMAL
MDC_IDC_SESS_TYPE: NORMAL
MDC_IDC_SET_BRADY_LOWRATE: 60 {BEATS}/MIN
MDC_IDC_SET_BRADY_MAX_SENSOR_RATE: 130 {BEATS}/MIN
MDC_IDC_SET_BRADY_MODE: NORMAL
MDC_IDC_SET_CRT_PACED_CHAMBERS: NORMAL
MDC_IDC_SET_LEADCHNL_RA_SENSING_ANODE_ELECTRODE_1: NORMAL
MDC_IDC_SET_LEADCHNL_RA_SENSING_ANODE_LOCATION_1: NORMAL
MDC_IDC_SET_LEADCHNL_RA_SENSING_CATHODE_ELECTRODE_1: NORMAL
MDC_IDC_SET_LEADCHNL_RA_SENSING_CATHODE_LOCATION_1: NORMAL
MDC_IDC_SET_LEADCHNL_RA_SENSING_POLARITY: NORMAL
MDC_IDC_SET_LEADCHNL_RA_SENSING_SENSITIVITY: 4 MV
MDC_IDC_SET_LEADCHNL_RV_PACING_AMPLITUDE: 2 V
MDC_IDC_SET_LEADCHNL_RV_PACING_ANODE_ELECTRODE_1: NORMAL
MDC_IDC_SET_LEADCHNL_RV_PACING_ANODE_LOCATION_1: NORMAL
MDC_IDC_SET_LEADCHNL_RV_PACING_CAPTURE_MODE: NORMAL
MDC_IDC_SET_LEADCHNL_RV_PACING_CATHODE_ELECTRODE_1: NORMAL
MDC_IDC_SET_LEADCHNL_RV_PACING_CATHODE_LOCATION_1: NORMAL
MDC_IDC_SET_LEADCHNL_RV_PACING_POLARITY: NORMAL
MDC_IDC_SET_LEADCHNL_RV_PACING_PULSEWIDTH: 0.4 MS
MDC_IDC_SET_LEADCHNL_RV_SENSING_ANODE_ELECTRODE_1: NORMAL
MDC_IDC_SET_LEADCHNL_RV_SENSING_ANODE_LOCATION_1: NORMAL
MDC_IDC_SET_LEADCHNL_RV_SENSING_CATHODE_ELECTRODE_1: NORMAL
MDC_IDC_SET_LEADCHNL_RV_SENSING_CATHODE_LOCATION_1: NORMAL
MDC_IDC_SET_LEADCHNL_RV_SENSING_POLARITY: NORMAL
MDC_IDC_SET_LEADCHNL_RV_SENSING_SENSITIVITY: 0.9 MV
MDC_IDC_SET_ZONE_DETECTION_INTERVAL: 350 MS
MDC_IDC_SET_ZONE_DETECTION_INTERVAL: 400 MS
MDC_IDC_SET_ZONE_TYPE: NORMAL
MDC_IDC_STAT_AT_BURDEN_PERCENT: 0 %
MDC_IDC_STAT_AT_DTM_END: NORMAL
MDC_IDC_STAT_AT_DTM_START: NORMAL
MDC_IDC_STAT_BRADY_AP_VP_PERCENT: 0 %
MDC_IDC_STAT_BRADY_AP_VS_PERCENT: 0 %
MDC_IDC_STAT_BRADY_AS_VP_PERCENT: 69.87 %
MDC_IDC_STAT_BRADY_AS_VS_PERCENT: 30.13 %
MDC_IDC_STAT_BRADY_DTM_END: NORMAL
MDC_IDC_STAT_BRADY_DTM_START: NORMAL
MDC_IDC_STAT_BRADY_RA_PERCENT_PACED: 0 %
MDC_IDC_STAT_BRADY_RV_PERCENT_PACED: 69.87 %
MDC_IDC_STAT_CRT_DTM_END: NORMAL
MDC_IDC_STAT_CRT_DTM_START: NORMAL
MDC_IDC_STAT_CRT_LV_PERCENT_PACED: 0 %
MDC_IDC_STAT_CRT_PERCENT_PACED: 0 %
MDC_IDC_STAT_EPISODE_RECENT_COUNT: 0
MDC_IDC_STAT_EPISODE_RECENT_COUNT: 1
MDC_IDC_STAT_EPISODE_RECENT_COUNT_DTM_END: NORMAL
MDC_IDC_STAT_EPISODE_RECENT_COUNT_DTM_START: NORMAL
MDC_IDC_STAT_EPISODE_TOTAL_COUNT: 0
MDC_IDC_STAT_EPISODE_TOTAL_COUNT: 2
MDC_IDC_STAT_EPISODE_TOTAL_COUNT_DTM_END: NORMAL
MDC_IDC_STAT_EPISODE_TOTAL_COUNT_DTM_START: NORMAL
MDC_IDC_STAT_EPISODE_TYPE: NORMAL

## 2023-01-01 PROCEDURE — 93294 REM INTERROG EVL PM/LDLS PM: CPT | Mod: GW | Performed by: INTERNAL MEDICINE

## 2023-01-01 PROCEDURE — 99207 PR NO CHARGE LOS: CPT | Performed by: NURSE PRACTITIONER

## 2023-01-01 PROCEDURE — 93296 REM INTERROG EVL PM/IDS: CPT | Mod: GW | Performed by: INTERNAL MEDICINE

## 2023-01-12 NOTE — LETTER
"1/12/2023    Kirit Michel MD  [address] [address]    RE: Haritha Trujillo       Dear Colleague,     I had the pleasure of seeing Haritha Trujillo in the [facility].  Vitals - Patient Reported  Systolic (Patient Reported): 1.66  Weight (Patient Reported): 56.5 kg (124 lb 8 oz)  Height (Patient Reported): 162.6 cm (5' 4\")  BMI (Based on Pt Reported Ht/Wt): 21.37     Pt  Has been confused today and little energy.   No cardiac symptoms reported per daughter. No chest pain or Sob.     Telephone number of patient:  [phone]      Peg is a 89 year old who is being evaluated via a billable video visit.      How would you like to obtain your AVS? MyChart  If the video visit is dropped, the invitation should be resent by: Text to cell phone: [phone]  Will anyone else be joining your video visit? Yes: daughter [phone]. How would they like to receive their invitation? Text to cell phone: [phone]        Video-Visit Details    Type of service:  Video Visit   Video Start Time: 3:49 PM  Video End Time:4:10 PM    Originating Location (pt. Location): Home    Distant Location (provider location):  On-site  Platform used for Video Visit: [employer]     MARGIE NOTE:  This visit was completed via video due to COVID-19 precautions.  The patient provided consent for a video visit.      I had the pleasure evaluating Haritha Trujillo for ischemic cardiomyopathy.      The patient is a delightful  with the following medical issues:  1.  AFib with AV node ablation and BiV upgrade 01/2015.  She does not have a functional LV lead.  This was shut off due to diaphragmatic stimulation in 06/2020.  Her device reached MICHAEL in 04/2020 and had a successful generator change at that time.  2.  Cardiomyopathy, EF of 30% to 40% in the past; however, last echo showed EF of 45%-50%.  She does suffer from HFpEF symptoms.  3.  Hypertension.  4.  Hyponatremia.  5.  Anxiety.  6.  " History of coronary artery disease with a  of the RCA.  She has extensive left-to-left collaterals arising from the distal circumflex.  She has a 60% eccentric calcified stenosis of both proximal and mid circumflex involving the takeoff of the large obtuse marginal artery.  IFR down both limbs was 1.  She has minimal LAD, left main and ramus disease, and diastolic pressure is 18.  7.  Short-term memory deficit.  Lives independently in a senior living facility.  She has a large extended family and they are very supportive and helping with her care.  They have done a hospice consult in the past, but they were declined due to Peg being high functioning.  8.  Chronic anticoagulation with warfarin    It is my pleasure to have a video visit with Peg with her son and daughter-in-law today.   joined us.  The patient has recently been enrolled in hospice.  The family was kind enough to make today's visit to make sure we are okay with the plan and to say very well.    Many of her medications have been stopped including warfarin, atorvastatin, Fosamax, and lisinopril.  Her isosorbide's been reduced to 30 mg daily and she denies any chest pain or shortness of breath.  She feels very comfortable.    PHYSICAL EXAMINATION:  General:  no apparent distress, normal body habitus, sitting upright.    Chest/Lungs:  no breathing difficulty while speaking.  No audible wheezing.  No cough during conversation.  Extremities:  no apparent cyanosis.  Skin:  no xanthelasma or apparent rash.  Neurologic:  normal arm motion, no tremor.    Psychiatric:  alert and oriented x3, calm demeanor.  The rest of the comprehensive physical examination is deferred due to public health emergency video visit restrictions.           RECOMMENDATIONS:  Enrolled into hospice and will see Peg prn. It has been my pleasure caring for her all of these years.     Kat Peterson, NP, APRN CNP    Thank you for allowing me to participate in the care of your  patient.      Sincerely,     Kat Peterson NP, APRN CNP     [facility]  cc:   NAVYA Shilpey CNP  [address]  [place] [id number]

## 2023-01-12 NOTE — PROGRESS NOTES
"Vitals - Patient Reported  Systolic (Patient Reported): 1.66  Weight (Patient Reported): 56.5 kg (124 lb 8 oz)  Height (Patient Reported): 162.6 cm (5' 4\")  BMI (Based on Pt Reported Ht/Wt): 21.37     Pt  Has been confused today and little energy.   No cardiac symptoms reported per daughter. No chest pain or Sob.     Telephone number of patient:  [phone]      Peg is a 89 year old who is being evaluated via a billable video visit.      How would you like to obtain your AVS? MyChart  If the video visit is dropped, the invitation should be resent by: Text to cell phone: [phone]  Will anyone else be joining your video visit? Yes: daughter [phone]. How would they like to receive their invitation? Text to cell phone: [phone]        Video-Visit Details    Type of service:  Video Visit   Video Start Time: 3:49 PM  Video End Time:4:10 PM    Originating Location (pt. Location): Home    Distant Location (provider location):  On-site  Platform used for Video Visit: [employer]     MARGIE NOTE:  This visit was completed via video due to COVID-19 precautions.  The patient provided consent for a video visit.      I had the pleasure evaluating Haritha Trujillo for ischemic cardiomyopathy.      The patient is a delightful  with the following medical issues:  1.  AFib with AV node ablation and BiV upgrade 01/2015.  She does not have a functional LV lead.  This was shut off due to diaphragmatic stimulation in 06/2020.  Her device reached MICHAEL in 04/2020 and had a successful generator change at that time.  2.  Cardiomyopathy, EF of 30% to 40% in the past; however, last echo showed EF of 45%-50%.  She does suffer from HFpEF symptoms.  3.  Hypertension.  4.  Hyponatremia.  5.  Anxiety.  6.  History of coronary artery disease with a  of the RCA.  She has extensive left-to-left collaterals arising from the distal circumflex.  She has a 60% eccentric calcified stenosis of both proximal and mid circumflex involving the " takeoff of the large obtuse marginal artery.  IFR down both limbs was 1.  She has minimal LAD, left main and ramus disease, and diastolic pressure is 18.  7.  Short-term memory deficit.  Lives independently in a senior living facility.  She has a large extended family and they are very supportive and helping with her care.  They have done a hospice consult in the past, but they were declined due to Peg being high functioning.  8.  Chronic anticoagulation with warfarin    It is my pleasure to have a video visit with Peg with her son and daughter-in-law today.   joined us.  The patient has recently been enrolled in hospice.  The family was kind enough to make today's visit to make sure we are okay with the plan and to say very well.    Many of her medications have been stopped including warfarin, atorvastatin, Fosamax, and lisinopril.  Her isosorbide's been reduced to 30 mg daily and she denies any chest pain or shortness of breath.  She feels very comfortable.    PHYSICAL EXAMINATION:  General:  no apparent distress, normal body habitus, sitting upright.    Chest/Lungs:  no breathing difficulty while speaking.  No audible wheezing.  No cough during conversation.  Extremities:  no apparent cyanosis.  Skin:  no xanthelasma or apparent rash.  Neurologic:  normal arm motion, no tremor.    Psychiatric:  alert and oriented x3, calm demeanor.  The rest of the comprehensive physical examination is deferred due to public health emergency video visit restrictions.           RECOMMENDATIONS:  Enrolled into hospice and will see Peg prn. It has been my pleasure caring for her all of these years.     Kat Peterson, NP, APRN CNP

## 2023-02-13 ENCOUNTER — MYC MEDICAL ADVICE (OUTPATIENT)
Dept: CARDIOLOGY | Facility: CLINIC | Age: 88
End: 2023-02-13
Payer: COMMERCIAL

## (undated) DEVICE — INTRODUCER GUIDEWIRE 22290

## (undated) DEVICE — SLEEVE TR BAND RADIAL COMPRESSION DEVICE 24CM TRB24-REG

## (undated) DEVICE — WIRE GUIDE 0.035"X260CM SAFE-T-J EXCHANGE G00517

## (undated) DEVICE — INTRO GLIDESHEATH SLENDER 6FR 10X45CM 60-1060

## (undated) DEVICE — BLADE ESU PLASMABLADE SPATULA TIP 4MM PS200-040

## (undated) DEVICE — CATH ANGIO INFINITI PIGTAIL 145 6 SH 6FRX110CM  534-652S

## (undated) DEVICE — DEFIB PRO-PADZ LVP LQD GEL ADULT 8900-2105-01

## (undated) DEVICE — CATH ANGIO JUDKINS R4 6FRX100CM INFINITI 534621T

## (undated) DEVICE — GW VASC OMNIWIRE J L185CM PRESSURE 89185J

## (undated) DEVICE — KIT HAND CONTROL ANGIOTOUCH ACIST 65CM AT-P65

## (undated) DEVICE — MANIFOLD KIT ANGIO AUTOMATED 014613

## (undated) DEVICE — CATH ANGIO JUDKINS JL4 6FRX100CM INFINITI 534620T

## (undated) DEVICE — PACK PCMKR PERM SRG PROC LF SAN32PC573

## (undated) DEVICE — SMART CAPNOLINE H PLUS, ADULT/INTERMEDIATE O2, LONG

## (undated) DEVICE — CABLE PACING ALLIGATOR CLIP 12FT 5833SL

## (undated) DEVICE — CATH LAUNCHER 6FR LA6EBU375

## (undated) DEVICE — TOTE ANGIO CORP PC15AT SAN32CC83O

## (undated) DEVICE — KIT WRENCH 5873W

## (undated) RX ORDER — HEPARIN SODIUM 200 [USP'U]/100ML
INJECTION, SOLUTION INTRAVENOUS
Status: DISPENSED
Start: 2021-10-19

## (undated) RX ORDER — REGADENOSON 0.08 MG/ML
INJECTION, SOLUTION INTRAVENOUS
Status: DISPENSED
Start: 2021-10-18

## (undated) RX ORDER — CEFAZOLIN SODIUM 2 G/100ML
INJECTION, SOLUTION INTRAVENOUS
Status: DISPENSED
Start: 2020-04-14

## (undated) RX ORDER — VERAPAMIL HYDROCHLORIDE 2.5 MG/ML
INJECTION, SOLUTION INTRAVENOUS
Status: DISPENSED
Start: 2021-10-19

## (undated) RX ORDER — NITROGLYCERIN 5 MG/ML
VIAL (ML) INTRAVENOUS
Status: DISPENSED
Start: 2021-10-19

## (undated) RX ORDER — AMINOPHYLLINE 25 MG/ML
INJECTION, SOLUTION INTRAVENOUS
Status: DISPENSED
Start: 2021-10-18

## (undated) RX ORDER — REGADENOSON 0.08 MG/ML
INJECTION, SOLUTION INTRAVENOUS
Status: DISPENSED
Start: 2018-07-27

## (undated) RX ORDER — FENTANYL CITRATE 50 UG/ML
INJECTION, SOLUTION INTRAMUSCULAR; INTRAVENOUS
Status: DISPENSED
Start: 2021-10-19

## (undated) RX ORDER — BUPIVACAINE HYDROCHLORIDE 2.5 MG/ML
INJECTION, SOLUTION EPIDURAL; INFILTRATION; INTRACAUDAL
Status: DISPENSED
Start: 2020-04-14

## (undated) RX ORDER — HEPARIN SODIUM 1000 [USP'U]/ML
INJECTION, SOLUTION INTRAVENOUS; SUBCUTANEOUS
Status: DISPENSED
Start: 2021-10-19

## (undated) RX ORDER — FENTANYL CITRATE 50 UG/ML
INJECTION, SOLUTION INTRAMUSCULAR; INTRAVENOUS
Status: DISPENSED
Start: 2020-04-14

## (undated) RX ORDER — LIDOCAINE HYDROCHLORIDE 10 MG/ML
INJECTION, SOLUTION EPIDURAL; INFILTRATION; INTRACAUDAL; PERINEURAL
Status: DISPENSED
Start: 2021-10-19

## (undated) RX ORDER — LIDOCAINE HYDROCHLORIDE 10 MG/ML
INJECTION, SOLUTION EPIDURAL; INFILTRATION; INTRACAUDAL; PERINEURAL
Status: DISPENSED
Start: 2020-04-14